# Patient Record
Sex: FEMALE | Race: BLACK OR AFRICAN AMERICAN | Employment: OTHER | URBAN - METROPOLITAN AREA
[De-identification: names, ages, dates, MRNs, and addresses within clinical notes are randomized per-mention and may not be internally consistent; named-entity substitution may affect disease eponyms.]

---

## 2017-07-20 ENCOUNTER — HOSPITAL ENCOUNTER (OUTPATIENT)
Dept: MAMMOGRAPHY | Age: 67
Discharge: HOME | End: 2017-07-20
Attending: INTERNAL MEDICINE
Payer: COMMERCIAL

## 2017-07-20 DIAGNOSIS — Z12.31 VISIT FOR SCREENING MAMMOGRAM: ICD-10-CM

## 2017-07-20 PROCEDURE — 77063 BREAST TOMOSYNTHESIS BI: CPT

## 2018-07-31 ENCOUNTER — HOSPITAL ENCOUNTER (OUTPATIENT)
Dept: MAMMOGRAPHY | Age: 68
Discharge: HOME | End: 2018-07-31
Attending: OBSTETRICS & GYNECOLOGY
Payer: COMMERCIAL

## 2018-07-31 DIAGNOSIS — Z12.31 VISIT FOR SCREENING MAMMOGRAM: ICD-10-CM

## 2018-07-31 PROCEDURE — 77063 BREAST TOMOSYNTHESIS BI: CPT

## 2019-08-09 ENCOUNTER — HOSPITAL ENCOUNTER (OUTPATIENT)
Dept: MAMMOGRAPHY | Age: 69
Discharge: HOME | End: 2019-08-09
Attending: INTERNAL MEDICINE
Payer: COMMERCIAL

## 2019-08-09 DIAGNOSIS — Z12.31 VISIT FOR SCREENING MAMMOGRAM: ICD-10-CM

## 2019-08-09 PROCEDURE — 77063 BREAST TOMOSYNTHESIS BI: CPT

## 2020-09-22 ENCOUNTER — HOSPITAL ENCOUNTER (OUTPATIENT)
Dept: MAMMOGRAPHY | Age: 70
Discharge: HOME | End: 2020-09-22
Attending: INTERNAL MEDICINE
Payer: COMMERCIAL

## 2020-09-22 DIAGNOSIS — Z12.31 VISIT FOR SCREENING MAMMOGRAM: ICD-10-CM

## 2020-09-22 PROCEDURE — 77063 BREAST TOMOSYNTHESIS BI: CPT

## 2021-09-23 ENCOUNTER — TRANSCRIBE ORDER (OUTPATIENT)
Dept: SCHEDULING | Age: 71
End: 2021-09-23

## 2021-09-23 DIAGNOSIS — Z12.31 VISIT FOR SCREENING MAMMOGRAM: Primary | ICD-10-CM

## 2021-09-24 ENCOUNTER — HOSPITAL ENCOUNTER (OUTPATIENT)
Dept: MAMMOGRAPHY | Age: 71
Discharge: HOME | End: 2021-09-24
Attending: INTERNAL MEDICINE
Payer: COMMERCIAL

## 2021-09-24 DIAGNOSIS — Z12.31 VISIT FOR SCREENING MAMMOGRAM: ICD-10-CM

## 2021-09-24 PROCEDURE — 77063 BREAST TOMOSYNTHESIS BI: CPT

## 2022-01-13 ENCOUNTER — TRANSCRIBE ORDER (OUTPATIENT)
Dept: SCHEDULING | Age: 72
End: 2022-01-13

## 2022-01-13 DIAGNOSIS — M48.061 LUMBAR SPINAL STENOSIS: Primary | ICD-10-CM

## 2022-01-16 ENCOUNTER — HOSPITAL ENCOUNTER (OUTPATIENT)
Age: 72
Discharge: HOME | End: 2022-01-16
Attending: PHYSICIAN ASSISTANT
Payer: MEDICARE

## 2022-01-16 DIAGNOSIS — M48.061 LUMBAR SPINAL STENOSIS: ICD-10-CM

## 2022-01-16 PROCEDURE — 72148 MRI LUMBAR SPINE W/O DYE: CPT

## 2022-04-05 ENCOUNTER — TRANSCRIBE ORDER (OUTPATIENT)
Dept: SCHEDULING | Age: 72
End: 2022-04-05

## 2022-04-05 DIAGNOSIS — G81.92: ICD-10-CM

## 2022-04-05 DIAGNOSIS — R53.1 WEAKNESS: Primary | ICD-10-CM

## 2022-04-12 ENCOUNTER — HOSPITAL ENCOUNTER (OUTPATIENT)
Dept: CT IMAGING | Age: 72
Discharge: HOME | End: 2022-04-12
Attending: NURSE PRACTITIONER
Payer: MEDICARE

## 2022-04-12 DIAGNOSIS — R53.1 WEAKNESS: ICD-10-CM

## 2022-04-12 DIAGNOSIS — G81.92: ICD-10-CM

## 2022-04-12 PROCEDURE — 70450 CT HEAD/BRAIN W/O DYE: CPT

## 2022-06-22 RX ORDER — LATANOPROST 50 UG/ML
1 SOLUTION/ DROPS OPHTHALMIC EVERY EVENING
COMMUNITY
Start: 2021-03-11 | End: 2022-09-13

## 2022-06-22 RX ORDER — BISMUTH SUBSALICYLATE 262 MG
1 TABLET,CHEWABLE ORAL DAILY
COMMUNITY

## 2022-06-22 RX ORDER — METFORMIN HYDROCHLORIDE 500 MG/1
500 TABLET ORAL DAILY
COMMUNITY

## 2022-06-22 RX ORDER — TRIAMTERENE AND HYDROCHLOROTHIAZIDE 37.5; 25 MG/1; MG/1
1 CAPSULE ORAL DAILY
COMMUNITY
End: 2022-10-04

## 2022-06-22 RX ORDER — NAPROXEN SODIUM 220 MG
1 TABLET ORAL 2 TIMES DAILY
COMMUNITY

## 2022-06-22 RX ORDER — CYCLOBENZAPRINE HCL 5 MG
5 TABLET ORAL
COMMUNITY

## 2022-06-22 RX ORDER — PREGABALIN 75 MG/1
75 CAPSULE ORAL 2 TIMES DAILY
COMMUNITY
End: 2022-09-13

## 2022-06-22 RX ORDER — SIMVASTATIN 40 MG/1
40 TABLET, FILM COATED ORAL
COMMUNITY
End: 2022-10-04

## 2022-06-23 ENCOUNTER — ANESTHESIA EVENT (OUTPATIENT)
Dept: ENDOSCOPY | Age: 72
End: 2022-06-23
Payer: MEDICARE

## 2022-06-24 ENCOUNTER — ANESTHESIA (OUTPATIENT)
Dept: ENDOSCOPY | Age: 72
End: 2022-06-24
Payer: MEDICARE

## 2022-06-24 ENCOUNTER — HOSPITAL ENCOUNTER (OUTPATIENT)
Age: 72
Setting detail: OUTPATIENT SURGERY
Discharge: HOME | End: 2022-06-24
Attending: INTERNAL MEDICINE | Admitting: INTERNAL MEDICINE
Payer: MEDICARE

## 2022-06-24 VITALS
TEMPERATURE: 97.1 F | WEIGHT: 178.4 LBS | OXYGEN SATURATION: 99 % | HEIGHT: 66 IN | HEART RATE: 79 BPM | RESPIRATION RATE: 12 BRPM | SYSTOLIC BLOOD PRESSURE: 117 MMHG | DIASTOLIC BLOOD PRESSURE: 60 MMHG | BODY MASS INDEX: 28.67 KG/M2

## 2022-06-24 LAB — GLUCOSE BLD STRIP.AUTO-MCNC: 109 MG/DL (ref 70–110)

## 2022-06-24 PROCEDURE — 99100 ANES PT EXTEME AGE<1 YR&>70: CPT | Performed by: ANESTHESIOLOGY

## 2022-06-24 PROCEDURE — 2709999900 HC NON-CHARGEABLE SUPPLY: Performed by: INTERNAL MEDICINE

## 2022-06-24 PROCEDURE — 76060000031 HC ANESTHESIA FIRST 0.5 HR: Performed by: INTERNAL MEDICINE

## 2022-06-24 PROCEDURE — 74011250637 HC RX REV CODE- 250/637: Performed by: NURSE ANESTHETIST, CERTIFIED REGISTERED

## 2022-06-24 PROCEDURE — 00812 ANES LWR INTST SCR COLSC: CPT | Performed by: NURSE ANESTHETIST, CERTIFIED REGISTERED

## 2022-06-24 PROCEDURE — 82962 GLUCOSE BLOOD TEST: CPT

## 2022-06-24 PROCEDURE — 76040000019: Performed by: INTERNAL MEDICINE

## 2022-06-24 PROCEDURE — 74011250636 HC RX REV CODE- 250/636: Performed by: NURSE ANESTHETIST, CERTIFIED REGISTERED

## 2022-06-24 PROCEDURE — 00812 ANES LWR INTST SCR COLSC: CPT | Performed by: ANESTHESIOLOGY

## 2022-06-24 PROCEDURE — 99100 ANES PT EXTEME AGE<1 YR&>70: CPT | Performed by: NURSE ANESTHETIST, CERTIFIED REGISTERED

## 2022-06-24 PROCEDURE — 77030008565 HC TBNG SUC IRR ERBE -B: Performed by: INTERNAL MEDICINE

## 2022-06-24 RX ORDER — INSULIN LISPRO 100 [IU]/ML
INJECTION, SOLUTION INTRAVENOUS; SUBCUTANEOUS ONCE
Status: CANCELLED | OUTPATIENT
Start: 2022-06-24 | End: 2022-06-24

## 2022-06-24 RX ORDER — INSULIN LISPRO 100 [IU]/ML
INJECTION, SOLUTION INTRAVENOUS; SUBCUTANEOUS ONCE
Status: DISCONTINUED | OUTPATIENT
Start: 2022-06-24 | End: 2022-06-24 | Stop reason: HOSPADM

## 2022-06-24 RX ORDER — FAMOTIDINE 20 MG/1
20 TABLET, FILM COATED ORAL ONCE
Status: COMPLETED | OUTPATIENT
Start: 2022-06-24 | End: 2022-06-24

## 2022-06-24 RX ORDER — SODIUM CHLORIDE 0.9 % (FLUSH) 0.9 %
5-40 SYRINGE (ML) INJECTION EVERY 8 HOURS
Status: CANCELLED | OUTPATIENT
Start: 2022-06-24

## 2022-06-24 RX ORDER — PROPOFOL 10 MG/ML
INJECTION, EMULSION INTRAVENOUS AS NEEDED
Status: DISCONTINUED | OUTPATIENT
Start: 2022-06-24 | End: 2022-06-24 | Stop reason: HOSPADM

## 2022-06-24 RX ORDER — MAGNESIUM SULFATE 100 %
4 CRYSTALS MISCELLANEOUS AS NEEDED
Status: CANCELLED | OUTPATIENT
Start: 2022-06-24

## 2022-06-24 RX ORDER — SODIUM CHLORIDE, SODIUM LACTATE, POTASSIUM CHLORIDE, CALCIUM CHLORIDE 600; 310; 30; 20 MG/100ML; MG/100ML; MG/100ML; MG/100ML
25 INJECTION, SOLUTION INTRAVENOUS CONTINUOUS
Status: DISCONTINUED | OUTPATIENT
Start: 2022-06-24 | End: 2022-06-24 | Stop reason: HOSPADM

## 2022-06-24 RX ORDER — SODIUM CHLORIDE 0.9 % (FLUSH) 0.9 %
5-40 SYRINGE (ML) INJECTION AS NEEDED
Status: CANCELLED | OUTPATIENT
Start: 2022-06-24

## 2022-06-24 RX ORDER — ONDANSETRON 2 MG/ML
4 INJECTION INTRAMUSCULAR; INTRAVENOUS ONCE
Status: CANCELLED | OUTPATIENT
Start: 2022-06-24 | End: 2022-06-24

## 2022-06-24 RX ADMIN — FAMOTIDINE 20 MG: 20 TABLET ORAL at 10:21

## 2022-06-24 RX ADMIN — SODIUM CHLORIDE, POTASSIUM CHLORIDE, SODIUM LACTATE AND CALCIUM CHLORIDE 25 ML/HR: 600; 310; 30; 20 INJECTION, SOLUTION INTRAVENOUS at 10:27

## 2022-06-24 RX ADMIN — PROPOFOL 50 MG: 10 INJECTION, EMULSION INTRAVENOUS at 11:06

## 2022-06-24 RX ADMIN — PROPOFOL 80 MG: 10 INJECTION, EMULSION INTRAVENOUS at 11:03

## 2022-06-24 NOTE — ANESTHESIA POSTPROCEDURE EVALUATION
Procedure(s):  COLONOSCOPY. MAC    Anesthesia Post Evaluation      Multimodal analgesia: multimodal analgesia used between 6 hours prior to anesthesia start to PACU discharge  Patient location during evaluation: bedside  Patient participation: complete - patient participated  Level of consciousness: awake  Pain management: adequate  Airway patency: patent  Anesthetic complications: no  Cardiovascular status: stable  Respiratory status: acceptable  Hydration status: acceptable  Post anesthesia nausea and vomiting:  controlled  Final Post Anesthesia Temperature Assessment:  Normothermia (36.0-37.5 degrees C)      INITIAL Post-op Vital signs:   Vitals Value Taken Time   /60 06/24/22 1144   Temp     Pulse 79 06/24/22 1149   Resp 12 06/24/22 1149   SpO2 99 % 06/24/22 1149   Vitals shown include unvalidated device data.

## 2022-06-24 NOTE — ANESTHESIA PREPROCEDURE EVALUATION
Relevant Problems   No relevant active problems       Anesthetic History     PONV          Review of Systems / Medical History  Patient summary reviewed, nursing notes reviewed and pertinent labs reviewed    Pulmonary  Within defined limits                 Neuro/Psych   Within defined limits           Cardiovascular    Hypertension              Exercise tolerance: >4 METS     GI/Hepatic/Renal  Within defined limits              Endo/Other    Diabetes: type 2         Other Findings              Physical Exam    Airway  Mallampati: II  TM Distance: 4 - 6 cm  Neck ROM: normal range of motion   Mouth opening: Normal     Cardiovascular  Regular rate and rhythm,  S1 and S2 normal,  no murmur, click, rub, or gallop  Rhythm: regular  Rate: normal         Dental  No notable dental hx       Pulmonary  Breath sounds clear to auscultation               Abdominal  GI exam deferred       Other Findings            Anesthetic Plan    ASA: 3  Anesthesia type: MAC          Induction: Intravenous  Anesthetic plan and risks discussed with: Patient

## 2022-06-24 NOTE — H&P
[facility]   [email or web address]      Impression:   1. FHx of colon CA      Plan:   colo  1. Chief Complaint:   FHx of colon CA    HPI:  Kobi Shoemaker is a 66 y.o. female who is being seen on consult for the above. No Sxs, last colo > 5 yr ago. Mago Neighbours PMH:   Past Medical History:   Diagnosis Date    Diabetes ([facility] [address]     NIDDM    Hypertension     Nausea & vomiting     Surgery only not colonoscopy       PSH:   Past Surgical History:   Procedure Laterality Date    HX COLONOSCOPY      HX TUBAL LIGATION         Social HX:   Social History     Socioeconomic History    Marital status: SINGLE     Spouse name: Not on file    Number of children: Not on file    Years of education: Not on file    Highest education level: Not on file   Occupational History    Not on file   Tobacco Use    Smoking status: Never Smoker    Smokeless tobacco: Never Used   Vaping Use    Vaping Use: Never used   Substance and Sexual Activity    Alcohol use: Never    Drug use: Never    Sexual activity: Not on file   Other Topics Concern    Not on file   Social History Narrative    Not on file     Social Determinants of Health     Financial Resource Strain:     Difficulty of Paying Living Expenses: Not on file   Food Insecurity:     Worried About [address] in the Last Year: Not on file    [address] in the Last Year: Not on file   Transportation Needs:     Lack of Transportation (Medical): Not on file    Lack of Transportation (Non-Medical):  Not on file   Physical Activity:     Days of Exercise per Week: Not on file    Minutes of Exercise per Session: Not on file   Stress:     Feeling of Stress : Not on file   Social Connections:     Frequency of Communication with Friends and Family: Not on file    Frequency of Social Gatherings with Friends and Family: Not on file    Attends Protestant Services: Not on file    Active Member of Clubs or Organizations: Not on file    Attends Club or Organization Meetings: Not on file    Marital Status: Not on file   Intimate Partner Violence:     Fear of Current or Ex-Partner: Not on file    Emotionally Abused: Not on file    Physically Abused: Not on file    Sexually Abused: Not on file   Housing Stability:     Unable to Pay for Housing in the Last Year: Not on file    Number of Aly in the Last Year: Not on file    Unstable Housing in the Last Year: Not on file       FHX:   Family History   Problem Relation Age of Onset    Breast Cancer Mother 80       Allergy:   Allergies   Allergen Reactions    Codeine Nausea and Vomiting    Penicillin G Swelling       Home Medications:     Medications Prior to Admission   Medication Sig    metFORMIN (GLUCOPHAGE) 500 mg tablet Take 500 mg by mouth daily.  simvastatin (ZOCOR) 40 mg tablet Take 40 mg by mouth nightly.  triamterene-hydroCHLOROthiazide (DYAZIDE) 37.5-25 mg per capsule Take 1 Capsule by mouth daily.  latanoprost (XALATAN) 0.005 % ophthalmic solution Administer 1 Drop to both eyes every evening.  pregabalin (Lyrica) 75 mg capsule Take 75 mg by mouth two (2) times a day.  naproxen sodium (NAPROSYN) 220 mg tablet Take 1 Tablet by mouth two (2) times a day.  cyclobenzaprine (FLEXERIL) 5 mg tablet Take 5 mg by mouth three (3) times daily as needed.  multivitamin (ONE A DAY) tablet Take 1 Tablet by mouth daily. Review of Systems:     Constitutional: No fevers, chills, weight loss, fatigue. Skin: No rashes, pruritis, jaundice, ulcerations, erythema. HENT: No headaches, nosebleeds, sinus pressure, rhinorrhea, sore throat. Eyes: No visual changes, blurred vision, eye pain, photophobia, jaundice. Cardiovascular: No chest pain, heart palpitations. Respiratory: No cough, SOB, wheezing, chest discomfort, orthopnea. Gastrointestinal: neg   Genitourinary: No dysuria, bleeding, discharge, pyuria.    Musculoskeletal: No weakness, arthralgias, wasting. Endo: No sweats. Heme: No bruising, easy bleeding. Allergies: As noted. Neurological: Cranial nerves intact. Alert and oriented. Gait not assessed. Psychiatric:  No anxiety, depression, hallucinations. Visit Vitals  Ht 5' 6\" (1.676 m)   Wt 82.1 kg (181 lb)   BMI 29.21 kg/m²       Physical Assessment:     constitutional: appearance: well developed, well nourished, normal habitus, no deformities, in no acute distress. skin: inspection: no rashes, ulcers, icterus or other lesions; no clubbing or telangiectasias. palpation: no induration or subcutaneos nodules. eyes: inspection: normal conjunctivae and lids; no jaundice pupils: symmetrical, normoreactive to light, normal accommodation and size. ENMT: mouth: normal oral mucosa,lips and gums; good dentition. oropharynx: normal tongue, hard and soft palate; posterior pharynx without erithema, exudate or lesions. neck: thyroid: normal size, consistency and position; no masses or tenderness. respiratory: effort: normal chest excursion; no intercostal retraction or accessory muscle use. cardiovascular: abdominal aorta: normal size and position; no bruits. palpation: PMI of normal size and position; normal rhythm; no thrill or murmurs. abdominal: abdomen: normal consistency; no tenderness or masses. hernias: no hernias appreciated. liver: normal size and consistency. spleen: not palpable. rectal: hemoccult/guaiac: not performed. musculoskeletal: digits and nails: no clubbing, cyanosis, petechiae or other inflammatory conditions. gait: normal gait and station head and neck: normal range of motion; no pain, crepitation or contracture. spine/ribs/pelvis: normal range of motion; no pain, deformity or contracture. lymphatic: axilae: not palpable. groin: not palpable. neck: within normal limits. other: not palpable. neurologic: cranial nerves: II-XII normal.   psychiatric: judgement/insight: within normal limits.  memory: within normal limits for recent and remote events. mood and affect: no evidence of depression, anxiety or agitation. orientation: oriented to time, space and person. Basic Metabolic Profile   No results for input(s): NA, K, CL, CO2, BUN, GLU, CA, MG, PHOS in the last 72 hours. No lab exists for component: CREAT      CBC w/Diff    No results for input(s): WBC, RBC, HGB, HCT, MCV, MCH, MCHC, RDW, PLT, HGBEXT, HCTEXT, PLTEXT in the last 72 hours. No lab exists for component: MPV No results for input(s): GRANS, LYMPH, EOS, PRO, MYELO, METAS, BLAST in the last 72 hours. No lab exists for component: MONO, BASO     Hepatic Function   No results for input(s): ALB, TP, TBILI, AP, AML, LPSE in the last 72 hours. No lab exists for component: DBILI, GPT, SGOT       Francis Caraballo MD, M.D. [facility], [address] [place] [address]  [email or web address]. [facility]

## 2022-06-24 NOTE — DISCHARGE INSTRUCTIONS
Colonoscopy: What to Expect at [address] [place] [address]  After a colonoscopy, you'll stay at the clinic until you wake up. Then you can go home. But you'll need to arrange for a ride. Your doctor will tell you when you can eat and do your other usual activities. Your doctor will talk to you about when you'll need your next colonoscopy. Your doctor can help you decide how often you need to be checked. This will depend on the results of your test and your risk for colorectal cancer. After the test, you may be bloated or have gas pains. You may need to pass gas. If a biopsy was done or a polyp was removed, you may have streaks of blood in your stool (feces) for a few days. Problems such as heavy rectal bleeding may not occur until several weeks after the test. This isn't common. But it can happen after polyps are removed. This care sheet gives you a general idea about how long it will take for you to recover. But each person recovers at a different pace. Follow the steps below to get better as quickly as possible. How can you care for yourself at home? Activity    · Rest when you feel tired. · You can do your normal activities when it feels okay to do so. Diet    · Follow your doctor's directions for eating. · Unless your doctor has told you not to, drink plenty of fluids. This helps to replace the fluids that were lost during the colon prep. · Do not drink alcohol. Medicines    · Your doctor will tell you if and when you can restart your medicines. You will also be given instructions about taking any new medicines. · If you take aspirin or some other blood thinner, ask your doctor if and when to start taking it again. Make sure that you understand exactly what your doctor wants you to do. · If polyps were removed or a biopsy was done during the test, your doctor may tell you not to take aspirin or other anti-inflammatory medicines for a few days.  These include ibuprofen (Advil, Motrin) and naproxen (Aleve). Other instructions    · For your safety, do not drive or operate machinery until the medicine wears off and you can think clearly. Your doctor may tell you not to drive or operate machinery until the day after your test.     · Do not sign legal documents or make major decisions until the medicine wears off and you can think clearly. The anesthesia can make it hard for you to fully understand what you are agreeing to. Follow-up care is a key part of your treatment and safety. Be sure to make and go to all appointments, and call your doctor if you are having problems. It's also a good idea to know your test results and keep a list of the medicines you take. When should you call for help? Call 911 anytime you think you may need emergency care. For example, call if:    · You passed out (lost consciousness). · You pass maroon or bloody stools. · You have trouble breathing. Call your doctor now or seek immediate medical care if:    · You have pain that does not get better after you take pain medicine. · You are sick to your stomach or cannot drink fluids. · You have new or worse belly pain. · You have blood in your stools. · You have a fever. · You cannot pass stools or gas. Watch closely for changes in your health, and be sure to contact your doctor if you have any problems. Where can you learn more? Go to http://www.gray.com/  Enter E264 in the search box to learn more about \"Colonoscopy: What to Expect at Home. \"  Current as of: September 8, 2021               Content Version: 13.2  © 2006-2022 Healthwise, Incorporated. Care instructions adapted under license by [employer] (which disclaims liability or warranty for this information).  If you have questions about a medical condition or this instruction, always ask your healthcare professional. [place] [address] any warranty or liability for your use of this information. DISCHARGE SUMMARY from Nurse     POST-PROCEDURE INSTRUCTIONS:    Call your Physician if you:  ? Observe any excess bleeding. ? Develop a temperature over 100.5o F.  ? Experience abdominal, shoulder or chest pain. ? Notice any signs of decreased circulation or nerve impairment to an extremity such as a change in color, persistent numbness, tingling, coldness or increase in pain. ? Vomit blood or you have nausea and vomiting lasting longer than 4 hours. ? Are unable to take medications. ? Are unable to urinate within 8 hours after discharge following general anesthesia or intravenous sedation. For the next 24 hours after receiving general anesthesia or intravenous sedation, or while taking prescription Narcotics, limit your activities:  ? Do NOT drive a motor vehicle, operate hazard machinery or power tools, or perform tasks that require coordination. The medication you received during your procedure may have some effect on your mental awareness. ? Do NOT make important personal or business decisions. The medication you received during your procedure may have some effect on your mental awareness. ? Do NOT drink alcoholic beverages. These drinks do not mix well with the medications that have been given to you. ? Upon discharge from the hospital, you must be accompanied by a responsible adult. ? Resume your diet as directed by your physician. ? Resume medications as your physician has prescribed. ? Please give a list of your current medications to your Primary Care Provider. ? Please update this list whenever your medications are discontinued, doses are changed, or new medications (including over-the-counter products) are added. ? Please carry medication information at all times in case of emergency situations. These are general instructions for a healthy lifestyle:    No smoking/ No tobacco products/ Avoid exposure to second hand smoke.    Surgeon General's Warning: Quitting smoking now greatly reduces serious risk to your health. Obesity, smoking, and a sedentary lifestyle greatly increase your risk for illness.  A healthy diet, regular physical exercise & weight monitoring are important for maintaining a healthy lifestyle   You may be retaining fluid if you have a history of heart failure or if you experience any of the following symptoms:  Weight gain of 3 pounds or more overnight or 5 pounds in a week, increased swelling in our hands or feet or shortness of breath while lying flat in bed. Please call your doctor as soon as you notice any of these symptoms; do not wait until your next office visit. Recognize signs and symptoms of STROKE:  F  -  Face looks uneven  A  -  Arms unable to move or move unevenly  S  -  Speech slurred or non-existent  T  -  Time to call 911 - as soon as signs and symptoms begin - DO NOT go back to bed or wait to see If you get better - TIME IS BRAIN. Colorectal Screening   Colorectal cancer almost always develops from precancerous polyps (abnormal growths) in the colon or rectum. Screening tests can find precancerous polyps, so that they can be removed before they turn into cancer. Screening tests can also find colorectal cancer early, when treatment works best.  Hilda Manual Speak with your physician about when you should begin screening and how often you should be tested. [employer] Activation    Thank you for requesting access to [employer]. Please follow the instructions below to securely access and download your online medical record. [employer] allows you to send messages to your doctor, view your test results, renew your prescriptions, schedule appointments, and more. How Do I Sign Up? 1. In your internet browser, go to [email or web address]. 2. Click on the First Time User? Click Here link in the Sign In box. You will see the New Member Sign Up page.   3. Enter your [employer] Access Code exactly as it appears below. You will not need to use this code after youve completed the sign-up process. If you do not sign up before the expiration date, you must request a new code. [employer] Access Code: Activation code not generated  Current [employer] Status: Active (This is the date your [employer] access code will )    4. Enter the last four digits of your Social Security Number (xxxx) and Date of Birth (mm/dd/yyyy) as indicated and click Submit. You will be taken to the next sign-up page. 5. Create a [employer] ID. This will be your [employer] login ID and cannot be changed, so think of one that is secure and easy to remember. 6. Create a [employer] password. You can change your password at any time. 7. Enter your Password Reset Question and Answer. This can be used at a later time if you forget your password. 8. Enter your e-mail address. You will receive e-mail notification when new information is available in [address]. 9. Click Sign Up. You can now view and download portions of your medical record. 10. Click the Download Summary menu link to download a portable copy of your medical information. Additional Information    If you have questions, please call [phone]. Remember, [employer] is NOT to be used for urgent needs. For medical emergencies, dial 911. Educational references and/or instructions provided during this visit included:    See Attached      APPOINTMENTS:    Per MD Instruction    Discharge information has been reviewed with the patient. The patient verbalized understanding.

## 2022-07-19 ENCOUNTER — TRANSCRIBE ORDER (OUTPATIENT)
Dept: SCHEDULING | Age: 72
End: 2022-07-19

## 2022-07-19 DIAGNOSIS — Z13.820 SCREENING FOR OSTEOPOROSIS: Primary | ICD-10-CM

## 2022-07-19 DIAGNOSIS — M48.061 LUMBAR SPINAL STENOSIS: Primary | ICD-10-CM

## 2022-07-19 DIAGNOSIS — Z79.83 ENCOUNTER FOR LONG-TERM USE OF BIOPHOSPHONATES: ICD-10-CM

## 2022-07-21 ENCOUNTER — HOSPITAL ENCOUNTER (OUTPATIENT)
Dept: PHYSICAL THERAPY | Age: 72
Discharge: HOME | End: 2022-07-21
Payer: MEDICARE

## 2022-07-21 PROCEDURE — 97162 PT EVAL MOD COMPLEX 30 MIN: CPT

## 2022-07-21 PROCEDURE — 97110 THERAPEUTIC EXERCISES: CPT

## 2022-07-21 NOTE — PROGRESS NOTES
PT DAILY TREATMENT NOTE     Patient Name: Arnie Bell  Date:2022  : 1950  [x]  Patient  Verified  Payor: Viky Gar / Plan: [place] & B / Product Type: Medicare /    In time:12:00  Out time:12:38  Total Treatment Time (min): 38  Visit #: 1 of 8    Medicare/BCBS Only   Total Timed Codes (min):  18 1:1 Treatment Time:  38       Treatment Area: Other low back pain [M54.59]    SUBJECTIVE  Pain Level (0-10 scale): 6  Any medication changes, allergies to medications, adverse drug reactions, diagnosis change, or new procedure performed?: [x] No    [] Yes (see summary sheet for update)  Subjective functional status/changes:   [] No changes reported  The pt reports difficulty with standing and falls    OBJECTIVE    20 min [x]Eval                  []Re-Eval       10 min Therapeutic Exercise:  [] See flow sheet :   Rationale: increase ROM and increase strength to improve the patients ability to perform ADLs    8 min Therapeutic Activity:  []  See flow sheet :   Rationale:  pt education and instruction   to improve the patients ability to self manage symptoms            With   [] TE   [] TA   [] neuro   [] other: Patient Education: [x] Review HEP    [] Progressed/Changed HEP based on:   [] positioning   [] body mechanics   [] transfers   [] heat/ice application    [] other:      Other Objective/Functional Measures:      Pain Level (0-10 scale) post treatment: 6    ASSESSMENT/Changes in Function: see POC    Patient will continue to benefit from skilled PT services to modify and progress therapeutic interventions, address functional mobility deficits, address ROM deficits, address strength deficits, analyze and address soft tissue restrictions, analyze and cue movement patterns, analyze and modify body mechanics/ergonomics, and address imbalance/dizziness to attain remaining goals.      [x]  See Plan of Care  []  See progress note/recertification  []  See Discharge Summary         Progress towards goals / Updated goals:  Short Term Goals: To be accomplished in 2 weeks:  Pt will demonstrate I and compliance with HEP to maximize therapeutic effect. IE: HEP issued and instructed  Pt will demonstrate Rhomberg stance for 30\" without LOB to improve proprioception with ADLs. IE: challenged without UE support  Long Term Goals: To be accomplished in 4 weeks:  Pt will demonstrate B hip flexion strength 4+/5 to improve ease of transfers. IE: 4/5  Pt will demonstrate B hip abduction strength 4/5 to improve stability with standing. IE: 3/5 right  4-/5 left  Pt will report no falls for 2 weeks to improve safety in home and community. IE: fall 1 week ago  Pt will improve FOTO score to 51 to demonstrate improved quality of life and function.   IE: 28    PLAN  []  Upgrade activities as tolerated     [x]  Continue plan of care  []  Update interventions per flow sheet       []  Discharge due to:_  []  Other:_      [place] Client DPT CMTPT 7/21/2022  2:35 PM    Future Appointments   Date Time Provider Kelley Flores   7/26/2022  2:15 PM [facility], PTA [facility]   7/29/2022  8:30 AM [facility] BONE DEXA  1 [facility]   7/29/2022 10:30 AM [facility], PTA [facility]   8/1/2022 12:30 PM [employer], DPT [facility]   8/3/2022 10:15 AM [facility], DPT [facility]   8/8/2022  9:45 AM [facility], PTA [facility]   8/10/2022  9:45 AM Page [facility], PTA [facility]   8/15/2022 10:45 AM Hong Swanson [facility]

## 2022-07-21 NOTE — PROGRESS NOTES
[facility]  [address]  [place], [address]  [phone] fax    Plan of Care/ Statement of Necessity for Physical Therapy Services    Patient name: Katharina Gant Start of Care: 2022   Referral source: Mario Cornejo : 1950    Medical Diagnosis: Other low back pain [M54.59]  Payor: Christin Greco / Plan: [place] MEDICARE PART A & B / Product Type: Medicare /  Onset Date: (with exacerbations in  and )    Treatment Diagnosis: B LE weakness and instability with gait   Prior Hospitalization: see medical history Provider#: [id number]   Medications: Verified on Patient summary List    Comorbidities: lumbar stenosis, HTN, diabetes   Prior Level of Function: Pt with improved ambulation ease and no falls with ambulation      The Plan of Care and following information is based on the information from the initial evaluation. Assessment/ key information: The pt is a 67 y/o F presenting with c/o B LE weakness and instability with gait for the past year. She reports initial onset around  and was treated at this clinic with good results. Since that time she reports exacerbation in  and then again in  leading to her current state. She has had progression of numbness to now include bilateral LE from knee down. Pt reports new onset of falling due to LE weakness and also UE weakness onset that limits her ability to react. Fairly good UE strength noted though biceps/triceps slightly decreased with pt also reporting intermittent hand numbness throughout the day. Pt is known to have lumbar stenosis contributing to condition, she reports no cervical or brain work up performed though. Pt does demonstrate limited hip strength bilaterally right > left with decreased light touch right > left shank also. Pt reports inability to stand for > 5 minutes without burning back pain and LE weakness. Resulting in multiple falls over the last month. She would like to improve LE strength to improve ambulation and ADLs without falling, while awaiting surgical consult. Pt would benefit from PT to improve ROM, strength and safety with ADLs. Evaluation Complexity History MEDIUM  Complexity : 1-2 comorbidities / personal factors will impact the outcome/ POC ; Examination HIGH Complexity : 4+ Standardized tests and measures addressing body structure, function, activity limitation and / or participation in recreation  ;Presentation MEDIUM Complexity : Evolving with changing characteristics  ; Clinical Decision Making MEDIUM Complexity : FOTO score of 26-74  Overall Complexity Rating: MEDIUM  Problem List: pain affecting function, decrease ROM, decrease strength, impaired gait/ balance, decrease ADL/ functional abilitiies, decrease activity tolerance, and decrease flexibility/ joint mobility   Treatment Plan may include any combination of the following: Therapeutic exercise, Therapeutic activities, Neuromuscular re-education, Physical agent/modality, Gait/balance training, Manual therapy, Patient education, Self Care training, Functional mobility training, and Home safety training  Patient / Family readiness to learn indicated by: asking questions, trying to perform skills, and interest  Persons(s) to be included in education: patient (P)  Barriers to Learning/Limitations: None  Patient Goal (s): Work on weakness to stop falling  Patient Self Reported Health Status: good  Rehabilitation Potential: fair-good    Short Term Goals: To be accomplished in 2 weeks:  Pt will demonstrate I and compliance with HEP to maximize therapeutic effect. Pt will demonstrate Rhomberg stance for 30\" without LOB to improve proprioception with ADLs. Long Term Goals: To be accomplished in 4 weeks:  Pt will demonstrate B hip flexion strength 4+/5 to improve ease of transfers. Pt will demonstrate B hip abduction strength 4/5 to improve stability with standing.   Pt will report no falls for 2 weeks to improve safety in home and community. Pt will improve FOTO score to 51 to demonstrate improved quality of life and function. Frequency / Duration: Patient to be seen 2 times per week for 4 weeks. Patient/ Caregiver education and instruction: Diagnosis, prognosis, self care, activity modification, and exercises   [x]  Plan of care has been reviewed with PTA    Certification Period: 7/21/2022 to 8/19/2022  Ayala Coffey DPT CMTPT 7/21/2022 12:47 PM    ________________________________________________________________________    I certify that the above Therapy Services are being furnished while the patient is under my care. I agree with the treatment plan and certify that this therapy is necessary.     [de-identified] Signature:____________________  Date:____________Time: _________     Amna Parrish PA-C  Please sign and return to [facility] [address] [place] [address] & [address]  [address]  [place], [address]  [phone] fax

## 2022-07-26 ENCOUNTER — HOSPITAL ENCOUNTER (OUTPATIENT)
Dept: PHYSICAL THERAPY | Age: 72
Discharge: HOME | End: 2022-07-26
Payer: MEDICARE

## 2022-07-26 PROCEDURE — 97112 NEUROMUSCULAR REEDUCATION: CPT

## 2022-07-26 PROCEDURE — 97110 THERAPEUTIC EXERCISES: CPT

## 2022-07-26 NOTE — PROGRESS NOTES
PT DAILY TREATMENT NOTE     Patient Name: Flor Champion  Date:2022  : 1950  [x]  Patient  Verified  Payor: Manuel Chaney / Plan: [place] MEDICARE PART A & B / Product Type: Medicare /    In time:2:17  Out time:2:47  Total Treatment Time (min): 30  Visit #: 2 of 8    Medicare/BCBS Only   Total Timed Codes (min):  30 1:1 Treatment Time:  30       Treatment Area: Other low back pain [M54.59]    SUBJECTIVE  Pain Level (0-10 scale): 6/10  Any medication changes, allergies to medications, adverse drug reactions, diagnosis change, or new procedure performed?: [x] No    [] Yes (see summary sheet for update)  Subjective functional status/changes:   [] No changes reported  Pt reports no new complaints of pain. Pt reports no change since IE. Pt states she is compliant with HEP. OBJECTIVE    20 min Therapeutic Exercise:  [x] See flow sheet :   Rationale: increase ROM, increase strength, and improve coordination to improve the patients ability to perform ADLs with increased ease. 10 min Neuromuscular Re-education:  [x]  See flow sheet :   Rationale: increase strength, improve coordination, and increase proprioception  to improve the patients ability to perform ADLs with increased ease. With   [] TE   [] TA   [] neuro   [] other: Patient Education: [x] Review HEP    [] Progressed/Changed HEP based on:   [] positioning   [] body mechanics   [] transfers   [] heat/ice application    [] other:      Other Objective/Functional Measures: Initiated exercises as per flow sheet. Pain Level (0-10 scale) post treatment: 6/10    ASSESSMENT/Changes in Function: Pt had increased symptoms, LE weakness, with standing FA with upright posture for approximately 30 seconds.      Patient will continue to benefit from skilled PT services to modify and progress therapeutic interventions, address functional mobility deficits, address ROM deficits, address strength deficits, analyze and address soft tissue restrictions, analyze and cue movement patterns, analyze and modify body mechanics/ergonomics, and assess and modify postural abnormalities to attain remaining goals. []  See Plan of Care  []  See progress note/recertification  []  See Discharge Summary         Progress towards goals / Updated goals:  Short Term Goals: To be accomplished in 2 weeks:  Pt will demonstrate I and compliance with HEP to maximize therapeutic effect. IE: HEP issued and instructed  Current: Pt reports compliance with HEP. 07/26/2022  Pt will demonstrate Rhomberg stance for 30\" without LOB to improve proprioception with ADLs. IE: challenged without UE support  Current: continued UE support required with FA. 02/26/22  Long Term Goals: To be accomplished in 4 weeks:  Pt will demonstrate B hip flexion strength 4+/5 to improve ease of transfers. IE: 4/5  Pt will demonstrate B hip abduction strength 4/5 to improve stability with standing. IE: 3/5 right  4-/5 left  Pt will report no falls for 2 weeks to improve safety in home and community. IE: fall 1 week ago  Pt will improve FOTO score to 51 to demonstrate improved quality of life and function.   IE: 28    PLAN  []  Upgrade activities as tolerated     [x]  Continue plan of care  []  Update interventions per flow sheet       []  Discharge due to:_  []  Other:_      Umberto Mark PTA 7/26/2022  2:37 PM    Future Appointments   Date Time Provider Kelley Flores   7/29/2022  8:30 AM [facility] BONE DEXA RM 1 [facility]   7/29/2022 10:30 AM Eric Guthrie PTA [facility]   8/1/2022 12:30 PM Rani Pack DPT [facility]   8/3/2022 10:15 AM Rani Pack DPT [facility]   8/8/2022  9:45 AM Eric Guthrie PTA [facility]   8/10/2022  9:45 AM Eric Guthrie PTA [facility]   8/15/2022 10:45 AM Kelleen Soulier [facility]

## 2022-07-29 ENCOUNTER — HOSPITAL ENCOUNTER (OUTPATIENT)
Dept: BONE DENSITY | Age: 72
Discharge: HOME | End: 2022-07-29
Attending: PHYSICIAN ASSISTANT
Payer: MEDICARE

## 2022-07-29 ENCOUNTER — HOSPITAL ENCOUNTER (OUTPATIENT)
Dept: PHYSICAL THERAPY | Age: 72
Discharge: HOME | End: 2022-07-29
Payer: MEDICARE

## 2022-07-29 DIAGNOSIS — Z79.83 ENCOUNTER FOR LONG-TERM USE OF BIOPHOSPHONATES: ICD-10-CM

## 2022-07-29 PROCEDURE — 97112 NEUROMUSCULAR REEDUCATION: CPT

## 2022-07-29 PROCEDURE — 77080 DXA BONE DENSITY AXIAL: CPT

## 2022-07-29 PROCEDURE — 97110 THERAPEUTIC EXERCISES: CPT

## 2022-07-29 NOTE — PROGRESS NOTES
PT DAILY TREATMENT NOTE     Patient Name: Katharina Gant  Date:2022  : 1950  [x]  Patient  Verified  Payor: Christin Greco / Plan: [place] MEDICARE PART A & B / Product Type: Medicare /    In time:10:30  Out time:11:10  Total Treatment Time (min): 40  Visit #: 3 of 8    Medicare/BCBS Only   Total Timed Codes (min):  40 1:1 Treatment Time:  40       Treatment Area: Other low back pain [M54.59]    SUBJECTIVE  Pain Level (0-10 scale): 5/10  Any medication changes, allergies to medications, adverse drug reactions, diagnosis change, or new procedure performed?: [x] No    [] Yes (see summary sheet for update)  Subjective functional status/changes:   [] No changes reported  Pt reports her pain is better today. Pt reports continued compliance with HEP. OBJECTIVE    30 min Therapeutic Exercise:  [x] See flow sheet :   Rationale: increase ROM and increase strength to improve the patients ability to perform ADLs with increased ease. 10 min Neuromuscular Re-education:  [x]  See flow sheet :   Rationale: increase strength, improve coordination, and increase proprioception  to improve the patients ability to perform ADLs with increased ease. With   [] TE   [] TA   [] neuro   [] other: Patient Education: [x] Review HEP    [] Progressed/Changed HEP based on:   [] positioning   [] body mechanics   [] transfers   [] heat/ice application    [] other:      Other Objective/Functional Measures: Added standing HS curls. Pain Level (0-10 scale) post treatment: 5/10    ASSESSMENT/Changes in Function: Pt continues to demonstrate significant difficulty standing upright without UE support with increased reports of LBP and LE weakness. Pt has no significant change in symptoms post treatment.      Patient will continue to benefit from skilled PT services to modify and progress therapeutic interventions, address functional mobility deficits, address ROM deficits, address strength deficits, analyze and address soft tissue restrictions, analyze and cue movement patterns, analyze and modify body mechanics/ergonomics, and assess and modify postural abnormalities to attain remaining goals. []  See Plan of Care  []  See progress note/recertification  []  See Discharge Summary         Progress towards goals / Updated goals:  Short Term Goals: To be accomplished in 2 weeks:  Pt will demonstrate I and compliance with HEP to maximize therapeutic effect. IE: HEP issued and instructed  Current: Pt reports compliance with HEP. 07/26/2022  Pt will demonstrate Rhomberg stance for 30\" without LOB to improve proprioception with ADLs. IE: challenged without UE support  Current: continued UE support required with FA. 02/26/22  Long Term Goals: To be accomplished in 4 weeks:  Pt will demonstrate B hip flexion strength 4+/5 to improve ease of transfers. IE: 4/5  Pt will demonstrate B hip abduction strength 4/5 to improve stability with standing. IE: 3/5 right  4-/5 left  Pt will report no falls for 2 weeks to improve safety in home and community. IE: fall 1 week ago  Current: Pt reports no falls since IE. 07/29/2022  Pt will improve FOTO score to 51 to demonstrate improved quality of life and function.   IE: 28    PLAN  []  Upgrade activities as tolerated     [x]  Continue plan of care  []  Update interventions per flow sheet       []  Discharge due to:_  []  Other:_      Porsche Hernandez PTA 7/29/2022  10:37 AM    Future Appointments   Date Time Provider Kelley Flores   8/1/2022 12:30 PM Brooke Salas DPT [facility]   8/3/2022 10:15 AM Brooke Salas DPT [facility]   8/8/2022  9:45 AM Shama Abebe PTA [facility]   8/10/2022  9:45 AM Shama Abebe PTA [facility]   8/15/2022 10:45 AM Michael Schulz [facility]

## 2022-08-01 ENCOUNTER — TELEPHONE (OUTPATIENT)
Dept: PHYSICAL THERAPY | Age: 72
End: 2022-08-01

## 2022-08-01 ENCOUNTER — HOSPITAL ENCOUNTER (OUTPATIENT)
Dept: PHYSICAL THERAPY | Age: 72
Discharge: HOME | End: 2022-08-01
Payer: MEDICARE

## 2022-08-01 PROCEDURE — 97112 NEUROMUSCULAR REEDUCATION: CPT | Performed by: PHYSICAL THERAPIST

## 2022-08-01 PROCEDURE — 97110 THERAPEUTIC EXERCISES: CPT | Performed by: PHYSICAL THERAPIST

## 2022-08-01 NOTE — PROGRESS NOTES
PT DAILY TREATMENT NOTE     Patient Name: Estefanía Sofia  Date:2022  : 1950  [x]  Patient  Verified  Payor: VA MEDICARE / Plan: [place] MEDICARE PART A & B / Product Type: Medicare /    In time:1230  Out time:113  Total Treatment Time (min): 43  Visit #: 4 of 8    Medicare/BCBS Only   Total Timed Codes (min):  43 1:1 Treatment Time:  43       Treatment Area: Other low back pain [M54.59]    SUBJECTIVE  Pain Level (0-10 scale): 6/10  Any medication changes, allergies to medications, adverse drug reactions, diagnosis change, or new procedure performed?: [x] No    [] Yes (see summary sheet for update)  Subjective functional status/changes:   [] No changes reported  Pt reports she didn't take her flexeril before she came today like she normally does     OBJECTIVE    30 min Therapeutic Exercise:  [x] See flow sheet : progressed per flow sheet   Rationale: increase ROM and increase strength to improve the patients ability to improve pain and activity tolerance      13 min Neuromuscular Re-education:  [x]  See flow sheet : core activation ex's per flow sheet   Rationale: increase strength, improve coordination, and increase proprioception  to improve the patients ability to improve pain and activity tolerance     With   [] TE   [] TA   [] neuro   [] other: Patient Education: [x] Review HEP    [] Progressed/Changed HEP based on:   [] positioning   [] body mechanics   [] transfers   [] heat/ice application    [] other:      Other Objective/Functional Measures:     Access Code: [email or web address]  URL: [email or web address]  Date: 2022  Prepared by: Odessa Bring    Exercises  Supine Piriformis Stretch with Foot on Ground - 2-3 x daily - 7 x weekly - 1 sets - 3 reps - 30 seconds hold       Pain Level (0-10 scale) post treatment: 4-5/10    ASSESSMENT/Changes in Function: Progressed ex's per flow sheet and patient completed w/o sitting rest breaks.  Required standing rest breaks after 15-20 seconds of rhomberg standing each repetition. Added piriformis str to HEP for c/o right sciatic pain. Patient will continue to benefit from skilled PT services to modify and progress therapeutic interventions, address functional mobility deficits, address ROM deficits, address strength deficits, analyze and address soft tissue restrictions, analyze and cue movement patterns, analyze and modify body mechanics/ergonomics, assess and modify postural abnormalities, address imbalance/dizziness, and instruct in home and community integration to attain remaining goals. []  See Plan of Care  []  See progress note/recertification  []  See Discharge Summary         Progress towards goals / Updated goals:  Short Term Goals: To be accomplished in 2 weeks:  Pt will demonstrate I and compliance with HEP to maximize therapeutic effect. IE: HEP issued and instructed  Current: Pt reports compliance with HEP. 07/26/2022  Pt will demonstrate Rhomberg stance for 30\" without LOB to improve proprioception with ADLs. IE: challenged without UE support  Current: continued UE support required with rhomberg stance > 15 seconds 8/1/22  Long Term Goals: To be accomplished in 4 weeks:  Pt will demonstrate B hip flexion strength 4+/5 to improve ease of transfers. IE: 4/5  Pt will demonstrate B hip abduction strength 4/5 to improve stability with standing. IE: 3/5 right  4-/5 left  Pt will report no falls for 2 weeks to improve safety in home and community. IE: fall 1 week ago  Current: Pt reports no falls since IE. 07/29/2022  Pt will improve FOTO score to 51 to demonstrate improved quality of life and function.   IE: 28    PLAN  [x]  Upgrade activities as tolerated     []  Continue plan of care  []  Update interventions per flow sheet       []  Discharge due to:_  []  Other:_      Felisha Rivera DPT 8/1/2022  12:25 PM    Future Appointments   Date Time Provider Kelley Flores   8/1/2022 12:30 PM Ambar Jimenez DPT [facility]   8/3/2022 10:15 AM Alem Gaviira DPT [facility]   8/8/2022  9:45 AM Ktahy Rodriguez, PTA [facility]   8/10/2022  9:45 AM Kathy Rodriguez, PTA [facility]   8/15/2022 10:45 AM Dahiana Traylor [facility]

## 2022-08-03 ENCOUNTER — HOSPITAL ENCOUNTER (OUTPATIENT)
Dept: PHYSICAL THERAPY | Age: 72
Discharge: HOME | End: 2022-08-03
Payer: MEDICARE

## 2022-08-03 PROCEDURE — 97112 NEUROMUSCULAR REEDUCATION: CPT

## 2022-08-03 PROCEDURE — 97110 THERAPEUTIC EXERCISES: CPT

## 2022-08-03 NOTE — PROGRESS NOTES
PT DAILY TREATMENT NOTE     Patient Name: Sanjana Zuniga  Date:8/3/2022  : 1950  [x]  Patient  Verified  Payor: VA MEDICARE / Plan: VA MEDICARE PART A & B / Product Type: Medicare /    In time:10:09 AM  Out time:10:47  Total Treatment Time (min): 38  Visit #: 5 of 8    Medicare/BCBS Only   Total Timed Codes (min):  38 1:1 Treatment Time:  38       Treatment Area: Other low back pain [M54.59]    SUBJECTIVE  Pain Level (0-10 scale): 5-6  Any medication changes, allergies to medications, adverse drug reactions, diagnosis change, or new procedure performed?: [x] No    [] Yes (see summary sheet for update)  Subjective functional status/changes:   [] No changes reported  Says she felt \"worn out\" after last session but no increase in pain. OBJECTIVE    25 min Therapeutic Exercise:  [x] See flow sheet :   Rationale: increase ROM and increase strength to improve the patients ability to tolerate MRADL performance. 13 min Neuromuscular Re-education:  [x]  See flow sheet :   Rationale: increase strength, improve coordination, improve balance, and increase proprioception  to improve the patients ability to navigate dynamic environments with improve safety and stability. With   [x] TE   [] TA   [x] neuro   [] other: Patient Education: [] Review HEP    [] Progressed/Changed HEP based on:   [x] positioning   [x] body mechanics   [] transfers   [] heat/ice application    [] other:      Other Objective/Functional Measures:   Intervention per flowsheet     Pain Level (0-10 scale) post treatment: 5-6    ASSESSMENT/Changes in Function: Attempted standing balance on Airex pad but patient with significant knee buckle; will reattempt in future sessions as strength improves. Poor lumbopelvic mechanics and TA recruitment noted. Patient completed all standing activities without seated rest breaks. Plan to incorporate half-prone strengthening exercise.      Patient will continue to benefit from skilled PT services to modify and progress therapeutic interventions, address functional mobility deficits, address ROM deficits, address strength deficits, analyze and address soft tissue restrictions, and analyze and cue movement patterns to attain remaining goals. Progress towards goals / Updated goals:  Short Term Goals: To be accomplished in 2 weeks:  Pt will demonstrate I and compliance with HEP to maximize therapeutic effect. IE: HEP issued and instructed  Current: Pt reports compliance with HEP. 07/26/2022  Pt will demonstrate Rhomberg stance for 30\" without LOB to improve proprioception with ADLs. IE: challenged without UE support  Current: continued UE support required with rhomberg stance > 15 seconds 8/1/22    Long Term Goals: To be accomplished in 4 weeks:  Pt will demonstrate B hip flexion strength 4+/5 to improve ease of transfers. IE: 4/5  Pt will demonstrate B hip abduction strength 4/5 to improve stability with standing. IE: 3/5 right  4-/5 left  Pt will report no falls for 2 weeks to improve safety in home and community. IE: fall 1 week ago  Current: Pt reports no falls since IE. 07/29/2022  Pt will improve FOTO score to 51 to demonstrate improved quality of life and function.   IE: 28    PLAN  []  Upgrade activities as tolerated     []  Continue plan of care  []  Update interventions per flow sheet       []  Discharge due to:_  []  Other:_      Diana Barrett, PT 8/3/2022  10:12 AM    Future Appointments   Date Time Provider Kelley Flores   8/3/2022 10:15 [facility]   8/8/2022  9:45 AM Isreal Galarza PTA [facility]   8/10/2022  9:45 AM [facility]   8/15/2022 10:45 AM Sonia Dodd [facility]

## 2022-08-08 ENCOUNTER — HOSPITAL ENCOUNTER (OUTPATIENT)
Dept: PHYSICAL THERAPY | Age: 72
Discharge: HOME | End: 2022-08-08
Payer: MEDICARE

## 2022-08-08 PROCEDURE — 97110 THERAPEUTIC EXERCISES: CPT

## 2022-08-08 PROCEDURE — 97112 NEUROMUSCULAR REEDUCATION: CPT

## 2022-08-08 NOTE — PROGRESS NOTES
PT DAILY TREATMENT NOTE     Patient Name: Kirsten Offer  Date:2022  : 1950  [x]  Patient  Verified  Payor: Ayaan Hester / Plan: [place] MEDICARE PART A & B / Product Type: Medicare /    In time:9:44  Out time:10:22  Total Treatment Time (min): 38  Visit #: 6 of 8    Medicare/BCBS Only   Total Timed Codes (min):  38 1:1 Treatment Time:  38       Treatment Area: Other low back pain [M54.59]    SUBJECTIVE  Pain Level (0-10 scale): 5/10  Any medication changes, allergies to medications, adverse drug reactions, diagnosis change, or new procedure performed?: [x] No    [] Yes (see summary sheet for update)  Subjective functional status/changes:   [] No changes reported  Pt reports her balance is still off. OBJECTIVE    28 min Therapeutic Exercise:  [x] See flow sheet :   Rationale: increase ROM and increase strength to improve the patients ability to perform ADLs with increased ease. 10 min Neuromuscular Re-education:  [x]  See flow sheet :   Rationale: increase strength, improve coordination, and increase proprioception  to improve the patients ability to perform ADLs with increased ease. With   [] TE   [] TA   [] neuro   [] other: Patient Education: [x] Review HEP    [] Progressed/Changed HEP based on:   [] positioning   [] body mechanics   [] transfers   [] heat/ice application    [] other:      Other Objective/Functional Measures: Frequent LOB with Rhomberg. Pain Level (0-10 scale) post treatment: 5-6/10    ASSESSMENT/Changes in Function: Pt demonstrates fair control with balance activities but continues to have frequent LOB with narrow base of support.      Patient will continue to benefit from skilled PT services to modify and progress therapeutic interventions, address functional mobility deficits, address ROM deficits, address strength deficits, analyze and address soft tissue restrictions, analyze and cue movement patterns, analyze and modify body mechanics/ergonomics, and assess and modify postural abnormalities to attain remaining goals. []  See Plan of Care  []  See progress note/recertification  []  See Discharge Summary         Progress towards goals / Updated goals:  Short Term Goals: To be accomplished in 2 weeks:  Pt will demonstrate I and compliance with HEP to maximize therapeutic effect. IE: HEP issued and instructed  Current: Pt reports compliance with HEP. 07/26/2022  Pt will demonstrate Rhomberg stance for 30\" without LOB to improve proprioception with ADLs. IE: challenged without UE support  Current: continued UE support required with rhomberg stance > 15 seconds 8/1/22     Long Term Goals: To be accomplished in 4 weeks:  Pt will demonstrate B hip flexion strength 4+/5 to improve ease of transfers. IE: 4/5  Pt will demonstrate B hip abduction strength 4/5 to improve stability with standing. IE: 3/5 right  4-/5 left  Pt will report no falls for 2 weeks to improve safety in home and community. IE: fall 1 week ago  Current: Pt reports no falls since IE. 07/29/2022  Pt will improve FOTO score to 51 to demonstrate improved quality of life and function.   IE: 28    PLAN  []  Upgrade activities as tolerated     [x]  Continue plan of care  []  Update interventions per flow sheet       []  Discharge due to:_  []  Other:_      Anita Sandy PTA 8/8/2022  10:21 AM    Future Appointments   Date Time Provider Kelley Flores   8/10/2022  9:45 AM [facility]   8/15/2022 10:45 AM Harsha Siddiqui [facility]

## 2022-08-10 ENCOUNTER — HOSPITAL ENCOUNTER (OUTPATIENT)
Dept: PHYSICAL THERAPY | Age: 72
Discharge: HOME | End: 2022-08-10
Payer: MEDICARE

## 2022-08-10 PROCEDURE — 97112 NEUROMUSCULAR REEDUCATION: CPT

## 2022-08-10 PROCEDURE — 97110 THERAPEUTIC EXERCISES: CPT

## 2022-08-10 NOTE — PROGRESS NOTES
PT DAILY TREATMENT NOTE     Patient Name: Martha Landis  Date:8/10/2022  : 1950  [x]  Patient  Verified  Payor: Melanie Fuentes / Plan: [place] MEDICARE PART A & B / Product Type: Medicare /    In time:9:45  Out time:10:23  Total Treatment Time (min): 38  Visit #:7 of 8    Medicare/BCBS Only   Total Timed Codes (min):  38 1:1 Treatment Time:  38       Treatment Area: Other low back pain [M54.59]    SUBJECTIVE  Pain Level (0-10 scale): 5/10  Any medication changes, allergies to medications, adverse drug reactions, diagnosis change, or new procedure performed?: [x] No    [] Yes (see summary sheet for update)  Subjective functional status/changes:   [] No changes reported  Pt reports no new complaints of pain. P    OBJECTIVE    28 min Therapeutic Exercise:  [x] See flow sheet :   Rationale: increase ROM and increase strength to improve the patients ability to perform ADLs with increased ease. 10 min Neuromuscular Re-education:  []  See flow sheet :   Rationale: increase strength, improve coordination, and increase proprioception  to improve the patients ability to perform ADLs with increased ease. With   [] TE   [] TA   [] neuro   [] other: Patient Education: [x] Review HEP    [] Progressed/Changed HEP based on:   [] positioning   [] body mechanics   [] transfers   [] heat/ice application    [] other:      Other Objective/Functional Measures: Increased reps with exercises as per flow sheet. Pain Level (0-10 scale) post treatment: 5/10    ASSESSMENT/Changes in Function: Pt continues to demonstrate difficulty performing balance activities and sit to stands due to poor proprioception.      Patient will continue to benefit from skilled PT services to modify and progress therapeutic interventions, address functional mobility deficits, address ROM deficits, address strength deficits, analyze and address soft tissue restrictions, analyze and cue movement patterns, analyze and modify body mechanics/ergonomics, and assess and modify postural abnormalities to attain remaining goals. []  See Plan of Care  []  See progress note/recertification  []  See Discharge Summary         Progress towards goals / Updated goals:  Short Term Goals: To be accomplished in 2 weeks:  Pt will demonstrate I and compliance with HEP to maximize therapeutic effect. IE: HEP issued and instructed  Current: Pt reports compliance with HEP. 07/26/2022  Pt will demonstrate Rhomberg stance for 30\" without LOB to improve proprioception with ADLs. IE: challenged without UE support  Current: continued UE support required with rhomberg stance > 15 seconds 8/1/22     Long Term Goals: To be accomplished in 4 weeks:  Pt will demonstrate B hip flexion strength 4+/5 to improve ease of transfers. IE: 4/5  Current: remains 4/5. 08/10/2022  Pt will demonstrate B hip abduction strength 4/5 to improve stability with standing. IE: 3/5 right  4-/5 left  Pt will report no falls for 2 weeks to improve safety in home and community. IE: fall 1 week ago  Current: Pt reports no falls since IE. 07/29/2022  Pt will improve FOTO score to 51 to demonstrate improved quality of life and function.   IE: 28    PLAN  []  Upgrade activities as tolerated     [x]  Continue plan of care  []  Update interventions per flow sheet       []  Discharge due to:_  []  Other:_      Sagar Romo, MARIO 8/10/2022  10:11 AM    Future Appointments   Date Time Provider Kelley Flores   8/15/2022 10:45 AM Heather Alford [facility]

## 2022-08-15 ENCOUNTER — HOSPITAL ENCOUNTER (OUTPATIENT)
Dept: PHYSICAL THERAPY | Age: 72
Discharge: HOME | End: 2022-08-15
Payer: MEDICARE

## 2022-08-15 PROCEDURE — 97110 THERAPEUTIC EXERCISES: CPT

## 2022-08-15 PROCEDURE — 97112 NEUROMUSCULAR REEDUCATION: CPT

## 2022-08-15 PROCEDURE — 97530 THERAPEUTIC ACTIVITIES: CPT

## 2022-08-15 NOTE — PROGRESS NOTES
[facility]  [address]  [place], [address]  [phone] fax    Continued Plan of Care/ Re-certification for Physical Therapy Services    Patient name: Kirsten Calixto Start of Care: 2022   Referral source: Vee Barrett : 1950   Medical/Treatment Diagnosis: Other low back pain [M54.59]  Payor: Ayaan Hester / Plan: Dori Dunaway / Product Type: Medicare /  Onset Date: (with exacerbation in  and )     Prior Hospitalization: see medical history Provider#: [id number]   Medications: Verified on Patient Summary List     Comorbidities: lumbar stenosis, HTN, diabetes   Prior Level of Function: Pt with improved ambulation ease and no falls with ambulation  Visits from Start of Care: 8    Missed Visits: 0    The Plan of Care and following information is based on the patient's current status:  Short Term Goals: To be accomplished in 2 weeks:  Pt will demonstrate I and compliance with HEP to maximize therapeutic effect. IE: HEP issued and instructed  Current: Pt reports compliance with HEP  Pt will demonstrate Rhomberg stance for 30\" without LOB to improve proprioception with ADLs. IE: challenged without UE support  Current: continued UE support required with rhomberg stance > 15 seconds      Long Term Goals: To be accomplished in 4 weeks:  Pt will demonstrate B hip flexion strength 4+/5 to improve ease of transfers. IE: 4/5  Current: remains 4/5  Pt will demonstrate B hip abduction strength 4/5 to improve stability with standing. IE: 3/5 right  4-/5 left  Current: slow progress - 3+/5 right, 4-/5 left    Pt will report no falls for 2 weeks to improve safety in home and community. IE: fall 1 week ago  Current: Pt reports no falls since IE  Pt will improve FOTO score to 51 to demonstrate improved quality of life and function.   IE: 35  Current: progressing - 43    Key functional changes: progressed FOTO score by 7 points, improved right hip abduction 3+/5      Problems/ barriers to goal attainment: N/A      Problem List: pain affecting function, decrease ROM, decrease strength, impaired gait/ balance, decrease ADL/ functional abilitiies, decrease activity tolerance, decrease flexibility/ joint mobility, and decrease transfer abilities    Treatment Plan: Therapeutic exercise, Therapeutic activities, Neuromuscular re-education, Physical agent/modality, Gait/balance training, Manual therapy, Patient education, Self Care training, Functional mobility training, Home safety training, and Stair training     Patient Goal (s) has been updated and includes: \"work on weakness to stop falling\"      Goals for this certification period to be accomplished in 4 weeks:  Pt will demonstrate B hip flexion strength 4+/5 to improve ease of transfers. Re-cert: remains 4/5  Pt will demonstrate B hip abduction strength 4/5 to improve stability with standing. Re-cert: slow progress - 3+/5 right, 4-/5 left    Pt will report no falls for 2 weeks to improve safety in home and community. Re-cert: Pt reports no falls since IE  Pt will improve FOTO score to 51 to demonstrate improved quality of life and function. Re-cert: progressing - 42    Frequency / Duration: Patient to be seen 2 times per week for 4 weeks:    Assessment / Recommendations:Patient presents for re-certification today. She reports no falls since initial evaluation and reports her biggest challenge is standing without UE support. Patient continues to require sitting modifications with performance of daily activities though she feels her balance has improved overall. She is scheduled to have lumbar surgery in September, though would benefit from continued skilled PT 2x/week for 4 weeks to continue progressing strength and balance to maximize post-operative outcomes.      Certification Period: 8/16/2022 - 9/14/2022    Rand Fox, PT, DPT 8/15/2022 11:06 AM    ________________________________________________________________________  I certify that the above Therapy Services are being furnished while the patient is under my care. I agree with the treatment plan and certify that this therapy is necessary. [] I have read the above and request that my patient continue as recommended.   [] I have read the above report and request that my patient continue therapy with the following changes/special instructions: _____________________________________________  [] I have read the above report and request that my patient be discharged from therapy    Physician's Signature:____________Date:_________TIME:________     Carly Elliott PA-C  ** Signature, Date and Time must be completed for valid certification **    Please sign and return to [facility] [address] [place] [address] & [facility]  [address]  [place], [address]  [phone] fax

## 2022-08-15 NOTE — PROGRESS NOTES
PT DAILY TREATMENT NOTE     Patient Name: Natali Siegel  Date:8/15/2022  : 1950  [x]  Patient  Verified  Payor: VA MEDICARE / Plan: [place] MEDICARE PART A & B / Product Type: Medicare /    In time:1045  Out time:1124  Total Treatment Time (min): 39  Visit #: 8 of 8    Medicare/BCBS Only   Total Timed Codes (min):  39 1:1 Treatment Time:  39       Treatment Area: Other low back pain [M54.59]    SUBJECTIVE  Pain Level (0-10 scale): 5-6  Any medication changes, allergies to medications, adverse drug reactions, diagnosis change, or new procedure performed?: [x] No    [] Yes (see summary sheet for update)  Subjective functional status/changes:   [] No changes reported  She is awaiting call back to see about having surgery at [facility] instead of [facility]. OBJECTIVE    19 min Therapeutic Exercise:  [x] See flow sheet :   Rationale: increase ROM, increase strength, and improve coordination to improve the patients ability to perform ADLs and self care tasks with greater ease     8 min Therapeutic Activity:  [x]  See flow sheet : FOTO score    Rationale: improve coordination  to improve the patients ability to perform functional tasks with greater ease      12 min Neuromuscular Re-education:  [x]  See flow sheet : balance activities, sit<>stand    Rationale: increase strength, improve coordination, improve balance, and increase proprioception  to improve the patients ability to perform functional tasks with greater stabilization and control           With   [] TE   [] TA   [] neuro   [] other: Patient Education: [x] Review HEP    [] Progressed/Changed HEP based on:   [] positioning   [] body mechanics   [] transfers   [] heat/ice application    [] other:      Other Objective/Functional Measures: FOTO = 42, hip abduction 3+/5 right, 4-/5 left      Pain Level (0-10 scale) post treatment: 5    ASSESSMENT/Changes in Function: Patient presents for re-certification today.  She reports no falls since initial evaluation and reports her biggest challenge is standing without UE support. Patient continues to require sitting modifications with performance of daily activities though she feels her balance has improved overall. She is scheduled to have lumbar surgery in September, though would benefit from continued skilled PT 2x/week for 4 weeks to continue progressing strength and balance to maximize post-operative outcomes. Patient will continue to benefit from skilled PT services to modify and progress therapeutic interventions, address functional mobility deficits, address ROM deficits, address strength deficits, analyze and address soft tissue restrictions, analyze and cue movement patterns, analyze and modify body mechanics/ergonomics, assess and modify postural abnormalities, address imbalance/dizziness, and instruct in home and community integration to attain remaining goals. []  See Plan of Care  [x]  See progress note/recertification  []  See Discharge Summary         Progress towards goals / Updated goals:  Short Term Goals: To be accomplished in 2 weeks:  Pt will demonstrate I and compliance with HEP to maximize therapeutic effect. IE: HEP issued and instructed  Current: Pt reports compliance with HEP. 07/26/2022  Pt will demonstrate Rhomberg stance for 30\" without LOB to improve proprioception with ADLs. IE: challenged without UE support  Current: continued UE support required with rhomberg stance > 15 seconds 8/1/22     Long Term Goals: To be accomplished in 4 weeks:  Pt will demonstrate B hip flexion strength 4+/5 to improve ease of transfers. IE: 4/5  Current: remains 4/5. 08/10/2022  Pt will demonstrate B hip abduction strength 4/5 to improve stability with standing. IE: 3/5 right  4-/5 left  Current: slow progress 8/15/2022 - 3+/5 right, 4-/5 left    Pt will report no falls for 2 weeks to improve safety in home and community.   IE: fall 1 week ago  Current: Pt reports no falls since IE  Pt will improve FOTO score to 51 to demonstrate improved quality of life and function. IE: 35  Current: progressing - 42 (8/15/2022)     PLAN  []  Upgrade activities as tolerated     [x]  Continue plan of care  []  Update interventions per flow sheet       []  Discharge due to:_  []  Other:_      Rene Valdes PT, DPT 8/15/2022  10:58 AM    No future appointments.

## 2022-08-18 ENCOUNTER — HOSPITAL ENCOUNTER (OUTPATIENT)
Dept: PHYSICAL THERAPY | Age: 72
Discharge: HOME | End: 2022-08-18
Payer: MEDICARE

## 2022-08-18 PROCEDURE — 97112 NEUROMUSCULAR REEDUCATION: CPT

## 2022-08-18 PROCEDURE — 97110 THERAPEUTIC EXERCISES: CPT

## 2022-08-18 NOTE — PROGRESS NOTES
PT DAILY TREATMENT NOTE     Patient Name: Siddharth Cruz  Date:2022  : 1950  [x]  Patient  Verified  Payor: VA MEDICARE / Plan: VA MEDICARE PART A & B / Product Type: Medicare /    In time:9:45  Out time:10:23  Total Treatment Time (min): 38  Visit #: 1 of 8    Medicare/BCBS Only   Total Timed Codes (min):  38 1:1 Treatment Time:  38       Treatment Area: Other low back pain [M54.59]    SUBJECTIVE  Pain Level (0-10 scale): 5/10  Any medication changes, allergies to medications, adverse drug reactions, diagnosis change, or new procedure performed?: [x] No    [] Yes (see summary sheet for update)  Subjective functional status/changes:   [] No changes reported  Pt reports no change in pain today. Pt reports compliance with HEP. OBJECTIVE    28 min Therapeutic Exercise:  [x] See flow sheet :   Rationale: increase strength, improve coordination, and increase proprioception to improve the patients ability to perform ADLs with increased ease. 10 min Neuromuscular Re-education:  [x]  See flow sheet :   Rationale: increase strength, improve coordination, and increase proprioception  to improve the patients ability to perform ADLs with increased ease. With   [] TE   [] TA   [] neuro   [] other: Patient Education: [x] Review HEP    [] Progressed/Changed HEP based on:   [] positioning   [] body mechanics   [] transfers   [] heat/ice application    [] other:      Other Objective/Functional Measures: Frequent LOB with balance activities and sit to stands. Pain Level (0-10 scale) post treatment: /10    ASSESSMENT/Changes in Function: Pt puts forth good effort with therapeutic exercises but continues to have difficulty maintaining balance without UE support.   Discussed DC planning with patient to DC in 6 visits to updated HEP program.     Patient will continue to benefit from skilled PT services to modify and progress therapeutic interventions, address functional mobility deficits, address ROM deficits, address strength deficits, analyze and address soft tissue restrictions, analyze and cue movement patterns, analyze and modify body mechanics/ergonomics, and assess and modify postural abnormalities to attain remaining goals. []  See Plan of Care  []  See progress note/recertification  []  See Discharge Summary         Progress towards goals / Updated goals:     Goals for this certification period to be accomplished in 4 weeks:  Pt will demonstrate B hip flexion strength 4+/5 to improve ease of transfers. Re-cert: remains 4/5  Pt will demonstrate B hip abduction strength 4/5 to improve stability with standing. Re-cert: slow progress - 3+/5 right, 4-/5 left    Pt will report no falls for 2 weeks to improve safety in home and community. Re-cert: Pt reports no falls since IE  Pt will improve FOTO score to 51 to demonstrate improved quality of life and function.   Re-cert: progressing - 42    PLAN  []  Upgrade activities as tolerated     [x]  Continue plan of care  []  Update interventions per flow sheet       []  Discharge due to:_  []  Other:_      Edith Khan PTA 8/18/2022  9:55 AM    Future Appointments   Date Time Provider Kelley Flores   8/23/2022 12:45 PM Yaneli Sheets [facility]   8/25/2022 12:15 PM Obdulia [facility]   8/29/2022 12:30 PM Adebayo Whitehead PTA [facility]   8/31/2022 11:45 AM Adebayo Whitehead PTA [facility]   9/6/2022 11:15 AM Adebayo Whitehead PTA [facility]

## 2022-08-23 ENCOUNTER — HOSPITAL ENCOUNTER (OUTPATIENT)
Dept: PHYSICAL THERAPY | Age: 72
Discharge: HOME | End: 2022-08-23
Payer: MEDICARE

## 2022-08-23 PROCEDURE — 97112 NEUROMUSCULAR REEDUCATION: CPT

## 2022-08-23 PROCEDURE — 97110 THERAPEUTIC EXERCISES: CPT

## 2022-08-23 NOTE — PROGRESS NOTES
PT DAILY TREATMENT NOTE     Patient Name: Bernice Fernandez  Date:2022  : 1950  [x]  Patient  Verified  Payor: VA MEDICARE / Plan: VA MEDICARE PART A & B / Product Type: Medicare /    In time:1238  Out time:119  Total Treatment Time (min): 41  Visit #: 2 of 8    Medicare/BCBS Only   Total Timed Codes (min):  41 1:1 Treatment Time:  41       Treatment Area: Other low back pain [M54.59]    SUBJECTIVE  Pain Level (0-10 scale): 5-6  Any medication changes, allergies to medications, adverse drug reactions, diagnosis change, or new procedure performed?: [x] No    [] Yes (see summary sheet for update)  Subjective functional status/changes:   [] No changes reported  \"I fell on Saturday 2 times. The first time both of my leg gave way under me when I was trying to fold a blanket. The second time my right leg gave way. \"     OBJECTIVE    29 min Therapeutic Exercise:  [x] See flow sheet :   Rationale: increase ROM, increase strength, and improve coordination to improve the patients ability to perform ADLs and dawna    12 min Neuromuscular Re-education:  [x]  See flow sheet : sit<>stand, PPT with marching, scap stabilization   Rationale: increase strength, improve coordination, improve balance, and increase proprioception  to improve the patients ability to perform functional tasks with greater stabilization and control           With   [] TE   [] TA   [] neuro   [] other: Patient Education: [x] Review HEP    [] Progressed/Changed HEP based on:   [] positioning   [] body mechanics   [] transfers   [] heat/ice application    [] other:      Other Objective/Functional Measures: right hip abduction 3+/5, left hip abduction 4-/5, progressed most TB activities to blue TB      Pain Level (0-10 scale) post treatment: 5    ASSESSMENT/Changes in Function: Patient fatigued by rep 9 with right hip abduction requiring Nancy to complete last 3 with emphasis on patient controlling lowering independently.  Patient with significant pain shooting down the left LE in standing limiting balance activities today. Recommend return to balance activities next visit as tolerated. Patient will continue to benefit from skilled PT services to modify and progress therapeutic interventions, address functional mobility deficits, address ROM deficits, address strength deficits, analyze and address soft tissue restrictions, analyze and cue movement patterns, analyze and modify body mechanics/ergonomics, assess and modify postural abnormalities, address imbalance/dizziness, and instruct in home and community integration to attain remaining goals. []  See Plan of Care  []  See progress note/recertification  []  See Discharge Summary         Progress towards goals / Updated goals:  Goals for this certification period to be accomplished in 4 weeks:  Pt will demonstrate B hip flexion strength 4+/5 to improve ease of transfers. Re-cert: remains 4/5  Pt will demonstrate B hip abduction strength 4/5 to improve stability with standing. Re-cert: slow progress - 3+/5 right, 4-/5 left    Current: remains on 8/23/2022 - right 3+/5, left 4-/5   Pt will report no falls for 2 weeks to improve safety in home and community. Re-cert: Pt reports no falls since IE  Current: patient reports 2 falls on Saturday (8/20/2022) where her legs crumbled under her   Pt will improve FOTO score to 51 to demonstrate improved quality of life and function.   Re-cert: progressing - 42    PLAN  []  Upgrade activities as tolerated     [x]  Continue plan of care  []  Update interventions per flow sheet       []  Discharge due to:_  []  Other:_      Refugio Smith, PT, DPT  8/23/2022  12:44 PM    Future Appointments   Date Time Provider Kelley Flores   8/23/2022 12:45 PM Norman Barragan [facility]   8/25/2022 12:15 PM [place] Rodronnie [facility]   8/29/2022 12:30 [facility]   8/31/2022 11:45 AM [facility]   9/6/2022 11:15 AM Olivia Nation, PTA [facility]

## 2022-08-25 ENCOUNTER — HOSPITAL ENCOUNTER (OUTPATIENT)
Dept: PHYSICAL THERAPY | Age: 72
Discharge: HOME | End: 2022-08-25
Payer: MEDICARE

## 2022-08-25 PROCEDURE — 97110 THERAPEUTIC EXERCISES: CPT

## 2022-08-25 PROCEDURE — 97112 NEUROMUSCULAR REEDUCATION: CPT

## 2022-08-25 NOTE — PROGRESS NOTES
PT DAILY TREATMENT NOTE     Patient Name: Darwin Noble  Date:2022  : 1950  [x]  Patient  Verified  Payor: Axel Medici / Plan: VA MEDICARE PART A & B / Product Type: Medicare /    In time: 3829  Out time:1255  Total Treatment Time (min): 38  Visit #: 3 of 8    Medicare/BCBS Only   Total Timed Codes (min):  38 1:1 Treatment Time:  38       Treatment Area: Other low back pain [M54.59]    SUBJECTIVE  Pain Level (0-10 scale): 5  Any medication changes, allergies to medications, adverse drug reactions, diagnosis change, or new procedure performed?: [x] No    [] Yes (see summary sheet for update)  Subjective functional status/changes:   [] No changes reported  No falls reported since last visit. Patient reports she has been very careful.      OBJECTIVE    26 min Therapeutic Exercise:  [x] See flow sheet :   Rationale: increase ROM, increase strength, and improve coordination to improve the patients ability to perform ADLs and self care tasks with greater ease      12 min Neuromuscular Re-education:  [x]  See flow sheet : balance exercises, sit<>stand, bridges    Rationale: increase strength, improve coordination, improve balance, and increase proprioception  to improve the patients ability to perform functional tasks with greater stabilization and control         With   [] TE   [] TA   [] neuro   [] other: Patient Education: [x] Review HEP    [] Progressed/Changed HEP based on:   [] positioning   [] body mechanics   [] transfers   [] heat/ice application    [] other:      Other Objective/Functional Measures: able to reintroduce standing activities today despite pain down leg    Added 1# weight for seated marches and LAQ    Progressed seated hip abduction to supine clam with blue TB    Pain Level (0-10 scale) post treatment: 5    ASSESSMENT/Changes in Function: Patient continues to have significant pain with WB activities but able to tolerate standing exercises today which she was unable to complete last visit. Slight increase in fatigue with additional weight for seated strengthening exercises but no change in pain reported. No assist required for hip abduction today. Reviewed update HEP and provided theraband to continue strengthening exercises independently. Emphasized UE support at home with standing exercises. Patient will continue to benefit from skilled PT services to modify and progress therapeutic interventions, address functional mobility deficits, address ROM deficits, address strength deficits, analyze and address soft tissue restrictions, analyze and cue movement patterns, analyze and modify body mechanics/ergonomics, assess and modify postural abnormalities, address imbalance/dizziness, and instruct in home and community integration to attain remaining goals. []  See Plan of Care  []  See progress note/recertification  []  See Discharge Summary         Progress towards goals / Updated goals:  Goals for this certification period to be accomplished in 4 weeks:  Pt will demonstrate B hip flexion strength 4+/5 to improve ease of transfers. Re-cert: remains 4/5  Pt will demonstrate B hip abduction strength 4/5 to improve stability with standing. Re-cert: slow progress - 3+/5 right, 4-/5 left    Current: remains on 8/23/2022 - right 3+/5, left 4-/5   Pt will report no falls for 2 weeks to improve safety in home and community. Re-cert: Pt reports no falls since IE  Current: patient reports 2 falls on Saturday (8/20/2022) where her legs crumbled under her   Pt will improve FOTO score to 51 to demonstrate improved quality of life and function.   Re-cert: progressing - 42    PLAN  []  Upgrade activities as tolerated     [x]  Continue plan of care  []  Update interventions per flow sheet       []  Discharge due to:_  []  Other:_      Silvana Riley, PT, DPT 8/25/2022  12:16 PM    Future Appointments   Date Time Provider Kelley Flores   8/29/2022 12:30 PM Dulce Umanzor PTA [facility] 8/31/2022 11:45 AM Lukas Van, MARIO [facility]   9/6/2022 11:15 AM Lukas Van PTA [facility]

## 2022-08-29 ENCOUNTER — TELEPHONE (OUTPATIENT)
Dept: PHYSICAL THERAPY | Age: 72
End: 2022-08-29

## 2022-08-29 ENCOUNTER — HOSPITAL ENCOUNTER (OUTPATIENT)
Dept: PHYSICAL THERAPY | Age: 72
Discharge: HOME | End: 2022-08-29
Payer: MEDICARE

## 2022-08-29 PROCEDURE — 97112 NEUROMUSCULAR REEDUCATION: CPT

## 2022-08-29 PROCEDURE — 97110 THERAPEUTIC EXERCISES: CPT

## 2022-08-29 NOTE — PROGRESS NOTES
PT DAILY TREATMENT NOTE     Patient Name: Helio Sanchez  Date:2022  : 1950  [x]  Patient  Verified  Payor: [place] MEDICARE / Plan: [place] MEDICARE PART A & B / Product Type: Medicare /    In time:12:01  Out time:12:49  Total Treatment Time (min): 48  Visit #: 4 of 8    Medicare/BCBS Only   Total Timed Codes (min):  48 1:1 Treatment Time:  43       Treatment Area: Other low back pain [M54.59]    SUBJECTIVE  Pain Level (0-10 scale): 6  Any medication changes, allergies to medications, adverse drug reactions, diagnosis change, or new procedure performed?: [x] No    [] Yes (see summary sheet for update)  Subjective functional status/changes:   [] No changes reported  Pt reports she knows she needs this to improve her strength. OBJECTIVE    33 min Therapeutic Exercise:  [x] See flow sheet :   Rationale: increase ROM and increase strength to improve the patients ability to perform functional task with ease. 15 min Neuromuscular Re-education:  [x]  See flow sheet :   Rationale: increase strength, improve coordination, improve balance, and increase proprioception  to improve the patients ability to perform ADL's with ease. With   [] TE   [] TA   [] neuro   [] other: Patient Education: [x] Review HEP    [] Progressed/Changed HEP based on:   [] positioning   [] body mechanics   [] transfers   [] heat/ice application    [] other:      Other Objective/Functional Measures:      Pain Level (0-10 scale) post treatment: 5    ASSESSMENT/Changes in Function:   Progressed resistance to several exercises to continue to improve overall strength and stability. Moderate cueing required to engage the core with exercise performance and to maintain good posture and control. Gait belt utilized with sit to stands with pt requiring cueing to fully extend through the B knees and stand tall through the L/S. The pt reports no significant changes in pain post treatment and left in no apparent distress.     Patient will continue to benefit from skilled PT services to modify and progress therapeutic interventions, address functional mobility deficits, address ROM deficits, address strength deficits, analyze and address soft tissue restrictions, analyze and cue movement patterns, analyze and modify body mechanics/ergonomics, and assess and modify postural abnormalities to attain remaining goals. [x]  See Plan of Care  []  See progress note/recertification  []  See Discharge Summary         Progress towards goals / Updated goals:  Goals for this certification period to be accomplished in 4 weeks:  Pt will demonstrate B hip flexion strength 4+/5 to improve ease of transfers. Re-cert: remains 4/5  Pt will demonstrate B hip abduction strength 4/5 to improve stability with standing. Re-cert: slow progress - 3+/5 right, 4-/5 left    Current: remains on 8/23/2022 - right 3+/5, left 4-/5   Pt will report no falls for 2 weeks to improve safety in home and community. Re-cert: Pt reports no falls since IE  Current: patient reports 2 falls on Saturday (8/20/2022) where her legs crumbled under her   Pt will improve FOTO score to 51 to demonstrate improved quality of life and function.   Re-cert: progressing - 42    PLAN  []  Upgrade activities as tolerated     [x]  Continue plan of care  []  Update interventions per flow sheet       []  Discharge due to:_  []  Other:_      John Treviño PTA 8/29/2022  12:14 PM    Future Appointments   Date Time Provider Kelley Flores   8/31/2022 11:45 AM Dulce Umanzor PTA [facility]   9/6/2022 11:15 AM [facility]

## 2022-08-31 ENCOUNTER — APPOINTMENT (OUTPATIENT)
Dept: PHYSICAL THERAPY | Age: 72
End: 2022-08-31
Payer: MEDICARE

## 2022-09-02 ENCOUNTER — HOSPITAL ENCOUNTER (OUTPATIENT)
Dept: PHYSICAL THERAPY | Age: 72
Discharge: HOME | End: 2022-09-02
Payer: MEDICARE

## 2022-09-02 PROCEDURE — 97110 THERAPEUTIC EXERCISES: CPT

## 2022-09-02 PROCEDURE — 97112 NEUROMUSCULAR REEDUCATION: CPT

## 2022-09-02 NOTE — PROGRESS NOTES
PT DAILY TREATMENT NOTE     Patient Name: Renetta Lizarraga  Date:2022  : 1950  [x]  Patient  Verified  Payor: VA MEDICARE / Plan: [place] MEDICARE PART A & B / Product Type: Medicare /    In time:7:47  Out time:8:27  Total Treatment Time (min): 40  Visit #: 5 of 8    Medicare/BCBS Only   Total Timed Codes (min):  40 1:1 Treatment Time:  40       Treatment Area: Other low back pain [M54.59]    SUBJECTIVE  Pain Level (0-10 scale): 5.5  Any medication changes, allergies to medications, adverse drug reactions, diagnosis change, or new procedure performed?: [x] No    [] Yes (see summary sheet for update)  Subjective functional status/changes:   [] No changes reported  Pt reports no falls in the past 2 weeks    OBJECTIVE    25 min Therapeutic Exercise:  [x] See flow sheet :   Rationale: increase ROM and increase strength to improve the patients ability to perform ADLs    15 min Neuromuscular Re-education:  [x]  See flow sheet :   Rationale: increase strength, improve coordination, improve balance, and increase proprioception  to improve the patients ability to perform functional tasks           With   [] TE   [] TA   [] neuro   [] other: Patient Education: [x] Review HEP    [] Progressed/Changed HEP based on:   [] positioning   [] body mechanics   [] transfers   [] heat/ice application    [] other:      Other Objective/Functional Measures:   Incr'd reps per flow sheet     Pain Level (0-10 scale) post treatment: 5.5    ASSESSMENT/Changes in Function: Pt demo'd improved activity tolerance today and performed more reps than prescribed with most therex. Pt cont'd to be challenged with standing balance activities and standing therex with c/o incr'd pain at back and tailbone that runs down her legs.      Patient will continue to benefit from skilled PT services to modify and progress therapeutic interventions, address functional mobility deficits, address ROM deficits, address strength deficits, analyze and address soft tissue restrictions, analyze and cue movement patterns, analyze and modify body mechanics/ergonomics, assess and modify postural abnormalities, and address imbalance/dizziness to attain remaining goals. []  See Plan of Care  []  See progress note/recertification  []  See Discharge Summary         Progress towards goals / Updated goals:  Goals for this certification period to be accomplished in 4 weeks:  Pt will demonstrate B hip flexion strength 4+/5 to improve ease of transfers. Re-cert: remains 4/5  Pt will demonstrate B hip abduction strength 4/5 to improve stability with standing. Re-cert: slow progress - 3+/5 right, 4-/5 left    Current: remains on 8/23/2022 - right 3+/5, left 4-/5   Pt will report no falls for 2 weeks to improve safety in home and community. Re-cert: Pt reports no falls since IE  Current: patient reports 2 falls on Saturday (8/20/2022) where her legs crumbled under her ; no falls in the past two weeks (9/2/2022)  Pt will improve FOTO score to 51 to demonstrate improved quality of life and function.   Re-cert: progressing - 42    PLAN  []  Upgrade activities as tolerated     [x]  Continue plan of care  []  Update interventions per flow sheet       []  Discharge due to:_  []  Other:_      Madison Figueroa PTA 9/2/2022  7:44 AM    Future Appointments   Date Time Provider Kelley Flores   9/2/2022  7:45 AM Christopher Quiles PTA [facility]   9/6/2022 11:15 AM Dean Kerr PTA [facility]

## 2022-09-06 ENCOUNTER — HOSPITAL ENCOUNTER (OUTPATIENT)
Dept: PHYSICAL THERAPY | Age: 72
Discharge: HOME | End: 2022-09-06
Payer: MEDICARE

## 2022-09-06 ENCOUNTER — TRANSCRIBE ORDER (OUTPATIENT)
Dept: SCHEDULING | Age: 72
End: 2022-09-06

## 2022-09-06 DIAGNOSIS — Z12.31 VISIT FOR SCREENING MAMMOGRAM: Primary | ICD-10-CM

## 2022-09-06 PROCEDURE — 97110 THERAPEUTIC EXERCISES: CPT

## 2022-09-06 PROCEDURE — 97112 NEUROMUSCULAR REEDUCATION: CPT

## 2022-09-06 NOTE — PROGRESS NOTES
PT DAILY TREATMENT NOTE     Patient Name: Anushka Salazar  Date:2022  : 1950  [x]  Patient  Verified  Payor: VA MEDICARE / Plan: VA MEDICARE PART A & B / Product Type: Medicare /    In time:11:15  Out time:11:54  Total Treatment Time (min): 39  Visit #: 6 of 8    Medicare/BCBS Only   Total Timed Codes (min):  39 1:1 Treatment Time:  39       Treatment Area: Other low back pain [M54.59]    SUBJECTIVE  Pain Level (0-10 scale): 5.5-6/10  Any medication changes, allergies to medications, adverse drug reactions, diagnosis change, or new procedure performed?: [x] No    [] Yes (see summary sheet for update)  Subjective functional status/changes:   [] No changes reported  Pt reports no new complaints of pain. Pt reports compliance with HEP. OBJECTIVE    39 min Therapeutic Exercise:  [x] See flow sheet :   Rationale: increase ROM and increase strength to improve the patients ability to perform ADLs with increased ease. 10 min Neuromuscular Re-education:  [x]  See flow sheet :   Rationale: increase strength, improve coordination, and increase proprioception  to improve the patients ability to perform ADLs with increased ease. With   [] TE   [] TA   [] neuro   [] other: Patient Education: [x] Review HEP    [] Progressed/Changed HEP based on:   [] positioning   [] body mechanics   [] transfers   [] heat/ice application    [] other:      Other Objective/Functional Measures: Held sidelying exercises today, resume next visit. Pain Level (0-10 scale) post treatment: 0/10    ASSESSMENT/Changes in Function: Discussed DC with patient to be in 2 visits in prep for surgery.      Patient will continue to benefit from skilled PT services to modify and progress therapeutic interventions, address functional mobility deficits, address ROM deficits, address strength deficits, analyze and address soft tissue restrictions, analyze and cue movement patterns, and analyze and modify body mechanics/ergonomics to attain remaining goals. []  See Plan of Care  []  See progress note/recertification  []  See Discharge Summary         Progress towards goals / Updated goals:  Goals for this certification period to be accomplished in 4 weeks:  Pt will demonstrate B hip flexion strength 4+/5 to improve ease of transfers. Re-cert: remains 4/5  Pt will demonstrate B hip abduction strength 4/5 to improve stability with standing. Re-cert: slow progress - 3+/5 right, 4-/5 left    Current: remains on 8/23/2022 - right 3+/5, left 4-/5   Pt will report no falls for 2 weeks to improve safety in home and community. Re-cert: Pt reports no falls since IE  Current: No falls since last report (08/20/2022). 09/06/2022  Pt will improve FOTO score to 51 to demonstrate improved quality of life and function.   Re-cert: progressing - 42    PLAN  []  Upgrade activities as tolerated     [x]  Continue plan of care  []  Update interventions per flow sheet       []  Discharge due to:_  []  Other:_      Kylah Ramires PTA 9/6/2022  11:29 AM    Future Appointments   Date Time Provider Kelley Flores   9/12/2022  1:15 PM [facility]   9/14/2022 12:30 PM [facility]

## 2022-09-09 ENCOUNTER — HOSPITAL ENCOUNTER (OUTPATIENT)
Dept: PREADMISSION TESTING | Age: 72
Discharge: HOME | End: 2022-09-09
Payer: MEDICARE

## 2022-09-09 ENCOUNTER — TRANSCRIBE ORDER (OUTPATIENT)
Dept: REGISTRATION | Age: 72
End: 2022-09-09

## 2022-09-09 DIAGNOSIS — M43.16 SPONDYLOLISTHESIS OF LUMBAR REGION: ICD-10-CM

## 2022-09-09 DIAGNOSIS — M48.062 PSEUDOCLAUDICATION SYNDROME: Primary | ICD-10-CM

## 2022-09-09 DIAGNOSIS — M48.062 PSEUDOCLAUDICATION SYNDROME: ICD-10-CM

## 2022-09-09 LAB
ANION GAP SERPL CALC-SCNC: 5 MMOL/L (ref 3–18)
ATRIAL RATE: 84 BPM
BUN SERPL-MCNC: 18 MG/DL (ref 7–18)
BUN/CREAT SERPL: 18 (ref 12–20)
CALCIUM SERPL-MCNC: 9.6 MG/DL (ref 8.5–10.1)
CALCULATED P AXIS, ECG09: 78 DEGREES
CALCULATED R AXIS, ECG10: -29 DEGREES
CALCULATED T AXIS, ECG11: 55 DEGREES
CHLORIDE SERPL-SCNC: 101 MMOL/L (ref 100–111)
CO2 SERPL-SCNC: 34 MMOL/L (ref 21–32)
CREAT SERPL-MCNC: 0.98 MG/DL (ref 0.6–1.3)
DIAGNOSIS, 93000: NORMAL
ERYTHROCYTE [DISTWIDTH] IN BLOOD BY AUTOMATED COUNT: 14.2 % (ref 11.6–14.5)
EST. AVERAGE GLUCOSE BLD GHB EST-MCNC: 128 MG/DL
GLUCOSE SERPL-MCNC: 109 MG/DL (ref 74–99)
HBA1C MFR BLD: 6.1 % (ref 4.2–5.6)
HCT VFR BLD AUTO: 36.8 % (ref 35–45)
HGB BLD-MCNC: 11.1 G/DL (ref 12–16)
MCH RBC QN AUTO: 27.3 PG (ref 24–34)
MCHC RBC AUTO-ENTMCNC: 30.2 G/DL (ref 31–37)
MCV RBC AUTO: 90.4 FL (ref 78–100)
NRBC # BLD: 0 K/UL (ref 0–0.01)
NRBC BLD-RTO: 0 PER 100 WBC
P-R INTERVAL, ECG05: 162 MS
PLATELET # BLD AUTO: 242 K/UL (ref 135–420)
PMV BLD AUTO: 10.8 FL (ref 9.2–11.8)
POTASSIUM SERPL-SCNC: 3.6 MMOL/L (ref 3.5–5.5)
Q-T INTERVAL, ECG07: 370 MS
QRS DURATION, ECG06: 76 MS
QTC CALCULATION (BEZET), ECG08: 437 MS
RBC # BLD AUTO: 4.07 M/UL (ref 4.2–5.3)
SODIUM SERPL-SCNC: 140 MMOL/L (ref 136–145)
VENTRICULAR RATE, ECG03: 84 BPM
WBC # BLD AUTO: 7.2 K/UL (ref 4.6–13.2)

## 2022-09-09 PROCEDURE — 93005 ELECTROCARDIOGRAM TRACING: CPT

## 2022-09-09 PROCEDURE — 83036 HEMOGLOBIN GLYCOSYLATED A1C: CPT

## 2022-09-09 PROCEDURE — 80048 BASIC METABOLIC PNL TOTAL CA: CPT

## 2022-09-09 PROCEDURE — 36415 COLL VENOUS BLD VENIPUNCTURE: CPT

## 2022-09-09 PROCEDURE — 85027 COMPLETE CBC AUTOMATED: CPT

## 2022-09-09 NOTE — NURSE NAVIGATOR
Helio Sanchez watched the pre op seminar online and received a preoperative education booklet in anticipation of surgery

## 2022-09-10 LAB
BACTERIA SPEC CULT: NORMAL
BACTERIA SPEC CULT: NORMAL
SERVICE CMNT-IMP: NORMAL

## 2022-09-12 ENCOUNTER — HOSPITAL ENCOUNTER (OUTPATIENT)
Dept: PHYSICAL THERAPY | Age: 72
Discharge: HOME | End: 2022-09-12
Payer: MEDICARE

## 2022-09-12 PROCEDURE — 97110 THERAPEUTIC EXERCISES: CPT

## 2022-09-12 PROCEDURE — 97112 NEUROMUSCULAR REEDUCATION: CPT

## 2022-09-12 NOTE — PROGRESS NOTES
PT DAILY TREATMENT NOTE     Patient Name: Natali Siegel  Date:2022  : 1950  [x]  Patient  Verified  Payor: Cate Racer / Plan: [place] MEDICARE PART A & B / Product Type: Medicare /    In time:11:47  Out time:1:25  Total Treatment Time (min): 38  Visit #: 7 of 8    Medicare/BCBS Only   Total Timed Codes (min):  38 1:1 Treatment Time:  38       Treatment Area: Other low back pain [M54.59]    SUBJECTIVE  Pain Level (0-10 scale): 10  Any medication changes, allergies to medications, adverse drug reactions, diagnosis change, or new procedure performed?: [x] No    [] Yes (see summary sheet for update)  Subjective functional status/changes:   [] No changes reported  Pt reports her surgical date got moved up to 2022. Pt reports no falls since last visit and continues to perform HEP as often as possible. OBJECTIVE    28 min Therapeutic Exercise:  [x] See flow sheet :   Rationale: increase ROM and increase strength to improve the patients ability to perform ADLs with increased ease. 10 min Neuromuscular Re-education:  [x]  See flow sheet :   Rationale: increase strength, improve coordination, and increase proprioception  to improve the patients ability to perform ADLs with increased ease. With   [] TE   [] TA   [] neuro   [] other: Patient Education: [x] Review HEP    [] Progressed/Changed HEP based on:   [] positioning   [] body mechanics   [] transfers   [] heat/ice application    [] other:      Other Objective/Functional Measures:      Pain Level (0-10 scale) post treatment: 5/10    ASSESSMENT/Changes in Function: Pt continued to have radiating pain when standing greater than 5 minutes.  Pt will be DC'd next visit with surgery expected 2022    Patient will continue to benefit from skilled PT services to modify and progress therapeutic interventions, address functional mobility deficits, address ROM deficits, address strength deficits, analyze and address soft tissue restrictions, and analyze and cue movement patterns to attain remaining goals. []  See Plan of Care  []  See progress note/recertification  []  See Discharge Summary         Progress towards goals / Updated goals:  Goals for this certification period to be accomplished in 4 weeks:  Pt will demonstrate B hip flexion strength 4+/5 to improve ease of transfers. Re-cert: remains 4/5  Current: remains. 4/5. 09/12/2022  Pt will demonstrate B hip abduction strength 4/5 to improve stability with standing. Re-cert: slow progress - 3+/5 right, 4-/5 left    Current: remains on 8/23/2022 - right 3+/5, left 4-/5   Pt will report no falls for 2 weeks to improve safety in home and community. Re-cert: Pt reports no falls since IE  Current: No falls since last report (08/20/2022). 09/06/2022  Pt will improve FOTO score to 51 to demonstrate improved quality of life and function.   Re-cert: progressing - 42    PLAN  []  Upgrade activities as tolerated     [x]  Continue plan of care  []  Update interventions per flow sheet       []  Discharge due to:_  []  Other:_      Estefania Lee PTA 9/12/2022  1:01 PM    Future Appointments   Date Time Provider Kelley Flores   9/12/2022  1:15 PM Zia Lima PTA [facility]   9/13/2022  9:00 AM [facility] ROOM P1 [facility]   9/14/2022 12:30 PM Zia Lima PTA [facility]

## 2022-09-13 ENCOUNTER — HOSPITAL ENCOUNTER (OUTPATIENT)
Dept: PREADMISSION TESTING | Age: 72
Discharge: HOME | End: 2022-09-13

## 2022-09-13 VITALS — BODY MASS INDEX: 28.93 KG/M2 | HEIGHT: 66 IN | WEIGHT: 180 LBS

## 2022-09-13 RX ORDER — GABAPENTIN 300 MG/1
300 CAPSULE ORAL 3 TIMES DAILY
COMMUNITY

## 2022-09-13 RX ORDER — SODIUM CHLORIDE, SODIUM LACTATE, POTASSIUM CHLORIDE, CALCIUM CHLORIDE 600; 310; 30; 20 MG/100ML; MG/100ML; MG/100ML; MG/100ML
125 INJECTION, SOLUTION INTRAVENOUS CONTINUOUS
Status: CANCELLED | OUTPATIENT
Start: 2022-09-21

## 2022-09-13 RX ORDER — DEXTROMETHORPHAN HYDROBROMIDE, GUAIFENESIN 5; 100 MG/5ML; MG/5ML
1300 LIQUID ORAL 2 TIMES DAILY
COMMUNITY
End: 2022-10-04

## 2022-09-13 NOTE — PERIOP NOTES
PAT - SURGICAL PRE-ADMISSION INSTRUCTIONS    NAME:  Ibis Mata                                                          TODAY'S DATE:  9/13/2022    SURGERY DATE:  9/21/2022                                  SURGERY ARRIVAL TIME:   TBD    Do NOT eat or drink anything, including candy or gum, after MIDNIGHT on 9-21-22, unless you have specific instructions from your Surgeon or Anesthesia Provider to do so. No smoking 24 hours before surgery. No alcohol 24 hours prior to the day of surgery. No recreational drugs for one week prior to the day of surgery. Leave all valuables, including money/purse, at home. Remove all jewelry, nail polish, makeup (including mascara); no lotions, powders, deodorant, or perfume/cologne/after shave. Glasses/Contact lenses and Dentures may be worn to the hospital.  They will be removed prior to surgery. Call your doctor if symptoms of a cold or illness develop within 24 ours prior to surgery. AN ADULT MUST DRIVE YOU HOME AFTER OUTPATIENT SURGERY. If you are having an OUTPATIENT procedure, please make arrangements for a responsible adult to be with you for 24 hours after your surgery. If you are admitted to the hospital, you will be assigned to a bed after surgery is complete. Normally a family member will not be able to see you until you are in your assigned bed. 12. Visitation Restrictions Explained. Patient has an advance directive not on file. Pt does not have a DNR order. Special Instructions:  Covid Test Pt is vaccinated. Vaccination record in media.  Quarantine requirements discussed  Take these medications the morning of surgery with a sip of water:  tylenol as needed,   Bring any pertinent legal medical records.,  Bring durable medical equipment (DME) needed:  walker, and back brace if ordered by Dr. King Beckham metformin/glucophage in morning on day of surgery  No NSAIDs 7 days prior to surgery    Patient Prep:    use CHG solution, Instructed to start washes 3 days prior to surgery     These surgical instructions were reviewed with patient during the PAT phone call. Pt verbalized understanding of instructions provided.

## 2022-09-14 ENCOUNTER — HOSPITAL ENCOUNTER (OUTPATIENT)
Dept: PHYSICAL THERAPY | Age: 72
Discharge: HOME | End: 2022-09-14
Payer: MEDICARE

## 2022-09-14 PROCEDURE — 97112 NEUROMUSCULAR REEDUCATION: CPT

## 2022-09-14 PROCEDURE — 97110 THERAPEUTIC EXERCISES: CPT

## 2022-09-14 NOTE — PROGRESS NOTES
PT DISCHARGE DAILY NOTE AND [id number]    Referral source: Dena Morales : 1950   Medical/Treatment Diagnosis: Other low back pain [M54.59]  Payor: VA MEDICARE / Plan: VA MEDICARE PART A & B / Product Type: Medicare /  Onset Date: (with exacerbation in  and )      Prior Hospitalization: see medical history Provider#: [id number]   Medications: Verified on Patient Summary List      Comorbidities: lumbar stenosis, HTN, diabetes   Prior Level of Function: Pt with improved ambulation ease and no falls with ambulation    Visits from Start of Care: 16    Missed Visits: 0    Reporting Period : 08/15/2022 to 2022    Date:2022  : 1950  [x]  Patient  Verified  Payor: Axel Medici / Plan: VA MEDICARE PART A & B / Product Type: Medicare /    In time:12:02  Out time:12:45  Total Treatment Time (min): 43  Visit #: 8 of 8    Medicare/BCBS Only   Total Timed Codes (min):  43 1:1 Treatment Time:  43       SUBJECTIVE  Pain Level (0-10 scale): 6/10  Any medication changes, allergies to medications, adverse drug reactions, diagnosis change, or new procedure performed?: [x] No    [] Yes (see summary sheet for update)  Subjective functional status/changes:   [] No changes reported  Pt reports no new complaints of pain. OBJECTIVE    33 min Therapeutic Exercise:  [x] See flow sheet :   Rationale: increase ROM and increase strength to improve the patients ability to perform ADLs with increased ease. 10 min Neuromuscular Re-education:  [x]  See flow sheet :   Rationale: increase strength, improve coordination, and increase proprioception  to improve the patients ability to perform ADLs with increased ease. With   [] TE   [] TA   [] neuro   [] other: Patient Education: [x] Review HEP    [] Progressed/Changed HEP based on:   [] positioning   [] body mechanics   [] transfers   [] heat/ice application    [] other:      Other Objective/Functional Measures:  FOTO:37     Pain Level (0-10 scale) post treatment: 5/10    Summary of Care:  Goals for this certification period to be accomplished in 4 weeks:  Pt will demonstrate B hip flexion strength 4+/5 to improve ease of transfers. Re-cert: remains 4/5  Current: remains. 4/5. Pt will demonstrate B hip abduction strength 4/5 to improve stability with standing. Re-cert: slow progress - 3+/5 right, 4-/5 left    Current: remains: right 3+/5, left 4-/5   Pt will report no falls for 2 weeks to improve safety in home and community. Re-cert: Pt reports no falls since IE  Current: No falls since last report   Pt will improve FOTO score to 51 to demonstrate improved quality of life and function. Re-cert: progressing - 42  Current: regressed to 37    ASSESSMENT/Changes in Function: Pt has made some progress toward goals and has not had a fall for over 3 weeks. Pt is scheduled to have spinal surgery next week and will be DC'd to [facility] at this time.      Thank you for this referral!      PLAN  [x]Discontinue therapy: []Patient has reached or is progressing toward set goals      []Patient is non-compliant or has abdicated      [x]Due to lack of appreciable progress towards set goals    Luis Elliott, PTA 9/14/2022  12:10 PM  Co-Sign: Coleen Hanna DPT CMTPT

## 2022-09-20 NOTE — H&P
Pre-Admission History and Physical    Patient: Jessica Pena   MRN: [id number]   SSN: [id number]   YOB: 1950   Age: 67 y.o. Sex: female     Patient scheduled for: anterior lumbar interbody fusion lumbar four/five, lumbar five/ sacral one, prone trans psoas fusion lumbar two/three, three/four, segmental instrumentation lumbar two - sacral one. Date of surgery: 9/21/2022. Surgeon: Jodee Goff MD    HPI:  Jessica ePna is a 67 y.o. female with significant back, buttock and leg pain. She reports a pain level of 6/10. MRI demonstrates lumbar spondylosis with stenosis at S1 on the left, L3/4 and L4/5 globally with spondylolisthesis and L2/3 moderately severe. This patient has failed the presurgical conservative treatments  including physical therapy, spinal block injections and medications. Pain has impacted the patient's functional ability. She is being admitted for surgical intervention. Past Medical History:   Diagnosis Date    Arthritis     back and hands    Chronic pain     back    COVID-19     Summer of  2021    Diabetes ([facility] [address]     NIDDM    Hypertension     Nausea & vomiting     Surgery only not colonoscopy     Social History     Socioeconomic History    Marital status: SINGLE   Tobacco Use    Smoking status: Never    Smokeless tobacco: Never   Vaping Use    Vaping Use: Never used   Substance and Sexual Activity    Alcohol use: Never    Drug use: Never     Past Surgical History:   Procedure Laterality Date    COLONOSCOPY N/A 6/24/2022    COLONOSCOPY performed by Ariella Swain MD at [facility]    HX COLONOSCOPY      HX TUBAL LIGATION       Family History   Problem Relation Age of Onset    Breast Cancer Mother 80     Allergies   Allergen Reactions    Codeine Nausea and Vomiting    Penicillin G Swelling     Current Outpatient Medications   Medication Sig Dispense Refill    gabapentin (NEURONTIN) 300 mg capsule Take 300 mg by mouth three (3) times daily.       acetaminophen (Tylenol Arthritis Pain) 650 mg TbER Take 1,300 mg by mouth two (2) times a day. naproxen sodium (NAPROSYN) 220 mg tablet Take 1 Tablet by mouth two (2) times a day. metFORMIN (GLUCOPHAGE) 500 mg tablet Take 500 mg by mouth daily. simvastatin (ZOCOR) 40 mg tablet Take 40 mg by mouth nightly. triamterene-hydroCHLOROthiazide (DYAZIDE) 37.5-25 mg per capsule Take 1 Capsule by mouth daily. cyclobenzaprine (FLEXERIL) 5 mg tablet Take 5 mg by mouth three (3) times daily as needed. multivitamin (ONE A DAY) tablet Take 1 Tablet by mouth daily. ROS:  Denies chills, fever,night sweats,  bowel or bladder dysfunction, unexplained weight loss/weight gain, chest pain, sob or anxiety. Physical Examination    Gen: Well developed, well nourished 67 y.o. female with good strength of her ehl, ta, hams and quads, deltoids, biceps, triceps and intrinsics. No nerve tension signs    Assessment and Plan    Due to the pt's persistent symptoms unrelieved by conservative measure Brad Betts is being admitted to undergo surgical intervention. The post-operative plan of care consists of physical therapy, home health and a 2 week f/u office visit. The risks, benefits, complications and alternatives to surgery have been discussed in detail with the patient. The patient understands and agrees to proceed.

## 2022-09-21 ENCOUNTER — ANESTHESIA EVENT (OUTPATIENT)
Dept: SURGERY | Age: 72
DRG: 460 | End: 2022-09-21
Payer: MEDICARE

## 2022-09-21 ENCOUNTER — ANESTHESIA (OUTPATIENT)
Dept: SURGERY | Age: 72
DRG: 460 | End: 2022-09-21
Payer: MEDICARE

## 2022-09-21 ENCOUNTER — APPOINTMENT (OUTPATIENT)
Dept: GENERAL RADIOLOGY | Age: 72
DRG: 460 | End: 2022-09-21
Attending: ORTHOPAEDIC SURGERY
Payer: MEDICARE

## 2022-09-21 ENCOUNTER — HOSPITAL ENCOUNTER (INPATIENT)
Age: 72
LOS: 2 days | Discharge: NURSING, REHAB OR LONG-TERM CARE | DRG: 460 | End: 2022-09-23
Attending: ORTHOPAEDIC SURGERY | Admitting: ORTHOPAEDIC SURGERY
Payer: MEDICARE

## 2022-09-21 DIAGNOSIS — Z98.1 S/P LUMBAR AND LUMBOSACRAL FUSION BY ANTERIOR TECHNIQUE: Primary | ICD-10-CM

## 2022-09-21 PROBLEM — M48.061 SPINAL STENOSIS OF LUMBAR REGION AT MULTIPLE LEVELS: Status: ACTIVE | Noted: 2022-09-21

## 2022-09-21 LAB
ABO + RH BLD: NORMAL
BLOOD GROUP ANTIBODIES SERPL: NORMAL
GLUCOSE BLD STRIP.AUTO-MCNC: 110 MG/DL (ref 70–110)
GLUCOSE BLD STRIP.AUTO-MCNC: 140 MG/DL (ref 70–110)
GLUCOSE BLD STRIP.AUTO-MCNC: 155 MG/DL (ref 70–110)
GLUCOSE BLD STRIP.AUTO-MCNC: 89 MG/DL (ref 70–110)
SPECIMEN EXP DATE BLD: NORMAL

## 2022-09-21 PROCEDURE — 36415 COLL VENOUS BLD VENIPUNCTURE: CPT

## 2022-09-21 PROCEDURE — 4A11X4G MONITORING OF PERIPHERAL NERVOUS ELECTRICAL ACTIVITY, INTRAOPERATIVE, EXTERNAL APPROACH: ICD-10-PCS | Performed by: ORTHOPAEDIC SURGERY

## 2022-09-21 PROCEDURE — 74011000250 HC RX REV CODE- 250: Performed by: ORTHOPAEDIC SURGERY

## 2022-09-21 PROCEDURE — 74011250636 HC RX REV CODE- 250/636: Performed by: ORTHOPAEDIC SURGERY

## 2022-09-21 PROCEDURE — 77030034479 HC ADH SKN CLSR PRINEO J&J -B: Performed by: ORTHOPAEDIC SURGERY

## 2022-09-21 PROCEDURE — 01NR0ZZ RELEASE SACRAL NERVE, OPEN APPROACH: ICD-10-PCS | Performed by: ORTHOPAEDIC SURGERY

## 2022-09-21 PROCEDURE — 77030008477 HC STYL SATN SLP COVD -A: Performed by: ANESTHESIOLOGY

## 2022-09-21 PROCEDURE — 74011000250 HC RX REV CODE- 250: Performed by: NURSE ANESTHETIST, CERTIFIED REGISTERED

## 2022-09-21 PROCEDURE — 77030031140 HC SUT VCRL3 J&J -A: Performed by: ORTHOPAEDIC SURGERY

## 2022-09-21 PROCEDURE — 77030035236 HC SUT PDS STRATFX BARB J&J -B: Performed by: ORTHOPAEDIC SURGERY

## 2022-09-21 PROCEDURE — 82962 GLUCOSE BLOOD TEST: CPT

## 2022-09-21 PROCEDURE — 0ST40ZZ RESECTION OF LUMBOSACRAL DISC, OPEN APPROACH: ICD-10-PCS | Performed by: ORTHOPAEDIC SURGERY

## 2022-09-21 PROCEDURE — 2709999900 HC NON-CHARGEABLE SUPPLY: Performed by: ORTHOPAEDIC SURGERY

## 2022-09-21 PROCEDURE — 76010000141 HC OR TIME 6.5 TO 7 HR: Performed by: ORTHOPAEDIC SURGERY

## 2022-09-21 PROCEDURE — 0ST20ZZ RESECTION OF LUMBAR VERTEBRAL DISC, OPEN APPROACH: ICD-10-PCS | Performed by: ORTHOPAEDIC SURGERY

## 2022-09-21 PROCEDURE — 77030020269 HC MISC IMPL: Performed by: ORTHOPAEDIC SURGERY

## 2022-09-21 PROCEDURE — 77010033678 HC OXYGEN DAILY

## 2022-09-21 PROCEDURE — 0SG30A0 FUSION OF LUMBOSACRAL JOINT WITH INTERBODY FUSION DEVICE, ANTERIOR APPROACH, ANTERIOR COLUMN, OPEN APPROACH: ICD-10-PCS | Performed by: ORTHOPAEDIC SURGERY

## 2022-09-21 PROCEDURE — 77030002933 HC SUT MCRYL J&J -A: Performed by: ORTHOPAEDIC SURGERY

## 2022-09-21 PROCEDURE — 74011250637 HC RX REV CODE- 250/637: Performed by: ORTHOPAEDIC SURGERY

## 2022-09-21 PROCEDURE — 77030006643: Performed by: ANESTHESIOLOGY

## 2022-09-21 PROCEDURE — 86900 BLOOD TYPING SEROLOGIC ABO: CPT

## 2022-09-21 PROCEDURE — 76060000044 HC ANESTHESIA 6.5 TO 7 HR: Performed by: ORTHOPAEDIC SURGERY

## 2022-09-21 PROCEDURE — 74011250636 HC RX REV CODE- 250/636: Performed by: ANESTHESIOLOGY

## 2022-09-21 PROCEDURE — C1713 ANCHOR/SCREW BN/BN,TIS/BN: HCPCS | Performed by: ORTHOPAEDIC SURGERY

## 2022-09-21 PROCEDURE — 77030040361 HC SLV COMPR DVT MDII -B: Performed by: ORTHOPAEDIC SURGERY

## 2022-09-21 PROCEDURE — 77030020782 HC GWN BAIR PAWS FLX 3M -B: Performed by: ORTHOPAEDIC SURGERY

## 2022-09-21 PROCEDURE — 77030020268 HC MISC GENERAL SUPPLY: Performed by: ORTHOPAEDIC SURGERY

## 2022-09-21 PROCEDURE — 0SG10A0 FUSION OF 2 OR MORE LUMBAR VERTEBRAL JOINTS WITH INTERBODY FUSION DEVICE, ANTERIOR APPROACH, ANTERIOR COLUMN, OPEN APPROACH: ICD-10-PCS | Performed by: ORTHOPAEDIC SURGERY

## 2022-09-21 PROCEDURE — 77030005513 HC CATH URETH FOL11 MDII -B: Performed by: ORTHOPAEDIC SURGERY

## 2022-09-21 PROCEDURE — 07DR3ZZ EXTRACTION OF ILIAC BONE MARROW, PERCUTANEOUS APPROACH: ICD-10-PCS | Performed by: ORTHOPAEDIC SURGERY

## 2022-09-21 PROCEDURE — 77030027138 HC INCENT SPIROMETER -A: Performed by: ORTHOPAEDIC SURGERY

## 2022-09-21 PROCEDURE — 76210000016 HC OR PH I REC 1 TO 1.5 HR: Performed by: ORTHOPAEDIC SURGERY

## 2022-09-21 PROCEDURE — 77030014007 HC SPNG HEMSTAT J&J -B: Performed by: ORTHOPAEDIC SURGERY

## 2022-09-21 PROCEDURE — 77030004402 HC BUR NEUR STRY -C: Performed by: ORTHOPAEDIC SURGERY

## 2022-09-21 PROCEDURE — 77030008683 HC TU ET CUF COVD -A: Performed by: ANESTHESIOLOGY

## 2022-09-21 PROCEDURE — 77030034475 HC MISC IMPL SPN: Performed by: ORTHOPAEDIC SURGERY

## 2022-09-21 PROCEDURE — 65270000029 HC RM PRIVATE

## 2022-09-21 PROCEDURE — 74011250636 HC RX REV CODE- 250/636: Performed by: NURSE ANESTHETIST, CERTIFIED REGISTERED

## 2022-09-21 PROCEDURE — 77030010512 HC APPL CLP LIG J&J -C: Performed by: ORTHOPAEDIC SURGERY

## 2022-09-21 PROCEDURE — 01NB0ZZ RELEASE LUMBAR NERVE, OPEN APPROACH: ICD-10-PCS | Performed by: ORTHOPAEDIC SURGERY

## 2022-09-21 DEVICE — I-FACTOR PUTTY, 2.5CC SYRINGE
Type: IMPLANTABLE DEVICE | Site: SPINE LUMBAR | Status: FUNCTIONAL
Brand: I-FACTOR PEPTIDE ENHANCED BONE GRAFT

## 2022-09-21 DEVICE — I-FACTOR™ PUTTY, 5.0 CC SYRINGE
Type: IMPLANTABLE DEVICE | Site: SPINE LUMBAR | Status: FUNCTIONAL
Brand: I-FACTOR™ PEPTIDE ENHANCED BONE GRAFT

## 2022-09-21 DEVICE — IDENTITI LIF POROUS TI SPACER, 8 X 18 X 50 MM, 10°
Type: IMPLANTABLE DEVICE | Site: SPINE LUMBAR | Status: FUNCTIONAL
Brand: IDENTITI

## 2022-09-21 RX ORDER — SODIUM CHLORIDE, SODIUM LACTATE, POTASSIUM CHLORIDE, CALCIUM CHLORIDE 600; 310; 30; 20 MG/100ML; MG/100ML; MG/100ML; MG/100ML
125 INJECTION, SOLUTION INTRAVENOUS CONTINUOUS
Status: DISCONTINUED | OUTPATIENT
Start: 2022-09-21 | End: 2022-09-22

## 2022-09-21 RX ORDER — PHENYLEPHRINE HYDROCHLORIDE 10 MG/ML
INJECTION INTRAVENOUS AS NEEDED
Status: DISCONTINUED | OUTPATIENT
Start: 2022-09-21 | End: 2022-09-21 | Stop reason: HOSPADM

## 2022-09-21 RX ORDER — ONDANSETRON 2 MG/ML
4 INJECTION INTRAMUSCULAR; INTRAVENOUS
Status: DISCONTINUED | OUTPATIENT
Start: 2022-09-21 | End: 2022-09-23 | Stop reason: HOSPADM

## 2022-09-21 RX ORDER — INSULIN LISPRO 100 [IU]/ML
INJECTION, SOLUTION INTRAVENOUS; SUBCUTANEOUS
Status: DISCONTINUED | OUTPATIENT
Start: 2022-09-21 | End: 2022-09-23 | Stop reason: HOSPADM

## 2022-09-21 RX ORDER — DOCUSATE SODIUM 100 MG/1
100 CAPSULE, LIQUID FILLED ORAL 2 TIMES DAILY
Status: DISCONTINUED | OUTPATIENT
Start: 2022-09-21 | End: 2022-09-23 | Stop reason: HOSPADM

## 2022-09-21 RX ORDER — ROCURONIUM BROMIDE 10 MG/ML
INJECTION, SOLUTION INTRAVENOUS AS NEEDED
Status: DISCONTINUED | OUTPATIENT
Start: 2022-09-21 | End: 2022-09-21 | Stop reason: HOSPADM

## 2022-09-21 RX ORDER — TRANEXAMIC ACID 10 MG/ML
1 INJECTION, SOLUTION INTRAVENOUS ONCE
Status: COMPLETED | OUTPATIENT
Start: 2022-09-21 | End: 2022-09-21

## 2022-09-21 RX ORDER — VANCOMYCIN HYDROCHLORIDE
1250 ONCE
Status: DISCONTINUED | OUTPATIENT
Start: 2022-09-21 | End: 2022-09-21 | Stop reason: HOSPADM

## 2022-09-21 RX ORDER — ONDANSETRON 2 MG/ML
INJECTION INTRAMUSCULAR; INTRAVENOUS AS NEEDED
Status: DISCONTINUED | OUTPATIENT
Start: 2022-09-21 | End: 2022-09-21

## 2022-09-21 RX ORDER — ONDANSETRON 2 MG/ML
INJECTION INTRAMUSCULAR; INTRAVENOUS AS NEEDED
Status: DISCONTINUED | OUTPATIENT
Start: 2022-09-21 | End: 2022-09-21 | Stop reason: HOSPADM

## 2022-09-21 RX ORDER — DIAZEPAM 5 MG/1
5 TABLET ORAL
Status: DISCONTINUED | OUTPATIENT
Start: 2022-09-21 | End: 2022-09-23 | Stop reason: HOSPADM

## 2022-09-21 RX ORDER — HYDROMORPHONE HYDROCHLORIDE 1 MG/ML
1 INJECTION, SOLUTION INTRAMUSCULAR; INTRAVENOUS; SUBCUTANEOUS
Status: DISCONTINUED | OUTPATIENT
Start: 2022-09-21 | End: 2022-09-22

## 2022-09-21 RX ORDER — MAGNESIUM SULFATE 100 %
4 CRYSTALS MISCELLANEOUS AS NEEDED
Status: DISCONTINUED | OUTPATIENT
Start: 2022-09-21 | End: 2022-09-23 | Stop reason: HOSPADM

## 2022-09-21 RX ORDER — VANCOMYCIN HYDROCHLORIDE 1 G/20ML
INJECTION, POWDER, LYOPHILIZED, FOR SOLUTION INTRAVENOUS AS NEEDED
Status: DISCONTINUED | OUTPATIENT
Start: 2022-09-21 | End: 2022-09-21 | Stop reason: HOSPADM

## 2022-09-21 RX ORDER — SODIUM CHLORIDE 0.9 % (FLUSH) 0.9 %
5-40 SYRINGE (ML) INJECTION AS NEEDED
Status: DISCONTINUED | OUTPATIENT
Start: 2022-09-21 | End: 2022-09-21 | Stop reason: HOSPADM

## 2022-09-21 RX ORDER — BUPIVACAINE HYDROCHLORIDE 5 MG/ML
INJECTION, SOLUTION EPIDURAL; INTRACAUDAL AS NEEDED
Status: DISCONTINUED | OUTPATIENT
Start: 2022-09-21 | End: 2022-09-21 | Stop reason: HOSPADM

## 2022-09-21 RX ORDER — SODIUM CHLORIDE 0.9 % (FLUSH) 0.9 %
5-40 SYRINGE (ML) INJECTION EVERY 8 HOURS
Status: DISCONTINUED | OUTPATIENT
Start: 2022-09-21 | End: 2022-09-23 | Stop reason: HOSPADM

## 2022-09-21 RX ORDER — ALBUTEROL SULFATE 0.83 MG/ML
SOLUTION RESPIRATORY (INHALATION)
Status: DISCONTINUED | OUTPATIENT
Start: 2022-09-21 | End: 2022-09-21 | Stop reason: HOSPADM

## 2022-09-21 RX ORDER — DIPHENHYDRAMINE HYDROCHLORIDE 50 MG/ML
12.5 INJECTION, SOLUTION INTRAMUSCULAR; INTRAVENOUS
Status: DISCONTINUED | OUTPATIENT
Start: 2022-09-21 | End: 2022-09-21 | Stop reason: HOSPADM

## 2022-09-21 RX ORDER — DEXAMETHASONE SODIUM PHOSPHATE 4 MG/ML
INJECTION, SOLUTION INTRA-ARTICULAR; INTRALESIONAL; INTRAMUSCULAR; INTRAVENOUS; SOFT TISSUE AS NEEDED
Status: DISCONTINUED | OUTPATIENT
Start: 2022-09-21 | End: 2022-09-21 | Stop reason: HOSPADM

## 2022-09-21 RX ORDER — LIDOCAINE HYDROCHLORIDE 20 MG/ML
INJECTION, SOLUTION EPIDURAL; INFILTRATION; INTRACAUDAL; PERINEURAL AS NEEDED
Status: DISCONTINUED | OUTPATIENT
Start: 2022-09-21 | End: 2022-09-21 | Stop reason: HOSPADM

## 2022-09-21 RX ORDER — HYDROMORPHONE HYDROCHLORIDE 1 MG/ML
0.5 INJECTION, SOLUTION INTRAMUSCULAR; INTRAVENOUS; SUBCUTANEOUS
Status: DISCONTINUED | OUTPATIENT
Start: 2022-09-21 | End: 2022-09-21 | Stop reason: HOSPADM

## 2022-09-21 RX ORDER — FAMOTIDINE 20 MG/1
40 TABLET, FILM COATED ORAL EVERY 12 HOURS
Status: DISCONTINUED | OUTPATIENT
Start: 2022-09-21 | End: 2022-09-23 | Stop reason: HOSPADM

## 2022-09-21 RX ORDER — SODIUM CHLORIDE 0.9 % (FLUSH) 0.9 %
5-40 SYRINGE (ML) INJECTION EVERY 8 HOURS
Status: DISCONTINUED | OUTPATIENT
Start: 2022-09-21 | End: 2022-09-21 | Stop reason: HOSPADM

## 2022-09-21 RX ORDER — SODIUM CHLORIDE, SODIUM LACTATE, POTASSIUM CHLORIDE, CALCIUM CHLORIDE 600; 310; 30; 20 MG/100ML; MG/100ML; MG/100ML; MG/100ML
100 INJECTION, SOLUTION INTRAVENOUS CONTINUOUS
Status: DISCONTINUED | OUTPATIENT
Start: 2022-09-21 | End: 2022-09-21 | Stop reason: HOSPADM

## 2022-09-21 RX ORDER — MIDAZOLAM HYDROCHLORIDE 1 MG/ML
INJECTION, SOLUTION INTRAMUSCULAR; INTRAVENOUS AS NEEDED
Status: DISCONTINUED | OUTPATIENT
Start: 2022-09-21 | End: 2022-09-21 | Stop reason: HOSPADM

## 2022-09-21 RX ORDER — LORAZEPAM 1 MG/1
1 TABLET ORAL
Status: DISCONTINUED | OUTPATIENT
Start: 2022-09-21 | End: 2022-09-23 | Stop reason: HOSPADM

## 2022-09-21 RX ORDER — FENTANYL CITRATE 50 UG/ML
25 INJECTION, SOLUTION INTRAMUSCULAR; INTRAVENOUS AS NEEDED
Status: DISCONTINUED | OUTPATIENT
Start: 2022-09-21 | End: 2022-09-21 | Stop reason: HOSPADM

## 2022-09-21 RX ORDER — DIPHENHYDRAMINE HYDROCHLORIDE 50 MG/ML
12.5 INJECTION, SOLUTION INTRAMUSCULAR; INTRAVENOUS
Status: DISCONTINUED | OUTPATIENT
Start: 2022-09-21 | End: 2022-09-23 | Stop reason: HOSPADM

## 2022-09-21 RX ORDER — PROCHLORPERAZINE EDISYLATE 5 MG/ML
5 INJECTION INTRAMUSCULAR; INTRAVENOUS ONCE
Status: DISCONTINUED | OUTPATIENT
Start: 2022-09-21 | End: 2022-09-21 | Stop reason: HOSPADM

## 2022-09-21 RX ORDER — SODIUM CHLORIDE 0.9 % (FLUSH) 0.9 %
5-40 SYRINGE (ML) INJECTION AS NEEDED
Status: DISCONTINUED | OUTPATIENT
Start: 2022-09-21 | End: 2022-09-23 | Stop reason: HOSPADM

## 2022-09-21 RX ORDER — VANCOMYCIN HYDROCHLORIDE
1250 EVERY 12 HOURS
Status: COMPLETED | OUTPATIENT
Start: 2022-09-21 | End: 2022-09-22

## 2022-09-21 RX ORDER — ACETAMINOPHEN 500 MG
1000 TABLET ORAL EVERY 6 HOURS
Status: DISCONTINUED | OUTPATIENT
Start: 2022-09-21 | End: 2022-09-23 | Stop reason: HOSPADM

## 2022-09-21 RX ORDER — NALOXONE HYDROCHLORIDE 0.4 MG/ML
0.4 INJECTION, SOLUTION INTRAMUSCULAR; INTRAVENOUS; SUBCUTANEOUS AS NEEDED
Status: DISCONTINUED | OUTPATIENT
Start: 2022-09-21 | End: 2022-09-23 | Stop reason: HOSPADM

## 2022-09-21 RX ORDER — OXYCODONE HYDROCHLORIDE 5 MG/1
5-10 TABLET ORAL
Status: DISCONTINUED | OUTPATIENT
Start: 2022-09-21 | End: 2022-09-23 | Stop reason: HOSPADM

## 2022-09-21 RX ORDER — NALOXONE HYDROCHLORIDE 0.4 MG/ML
0.1 INJECTION, SOLUTION INTRAMUSCULAR; INTRAVENOUS; SUBCUTANEOUS AS NEEDED
Status: DISCONTINUED | OUTPATIENT
Start: 2022-09-21 | End: 2022-09-21 | Stop reason: HOSPADM

## 2022-09-21 RX ORDER — TRIAMTERENE/HYDROCHLOROTHIAZID 37.5-25 MG
1 TABLET ORAL DAILY
Status: DISCONTINUED | OUTPATIENT
Start: 2022-09-22 | End: 2022-09-23 | Stop reason: HOSPADM

## 2022-09-21 RX ORDER — PROPOFOL 10 MG/ML
INJECTION, EMULSION INTRAVENOUS
Status: DISCONTINUED | OUTPATIENT
Start: 2022-09-21 | End: 2022-09-21 | Stop reason: HOSPADM

## 2022-09-21 RX ORDER — FENTANYL CITRATE 50 UG/ML
INJECTION, SOLUTION INTRAMUSCULAR; INTRAVENOUS AS NEEDED
Status: DISCONTINUED | OUTPATIENT
Start: 2022-09-21 | End: 2022-09-21 | Stop reason: HOSPADM

## 2022-09-21 RX ORDER — HYDROMORPHONE HYDROCHLORIDE 1 MG/ML
INJECTION, SOLUTION INTRAMUSCULAR; INTRAVENOUS; SUBCUTANEOUS AS NEEDED
Status: DISCONTINUED | OUTPATIENT
Start: 2022-09-21 | End: 2022-09-21 | Stop reason: HOSPADM

## 2022-09-21 RX ORDER — GABAPENTIN 300 MG/1
300 CAPSULE ORAL 3 TIMES DAILY
Status: DISCONTINUED | OUTPATIENT
Start: 2022-09-21 | End: 2022-09-23 | Stop reason: HOSPADM

## 2022-09-21 RX ORDER — NEOSTIGMINE METHYLSULFATE 1 MG/ML
INJECTION, SOLUTION INTRAVENOUS AS NEEDED
Status: DISCONTINUED | OUTPATIENT
Start: 2022-09-21 | End: 2022-09-21 | Stop reason: HOSPADM

## 2022-09-21 RX ORDER — PROPOFOL 10 MG/ML
INJECTION, EMULSION INTRAVENOUS AS NEEDED
Status: DISCONTINUED | OUTPATIENT
Start: 2022-09-21 | End: 2022-09-21 | Stop reason: HOSPADM

## 2022-09-21 RX ORDER — KETAMINE HYDROCHLORIDE 10 MG/ML
INJECTION, SOLUTION INTRAMUSCULAR; INTRAVENOUS AS NEEDED
Status: DISCONTINUED | OUTPATIENT
Start: 2022-09-21 | End: 2022-09-21 | Stop reason: HOSPADM

## 2022-09-21 RX ORDER — VANCOMYCIN HYDROCHLORIDE
1250 ONCE
Status: COMPLETED | OUTPATIENT
Start: 2022-09-21 | End: 2022-09-21

## 2022-09-21 RX ORDER — GLYCOPYRROLATE 0.2 MG/ML
INJECTION INTRAMUSCULAR; INTRAVENOUS AS NEEDED
Status: DISCONTINUED | OUTPATIENT
Start: 2022-09-21 | End: 2022-09-21 | Stop reason: HOSPADM

## 2022-09-21 RX ADMIN — TRANEXAMIC ACID 1 G: 10 INJECTION, SOLUTION INTRAVENOUS at 07:50

## 2022-09-21 RX ADMIN — GABAPENTIN 300 MG: 300 CAPSULE ORAL at 22:21

## 2022-09-21 RX ADMIN — DOCUSATE SODIUM 100 MG: 100 CAPSULE ORAL at 22:21

## 2022-09-21 RX ADMIN — PHENYLEPHRINE HYDROCHLORIDE 200 MCG: 10 INJECTION INTRAVENOUS at 10:07

## 2022-09-21 RX ADMIN — SODIUM CHLORIDE, SODIUM LACTATE, POTASSIUM CHLORIDE, AND CALCIUM CHLORIDE: 600; 310; 30; 20 INJECTION, SOLUTION INTRAVENOUS at 13:00

## 2022-09-21 RX ADMIN — HYDROMORPHONE HYDROCHLORIDE 0.5 MG: 1 INJECTION, SOLUTION INTRAMUSCULAR; INTRAVENOUS; SUBCUTANEOUS at 10:40

## 2022-09-21 RX ADMIN — PHENYLEPHRINE HYDROCHLORIDE 100 MCG: 10 INJECTION INTRAVENOUS at 10:40

## 2022-09-21 RX ADMIN — VANCOMYCIN HYDROCHLORIDE 1250 MG: 10 INJECTION, POWDER, LYOPHILIZED, FOR SOLUTION INTRAVENOUS at 19:12

## 2022-09-21 RX ADMIN — HYDROMORPHONE HYDROCHLORIDE 0.5 MG: 1 INJECTION, SOLUTION INTRAMUSCULAR; INTRAVENOUS; SUBCUTANEOUS at 08:15

## 2022-09-21 RX ADMIN — KETAMINE HYDROCHLORIDE 10 MG: 10 INJECTION, SOLUTION INTRAMUSCULAR; INTRAVENOUS at 09:59

## 2022-09-21 RX ADMIN — DEXAMETHASONE SODIUM PHOSPHATE 4 MG: 4 INJECTION, SOLUTION INTRAMUSCULAR; INTRAVENOUS at 10:18

## 2022-09-21 RX ADMIN — MIDAZOLAM 1 MG: 1 INJECTION INTRAMUSCULAR; INTRAVENOUS at 07:31

## 2022-09-21 RX ADMIN — SODIUM CHLORIDE, SODIUM LACTATE, POTASSIUM CHLORIDE, AND CALCIUM CHLORIDE 125 ML/HR: 600; 310; 30; 20 INJECTION, SOLUTION INTRAVENOUS at 16:27

## 2022-09-21 RX ADMIN — VANCOMYCIN HYDROCHLORIDE 1250 MG: 10 INJECTION, POWDER, LYOPHILIZED, FOR SOLUTION INTRAVENOUS at 07:02

## 2022-09-21 RX ADMIN — SODIUM CHLORIDE, SODIUM LACTATE, POTASSIUM CHLORIDE, AND CALCIUM CHLORIDE 125 ML/HR: 600; 310; 30; 20 INJECTION, SOLUTION INTRAVENOUS at 06:35

## 2022-09-21 RX ADMIN — PHENYLEPHRINE HYDROCHLORIDE 100 MCG: 10 INJECTION INTRAVENOUS at 10:27

## 2022-09-21 RX ADMIN — SODIUM CHLORIDE, SODIUM LACTATE, POTASSIUM CHLORIDE, AND CALCIUM CHLORIDE: 600; 310; 30; 20 INJECTION, SOLUTION INTRAVENOUS at 09:00

## 2022-09-21 RX ADMIN — KETAMINE HYDROCHLORIDE 10 MG: 10 INJECTION, SOLUTION INTRAMUSCULAR; INTRAVENOUS at 08:15

## 2022-09-21 RX ADMIN — FENTANYL CITRATE 50 MCG: 50 INJECTION, SOLUTION INTRAMUSCULAR; INTRAVENOUS at 08:02

## 2022-09-21 RX ADMIN — GLYCOPYRROLATE 0.1 MG: 0.2 INJECTION INTRAMUSCULAR; INTRAVENOUS at 07:31

## 2022-09-21 RX ADMIN — PHENYLEPHRINE HYDROCHLORIDE 100 MCG: 10 INJECTION INTRAVENOUS at 10:04

## 2022-09-21 RX ADMIN — NEOSTIGMINE METHYLSULFATE 1 MG: 1 INJECTION, SOLUTION INTRAVENOUS at 13:35

## 2022-09-21 RX ADMIN — TRANEXAMIC ACID 1 G: 10 INJECTION, SOLUTION INTRAVENOUS at 13:13

## 2022-09-21 RX ADMIN — KETAMINE HYDROCHLORIDE 10 MG: 10 INJECTION, SOLUTION INTRAMUSCULAR; INTRAVENOUS at 08:09

## 2022-09-21 RX ADMIN — ONDANSETRON HYDROCHLORIDE 4 MG: 2 INJECTION INTRAMUSCULAR; INTRAVENOUS at 11:58

## 2022-09-21 RX ADMIN — FENTANYL CITRATE 25 MCG: 50 INJECTION, SOLUTION INTRAMUSCULAR; INTRAVENOUS at 15:16

## 2022-09-21 RX ADMIN — ROCURONIUM BROMIDE 50 MG: 10 INJECTION, SOLUTION INTRAVENOUS at 07:42

## 2022-09-21 RX ADMIN — ACETAMINOPHEN 1000 MG: 500 TABLET ORAL at 17:37

## 2022-09-21 RX ADMIN — PHENYLEPHRINE HYDROCHLORIDE 100 MCG: 10 INJECTION INTRAVENOUS at 08:25

## 2022-09-21 RX ADMIN — GLYCOPYRROLATE 0.2 MG: 0.2 INJECTION INTRAMUSCULAR; INTRAVENOUS at 13:35

## 2022-09-21 RX ADMIN — PHENYLEPHRINE HYDROCHLORIDE 50 MCG: 10 INJECTION INTRAVENOUS at 09:11

## 2022-09-21 RX ADMIN — PROPOFOL 150 MG: 10 INJECTION, EMULSION INTRAVENOUS at 07:42

## 2022-09-21 RX ADMIN — LIDOCAINE HYDROCHLORIDE 80 MG: 20 INJECTION, SOLUTION INTRAVENOUS at 07:42

## 2022-09-21 RX ADMIN — KETAMINE HYDROCHLORIDE 20 MG: 10 INJECTION, SOLUTION INTRAMUSCULAR; INTRAVENOUS at 08:00

## 2022-09-21 RX ADMIN — PROPOFOL 50 MCG/KG/MIN: 10 INJECTION, EMULSION INTRAVENOUS at 10:00

## 2022-09-21 RX ADMIN — OXYCODONE 5 MG: 5 TABLET ORAL at 16:19

## 2022-09-21 RX ADMIN — FENTANYL CITRATE 50 MCG: 50 INJECTION, SOLUTION INTRAMUSCULAR; INTRAVENOUS at 07:42

## 2022-09-21 RX ADMIN — OXYCODONE 5 MG: 5 TABLET ORAL at 22:54

## 2022-09-21 RX ADMIN — PHENYLEPHRINE HYDROCHLORIDE 100 MCG: 10 INJECTION INTRAVENOUS at 10:20

## 2022-09-21 RX ADMIN — PHENYLEPHRINE HYDROCHLORIDE 50 MCG/MIN: 10 INJECTION INTRAVENOUS at 10:57

## 2022-09-21 RX ADMIN — FENTANYL CITRATE 25 MCG: 50 INJECTION, SOLUTION INTRAMUSCULAR; INTRAVENOUS at 15:00

## 2022-09-21 RX ADMIN — GABAPENTIN 300 MG: 300 CAPSULE ORAL at 17:37

## 2022-09-21 RX ADMIN — PHENYLEPHRINE HYDROCHLORIDE 100 MCG: 10 INJECTION INTRAVENOUS at 07:53

## 2022-09-21 RX ADMIN — MIDAZOLAM 1 MG: 1 INJECTION INTRAMUSCULAR; INTRAVENOUS at 09:59

## 2022-09-21 RX ADMIN — PROPOFOL 50 MG: 10 INJECTION, EMULSION INTRAVENOUS at 09:59

## 2022-09-21 NOTE — PROGRESS NOTES
19:50 Assessment completed. Lungs are clear bilat. IS =1200. Back dsg remains C/D/I. CMS & PP are intact. Resting quietly in bed. 23:00 Shift assessment completed. See nsg flow sheet for details. 03:00 Reassessed with 0 changes noted. Resting quietly in bed with eyes closed between cares    07:30 Bedside and Verbal shift change report given to [address] (oncoming nurse) by Beauty Osler RN (offgoing nurse). Report included the following information SBAR.

## 2022-09-21 NOTE — BRIEF OP NOTE
Brief Postoperative Note    Patient: Nadia Manuel  YOB: 1950  MRN: [id number]    Date of Procedure: 9/21/2022     Pre-Op Diagnosis: SPONDYLALISTHESIS, SPINAL STENOSIS    Post-Op Diagnosis: Same as preoperative diagnosis. Procedure(s):  ANTERIOR LUMBAR INTERBODY FUSION LUMBAR FOUR/FIVE, LUMBAR FIVE/SACRAL ONE  PRONE TRANSPOAS FUSION LUMBER TWO/THREE, THREE/FOUR, SEGMANTAL INSTRUMENT LUMBAR TWO- SACRAL ONE WITH C-ARM  Segmental instrumentation L2/3/5/S1  Surgeon(s):  MD María Elena Sarmiento MD Eletha Blacksmith, MD    Surgical Assistant: Surg Asst-1: Michelle ALBARRAN    Anesthesia: General     Estimated Blood Loss (mL): less than 288     Complications: None    Specimens: * No specimens in log *     Implants:   Implant Name Type Inv.  Item Serial No.  Lot No. LRB No. Used Action   Demineralized Cancellous chips 1-8mm, 10cc   [id number] LIFENET 0 N/A 1 Implanted   PUTTY BONE GRAFT FACTOR 2.5ML PEPTIDE ENHANCED W/SYRINGE - [id number]  PUTTY BONE GRAFT FACTOR 2.5ML PEPTIDE ENHANCED W/SYRINGE  [employer]WD [id number] N/A 1 Implanted   PUTTY BONE GRAFT FACTOR 2.5ML PEPTIDE ENHANCED W/SYRINGE - [id number]  PUTTY BONE GRAFT FACTOR 2.5ML PEPTIDE ENHANCED W/SYRINGE  [employer]WD [id number] N/A 3 Implanted   IdentiTi ALIF SA Spacer [id number] 15 degrees    ALPHATEC SPINE [id number] N/A 1 Implanted   IdentiTi ALIF SA Spcaer [id number], 15 degrees    ALPHATEC SPINE [id number] N/A 1 Implanted   Demineralized cancellous chips 1-8mm, 10cc   [id number] LIFENET 0 N/A 1 Implanted   PUTTY BONE GRAFT FACTOR 5ML PEPTIDE ENHANCED W/SYRINGE - [id number]  PUTTY BONE GRAFT FACTOR 5ML PEPTIDE ENHANCED W/SYRINGE  [employer]WD [id number] N/A 1 Implanted   PUTTY BONE GRAFT FACTOR 5ML PEPTIDE ENHANCED W/SYRINGE - [id number]  PUTTY BONE GRAFT FACTOR 5ML PEPTIDE ENHANCED W/SYRINGE  [employer]WD [id number] N/A 1 Implanted   SPACER SPNL 10 DEG [id number] MM LIF POROUS TI IDENTITI - [id number]  SPACER SPNL 10 DEG [id number] MM LIF POROUS TI IDENTITI  ALPHATEC SPINE_WD [id number] N/A 1 Implanted   IdentiTi LIF Porous Spacer, [id number], 10 degrees    ALPHATEC SPINE [id number] N/A 1 Implanted       Drains: * No LDAs found *    Findings: restored lordosis, rigid fixation    Electronically Signed by Magdaleno May MD on 9/21/2022 at 1:16 PM

## 2022-09-21 NOTE — PROGRESS NOTES
[address] [facility] at this time. Patient drowsy and oriented x4. Denies SOB, chest pain. Shows no signs of distress. Patient lungs clear diminished bilaterally. Cap refill < 3 sec to all extremities. Patient has 20 G IV to R hand CDI. Flacc 5/10. Dressing to L back and abdomen is CDI. Dressing to lower back DI with scant sanguineous drainage. SCDs and hudson stockings applied to BLE. Incentive spirometer at bedside. Patient reaches 1500 on IS. Bowel sounds present. Skin intact. Patient call light and possessions within reach. Bed locked and in lowest position. 1940  Bedside and verbal shift change report given to Delphine Birmingham by Isabela Avilez RN. Report included SBAR, kardex, MAR, and recent results. Pain controlled by position and prn dominga. Patient has not ambulated post op.

## 2022-09-21 NOTE — ANESTHESIA PREPROCEDURE EVALUATION
Relevant Problems   No relevant active problems       Anesthetic History     PONV          Review of Systems / Medical History  Patient summary reviewed, nursing notes reviewed and pertinent labs reviewed    Pulmonary  Within defined limits            Pertinent negatives: No sleep apnea and smoker     Neuro/Psych              Cardiovascular    Hypertension: well controlled              Exercise tolerance: <4 METS     GI/Hepatic/Renal             Pertinent negatives: No hiatal hernia and GERD   Endo/Other    Diabetes: well controlled, type 2    Arthritis     Other Findings              Physical Exam    Airway  Mallampati: III  TM Distance: > 6 cm  Neck ROM: normal range of motion   Mouth opening: Diminished (comment)     Cardiovascular    Rhythm: regular  Rate: normal         Dental         Pulmonary  Breath sounds clear to auscultation               Abdominal  GI exam deferred       Other Findings            Anesthetic Plan    ASA: 2  Anesthesia type: general          Induction: Intravenous  Anesthetic plan and risks discussed with: Patient

## 2022-09-21 NOTE — PERIOP NOTES
TRANSFER - IN REPORT:    Verbal report received from Sabas Cabrera CRNA and Tasha Malhotra RN(name) on [place] [facility]  being received from OR (unit) for routine progression of care      Report consisted of patients Situation, Background, Assessment and   Recommendations(SBAR). Information from the following report(s) OR Summary, Procedure Summary, Intake/Output, and MAR was reviewed with the receiving nurse. Opportunity for questions and clarification was provided. Assessment completed upon patients arrival to unit and care assumed.

## 2022-09-21 NOTE — PERIOP NOTES
TRANSFER - OUT REPORT:    Verbal report given to [address], RN (name) on Jessica Pena  being transferred to [address] [place], [place] [address]unit) for routine progression of care       Report consisted of patients Situation, Background, Assessment and   Recommendations(SBAR). Information from the following report(s) Procedure Summary, Intake/Output, MAR, and Cardiac Rhythm NSR  was reviewed with the receiving nurse. Lines:   Peripheral IV 09/21/22 Posterior;Right Hand (Active)   Site Assessment Clean, dry, & intact 09/21/22 1451   Phlebitis Assessment 0 09/21/22 1451   Infiltration Assessment 0 09/21/22 1451   Dressing Status Clean, dry, & intact 09/21/22 1451   Dressing Type Transparent;Tape 09/21/22 1451   Hub Color/Line Status Pink; Infusing 09/21/22 1451        Opportunity for questions and clarification was provided.       Patient transported with:   Registered Nurse

## 2022-09-21 NOTE — ROUTINE PROCESS
1528  TRANSFER - IN REPORT:    Verbal report received from [address] [place] [address] on Arnie Bell  being received from PACU for routine post - op      Report consisted of patients Situation, Background, Assessment and   Recommendations(SBAR). Information from the following report(s) SBAR, Kardex, OR Summary, Intake/Output, MAR, and Recent Results was reviewed with the receiving nurse. Opportunity for questions and clarification was provided. Assessment will be completed upon patients arrival to unit and care assumed.

## 2022-09-21 NOTE — OP NOTES
Operative Note    Patient: Lexii Mesa  YOB: 1950  MRN: [id number]    Date of Procedure: 9/21/2022     Pre-Op Diagnosis: SPONDYLALISTHESIS, SPINAL STENOSIS    Post-Op Diagnosis: Same as preoperative diagnosis. Procedure(s): Anterior exposure of the L4-L5 disc space  Anterior exposure of the L5-S1 disc space    Surgeon(s):  MD Dori Solomon MD    Surgical Assistant: Surg Asst-1: Petey Killian    Anesthesia: General     Estimated Blood Loss (mL):  804     Complications: None    Specimens: * No specimens in log *     Implants:   Implant Name Type Inv. Item Serial No.  Lot No. LRB No. Used Action   Demineralized Cancellous chips 1-8mm, 10cc   [id number] LIFENET 0 N/A 1 Implanted   PUTTY BONE GRAFT FACTOR 2.5ML PEPTIDE ENHANCED W/SYRINGE - [id number]  PUTTY BONE GRAFT FACTOR 2.5ML PEPTIDE ENHANCED W/SYRINGE  [employer]WD [id number] N/A 1 Implanted   PUTTY BONE GRAFT FACTOR 2.5ML PEPTIDE ENHANCED W/SYRINGE - [id number]  PUTTY BONE GRAFT FACTOR 2.5ML PEPTIDE ENHANCED W/SYRINGE  [employer]_WD [id number] N/A 3 Implanted   IdentiTi ALIF SA Spacer [id number] 15 degrees    ALPHATEC SPINE [id number] N/A 1 Implanted   IdentiTi ALIF SA Spcaer [id number], 15 degrees    ALPHATEC SPINE [id number] N/A 1 Implanted       Drains: * No LDAs found *    Findings: Exposure of the L4-L5 disc space, exposure of the L5-S1 disc space, no complications encountered    Detailed Description of Procedure:   Patient had preop antibiotics and preop discussion of surgery and agreed to proceed. Had general anesthesia Hughes catheter shaved prepped draped per CDC guidelines for aseptic technique. Made a Pfannenstiel style incision below the umbilicus into the left carried down through skin and soft tissue exposing the anterior fascia.   I incised the anterior fascia in a left paramedian fashion exposing the rectus muscle followed this to the midline and entered the preperitoneal space and followed this around laterally into the retroperitoneal space identifying the psoas muscle then the vascular pedicle self-retaining clamp was placed in the L5-S1 disc base was identified and gently cleared off overlying soft tissue. There was a descending sacral which I doubly clipped and divided. This allowed exposure to the L5-S1 disc bases compartment fluoroscopy. Please refer to Dr. Linda Garcia dictation for the discectomy and implant therapy. The pedicles were inspected the vessels were patent and no bleeding. Next worked in a cephalad direction and identified the L4-L5 disc space. Carefully dissected the iliac vein to the right giving exposure to the anterior surface of the disc base. Please refer to Dr. Linda Garcia dictation for discectomy and implant therapy. Once this was completed reexamined there was no bleeding the vessels were intact placed Vanco powder at the site and allowed the retroperitoneum to sit back in its normal position there is no violation of the peritoneum. Closed the overlying anterior fascia with figure-of-eight #1 Nurolon sutures. Irrigated again and closed the Alverto's fascia with interrupted 2-0 Monocryl. 3-0 Monocryl interrupted for the subcu 4 Monocryl and Dermabond glue for the skin. No complications were encountered special thanks to Dr. Amy Pascual for limited perspective care this very nice patient.     Electronically Signed by Andrew Ly MD on 9/21/2022 at 9:45 AM

## 2022-09-21 NOTE — INTERVAL H&P NOTE
Update History & Physical    The Patient's History and Physical of September 20, 2022 was reviewed with the patient and I examined the patient. There was no change. The surgical site was confirmed by the patient and me. Plan:  The risk, benefits, expected outcome, and alternative to the recommended procedure have been discussed with the patient. Patient understands and wants to proceed with the procedure.     Electronically signed by Robb Hameed MD on 9/21/2022 at 7:18 AM

## 2022-09-22 ENCOUNTER — APPOINTMENT (OUTPATIENT)
Dept: CT IMAGING | Age: 72
DRG: 460 | End: 2022-09-22
Attending: ORTHOPAEDIC SURGERY
Payer: MEDICARE

## 2022-09-22 LAB
ANION GAP SERPL CALC-SCNC: 4 MMOL/L (ref 3–18)
BUN SERPL-MCNC: 18 MG/DL (ref 7–18)
BUN/CREAT SERPL: 17 (ref 12–20)
CALCIUM SERPL-MCNC: 8.8 MG/DL (ref 8.5–10.1)
CHLORIDE SERPL-SCNC: 106 MMOL/L (ref 100–111)
CO2 SERPL-SCNC: 29 MMOL/L (ref 21–32)
CREAT SERPL-MCNC: 1.04 MG/DL (ref 0.6–1.3)
ERYTHROCYTE [DISTWIDTH] IN BLOOD BY AUTOMATED COUNT: 14.6 % (ref 11.6–14.5)
GLUCOSE BLD STRIP.AUTO-MCNC: 112 MG/DL (ref 70–110)
GLUCOSE BLD STRIP.AUTO-MCNC: 120 MG/DL (ref 70–110)
GLUCOSE BLD STRIP.AUTO-MCNC: 127 MG/DL (ref 70–110)
GLUCOSE BLD STRIP.AUTO-MCNC: 138 MG/DL (ref 70–110)
GLUCOSE BLD STRIP.AUTO-MCNC: 150 MG/DL (ref 70–110)
GLUCOSE BLD STRIP.AUTO-MCNC: 157 MG/DL (ref 70–110)
GLUCOSE SERPL-MCNC: 139 MG/DL (ref 74–99)
HCT VFR BLD AUTO: 27.5 % (ref 35–45)
HGB BLD-MCNC: 8.6 G/DL (ref 12–16)
MCH RBC QN AUTO: 27.8 PG (ref 24–34)
MCHC RBC AUTO-ENTMCNC: 31.3 G/DL (ref 31–37)
MCV RBC AUTO: 89 FL (ref 78–100)
NRBC # BLD: 0 K/UL (ref 0–0.01)
NRBC BLD-RTO: 0 PER 100 WBC
PLATELET # BLD AUTO: 194 K/UL (ref 135–420)
PMV BLD AUTO: 10.9 FL (ref 9.2–11.8)
POTASSIUM SERPL-SCNC: 4 MMOL/L (ref 3.5–5.5)
RBC # BLD AUTO: 3.09 M/UL (ref 4.2–5.3)
SODIUM SERPL-SCNC: 139 MMOL/L (ref 136–145)
WBC # BLD AUTO: 11.5 K/UL (ref 4.6–13.2)

## 2022-09-22 PROCEDURE — 74011636637 HC RX REV CODE- 636/637: Performed by: ORTHOPAEDIC SURGERY

## 2022-09-22 PROCEDURE — 97162 PT EVAL MOD COMPLEX 30 MIN: CPT

## 2022-09-22 PROCEDURE — 36415 COLL VENOUS BLD VENIPUNCTURE: CPT

## 2022-09-22 PROCEDURE — 82962 GLUCOSE BLOOD TEST: CPT

## 2022-09-22 PROCEDURE — 74011000250 HC RX REV CODE- 250: Performed by: ORTHOPAEDIC SURGERY

## 2022-09-22 PROCEDURE — 97530 THERAPEUTIC ACTIVITIES: CPT

## 2022-09-22 PROCEDURE — 72131 CT LUMBAR SPINE W/O DYE: CPT

## 2022-09-22 PROCEDURE — 85027 COMPLETE CBC AUTOMATED: CPT

## 2022-09-22 PROCEDURE — 65270000029 HC RM PRIVATE

## 2022-09-22 PROCEDURE — 74011250637 HC RX REV CODE- 250/637: Performed by: ORTHOPAEDIC SURGERY

## 2022-09-22 PROCEDURE — 97166 OT EVAL MOD COMPLEX 45 MIN: CPT

## 2022-09-22 PROCEDURE — 74011250636 HC RX REV CODE- 250/636: Performed by: ORTHOPAEDIC SURGERY

## 2022-09-22 PROCEDURE — 80048 BASIC METABOLIC PNL TOTAL CA: CPT

## 2022-09-22 RX ORDER — OXYCODONE HYDROCHLORIDE 5 MG/1
5 TABLET ORAL
Qty: 28 TABLET | Refills: 0 | Status: SHIPPED | OUTPATIENT
Start: 2022-09-22 | End: 2022-09-23 | Stop reason: SDUPTHER

## 2022-09-22 RX ADMIN — Medication 3 UNITS: at 17:35

## 2022-09-22 RX ADMIN — OXYCODONE 5 MG: 5 TABLET ORAL at 12:48

## 2022-09-22 RX ADMIN — ACETAMINOPHEN 1000 MG: 500 TABLET ORAL at 06:26

## 2022-09-22 RX ADMIN — OXYCODONE 10 MG: 5 TABLET ORAL at 08:52

## 2022-09-22 RX ADMIN — DOCUSATE SODIUM 100 MG: 100 CAPSULE ORAL at 10:13

## 2022-09-22 RX ADMIN — FAMOTIDINE 40 MG: 20 TABLET, FILM COATED ORAL at 10:13

## 2022-09-22 RX ADMIN — ACETAMINOPHEN 1000 MG: 500 TABLET ORAL at 00:25

## 2022-09-22 RX ADMIN — SODIUM CHLORIDE, PRESERVATIVE FREE 10 ML: 5 INJECTION INTRAVENOUS at 21:58

## 2022-09-22 RX ADMIN — Medication 3 UNITS: at 00:26

## 2022-09-22 RX ADMIN — VANCOMYCIN HYDROCHLORIDE 1250 MG: 10 INJECTION, POWDER, LYOPHILIZED, FOR SOLUTION INTRAVENOUS at 07:42

## 2022-09-22 RX ADMIN — GABAPENTIN 300 MG: 300 CAPSULE ORAL at 18:25

## 2022-09-22 RX ADMIN — ACETAMINOPHEN 1000 MG: 500 TABLET ORAL at 18:25

## 2022-09-22 RX ADMIN — ACETAMINOPHEN 1000 MG: 500 TABLET ORAL at 12:48

## 2022-09-22 RX ADMIN — GABAPENTIN 300 MG: 300 CAPSULE ORAL at 10:13

## 2022-09-22 RX ADMIN — OXYCODONE 5 MG: 5 TABLET ORAL at 04:20

## 2022-09-22 RX ADMIN — FAMOTIDINE 40 MG: 20 TABLET, FILM COATED ORAL at 00:25

## 2022-09-22 RX ADMIN — FAMOTIDINE 40 MG: 20 TABLET, FILM COATED ORAL at 21:55

## 2022-09-22 RX ADMIN — DOCUSATE SODIUM 100 MG: 100 CAPSULE ORAL at 21:55

## 2022-09-22 RX ADMIN — SODIUM CHLORIDE, PRESERVATIVE FREE 10 ML: 5 INJECTION INTRAVENOUS at 18:28

## 2022-09-22 RX ADMIN — OXYCODONE 10 MG: 5 TABLET ORAL at 17:29

## 2022-09-22 RX ADMIN — GABAPENTIN 300 MG: 300 CAPSULE ORAL at 23:17

## 2022-09-22 RX ADMIN — TRIAMTERENE AND HYDROCHLOROTHIAZIDE 1 TABLET: 37.5; 25 TABLET ORAL at 10:13

## 2022-09-22 NOTE — PROGRESS NOTES
Vss afeb  Neuro intact  Sciatica resolved  Incisional pain  Pt ot  Dc home when cleared PT  Fu 2 weeks

## 2022-09-22 NOTE — PROGRESS NOTES
D/C plan: Skilled nursing facility for rehab. CM met w/ the pt at bedside. Confirmed information on the pt's face sheet. Pt lives at home w/ her dtr. Pt is independent at baseline w/ a RW. CM discussed SNF at d/c. Pt is in agreement. Pt would like to consider facilities in [place], [facility]. She will pick from the accepting facilities w/ a private room. Transition of Care (EARL) Plan:          Pt admitted for an elective surgical procedure. Anterior lumbar interbody fusion L5-S1 with titanium mesh cage and demineralized bone matrix cage, Alphatec type; anterior lumbar interbody fusion L4-L5 with titanium mesh cage and demineralized bone matrix cage, Alphatec type; post prone transpsoas interbody fusion/anterior interbody fusion at L2-L3 with Alphatec cage and demineralized bone matrix; prone transpsoas interbody fusion/anterior interbody fusion L3-L4 with titanium mesh cage and demineralized bone matrix, Alphatec type; posterior segmental spinal instrumentation, Alphatec type, L2-L3, L5-S1; intraoperative neuromonitoring, Alphatec type. Pt is independent. Please encourage ambulation. No transition of care needs identified at this time. Anticipate pt will be medically stable for discharge within the next 24-48 hours with physician follow up. CM available to assist as needed. EARL Transportation:   How is patient being transported at discharge? BLS vs dtr; pending medical progression. When? Once cleared by physician     Is transport scheduled? N/A      Follow-up appointment and transportation:   PCP/Specialist?  See AVS for Appointment         Who is transporting to the follow-up appointment? dtr      Is transport for follow up appointment scheduled? N/A    Communication plan (with patient/family): Who is being called? Patient     Responsible party? Patient      What number(s) is to be used? See Facesheet      What service provider is calling for [facility] services?     N/a When are they calling? N/a    Readmission Risk? (Green/Low; Yellow/Moderate; Red/High):  [employer] here to complete [address] including selection of the Healthcare Decision Maker Relationship (ie \"Primary\")    Care Management Interventions  PCP Verified by CM: Yes (Dr. Brown Lopez)  Palliative Care Criteria Met (RRAT>21 & CHF Dx)?: No  Mode of Transport at Discharge: BLS  Transition of Care Consult (CM Consult): SNF  Discharge Durable Medical Equipment: No  Physical Therapy Consult: Yes  Occupational Therapy Consult: Yes  Speech Therapy Consult: No  Support Systems: Child(chago)  Confirm Follow Up Transport: Family  The Plan for Transition of Care is Related to the Following Treatment Goals : SNF then d/c home. The Patient and/or Patient Representative was Provided with a Choice of Provider and Agrees with the Discharge Plan?: Yes (The pt is alert and oriented. )  Freedom of Choice List was Provided with Basic Dialogue that Supports the Patient's Individualized Plan of Care/Goals, Treatment Preferences and Shares the Quality Data Associated with the Providers?: Yes (The pt would like referrals sent to [facility] and the [facility].  She will choose from the accepting facilities. )  Discharge Location  Patient Expects to be Discharged to[de-identified] Skilled nursing facility

## 2022-09-22 NOTE — OP NOTES
[address] [place] [address]   OPERATIVE REPORT    Name:  Ester Oppenheim  MR#:   [id number]  :  1950  ACCOUNT #:  [de-identified]  DATE OF SERVICE:  2022    PREOPERATIVE DIAGNOSES:  Degenerative spinal stenosis; degenerative spondylolisthesis, multiple levels; neurogenic claudication; lumbosacral kyphosis. POSTOPERATIVE DIAGNOSES:  Degenerative spinal stenosis; degenerative spondylolisthesis, multiple levels; neurogenic claudication; lumbosacral kyphosis. PROCEDURES PERFORMED:  Anterior lumbar interbody fusion L5-S1 with titanium mesh cage and demineralized bone matrix cage, Alphatec type; anterior lumbar interbody fusion L4-L5 with titanium mesh cage and demineralized bone matrix cage, Alphatec type; post prone transpsoas interbody fusion/anterior interbody fusion at L2-L3 with Alphatec cage and demineralized bone matrix; prone transpsoas interbody fusion/anterior interbody fusion L3-L4 with titanium mesh cage and demineralized bone matrix, Alphatec type; posterior segmental spinal instrumentation, Alphatec type, L2-L3, L5-S1; intraoperative neuromonitoring, Alphatec type. SURGEON:  Magdaleno May MD    CO-SURGEON:  Anterior lumbar fusion, Mahsa Craig DO    ASSISTANT:  Demetrius Moore    ANESTHESIA:  General endotracheal.    COMPLICATIONS:  None. SPECIMENS REMOVED:  None. IMPLANTS:  Alphatec interbody cages x4, pedicle screw system, demineralized bone matrix. ESTIMATED BLOOD LOSS:  100 mL. FINDINGS:  We were able to restore lumbar lordosis, provide anterior column support, indirect decompression and rigid fixation. PROCEDURE:  Following induction of endotracheal anesthesia and placement of neuromonitoring, the patient was placed in supine position on a radiolucent table. The patient was prepped and draped in the usual fashion.   Dr. Dominique Woods and [employer] performed exposure of the lumbosacral spine at L5-S1 and then L4-L5 through left-sided Pfannenstiel incision in a retroperitoneal approach. The great vessels were protected and the middle sacral was ligated. Self-retaining retractor blades placed at L5-S1 to protect the vasculature and I performed an annulotomy, radical diskectomy, endplate preparation, thorough decompression. Various sizers were utilized. Ultimately, most appropriate size interbody device was selected with a large footprint, filled with demineralized bone matrix and tamped firmly into place with excellent bony apposition. Anterior fixation screws were placed at L5-S1 rigidly securing the device. This dramatically restored disk space height and segmental lordosis. Attention then turned to L4-L5. The retractors were recited lateral to the great vessels. They were retracted as best possible though we were somewhat limited in our traction of the vessels to the right. An annulotomy was again done and radical diskectomy back to the posterior margin. Thorough decompression and resection of the annular and disk material were done. Various karrie and sizers were utilized. Following this direct decompression, an interbody cage was selected and tamped firmly into place, filled with demineralized bone matrix with fixation screws going into L4 and L5. Estimated blood loss for this was zero. There were no complications. Dr. Lynn Montana then performed closure of the abdominal incision in the standard fashion with a subcuticular suture and Dermabond. All counts were correct. Fluoroscopic imaging confirmed positioning of the devices and improved alignment of the patient's spine. The patient was then turned onto prone position on a prone transpsoas frame. The patient was prepped and draped in usual fashion. Left-sided access was obtained. This was an incision that allowed me to address both the L2-L3 and L3-L4 segments. Digital palpation and dissection were done to the retroperitoneal space. Palpation of the psoas was done.   Initial guide tube was placed with fluoroscopic guidance. Neuromonitoring was used with triggered EMGs. Clear runs were found. The Alphatec split tube dilation was utilized, centered in the midpoint of the disk space. The intradiscal aisha was placed after visualizing and probing the area to make sure there were no neural elements. Clean runs were found. An annulotomy was done. Benitez elevators were taken into superior and inferior endplates and radical diskectomy done. Graded karrie were then utilized and disk space prepared. Distractors utilized and the best tightest lordotic implant was placed maximizing segmental lordosis and dramatically returning disk space height to normal.  This filled demineralized bone matrix, tamped firmly into place. No blood loss was noted. Attention then turned to L3 where the procedure was repeated. At each level, a thorough decompression was accomplished, endplate preparation was done and the interbody device was selected and tamped firmly into place with excellent bony apposition and restoration of segmental lordosis and interbody structural integrity. Attention then was turned to placement of rigid segmental fixation utilizing the percutaneous screw technique. Incisions were made at the transverse process pedicle junction bilaterally. This was just through the skin. Jamshidi needles were placed transpedicularly utilizing intraoperative neuromonitoring as well as fluoroscopic imaging. Guidewires placed, fascial splitter used and then the appropriate screws placed. The L2, L3, L5 and S1 rigid fixation was afforded. Again utilizing a percutaneous technique, a lordosed don was introduced, threaded through the towers of each of the screws and final tightening and torquing done. Spine was straight, rigidly fixed, lordotic. All wounds were irrigated.   There was minimal bleeding other than skin bleeding, I would say 100 mL or less total.  Closure was done in layers and the skin closed with subcuticular suture and Dermabond. Sterile occlusive dressings were placed upon the wounds. All counts were correct.       MD ALLISON [email or web address]  D:  09/21/2022 14:14  T:  09/22/2022 0:12  JOB #:  [id number]

## 2022-09-22 NOTE — DISCHARGE SUMMARY
Discharge/Transfer  Summary     Patient: Brad Betts MRN: [id number]  SSN: [id number]    YOB: 1950  Age: 67 y.o. Sex: female       Admit Date: 9/21/2022    Discharge Date: 9/23/2022      Admission Diagnoses: Spinal stenosis of lumbar region at multiple levels [M48.061]    Discharge Diagnoses:   Problem List as of 9/23/2022 Never Reviewed            Codes Class Noted - Resolved    Spinal stenosis of lumbar region at multiple levels ICD-10-CM: M48.061  ICD-9-CM: 724.02  9/21/2022 - Present            Discharge Condition: Good      Surgery: ANTERIOR LUMBAR INTERBODY FUSION LUMBAR FOUR/FIVE, LUMBAR FIVE/SACRAL ONE:   PRONE TRANSPOAS FUSION LUMBER TWO/THREE, THREE/FOUR, SEGMANTAL INSTRUMENT LUMBAR TWO- SACRAL ONE WITH C-ARM:      Procedure(s) (LRB):  ANTERIOR LUMBAR INTERBODY FUSION LUMBAR FOUR/FIVE, LUMBAR FIVE/SACRAL ONE (N/A)  PRONE TRANSPOAS FUSION LUMBER TWO/THREE, THREE/FOUR, SEGMANTAL INSTRUMENT LUMBAR TWO- SACRAL ONE WITH C-ARM (N/A)       Hospital Course:Slow progress. CT scan confirmed surgical procedure. Disposition: home    Discharge Medications:   Current Discharge Medication List        START taking these medications    Details   oxyCODONE IR (Roxicodone) 5 mg immediate release tablet Take 1 Tablet by mouth every six (6) hours as needed for Pain for up to 7 days. Max Daily Amount: 20 mg.  Qty: 28 Tablet, Refills: 0    Associated Diagnoses: S/P lumbar and lumbosacral fusion by anterior technique           CONTINUE these medications which have NOT CHANGED    Details   gabapentin (NEURONTIN) 300 mg capsule Take 300 mg by mouth three (3) times daily. acetaminophen (TYLENOL) 650 mg TbER Take 1,300 mg by mouth two (2) times a day. metFORMIN (GLUCOPHAGE) 500 mg tablet Take 500 mg by mouth daily. simvastatin (ZOCOR) 40 mg tablet Take 40 mg by mouth nightly. triamterene-hydroCHLOROthiazide (DYAZIDE) 37.5-25 mg per capsule Take 1 Capsule by mouth daily. multivitamin (ONE A DAY) tablet Take 1 Tablet by mouth daily. naproxen sodium (NAPROSYN) 220 mg tablet Take 1 Tablet by mouth two (2) times a day. cyclobenzaprine (FLEXERIL) 5 mg tablet Take 5 mg by mouth three (3) times daily as needed. Follow-up Appointments   Procedures    FOLLOW UP VISIT Appointment in: Two Weeks     Standing Status:   Standing     Number of Occurrences:   1     Order Specific Question:   Appointment in     Answer:    Two Weeks       Signed By: Vee Corona MD     September 23, 2022

## 2022-09-22 NOTE — PROGRESS NOTES
Problem: Mobility Impaired (Adult and Pediatric)  Goal: *Acute Goals and Plan of Care (Insert Text)  Description: In 1-7 days pt will be able to perform:  STG  1. Bed mobility:  Demonstrate proper log-roll technique for safe initiation of rolling for OOB activities. 2.  Supine to sit to supine CGA with HR for meals. 3.  Sit to stand to sit CGA with RW in prep for ambulation. LT.  Gait:  Ambulate >50ft min A with RW, for home/community mobility. 2.  Stair Negotiation:  Ascend/descend >3 steps min A with HR for home entry. 3.  Activity tolerance: Tolerate up in chair 30 minutes-1 hour for ADL's.  4.  Patient/Family Education:  Patient/family to be independent with HEP for follow-up care and safe discharge. Note: []  Patient has met MD eugene green for d/c home   []  Recommend HH with 24 hour adult care  [x]  Benefit from additional acute PT session to address:  transfer training, gait training and stair training    PHYSICAL THERAPY EVALUATION    Patient: Natali Siegel (25 y.o. female)  Date: 2022  Primary Diagnosis: Spinal stenosis of lumbar region at multiple levels [M48.061]  Procedure(s) (LRB):  ANTERIOR LUMBAR INTERBODY FUSION LUMBAR FOUR/FIVE, LUMBAR FIVE/SACRAL ONE (N/A)  PRONE TRANSPOAS FUSION LUMBER TWO/THREE, THREE/FOUR, SEGMANTAL INSTRUMENT LUMBAR TWO- SACRAL ONE WITH C-ARM (N/A) 1 Day Post-Op   Precautions:   Fall, Back, Spinal    PLOF: Independent    ASSESSMENT :  Based on the objective data described below, the patient presents with decreased mobility in regards to bed mobility, transfers, gt quality and tolerance, balance, stair negotiation and safety due to back surgery. Decreased generalized strength, pain in back, dec sensation of B LE also impacting pt functional mobility. Pt rating pain on numerical pain scale pre/post and during session 3/10.   Pt ed regarding mobility safety, ice application/use, back precautions, log roll technique, environmental safety and need to use call bell for activity. Pt able to perform supine<>sidelying<>sit w/ mod Ax1-2. Sitting position EOB w/ dec trunk control. Sit<>stand w/ mod/max Ax2. Safety vc required throughout session to reinforce safety. Pt demonstrated difficulty initiating movement of B LE. Required manual bracing of R knee to initiate standing w/ RW and unable to obtain full upright posture despite max Ax2 and 3 trials. Pt able to scoot along laterally EOB w/ min A and frequent vc.  Pt c/o dizziness and light headedness after session. /50. Answered questions by pt in regards to PT and mobility. Pt left supine in bed w/ all needs within reach and B SCD reapplied. Nurse Marni aware of session and outcomes. Recommend acute rehab upon hospital d/c. Patient will benefit from skilled intervention to address the above impairments. Patient's rehabilitation potential is considered to be Good  Factors which may influence rehabilitation potential include:   []         None noted  []         Mental ability/status  []         Medical condition  []         Home/family situation and support systems  []         Safety awareness  [x]         Pain tolerance/management  []         Other:      PLAN :  Recommendations and Planned Interventions:  [x]           Bed Mobility Training             []    Neuromuscular Re-Education  [x]           Transfer Training                   []    Orthotic/Prosthetic Training  [x]           Gait Training                          [x]    Modalities  []           Therapeutic Exercises           []    Edema Management/Control  [x]           Therapeutic Activities            [x]    Family Training/Education  [x]           Patient Education  []           Other (comment):    Frequency/Duration: Patient will be followed by physical therapy 1-2 times per day/4-7 days per week to address goals.   Discharge Recommendations:   Further Equipment Recommendations for Discharge: N/A    AMPA: 10/24    This AMPA score should be considered in conjunction with interdisciplinary team recommendations to determine the most appropriate discharge setting. Patient's social support, diagnosis, medical stability, and prior level of function should also be taken into consideration. SUBJECTIVE:   Patient stated I am having a hard time moving my legs.     OBJECTIVE DATA SUMMARY:     Past Medical History:   Diagnosis Date    Arthritis     back and hands    Chronic pain     back    COVID-19     Summer of  2021    Diabetes [facility])     NIDDM    Hypertension     Nausea & vomiting     Surgery only not colonoscopy     Past Surgical History:   Procedure Laterality Date    COLONOSCOPY N/A 6/24/2022    COLONOSCOPY performed by German Neville MD at [facility]    HX COLONOSCOPY      HX TUBAL LIGATION       Barriers to Learning/Limitations: yes;  physical  Compensate with: Visual Cues, Verbal Cues, and Tactile Cues  Home Situation:  Home Situation  Home Environment: Private residence  # Steps to Enter: 4  Rails to Enter: Yes  Hand Rails : Bilateral (wide)  One/Two Story Residence: Two story  # of Interior Steps: 13  Interior Rails: Right  Lift Chair Available: No  Living Alone: No  Support Systems: Child(chago)  Patient Expects to be Discharged to[de-identified] Skilled nursing facility  Current DME Used/Available at Home: Erleen Hugo, straight, Walker, rolling  Tub or Shower Type: Shower  Critical Behavior:  Neurologic State: Alert  Orientation Level: Oriented X4  Cognition: Appropriate for age attention/concentration; Follows commands  Safety/Judgement: Awareness of environment;Decreased awareness of need for assistance;Decreased insight into deficits  Psychosocial  Patient Behaviors: Calm; Cooperative  Skin Condition/Temp: Warm  Skin Integrity: Incision (comment) (back)  Skin Integumentary  Skin Color: Appropriate for ethnicity  Skin Condition/Temp: Warm  Skin Integrity: Incision (comment) (back)  Strength:    Strength: Generally decreased, functional  Tone & Sensation:   Tone: Normal  Sensation: Impaired (R knee to foot; L LE)  Range Of Motion:  AROM: Generally decreased, functional  PROM: Generally decreased, functional  Posture:  Functional Mobility:  Bed Mobility:  Rolling: Minimum assistance; Additional time (vc)  Supine to Sit: Moderate assistance; Additional time (vc)  Sit to Supine: Minimum assistance; Moderate assistance;Assist x2 (vc)  Scooting: Contact guard assistance;Minimum assistance; Additional time (vc)  Transfers:  Sit to Stand: Moderate assistance;Maximum assistance;Assist x2 (vc)  Stand to Sit: Moderate assistance;Assist x2 (vc)  Balance:   Sitting: Impaired  Sitting - Static: Fair (occasional)  Sitting - Dynamic: Fair (occasional)  Standing: Impaired; With support  Standing - Static: Poor;Fair;Constant support  Standing - Dynamic : Not tested  Wheelchair Mobility:  Ambulation/Gait Training:  Therapeutic Exercises:   Pain:  Pain level pre-treatment: 3/10   Pain level post-treatment: 3/10   Pain Intervention(s) : Medication (see MAR); Rest, Ice, Repositioning  Response to intervention: Nurse notified, See doc flow    Activity Tolerance:   Fair   Please refer to the flowsheet for vital signs taken during this treatment. After treatment:   []         Patient left in no apparent distress sitting up in chair  [x]         Patient left in no apparent distress in bed  [x]         Call bell left within reach  [x]         Nursing notified  []         Caregiver present  []         Bed alarm activated  [x]         SCDs applied    COMMUNICATION/EDUCATION:   [x]         Role of Physical Therapy in the acute care setting. [x]         Fall prevention education was provided and the patient/caregiver indicated understanding. [x]         Patient/family have participated as able in goal setting and plan of care. [x]         Patient/family agree to work toward stated goals and plan of care.   []         Patient understands intent and goals of therapy, but is neutral about his/her participation. []         Patient is unable to participate in goal setting/plan of care: ongoing with therapy staff.  []         Other: Thank you for this referral.  Liz Tirado, PT   Time Calculation: 23 mins      Eval Complexity: History: HIGH Complexity :3+ comorbidities / personal factors will impact the outcome/ POC Exam:MEDIUM Complexity : 3 Standardized tests and measures addressing body structure, function, activity limitation and / or participation in recreation  Presentation: HIGH Complexity : Unstable and unpredictable characteristics  Clinical Decision Making:High Complexity    Overall Complexity:MEDIUM    [address] [facility] [address] [address] AM-PAC® Basic Mobility Inpatient Short Form (6-Clicks) Version 2    How much HELP from another person does the patient currently need    (If the patient hasn't done an activity recently, how much help from another person do you think he/she would need if he/she tried?)   Total (Total A or Dep)   A Lot  (Mod to Max A)   A Little (Sup or Min A)   None (Mod I to I)   Turning from your back to your side while in a flat bed without using bedrails? [] 1 [x] 2 [] 3 [] 4   2. Moving from lying on your back to sitting on the side of a flat bed without using bedrails? [] 1 [x] 2 [] 3 [] 4   3. Moving to and from a bed to a chair (including a wheelchair)? [] 1 [x] 2 [] 3 [] 4   4. Standing up from a chair using your arms (e.g., wheelchair, or bedside chair)? [] 1 [x] 2 [] 3 [] 4   5. Walking in hospital room? [x] 1 [] 2 [] 3 [] 4   6. Climbing 3-5 steps with a railing?+   [x] 1 [] 2 [] 3 [] 4   +If stair climbing cannot be assessed, skip item #6. Sum responses from items 1-5. Based on an AM-PAC score of 10/24 and their current functional mobility deficits, it is recommended that the patient have 5-7 sessions per week of Physical Therapy at d/c to increase the patient's independence.

## 2022-09-22 NOTE — PROGRESS NOTES
19:25 Assessment completed. Lungs are clear bilat. IS = 1200. Honey comb dsg on back remains D/I with CMS & PP intact. Continues to have numbness & tingling in RLE & numbness in LLE. Denies pain or discomfort in calves. Resting quietly in bed on her side. 22:55 Shift assessment completed. See nsg flow sheet for details    03:10 Reassessed with 0 changes noted. Honey comb dsg's remains D/I. Repositioned q 2-3 hrs as requested. Resting quietly in bed with eyes closed between cares. 07:05 Bedside and Verbal shift change report given to CHRISTIANO Escobar RN (oncoming nurse) by Gabo Vicente RN (offgoing nurse). Report included the following information SBAR.

## 2022-09-22 NOTE — WOUND CARE
Wound Care Note:    Patient seen for hospital wide pressure injury prevalence. Full assessment completed, sacrum, heels and bilateral ischium intact, no redness noted. Patient repositioned at this time. Skin Care & Pressure Relief Recommendations:  Minimize layers of linen  Pads under patient to optimize support surface and microclimate  Turn/reposition approximately every 2 hours. Pillow Wedges  Manage incontinence  Promote continence; Skin Protective lotion to buttocks and sacrum daily and as needed with incontinence care    Offload heels with pillows or offloading boots.     Discussed above plan with patient     Transition of Care: Consult wound care if needed

## 2022-09-22 NOTE — PROGRESS NOTES
Problem: Falls - Risk of  Goal: *Absence of Falls  Description: Document Shaheen Chung Fall Risk and appropriate interventions in the flowsheet. Outcome: Progressing Towards Goal  Note: Fall Risk Interventions:  Mobility Interventions: Communicate number of staff needed for ambulation/transfer, Patient to call before getting OOB, Utilize walker, cane, or other assistive device         Medication Interventions: Assess postural VS orthostatic hypotension, Patient to call before getting OOB, Teach patient to arise slowly    Elimination Interventions: Call light in reach, Patient to call for help with toileting needs    History of Falls Interventions:  Investigate reason for fall, Consult care management for discharge planning, Vital signs minimum Q4HRs X 24 hrs (comment for end date)

## 2022-09-22 NOTE — ANESTHESIA POSTPROCEDURE EVALUATION
Procedure(s):  ANTERIOR LUMBAR INTERBODY FUSION LUMBAR FOUR/FIVE, LUMBAR FIVE/SACRAL ONE  PRONE TRANSPOAS FUSION LUMBER TWO/THREE, THREE/FOUR, SEGMANTAL INSTRUMENT LUMBAR TWO- SACRAL ONE WITH C-ARM.     general    Anesthesia Post Evaluation      Multimodal analgesia: multimodal analgesia used between 6 hours prior to anesthesia start to PACU discharge  Patient location during evaluation: PACU  Patient participation: complete - patient participated  Level of consciousness: awake and alert  Pain score: 5  Airway patency: patent  Anesthetic complications: no  Cardiovascular status: acceptable  Respiratory status: acceptable  Hydration status: acceptable  Post anesthesia nausea and vomiting:  none  Final Post Anesthesia Temperature Assessment:  Normothermia (36.0-37.5 degrees C)      INITIAL Post-op Vital signs:   Vitals Value Taken Time   /55 09/21/22 1541   Temp 36.4 °C (97.5 °F) 09/21/22 1541   Pulse 88 09/21/22 1541   Resp 16 09/21/22 1541   SpO2 100 % 09/21/22 1541

## 2022-09-22 NOTE — PROGRESS NOTES
Problem: Self Care Deficits Care Plan (Adult)  Goal: *Acute Goals and Plan of Care (Insert Text)  Description: Initial Occupational Therapy Goals (9/22/2022) Within 7 day(s):  1. Patient will perform toilet transfer with min A in preparation for bowel and bladder management. 2. Patient will perform bowel and bladder management with min A for increased independence with ADLs. 3. Patient will perform UB dressing with SBA for increased independence with ADLs. 4. Patient will perform LB dressing with min A & A/E PRN for increased independence with ADLs. 5. Patient will adhere to back/spinal precautions 100% of the time with 1-2 verbal cues for increased independence with ADLs. 6. Patient will utilize energy conservation techniques with 1 verbal cue(s) for increased independence with ADLs. Outcome: Progressing Towards Goal  OCCUPATIONAL THERAPY EVALUATION    Patient: Katharina Gant (73 y.o. female)  Date: 9/22/2022  Primary Diagnosis: Spinal stenosis of lumbar region at multiple levels [M48.061]  Procedure(s) (LRB):  ANTERIOR LUMBAR INTERBODY FUSION LUMBAR FOUR/FIVE, LUMBAR FIVE/SACRAL ONE (N/A)  PRONE TRANSPOAS FUSION LUMBER TWO/THREE, THREE/FOUR, SEGMANTAL INSTRUMENT LUMBAR TWO- SACRAL ONE WITH C-ARM (N/A) 1 Day Post-Op   Precautions: Fall, Back, Spinal  PLOF: pt mod I for ADLs/functional mobility, lives with daughter and grandchildren    ASSESSMENT AND RECOMMENDATIONS:  Based on the objective data described below, the patient presents with BLE decreased ROM and strength affecting LE ADLs. Pt found supine in bed, reporting pain 3/10, agreeable to therapy. Educated pt on back precautions including log rolling technique for bed mobility. Pt required min A for rolling, and min/mod Ax2 to transition from sidelying>sitting with additional time to complete. Pt with impaired sitting balance and requires occasional support. Pt performed STS x 2, required mod/max Ax2 and unable to maintain static standing balance.  Pt unable to straighten B knees and tends to buckle. Pt then reporting increased light headedness, returned to supine with mod Ax2, /50. Recommend acute inpatient rehab at hospital d/c to maximize safety/independence with ADLs. Education: Reviewed Back Precautions, body mechanics, home safety, importance of moving every hour to assist w/ stiffness & spasms, adaptive strategies and adaptive dressing techniques including clothing modifications with patient verbalizing understanding at this time. Patient will benefit from skilled intervention to address the above impairments. Patient's rehabilitation potential is considered to be Good  Factors which may influence rehabilitation potential include:   [x]             None noted  []             Mental ability/status  []             Medical condition  []             Home/family situation and support systems  []             Safety awareness  []             Pain tolerance/management  []             Other:        PLAN :  Recommendations and Planned Interventions:   [x]               Self Care Training                  [x]      Therapeutic Activities  [x]               Functional Mobility Training   []      Cognitive Retraining  [x]               Therapeutic Exercises           [x]      Endurance Activities  [x]               Balance Training                    [x]      Neuromuscular Re-Education  []               Visual/Perceptual Training     [x]      Home Safety Training  [x]               Patient Education                   [x]      Family Training/Education  []               Other (comment):    Frequency/Duration: Patient will be followed by occupational therapy 1-2 times per day/4-7 days per week to address goals.   Discharge Recommendations: acute inpatient rehab  Further Equipment Recommendations for Discharge: N/A    AMPAC: Based on an AM-PAC score of 13/24 and their current ADL deficits; it is recommended that the patient have 5-7 sessions per week of Occupational Therapy at d/c to increase the patient's independence. Currently, this patient demonstrates the potential endurance, and/or tolerance for 3 hours of therapy each day at d/c. This AMPAC score should be considered in conjunction with interdisciplinary team recommendations to determine the most appropriate discharge setting. Patient's social support, diagnosis, medical stability, and prior level of function should also be taken into consideration. SUBJECTIVE:   Patient stated I can't get my legs to straighten.     OBJECTIVE DATA SUMMARY:     Past Medical History:   Diagnosis Date    Arthritis     back and hands    Chronic pain     back    COVID-19     Summer of  2021    Diabetes [facility])     NIDDM    Hypertension     Nausea & vomiting     Surgery only not colonoscopy     Past Surgical History:   Procedure Laterality Date    COLONOSCOPY N/A 6/24/2022    COLONOSCOPY performed by Zia Allison MD at [facility]    HX COLONOSCOPY      HX TUBAL LIGATION       Barriers to Learning/Limitations: yes;  physical  Compensate with: visual, verbal, tactile, kinesthetic cues/model    Home Situation/Prior Level of Function:   Home Situation  Home Environment: Private residence  # Steps to Enter: 4  Rails to Enter: Yes  Hand Rails : Bilateral (wide)  One/Two Story Residence: Two story  # of Interior Steps: 15  Interior Rails: Right  Lift Chair Available: No  Living Alone: No  Support Systems: Child(chago)  Patient Expects to be Discharged to[de-identified] Rehab Unit Subacute  Current DME Used/Available at Home: Cane, straight, Walker, rolling  Tub or Shower Type: Shower  []  Right hand dominant   []  Left hand dominant    Cognitive/Behavioral Status:  Neurologic State: Alert  Orientation Level: Oriented X4  Cognition: Appropriate for age attention/concentration; Follows commands  Safety/Judgement: Awareness of environment;Decreased awareness of need for assistance;Decreased insight into deficits    Coordination: BUE  Coordination: Generally decreased, functional  Fine Motor Skills-Upper: Left Intact; Right Intact    Gross Motor Skills-Upper: Left Intact; Right Intact    Balance:  Sitting: Impaired  Sitting - Static: Fair (occasional)  Sitting - Dynamic: Fair (occasional)  Standing: Impaired; With support  Standing - Static: Poor;Fair;Constant support  Standing - Dynamic : Not tested    Strength: BUE  Strength: Generally decreased, functional    Tone & Sensation:BUE  Tone: Normal  Sensation: Impaired (R knee to foot; L LE)    Range of Motion: BUE  AROM: Generally decreased, functional  PROM: Generally decreased, functional    Functional Mobility and Transfers for ADLs:  Bed Mobility:  Rolling: Minimum assistance; Additional time (vc)  Supine to Sit: Moderate assistance; Additional time (vc)  Sit to Supine: Minimum assistance; Moderate assistance;Assist x2 (vc)  Scooting: Contact guard assistance;Minimum assistance; Additional time (vc)  Transfers:  Sit to Stand: Moderate assistance;Maximum assistance;Assist x2 (vc)    ADL Assessment:  Feeding: Setup;Modified independent  Oral Facial Hygiene/Grooming: Minimum assistance  Bathing: Maximum assistance  Upper Body Dressing: Moderate assistance  Lower Body Dressing: Total assistance  Toileting: Maximum assistance     ADL Intervention:  Feeding  Feeding Assistance: Set-up; Supervision  Drink to Mouth: Set-up; Supervision    Cognitive Retraining  Safety/Judgement: Awareness of environment;Decreased awareness of need for assistance;Decreased insight into deficits    Pain:  Pain level pre-treatment: 3/10  Pain level post-treatment: 3/10  Pain Intervention(s): Rest, Ice, Repositioning   Response to intervention: Nurse notified, see doc flow sheet    Activity Tolerance:   Fair. Patient able to stand ~15 seconds. Patient limited by pain, strength, ROM, endurance, balance. Patient unsteady. Please refer to the flowsheet for vital signs taken during this treatment.   After treatment:   [] Patient left in no apparent distress sitting up in chair  []  Patient sitting on EOB  [x]  Patient left in no apparent distress in bed  [x]  Call bell left within reach  [x]  Nursing notified  []  Caregiver present  []  Ice applied  [x]  SCD's on while back in bed  [] Bed alarm activated    COMMUNICATION/EDUCATION:   Communication/Collaboration:  [x]       Role of Occupational Therapy in the acute care setting. [x]      Home safety education was provided and the patient/caregiver indicated understanding. [x]      Patient/family have participated as able in goal setting and plan of care. [x]      Patient/family agree to work toward stated goals and plan of care. []      Patient understands intent and goals of therapy, but is neutral about his/her participation. []      Patient is unable to participate in plan of care at this time. Thank you for this referral.  Omero Morin, OTR/L  Time Calculation: 23 mins    Eval Complexity: History: MEDIUM Complexity : Expanded review of history including physical, cognitive and psychosocial  history ; Examination: MEDIUM Complexity : 3-5 performance deficits relating to physical, cognitive , or psychosocial skils that result in activity limitations and / or participation restrictions; Decision Making:MEDIUM Complexity : Patient may present with comorbidities that affect occupational performnce.  Miniml to moderate modification of tasks or assistance (eg, physical or verbal ) with assesment(s) is necessary to enable patient to complete evaluation     [facility] AM-PAC® Daily Activity Inpatient Short Form (6-Clicks)*    How much HELP from another person does the patient currently need    (If the patient hasn't done an activity recently, how much help from another person do you think he/she would need if he/she tried?)   Total (Total A or Dep)   A Lot  (Mod to Max A)   A Little (Sup or Min A)   None (Mod I to I)   Putting on and taking off regular lower body clothing? [x] 1 [] 2 [] 3 [] 4   2. Bathing (including washing, rinsing,      drying)? [] 1 [x] 2 [] 3 [] 4   3. Toileting, which includes using toilet, bedpan or urinal?   [] 1 [x] 2 [] 3 [] 4   4. Putting on and taking off regular upper body clothing? [] 1 [x] 2 [] 3 [] 4   5. Taking care of personal grooming such as brushing teeth? [] 1 [] 2 [x] 3 [] 4   6. Eating meals? [] 1 [] 2 [x] 3 [] 4     Based on an AM-PAC score of 13/24 and their current ADL deficits; it is recommended that the patient have 5-7 sessions per week of Occupational Therapy at d/c to increase the patient's independence. Currently, this patient demonstrates the potential endurance, and/or tolerance for 3 hours of therapy each day at d/c.

## 2022-09-22 NOTE — PROGRESS NOTES
5992  Bedside shift change report given to Kay Harmon RN (oncoming nurse) by Daisy Richter RN (offgoing nurse). Report included the following information SBAR, Kardex, Intake/Output, and MAR.     0740  Assumed care at this time. Patient alert and oriented x4. Denies SOB, chest pain. Shows no signs of distress. Patient lungs clear bilaterally. Cap refill < 3 sec to all extremities. Patient has 20 G IV to R hand CDI. Stated pain 5/10. Dressing to back is CDI. SCDs and hudson stockings applied to BLE. Incentive spirometer at bedside. Patient reaches 1500 on IS. Bowel sounds present. Skin intact. Attempted to sit patient on EOB--max assist x2 and unable to hold self in sitting position on EOB. Patient call light and possessions within reach. Bed locked and in lowest position. [address] [facility] [address] to Dr. Azucena Kennedy. Notified patient still c/o tingling to LLE and heaviness/pain/numbness to RLE. Bucking with standing and unable to lift legs off bed. Verbal order with readback: Ct without contrast lumbar spine. Hughes may remain and needs to be removed when patient ambulates. [address] [facility] [address] to Dr. Azucena Kennedy. Notified CT will be completed later this evening. Instructed to d/c fluids because patient oral intake and output is sufficient. Notified  and temp 99.5; patient has been educated several times on use of IS. Will attempt to sit patient on EOB again when meal arrives. 1600  Verbal order with readback Dr. Azucena Kennedy: d/c IV dilaudid. 1630  Patient able to sit on EOB to eat. Patient mobility improving but still unable to lift legs. Will reassess later this evening to see if possible to get patient into chair. 1920  Bedside and verbal shift change report given to Erick Carvajal by Sania San RN. Report included SBAR, kardex, MAR, and recent results. Pain controlled with prn meds and repositioning. HR improves with IS use and pain control. Will continue to monitor temperature and HR.

## 2022-09-22 NOTE — DISCHARGE INSTRUCTIONS
Dr. Marisela Moore    DO NOT take any NSAIDS if you had Fusion Surgery. ACTIVITIES:  *The first week after surgery   Change positions every hour while you are awake. Walking is the best way to rebuild strength. Activities around the house, such as washing dishes and preparing light meals are fine. Avoid strenuous activities, such as vacuuming, and do not lift anything heavier than 1 gallon of milk (or about 5-8 pounds). Do not bend over to  items from the ground level until 3 months post-op. *Week 2 and beyond  You may gradually increase your activities, but avoid heavy lifting, pushing/pulling. Walk at a pace that avoids fatigue or severe pain. Do not try to walk several blocks the first day! As you increase the distance, you may feel tired. If so, stop and rest.   Follow-up with Dr. Marissa Christie will be 2 weeks after surgery. BATHING and INCISION CARE:  The incision may be tender or feel numb: this is normal.   Keep the incision clean and dry. You may shower 3 days after surgery. Cover the dressing with saran wrap before getting in the shower. The incision is closed with sutures under the skin and glue on top. Do not apply any lotions, ointments or oils on the incision. Do not remove the dressing. Your dressing will be changed at your first post op appointment. If it comes loose or is damaged, dirty or wet before this appointment, call your home health nurse (if you are being seen by a nurse at home) or the office to have the dressing changed. If you notice any excessive swelling, redness, or persistent drainage around the incision, notify the office immediately. CONSTIPATION:  Take a stool softener twice a day while you are taking a narcotic.    If you have not had a bowel movement within 3 days of surgery, you will need to use a laxative or suppository that can be obtained over the counter at your local pharmacy     ICE  Use ice on your back to decrease pain and swelling. Do NOT use heat. MEDICATIONS:  If you had fusion surgery DO NOT TAKE non-steroidal anti-inflammatory (NSAID) medications, such as Motrin, Aleve, Advil Naprosyn, Ibuprofen or aspirin. Take Tylenol/Acetaminophen every 4-6 hours for pain. Do not take more than 3000 mg each day. (Do not take Tylenol/Acetaminophen if you have liver problems). Take your prescribed narcotic pain medication as needed for pain that is not tolerable. Eat food before you take any pain medication to avoid nausea. If you need a medication refill, please call the office during working hours at least 2 days before your prescription runs out. Do not wait until your bottle is empty to call for a refill. NUTRITION:  Eat healthy to help your wound to heal.    Eat a healthy balanced diet to help your wound to heal. Protein supplements should be considered if you are eating less than 50% of your meal.   Drink plenty of water to stay hydrated. DRIVING & RETURN TO WORK:   You will be told at your follow up appointment if it is safe for you to drive or return to work and will be provided with a [id number]-note if needed (please ask). NEVER drive while taking narcotic medication. WHEN TO CALL THE OFFICE:  If you have severe pain unrelieved by the medications, new numbness or tingling in your legs; If you have a fever of 101.0°F or greater   If you notice increased swelling, redness, or increased drainage from the incision    If you are not able to urinate  If you are not able to control your bowels       [address] [place] [address] [address] number is [phone]. They are open from 8:00am to 5:00pm Mon - Fri. After 5:00pm, or on weekends/holidays, please call the answering service at [phone] for a call back.

## 2022-09-23 ENCOUNTER — HOSPITAL ENCOUNTER (INPATIENT)
Age: 72
LOS: 11 days | Discharge: HOME HEALTH | DRG: 561 | End: 2022-10-04
Attending: HOSPITALIST | Admitting: HOSPITALIST
Payer: MEDICARE

## 2022-09-23 VITALS
HEIGHT: 66 IN | WEIGHT: 180.6 LBS | OXYGEN SATURATION: 98 % | HEART RATE: 99 BPM | DIASTOLIC BLOOD PRESSURE: 62 MMHG | SYSTOLIC BLOOD PRESSURE: 142 MMHG | BODY MASS INDEX: 29.02 KG/M2 | RESPIRATION RATE: 16 BRPM | TEMPERATURE: 97.8 F

## 2022-09-23 DIAGNOSIS — M48.061 SPINAL STENOSIS OF LUMBAR REGION AT MULTIPLE LEVELS: Primary | ICD-10-CM

## 2022-09-23 PROBLEM — Z74.09 IMPAIRED MOBILITY AND ADLS: Status: ACTIVE | Noted: 2022-09-23

## 2022-09-23 PROBLEM — Z78.9 IMPAIRED MOBILITY AND ADLS: Status: ACTIVE | Noted: 2022-09-23

## 2022-09-23 LAB
ERYTHROCYTE [DISTWIDTH] IN BLOOD BY AUTOMATED COUNT: 14.6 % (ref 11.6–14.5)
GLUCOSE BLD STRIP.AUTO-MCNC: 105 MG/DL (ref 70–110)
GLUCOSE BLD STRIP.AUTO-MCNC: 126 MG/DL (ref 70–110)
GLUCOSE BLD STRIP.AUTO-MCNC: 141 MG/DL (ref 70–110)
HCT VFR BLD AUTO: 27.7 % (ref 35–45)
HGB BLD-MCNC: 8.6 G/DL (ref 12–16)
MCH RBC QN AUTO: 27.7 PG (ref 24–34)
MCHC RBC AUTO-ENTMCNC: 31 G/DL (ref 31–37)
MCV RBC AUTO: 89.1 FL (ref 78–100)
NRBC # BLD: 0 K/UL (ref 0–0.01)
NRBC BLD-RTO: 0 PER 100 WBC
PLATELET # BLD AUTO: 177 K/UL (ref 135–420)
PMV BLD AUTO: 10.7 FL (ref 9.2–11.8)
RBC # BLD AUTO: 3.11 M/UL (ref 4.2–5.3)
WBC # BLD AUTO: 15.9 K/UL (ref 4.6–13.2)

## 2022-09-23 PROCEDURE — 65310000000 HC RM PRIVATE REHAB

## 2022-09-23 PROCEDURE — 82962 GLUCOSE BLOOD TEST: CPT

## 2022-09-23 PROCEDURE — 36415 COLL VENOUS BLD VENIPUNCTURE: CPT

## 2022-09-23 PROCEDURE — 74011000250 HC RX REV CODE- 250: Performed by: ORTHOPAEDIC SURGERY

## 2022-09-23 PROCEDURE — 85027 COMPLETE CBC AUTOMATED: CPT

## 2022-09-23 PROCEDURE — 2709999900 HC NON-CHARGEABLE SUPPLY

## 2022-09-23 PROCEDURE — 97116 GAIT TRAINING THERAPY: CPT

## 2022-09-23 PROCEDURE — 74011250637 HC RX REV CODE- 250/637: Performed by: HOSPITALIST

## 2022-09-23 PROCEDURE — 74011250637 HC RX REV CODE- 250/637: Performed by: ORTHOPAEDIC SURGERY

## 2022-09-23 RX ORDER — CYCLOBENZAPRINE HCL 5 MG
5 TABLET ORAL
Status: DISCONTINUED | OUTPATIENT
Start: 2022-09-23 | End: 2022-10-04 | Stop reason: HOSPADM

## 2022-09-23 RX ORDER — FAMOTIDINE 40 MG/1
40 TABLET, FILM COATED ORAL DAILY
COMMUNITY
End: 2022-10-04

## 2022-09-23 RX ORDER — DEXTROSE MONOHYDRATE 100 MG/ML
0-250 INJECTION, SOLUTION INTRAVENOUS AS NEEDED
Status: DISCONTINUED | OUTPATIENT
Start: 2022-09-23 | End: 2022-10-04 | Stop reason: HOSPADM

## 2022-09-23 RX ORDER — HYDRALAZINE HYDROCHLORIDE 25 MG/1
25 TABLET, FILM COATED ORAL
Status: DISCONTINUED | OUTPATIENT
Start: 2022-09-23 | End: 2022-10-04 | Stop reason: HOSPADM

## 2022-09-23 RX ORDER — THERA TABS 400 MCG
1 TAB ORAL DAILY
Status: DISCONTINUED | OUTPATIENT
Start: 2022-09-24 | End: 2022-10-04 | Stop reason: HOSPADM

## 2022-09-23 RX ORDER — ATORVASTATIN CALCIUM 20 MG/1
20 TABLET, FILM COATED ORAL
Status: DISCONTINUED | OUTPATIENT
Start: 2022-09-23 | End: 2022-10-04 | Stop reason: HOSPADM

## 2022-09-23 RX ORDER — ADHESIVE BANDAGE
30 BANDAGE TOPICAL DAILY PRN
Status: DISCONTINUED | OUTPATIENT
Start: 2022-09-23 | End: 2022-10-04 | Stop reason: HOSPADM

## 2022-09-23 RX ORDER — FAMOTIDINE 20 MG/1
40 TABLET, FILM COATED ORAL DAILY
Status: DISCONTINUED | OUTPATIENT
Start: 2022-09-24 | End: 2022-09-25

## 2022-09-23 RX ORDER — INSULIN LISPRO 100 [IU]/ML
INJECTION, SOLUTION INTRAVENOUS; SUBCUTANEOUS
Status: DISCONTINUED | OUTPATIENT
Start: 2022-09-23 | End: 2022-10-04 | Stop reason: HOSPADM

## 2022-09-23 RX ORDER — BISACODYL 5 MG
10 TABLET, DELAYED RELEASE (ENTERIC COATED) ORAL
Status: DISCONTINUED | OUTPATIENT
Start: 2022-09-23 | End: 2022-10-04 | Stop reason: HOSPADM

## 2022-09-23 RX ORDER — OXYCODONE HYDROCHLORIDE 5 MG/1
5 TABLET ORAL
Qty: 28 TABLET | Refills: 0 | Status: SHIPPED | OUTPATIENT
Start: 2022-09-23 | End: 2022-10-04

## 2022-09-23 RX ORDER — DOCUSATE SODIUM 100 MG/1
100 CAPSULE, LIQUID FILLED ORAL 2 TIMES DAILY
COMMUNITY

## 2022-09-23 RX ORDER — GABAPENTIN 300 MG/1
300 CAPSULE ORAL 3 TIMES DAILY
Status: DISCONTINUED | OUTPATIENT
Start: 2022-09-23 | End: 2022-10-04 | Stop reason: HOSPADM

## 2022-09-23 RX ORDER — OXYCODONE HYDROCHLORIDE 5 MG/1
5 TABLET ORAL
Status: DISCONTINUED | OUTPATIENT
Start: 2022-09-23 | End: 2022-10-04 | Stop reason: HOSPADM

## 2022-09-23 RX ORDER — DOCUSATE SODIUM 100 MG/1
100 CAPSULE, LIQUID FILLED ORAL 2 TIMES DAILY
Status: DISCONTINUED | OUTPATIENT
Start: 2022-09-23 | End: 2022-10-04 | Stop reason: HOSPADM

## 2022-09-23 RX ORDER — MAGNESIUM SULFATE 100 %
4 CRYSTALS MISCELLANEOUS AS NEEDED
Status: DISCONTINUED | OUTPATIENT
Start: 2022-09-23 | End: 2022-10-04 | Stop reason: HOSPADM

## 2022-09-23 RX ORDER — ACETAMINOPHEN 325 MG/1
650 TABLET ORAL EVERY 12 HOURS
Status: COMPLETED | OUTPATIENT
Start: 2022-09-23 | End: 2022-09-26

## 2022-09-23 RX ORDER — FACIAL-BODY WIPES
10 EACH TOPICAL
Status: DISCONTINUED | OUTPATIENT
Start: 2022-09-23 | End: 2022-10-04 | Stop reason: HOSPADM

## 2022-09-23 RX ADMIN — ACETAMINOPHEN 650 MG: 325 TABLET, FILM COATED ORAL at 20:28

## 2022-09-23 RX ADMIN — DOCUSATE SODIUM 100 MG: 100 CAPSULE ORAL at 10:27

## 2022-09-23 RX ADMIN — TRIAMTERENE AND HYDROCHLOROTHIAZIDE 1 TABLET: 37.5; 25 TABLET ORAL at 10:27

## 2022-09-23 RX ADMIN — DOCUSATE SODIUM 100 MG: 100 CAPSULE, LIQUID FILLED ORAL at 18:04

## 2022-09-23 RX ADMIN — FAMOTIDINE 40 MG: 20 TABLET, FILM COATED ORAL at 10:27

## 2022-09-23 RX ADMIN — ACETAMINOPHEN 1000 MG: 500 TABLET ORAL at 12:17

## 2022-09-23 RX ADMIN — GABAPENTIN 300 MG: 300 CAPSULE ORAL at 12:17

## 2022-09-23 RX ADMIN — OXYCODONE 10 MG: 5 TABLET ORAL at 11:17

## 2022-09-23 RX ADMIN — ATORVASTATIN CALCIUM 20 MG: 40 TABLET, FILM COATED ORAL at 21:57

## 2022-09-23 RX ADMIN — GABAPENTIN 300 MG: 300 CAPSULE ORAL at 22:00

## 2022-09-23 RX ADMIN — ACETAMINOPHEN 1000 MG: 500 TABLET ORAL at 05:48

## 2022-09-23 RX ADMIN — OXYCODONE HYDROCHLORIDE 5 MG: 5 TABLET ORAL at 20:28

## 2022-09-23 RX ADMIN — OXYCODONE 10 MG: 5 TABLET ORAL at 05:48

## 2022-09-23 RX ADMIN — SODIUM CHLORIDE, PRESERVATIVE FREE 10 ML: 5 INJECTION INTRAVENOUS at 05:50

## 2022-09-23 NOTE — PROGRESS NOTES
Vss afeb  Neuro intact   psoas weakness on left secondary to expected surgical trauma. Pain controlled  CT of ls spine with excellent alignment and appropriate instrumentation placement    Plan  PT OT  IF clears able to go home  IF continued slow progress discharge to ARU/SNF for ongoing therapy.   Scripts on chart

## 2022-09-23 NOTE — REHAB NOTE
Wound Prevention Checklist    Patient: Zarina Pascual (43 y.o. female)  Date: 9/23/2022  Diagnosis: Impaired mobility and ADLs [Z74.09, Z78.9] <principal problem not specified>    Precautions:         []  Heel prevention boots placed on patient    [x]  Patient turned q2h during shift    []  Lift team ordered    [x]  Patient on Lizzy bed/Specialty bed    [x]  Each Wound is documented during shift (Stage, Color, drainage, odor, measurements, and dressings)    [x]  Dual skin check done with Stevie Aranda RN

## 2022-09-23 NOTE — PROGRESS NOTES
4304  Bedside shift change report given to Kristina Coronado RN (oncoming nurse) by Tito Casey RN (offgoing nurse). Report included the following information SBAR, Kardex, Intake/Output, and MAR.     [id number]  Assumed care at this time. Patient alert and oriented x4. Denies SOB, chest pain. Shows no signs of distress. Patient lungs clear bilaterally. Cap refill < 3 sec to all extremities. Patient has 20 G IV to R hand CDI. Stated pain 5/10. Dressing to lower back, L back, and abdomen is CDI with scant sanguinous drainage. SCD and hudson stockings applied to BLE. Incentive spirometer at bedside. Patient reaches 1500 on IS. Bowel sounds present. Skin intact. Patient call light and possessions within reach. Bed locked and in lowest position. 0529  Patient ambulated to chair. Removed aly. [address]. Rafi Payton. Notified patient is c/o mild dizziness since getting up this morning. Tolerating fluids. /66, . Verbal order with readback: cbc.    5998  Patient voiding and tolerating ambulation with assistance x2. 1245  Attempted to give report to rehab facility. Stated they would return call and patient may continue with  time at 1 with [employer] 1401  TRANSFER - OUT REPORT:    Verbal report given to Nasima COONEY(name) on Gabriella Hwoell  being transferred to [address] [place] [address] for routine progression of care       Report consisted of patients Situation, Background, Assessment and   Recommendations(SBAR). Information from the following report(s) SBAR, Kardex, Intake/Output, and MAR was reviewed with the receiving nurse. Opportunity for questions and clarification was provided.       Patient transported with:  Jailyn Mckeon

## 2022-09-23 NOTE — REHAB NOTE
SHIFT CHANGE NOTE FOR MARYDI    Bedside and Verbal shift change report given to Cathy COONEY (oncoming nurse) by Tenisha Jean Baptiste RN (offgoing nurse). Report included the following information SBAR, Kardex, MAR and Recent Results. Situation:   Code Status: Full Code   Hospital Day: 0   Problem List:   Hospital Problems  Never Reviewed            Codes Class Noted POA    Impaired mobility and ADLs ICD-10-CM: Z74.09, Z78.9  ICD-9-CM: V49.89  9/23/2022 Unknown           Background:   Past Medical History:   Past Medical History:   Diagnosis Date    Arthritis     back and hands    Chronic pain     back    COVID-19     Summer of  2021    Diabetes ([facility] [address]     NIDDM    Hypertension     Nausea & vomiting     Surgery only not colonoscopy        Assessment:  Changes in Assessment throughout shift:      Patient has a central line: no Reasons if yes: Insertion date: Last dressing date:  Patient has Aly Cath: no Reasons if yes:     Insertion date:  Shift aly care completed: YES    Last Vitals:     Vitals:    09/23/22 1507   BP: (!) 143/62   Pulse: 68   Resp: 19   Temp: 98 °F (36.7 °C)   SpO2: 96%       PAIN    Pain Assessment    Pain Intensity 1: 0 (09/23/22 1802) Pain Intensity 1: 2 (12/29/14 1105)      Pain Location 1: Abdomen      Pain Intervention(s) 1: Medication (see MAR)  Patient Stated Pain Goal: 0 Patient Stated Pain Goal: 0  Intervention effective: yes  Other actions taken for pain:      Skin Assessment  Skin color Skin Color: Appropriate for ethnicity  Condition/Temperature Skin Condition/Temp: Dry  Integrity Skin Integrity: Incision (comment)  Turgor    Weekly Pressure Ulcer Documentation  Pressure  Injury Documentation: No Pressure Injury Noted-Pressure Ulcer Prevention Initiated  Wound Prevention & Protection Methods  Orientation of wound Orientation of Wound Prevention: Posterior  Location of Prevention Location of Wound Prevention: Sacrum/Coccyx  Dressing Present Dressing Present : No  Dressing Status    Wound Offloading Wound Offloading (Prevention Methods): Bed, pressure reduction mattress    INTAKE/OUPUT  Date 09/22/22 1900 - 09/23/22 0659(Not Admitted) 09/23/22 0700 - 09/24/22 0659   Shift 1900-0659 24 Hour Total 5618-7423 9045-7652 24 Hour Total   INTAKE   P.O.   300  300     P. O.   300  300   Shift Total   300  300   OUTPUT   Urine          Urine Occurrence(s)   4 x  4 x   Stool          Stool Occurrence(s)   0 x  0 x   Shift Total        NET   300  300   Weight (kg)            Recommendations:  Patient needs and requests: tbd    Pending tests/procedures: tbd     Functional Level/Equipment: Partial (one person) /      Fall Precautions:   Fall risk precautions were reinforced with the patient; she was instructed to call for help prior to getting up. The following fall risk precautions were continued: bed/ chair alarms, door signage, yellow bracelet and socks as well as update of the Sofia Fothergill tool in the patient's room. Reta Score: 4    HEALS Safety Check    A safety check occurred in the patient's room between off going nurse and oncoming nurse listed above. The safety check included the below items  Area Items   H  High Alert Medications Verify all high alert medication drips (heparin, PCA, etc.)   E  Equipment Suction is set up for ALL patients (with chloe)  Red plugs utilized for all equipment (IV pumps, etc.)  WOWs wiped down at end of shift. Room stocked with oxygen, suction, and other unit-specific supplies   A  Alarms Bed alarm is set for fall risk patients  Ensure chair alarm is in place and activated if patient is up in a chair   L  Lines Check IV for any infiltration  Hughes bag is empty if patient has a Hughes   Tubing and IV bags are labeled   S  Safety   Room is clean, patient is clean, and equipment is clean. Hallways are clear from equipment besides carts.    Fall bracelet on for fall risk patients  Ensure room is clear and free of clutter  Suction is set up for ALL patients (with chloe)  Hallways are clear from equipment besides carts.    Isolation precautions followed, supplies available outside room, sign posted     Howard Taylor RN No

## 2022-09-23 NOTE — DISCHARGE SUMMARY
Discharge/Transfer  Summary     Patient: Flor Champion MRN: [id number]  SSN: [id number]    YOB: 1950  Age: 67 y.o. Sex: female       Admit Date: 9/21/2022    Discharge Date: 9/23/2022      Admission Diagnoses: Spinal stenosis of lumbar region at multiple levels [M48.061]    Discharge Diagnoses:   Problem List as of 9/23/2022 Never Reviewed            Codes Class Noted - Resolved    Spinal stenosis of lumbar region at multiple levels ICD-10-CM: M48.061  ICD-9-CM: 724.02  9/21/2022 - Present            Discharge Condition: Good      Surgery: ANTERIOR LUMBAR INTERBODY FUSION LUMBAR FOUR/FIVE, LUMBAR FIVE/SACRAL ONE:   PRONE TRANSPOAS FUSION LUMBER TWO/THREE, THREE/FOUR, SEGMANTAL INSTRUMENT LUMBAR TWO- SACRAL ONE WITH C-ARM:      Procedure(s) (LRB):  ANTERIOR LUMBAR INTERBODY FUSION LUMBAR FOUR/FIVE, LUMBAR FIVE/SACRAL ONE (N/A)  PRONE TRANSPOAS FUSION LUMBER TWO/THREE, THREE/FOUR, SEGMANTAL INSTRUMENT LUMBAR TWO- SACRAL ONE WITH C-ARM (N/A)       Hospital Course: Patient tolerated surgery well. CT confirmed instrumentation and alignment. Has expected psoas weakness on left. With slow PT OT progress will go to ARU. Disposition: Acute Rehabilitation    Discharge Medications:   Current Discharge Medication List        START taking these medications    Details   !! oxyCODONE IR (Roxicodone) 5 mg immediate release tablet Take 1 Tablet by mouth every six (6) hours as needed for Pain for up to 7 days. Max Daily Amount: 20 mg.  Qty: 28 Tablet, Refills: 0    Associated Diagnoses: S/P lumbar and lumbosacral fusion by anterior technique      !! oxyCODONE IR (Roxicodone) 5 mg immediate release tablet Take 1 Tablet by mouth every six (6) hours as needed for Pain for up to 7 days. Max Daily Amount: 20 mg.  Qty: 28 Tablet, Refills: 0    Associated Diagnoses: S/P lumbar and lumbosacral fusion by anterior technique       !! - Potential duplicate medications found. Please discuss with provider. CONTINUE these medications which have NOT CHANGED    Details   gabapentin (NEURONTIN) 300 mg capsule Take 300 mg by mouth three (3) times daily. acetaminophen (TYLENOL) 650 mg TbER Take 1,300 mg by mouth two (2) times a day. metFORMIN (GLUCOPHAGE) 500 mg tablet Take 500 mg by mouth daily. simvastatin (ZOCOR) 40 mg tablet Take 40 mg by mouth nightly. triamterene-hydroCHLOROthiazide (DYAZIDE) 37.5-25 mg per capsule Take 1 Capsule by mouth daily. multivitamin (ONE A DAY) tablet Take 1 Tablet by mouth daily. naproxen sodium (NAPROSYN) 220 mg tablet Take 1 Tablet by mouth two (2) times a day. cyclobenzaprine (FLEXERIL) 5 mg tablet Take 5 mg by mouth three (3) times daily as needed. Follow-up Appointments   Procedures    FOLLOW UP VISIT Appointment in: Two Weeks     Standing Status:   Standing     Number of Occurrences:   1     Order Specific Question:   Appointment in     Answer:    Two Weeks       Signed By: West Ma MD     September 23, 2022

## 2022-09-23 NOTE — PROGRESS NOTES
Problem: Falls - Risk of  Goal: *Absence of Falls  Description: Document Ev Soto Fall Risk and appropriate interventions in the flowsheet.   Outcome: Progressing Towards Goal  Note: Fall Risk Interventions:  Mobility Interventions: Bed/chair exit alarm, Patient to call before getting OOB         Medication Interventions: Bed/chair exit alarm, Evaluate medications/consider consulting pharmacy, Patient to call before getting OOB    Elimination Interventions: Call light in reach, Bed/chair exit alarm, Patient to call for help with toileting needs    History of Falls Interventions: Bed/chair exit alarm, Evaluate medications/consider consulting pharmacy         Problem: Patient Education: Go to Patient Education Activity  Goal: Patient/Family Education  Outcome: Progressing Towards Goal

## 2022-09-23 NOTE — PROGRESS NOTES
Problem: Mobility Impaired (Adult and Pediatric)  Goal: *Acute Goals and Plan of Care (Insert Text)  Description: In 1-7 days pt will be able to perform:  STG  1. Bed mobility:  Demonstrate proper log-roll technique for safe initiation of rolling for OOB activities. 2.  Supine to sit to supine CGA with HR for meals. 3.  Sit to stand to sit CGA with RW in prep for ambulation. LT.  Gait:  Ambulate >50ft min A with RW, for home/community mobility. 2.  Stair Negotiation:  Ascend/descend >3 steps min A with HR for home entry. 3.  Activity tolerance: Tolerate up in chair 30 minutes-1 hour for ADL's.  4.  Patient/Family Education:  Patient/family to be independent with HEP for follow-up care and safe discharge. Outcome: Progressing Towards Goal   []  Patient has met MD mobilization critieria for d/c home   []  Recommend HH with 24 hour adult care   [x]  Benefit from additional acute PT session to address:  Rehab placement    PHYSICAL THERAPY TREATMENT    Patient: Jessica Pena (15 y.o. female)  Date: 2022  Diagnosis: Spinal stenosis of lumbar region at multiple levels [M48.061] <principal problem not specified>  Procedure(s) (LRB):  ANTERIOR LUMBAR INTERBODY FUSION LUMBAR FOUR/FIVE, LUMBAR FIVE/SACRAL ONE (N/A)  PRONE TRANSPOAS FUSION LUMBER TWO/THREE, THREE/FOUR, SEGMANTAL INSTRUMENT LUMBAR TWO- SACRAL ONE WITH C-ARM (N/A) 2 Days Post-Op  Precautions: Fall, Back, Spinal  PLOF: lives with daughter, ambulatory with a RW    ASSESSMENT:  Pt sitting in chair upon arrival.  VC for UE placement and Nancy needed for sit to stand. Ambulated 20ft with RW, decreased pace, no LOB, c/o nausea and dizziness. ModA with LEs into bed. Pt reports feeling better upon laying down.   Progression toward goals:   []      Improving appropriately and progressing toward goals  []      Improving slowly and progressing toward goals  []      Not making progress toward goals and plan of care will be adjusted     PLAN:  Patient continues to benefit from skilled intervention to address the above impairments. Continue treatment per established plan of care. Discharge Recommendations: Rehab  Further Equipment Recommendations for Discharge:  rolling walker    AMPAC: Based on an AM-PAC score of **/24 (14/20 if omitting stairs) and their current functional mobility deficits, it is recommended that the patient have 3-5 sessions per week of Physical Therapy at d/c to increase the patient's independence. This AMPAC score should be considered in conjunction with interdisciplinary team recommendations to determine the most appropriate discharge setting. Patient's social support, diagnosis, medical stability, and prior level of function should also be taken into consideration. SUBJECTIVE:   Patient stated My R leg is still giving me trouble.     OBJECTIVE DATA SUMMARY:   Critical Behavior:  Neurologic State: Alert, Eyes open spontaneously  Orientation Level: Oriented X4  Cognition: Follows commands  Safety/Judgement: Awareness of environment, Decreased awareness of need for assistance, Decreased insight into deficits  Functional Mobility Training:  Bed Mobility:  Sit to Supine: Moderate assistance  Transfers:  Sit to Stand: Minimum assistance  Stand to Sit: Contact guard assistance  Balance:  Sitting: Intact; With support  Sitting - Static: Fair (occasional)  Sitting - Dynamic: Fair (occasional)  Standing: Intact; With support  Standing - Static: Fair  Standing - Dynamic : Fair   Ambulation/Gait Training:  Distance (ft): 20 Feet (ft)  Assistive Device: Gait belt;Walker, rolling  Ambulation - Level of Assistance: Contact guard assistance  Gait Abnormalities: Decreased step clearance  Speed/Laura: Slow        Pain:  Pain level pre-treatment: 6/10  Pain level post-treatment: 6/10   Pain Intervention(s): Medication (see MAR);  Rest, Ice, Repositioning   Response to intervention: Nurse notified, See doc flow    Activity Tolerance: fair  Please refer to the flowsheet for vital signs taken during this treatment. After treatment:   [] Patient left in no apparent distress sitting up in chair  [x] Patient left in no apparent distress in bed  [x] Call bell left within reach  [] Nursing notified  [] Caregiver present  [] Bed alarm activated  [x] SCDs applied      COMMUNICATION/EDUCATION:   [x]         Role of Physical Therapy in the acute care setting. [x]         Fall prevention education was provided and the patient/caregiver indicated understanding. [x]         Patient/family have participated as able in working toward goals and plan of care. [x]         Patient/family agree to work toward stated goals and plan of care. []         Patient understands intent and goals of therapy, but is neutral about his/her participation. []         Patient is unable to participate in stated goals/plan of care: ongoing with therapy staff.  []         Other:        Philip Danielson PTA   Time Calculation: 11 mins    [facility] AM-PAC® Basic Mobility Inpatient Short Form (6-Clicks) Version 2    How much HELP from another person does the patient currently need    (If the patient hasn't done an activity recently, how much help from another person do you think he/she would need if he/she tried?)   Total (Total A or Dep)   A Lot  (Mod to Max A)   A Little (Sup or Min A)   None (Mod I to I)   Turning from your back to your side while in a flat bed without using bedrails? [] 1 [] 2 [x] 3 [] 4   2. Moving from lying on your back to sitting on the side of a flat bed without using bedrails? [] 1 [x] 2 [] 3 [] 4   3. Moving to and from a bed to a chair (including a wheelchair)? [] 1 [] 2 [x] 3 [] 4   4. Standing up from a chair using your arms (e.g., wheelchair, or bedside chair)? [] 1 [] 2 [x] 3 [] 4   5. Walking in hospital room? [] 1 [] 2 [x] 3 [] 4   6.  Climbing 3-5 steps with a railing?+   [] 1 [] 2 [] 3 [] 4   +If stair climbing cannot be assessed, skip item #6. Sum responses from items 1-5. Based on an AM-PAC score of **/24 (or **/20 if omitting stairs) and their current functional mobility deficits, it is recommended that the patient have 5-7 sessions per week of Physical Therapy at d/c to increase the patient's independence. Currently, this patient demonstrates the potential endurance, and/or tolerance for 3 hours of therapy each day at d/c. Based on an AM-PAC score of **/24 (14/20 if omitting stairs) and their current functional mobility deficits, it is recommended that the patient have 3-5 sessions per week of Physical Therapy at d/c to increase the patient's independence. Based on an AM-PAC score of **/24 (or **/20 if omitting stairs) and their current functional mobility deficits, it is recommended that the patient have 2-3 sessions per week of Physical Therapy at d/c to increase the patient's independence. At this time and based on an AM-PAC score of **/24 (or **/20 if omitting stairs), no further PT is recommended upon discharge due to (i.e. patient at baseline functional statusetc). Recommend patient returns to prior setting with prior services.

## 2022-09-23 NOTE — ACP (ADVANCE CARE PLANNING)
Advance Care Planning   Advance Care Planning Inpatient Note  [facility] Department    Today's Date: 9/23/2022  Unit: [facility] [address] [facility]    Received request from heaven. Upon review of chart and communication with care team, patient's decision making abilities are not in question. Patient was/were present in the room during visit. Goals of ACP Conversation:  Discuss Advance Care planning documents    Health Care Decision Makers:    Patient has document at home,  provided instructions for uploading form in My Chart.   Summary:  No Decision Maker named by patient at this time  Kalen 53 (Patient Wishes) on file:  Healthcare Power of /Advance Directive appointment of Health care agent  Living Will/ Advance Directive  Anatomical Gift/Organ donation     Interventions:  Requested patient/family submit existing document(s) for our records: Healthcare Power of /Advance Directive appointment of Health care agent  Living Will/ Advance Directive  Anatomical Gift/Organ donation    Care Preferences Communicated:  No    Outcomes/Plan:  ACP discussion completed    Chaplain Nhung on 9/23/2022 at 11:11 AM

## 2022-09-23 NOTE — PROGRESS NOTES
Problem: Falls - Risk of  Goal: *Absence of Falls  Description: Document [place] Fall Risk and appropriate interventions in the flowsheet.   Outcome: Progressing Towards Goal  Note: Fall Risk Interventions:  Mobility Interventions: Communicate number of staff needed for ambulation/transfer, Patient to call before getting OOB, Utilize walker, cane, or other assistive device         Medication Interventions: Assess postural VS orthostatic hypotension, Patient to call before getting OOB, Teach patient to arise slowly    Elimination Interventions: Call light in reach, Patient to call for help with toileting needs    History of Falls Interventions: Vital signs minimum Q4HRs X 24 hrs (comment for end date), Investigate reason for fall (\"leg gave out\")

## 2022-09-23 NOTE — PROGRESS NOTES
conducted a Follow up consultation and Spiritual Assessment for Yeny Terrell, who is a 67 y.o.,female. The  provided the following Interventions:  Continued the relationship of care and support. Listened empathically. Offered assurance of prayer on patients behalf.  provided Scripture reading of Psalm 91 for patient. Chart reviewed. The following outcomes were achieved:  Patient expressed gratitude for pastoral care visit. Assessment:  There are no further spiritual or Mosque issues which require Spiritual Care Services interventions at this time. Plan:  Chaplains will continue to follow and will provide pastoral care on an as needed/requested basis.  recommends bedside caregivers page  on duty if patient shows signs of acute spiritual or emotional distress. Rev.  Deep Odell,Certified Respecting Choices Advance Care   Coordinator [place]  [phone]

## 2022-09-23 NOTE — PROGRESS NOTES
Transport today at 1300 via [address] [place] [address],[address] to [id number] [place] [address]. The pt and her dtr are in agreement w/ the transfer. Transition of care to Acute Rehab: [address] [place] [address] for Rehab    Communication to Patient/Family:  Met with patient and family, and they are agreeable to the transition plan. The Plan for Transition of Care is related to the following treatment goals: Acute rehab     The Patient  was provided with a choice of provider and agrees   with the discharge plan. Yes [x] No []    Freedom of choice list was provided with basic dialogue that supports the patient's individualized plan of care/goals and shares the quality data associated with the providers. Yes [x] No []    Rehab Transition:  Patient has been accepted to [address] [place] [address] for Rehab, [address] [place] [address]., [place], [address], and meets criteria for admission. Patient will transported by [facility]  and expected to leave at 1300. Rosie Joseph Communication to Rehab:  Bedside RN, Marni, has been notified to update the transition plan to the facility and call report Phone [phone]. Discharge information has been updated on the AVS and sent via [facility] and/or CC link. Discharge instructions to be fax'd to facility at Fax [phone]     Please include all hard scripts for controlled substances, med rec and dc summary, and AVS in packet. Please medicate for pain prior to dc if possible and needed to help offset delay when patient first arrives to facility. Reviewed and confirmed with facility, [facility] for Rehab can manage the patient care needs for the following:     Jovita Lima with (X) only those applicable:  Medication:  []Medications are available at the facility  []IV Antibiotics    []Controlled Substance - hard copies available sent.   []Weekly Labs    Equipment:  []CPAP/BiPAP  []Wound Vacuum  []Hughes or Urinary Device  []PICC/Central Line  []Nebulizer  []Ventilator    Treatment:  []Isolation (for MRSA, VRE, etc.)  []Surgical Drain Management  []Tracheostomy Care  []Dressing Changes  []Dialysis with transportation  []PEG Care  []Oxygen  []Daily Weights for Heart Failure    Dietary:  []Any diet limitations  []Tube Feedings   []Total Parenteral Management (TPN)    Financial Resources:  [x]UAI Not required for Acute Rehab Transfer  []Medicaid Application Completed  []UAI Completed  []Level II Completed  [] Private pay individual who will not become financially eligible for Medicaid within 6 months from admission to a [address] [place] [address] facility. [] Individual refused to have screening conducted. Advanced Care Plan:  []Surrogate Decision Maker of Care  []POA  []Communicated Code Status and copy sent. Other:         Care Management Interventions  PCP Verified by CM:  Yes (Dr. Elaina Palm)  Palliative Care Criteria Met (RRAT>21 & CHF Dx)?: No  Mode of Transport at Discharge: BLS  Transition of Care Consult (CM Consult): Acute Rehab  Discharge Durable Medical Equipment: No  Physical Therapy Consult: Yes  Occupational Therapy Consult: Yes  Speech Therapy Consult: No  Support Systems: Child(chago)  Confirm Follow Up Transport: Family  The Plan for Transition of Care is Related to the Following Treatment Goals : Acute Rehab  The Patient and/or Patient Representative was Provided with a Choice of Provider and Agrees with the Discharge Plan?: Yes (The pt is alert and oriented. )  Freedom of Choice List was Provided with Basic Dialogue that Supports the Patient's Individualized Plan of Care/Goals, Treatment Preferences and Shares the Quality Data Associated with the Providers?: Yes ([facility])  Discharge Location  Patient Expects to be Discharged to[de-identified] Rehab hospital/unit acute

## 2022-09-24 ENCOUNTER — APPOINTMENT (OUTPATIENT)
Dept: GENERAL RADIOLOGY | Age: 72
DRG: 561 | End: 2022-09-24
Attending: INTERNAL MEDICINE
Payer: MEDICARE

## 2022-09-24 LAB
ALBUMIN SERPL-MCNC: 2.5 G/DL (ref 3.4–5)
ALBUMIN/GLOB SERPL: 0.6 {RATIO} (ref 0.8–1.7)
ALP SERPL-CCNC: 73 U/L (ref 45–117)
ALT SERPL-CCNC: 64 U/L (ref 13–56)
ANION GAP SERPL CALC-SCNC: 4 MMOL/L (ref 3–18)
ANION GAP SERPL CALC-SCNC: 5 MMOL/L (ref 3–18)
APPEARANCE UR: CLEAR
AST SERPL-CCNC: 98 U/L (ref 10–38)
BACTERIA URNS QL MICRO: ABNORMAL /HPF
BASOPHILS # BLD: 0 K/UL (ref 0–0.1)
BASOPHILS # BLD: 0 K/UL (ref 0–0.1)
BASOPHILS NFR BLD: 0 % (ref 0–2)
BASOPHILS NFR BLD: 0 % (ref 0–2)
BILIRUB SERPL-MCNC: 0.4 MG/DL (ref 0.2–1)
BILIRUB UR QL: NEGATIVE
BUN SERPL-MCNC: 14 MG/DL (ref 7–18)
BUN SERPL-MCNC: 15 MG/DL (ref 7–18)
BUN/CREAT SERPL: 15 (ref 12–20)
BUN/CREAT SERPL: 16 (ref 12–20)
CALCIUM SERPL-MCNC: 8.8 MG/DL (ref 8.5–10.1)
CALCIUM SERPL-MCNC: 9.1 MG/DL (ref 8.5–10.1)
CHLORIDE SERPL-SCNC: 101 MMOL/L (ref 100–111)
CHLORIDE SERPL-SCNC: 103 MMOL/L (ref 100–111)
CO2 SERPL-SCNC: 32 MMOL/L (ref 21–32)
CO2 SERPL-SCNC: 33 MMOL/L (ref 21–32)
COLOR UR: YELLOW
CREAT SERPL-MCNC: 0.9 MG/DL (ref 0.6–1.3)
CREAT SERPL-MCNC: 1 MG/DL (ref 0.6–1.3)
DIFFERENTIAL METHOD BLD: ABNORMAL
DIFFERENTIAL METHOD BLD: ABNORMAL
EOSINOPHIL # BLD: 0.1 K/UL (ref 0–0.4)
EOSINOPHIL # BLD: 0.1 K/UL (ref 0–0.4)
EOSINOPHIL NFR BLD: 0 % (ref 0–5)
EOSINOPHIL NFR BLD: 1 % (ref 0–5)
EPITH CASTS URNS QL MICRO: ABNORMAL /LPF (ref 0–5)
ERYTHROCYTE [DISTWIDTH] IN BLOOD BY AUTOMATED COUNT: 14.8 % (ref 11.6–14.5)
ERYTHROCYTE [DISTWIDTH] IN BLOOD BY AUTOMATED COUNT: 14.9 % (ref 11.6–14.5)
GLOBULIN SER CALC-MCNC: 4 G/DL (ref 2–4)
GLUCOSE BLD STRIP.AUTO-MCNC: 110 MG/DL (ref 70–110)
GLUCOSE BLD STRIP.AUTO-MCNC: 131 MG/DL (ref 70–110)
GLUCOSE BLD STRIP.AUTO-MCNC: 149 MG/DL (ref 70–110)
GLUCOSE SERPL-MCNC: 121 MG/DL (ref 74–99)
GLUCOSE SERPL-MCNC: 196 MG/DL (ref 74–99)
GLUCOSE UR STRIP.AUTO-MCNC: NEGATIVE MG/DL
HCT VFR BLD AUTO: 25.2 % (ref 35–45)
HCT VFR BLD AUTO: 26.5 % (ref 35–45)
HGB BLD-MCNC: 7.8 G/DL (ref 12–16)
HGB BLD-MCNC: 8 G/DL (ref 12–16)
HGB UR QL STRIP: ABNORMAL
IMM GRANULOCYTES # BLD AUTO: 0 K/UL (ref 0–0.04)
IMM GRANULOCYTES # BLD AUTO: 0.1 K/UL (ref 0–0.04)
IMM GRANULOCYTES NFR BLD AUTO: 0 % (ref 0–0.5)
IMM GRANULOCYTES NFR BLD AUTO: 0 % (ref 0–0.5)
KETONES UR QL STRIP.AUTO: NEGATIVE MG/DL
LACTATE SERPL-SCNC: 1.2 MMOL/L (ref 0.4–2)
LACTATE SERPL-SCNC: 2.8 MMOL/L (ref 0.4–2)
LEUKOCYTE ESTERASE UR QL STRIP.AUTO: NEGATIVE
LYMPHOCYTES # BLD: 1.2 K/UL (ref 0.9–3.6)
LYMPHOCYTES # BLD: 1.3 K/UL (ref 0.9–3.6)
LYMPHOCYTES NFR BLD: 11 % (ref 21–52)
LYMPHOCYTES NFR BLD: 9 % (ref 21–52)
MCH RBC QN AUTO: 26.7 PG (ref 24–34)
MCH RBC QN AUTO: 27.3 PG (ref 24–34)
MCHC RBC AUTO-ENTMCNC: 30.2 G/DL (ref 31–37)
MCHC RBC AUTO-ENTMCNC: 31 G/DL (ref 31–37)
MCV RBC AUTO: 88.1 FL (ref 78–100)
MCV RBC AUTO: 88.3 FL (ref 78–100)
MONOCYTES # BLD: 1.2 K/UL (ref 0.05–1.2)
MONOCYTES # BLD: 1.2 K/UL (ref 0.05–1.2)
MONOCYTES NFR BLD: 9 % (ref 3–10)
MONOCYTES NFR BLD: 9 % (ref 3–10)
NEUTS SEG # BLD: 10.7 K/UL (ref 1.8–8)
NEUTS SEG # BLD: 9.9 K/UL (ref 1.8–8)
NEUTS SEG NFR BLD: 79 % (ref 40–73)
NEUTS SEG NFR BLD: 81 % (ref 40–73)
NITRITE UR QL STRIP.AUTO: NEGATIVE
NRBC # BLD: 0 K/UL (ref 0–0.01)
NRBC # BLD: 0 K/UL (ref 0–0.01)
NRBC BLD-RTO: 0 PER 100 WBC
NRBC BLD-RTO: 0 PER 100 WBC
PH UR STRIP: 5.5 [PH] (ref 5–8)
PLATELET # BLD AUTO: 174 K/UL (ref 135–420)
PLATELET # BLD AUTO: 201 K/UL (ref 135–420)
PMV BLD AUTO: 10.6 FL (ref 9.2–11.8)
PMV BLD AUTO: 11 FL (ref 9.2–11.8)
POTASSIUM SERPL-SCNC: 3.5 MMOL/L (ref 3.5–5.5)
POTASSIUM SERPL-SCNC: 3.9 MMOL/L (ref 3.5–5.5)
PROT SERPL-MCNC: 6.5 G/DL (ref 6.4–8.2)
PROT UR STRIP-MCNC: NEGATIVE MG/DL
RBC # BLD AUTO: 2.86 M/UL (ref 4.2–5.3)
RBC # BLD AUTO: 3 M/UL (ref 4.2–5.3)
RBC #/AREA URNS HPF: ABNORMAL /HPF (ref 0–5)
SODIUM SERPL-SCNC: 138 MMOL/L (ref 136–145)
SODIUM SERPL-SCNC: 140 MMOL/L (ref 136–145)
SP GR UR REFRACTOMETRY: 1.01 (ref 1–1.03)
UROBILINOGEN UR QL STRIP.AUTO: 1 EU/DL (ref 0.2–1)
WBC # BLD AUTO: 12.5 K/UL (ref 4.6–13.2)
WBC # BLD AUTO: 13.2 K/UL (ref 4.6–13.2)
WBC URNS QL MICRO: ABNORMAL /HPF (ref 0–4)

## 2022-09-24 PROCEDURE — 81001 URINALYSIS AUTO W/SCOPE: CPT

## 2022-09-24 PROCEDURE — 99223 1ST HOSP IP/OBS HIGH 75: CPT | Performed by: INTERNAL MEDICINE

## 2022-09-24 PROCEDURE — 87040 BLOOD CULTURE FOR BACTERIA: CPT

## 2022-09-24 PROCEDURE — 97162 PT EVAL MOD COMPLEX 30 MIN: CPT

## 2022-09-24 PROCEDURE — 82962 GLUCOSE BLOOD TEST: CPT

## 2022-09-24 PROCEDURE — 97535 SELF CARE MNGMENT TRAINING: CPT

## 2022-09-24 PROCEDURE — 74011250637 HC RX REV CODE- 250/637: Performed by: INTERNAL MEDICINE

## 2022-09-24 PROCEDURE — 85025 COMPLETE CBC W/AUTO DIFF WBC: CPT

## 2022-09-24 PROCEDURE — 97166 OT EVAL MOD COMPLEX 45 MIN: CPT

## 2022-09-24 PROCEDURE — 2709999900 HC NON-CHARGEABLE SUPPLY

## 2022-09-24 PROCEDURE — 74011250637 HC RX REV CODE- 250/637: Performed by: HOSPITALIST

## 2022-09-24 PROCEDURE — 36415 COLL VENOUS BLD VENIPUNCTURE: CPT

## 2022-09-24 PROCEDURE — 71045 X-RAY EXAM CHEST 1 VIEW: CPT

## 2022-09-24 PROCEDURE — 83605 ASSAY OF LACTIC ACID: CPT

## 2022-09-24 PROCEDURE — 65310000000 HC RM PRIVATE REHAB

## 2022-09-24 PROCEDURE — 97116 GAIT TRAINING THERAPY: CPT

## 2022-09-24 PROCEDURE — 74011250636 HC RX REV CODE- 250/636: Performed by: INTERNAL MEDICINE

## 2022-09-24 PROCEDURE — 97530 THERAPEUTIC ACTIVITIES: CPT

## 2022-09-24 PROCEDURE — 80053 COMPREHEN METABOLIC PANEL: CPT

## 2022-09-24 RX ORDER — SODIUM CHLORIDE 9 MG/ML
500 INJECTION, SOLUTION INTRAVENOUS ONCE
Status: COMPLETED | OUTPATIENT
Start: 2022-09-24 | End: 2022-09-25

## 2022-09-24 RX ORDER — ONDANSETRON 4 MG/1
8 TABLET, ORALLY DISINTEGRATING ORAL
Status: DISCONTINUED | OUTPATIENT
Start: 2022-09-24 | End: 2022-09-24

## 2022-09-24 RX ORDER — POLYETHYLENE GLYCOL 3350 17 G/17G
17 POWDER, FOR SOLUTION ORAL ONCE
Status: COMPLETED | OUTPATIENT
Start: 2022-09-24 | End: 2022-09-24

## 2022-09-24 RX ORDER — ONDANSETRON 4 MG/1
4 TABLET, ORALLY DISINTEGRATING ORAL
Status: DISCONTINUED | OUTPATIENT
Start: 2022-09-24 | End: 2022-10-04 | Stop reason: HOSPADM

## 2022-09-24 RX ORDER — METOPROLOL TARTRATE 25 MG/1
12.5 TABLET, FILM COATED ORAL EVERY 12 HOURS
Status: DISCONTINUED | OUTPATIENT
Start: 2022-09-24 | End: 2022-10-04 | Stop reason: HOSPADM

## 2022-09-24 RX ORDER — ACETAMINOPHEN 500 MG
500 TABLET ORAL
Status: DISCONTINUED | OUTPATIENT
Start: 2022-09-24 | End: 2022-10-04 | Stop reason: HOSPADM

## 2022-09-24 RX ORDER — HYDROCHLOROTHIAZIDE 25 MG/1
12.5 TABLET ORAL DAILY
Status: DISCONTINUED | OUTPATIENT
Start: 2022-09-24 | End: 2022-10-04 | Stop reason: HOSPADM

## 2022-09-24 RX ADMIN — ACETAMINOPHEN 650 MG: 325 TABLET, FILM COATED ORAL at 08:42

## 2022-09-24 RX ADMIN — GABAPENTIN 300 MG: 300 CAPSULE ORAL at 21:28

## 2022-09-24 RX ADMIN — ACETAMINOPHEN 500 MG: 500 TABLET ORAL at 18:23

## 2022-09-24 RX ADMIN — DOCUSATE SODIUM 100 MG: 100 CAPSULE, LIQUID FILLED ORAL at 08:42

## 2022-09-24 RX ADMIN — FAMOTIDINE 40 MG: 20 TABLET ORAL at 08:42

## 2022-09-24 RX ADMIN — GABAPENTIN 300 MG: 300 CAPSULE ORAL at 08:42

## 2022-09-24 RX ADMIN — THERA TABS 1 TABLET: TAB at 08:42

## 2022-09-24 RX ADMIN — ACETAMINOPHEN 650 MG: 325 TABLET, FILM COATED ORAL at 23:09

## 2022-09-24 RX ADMIN — ATORVASTATIN CALCIUM 20 MG: 40 TABLET, FILM COATED ORAL at 21:28

## 2022-09-24 RX ADMIN — LACTULOSE 30 ML: 20 SOLUTION ORAL at 12:40

## 2022-09-24 RX ADMIN — DOCUSATE SODIUM 100 MG: 100 CAPSULE, LIQUID FILLED ORAL at 17:05

## 2022-09-24 RX ADMIN — GABAPENTIN 300 MG: 300 CAPSULE ORAL at 16:59

## 2022-09-24 RX ADMIN — POLYETHYLENE GLYCOL 3350 17 G: 17 POWDER, FOR SOLUTION ORAL at 12:40

## 2022-09-24 RX ADMIN — SODIUM CHLORIDE 500 ML: 9 INJECTION, SOLUTION INTRAVENOUS at 20:28

## 2022-09-24 RX ADMIN — HYDROCHLOROTHIAZIDE 12.5 MG: 25 TABLET ORAL at 12:40

## 2022-09-24 RX ADMIN — METOPROLOL TARTRATE 12.5 MG: 25 TABLET, FILM COATED ORAL at 21:28

## 2022-09-24 NOTE — PROGRESS NOTES
Problem: Mobility Impaired (Adult and Pediatric)  Goal: *Therapy Goal (Edit Goal, Insert Text)  Description: Physical Therapy Short Term Goals  Initiated 9/24/2022 and to be accomplished within 7 day(s) on 9/30/2022  1. Patient will move from supine to sit and sit to supine , scoot up and down, and roll side to side in bed with minimal assistance/contact guard assist.    2.  Patient will transfer from bed to chair and chair to bed with minimal assistance/contact guard assist using the least restrictive device. 3.  Patient will perform sit to stand with moderate assistance . 4. Patient will ambulate with minimal assistance/contact guard assist for 150 feet with the least restrictive device. 5.  Patient will ascend/descend 4 stairs with 1 handrail(s) with moderate assistance . Physical Therapy Long Term Goals  Initiated 9/24/2022 and to be accomplished within 21 day(s) on 10/14/2022  1. Patient will move from supine to sit and sit to supine , scoot up and down, and roll side to side in bed with supervision/set-up. 2.  Patient will transfer from bed to chair and chair to bed with supervision/set-up using the least restrictive device. 3.  Patient will perform sit to stand with supervision/set-up. 4.  Patient will ambulate with supervision/set-up for 150 feet with the least restrictive device. 5.  Patient will ascend/descend 4 stairs with 1 handrail(s) with CG/SBA. Outcome: Progressing Towards Goal   PHYSICAL THERAPY EVALUATION    Patient: Tabitha Carvajal (52 y.o. female)  Date: 9/24/2022  Diagnosis: Impaired mobility and ADLs [Z74.09, Z78.9]   Precautions: Fall, Back, Spinal  Chart, physical therapy assessment, plan of care and goals were reviewed. Time in:0940  Time [id number]  Time In: 3232  Time Out: 1400    Patient seen for: Balance activities;Gait training;Patient education;Transfer training; Wheelchair mobility    Pain:  Patient reporting moderate pain during session.     Patient identified with name and : yes    SUBJECTIVE:     Patient stated I'm doing all right.  Patient reporting that she was having worsening weakness, pain and starting to fall before she underwent her surgery. Patient reporting vertigo during session with left rolling and slightly with lying down on left side and coming back up into sitting. Lasting only seconds. Patient's Goal for Physical Therapy: Patient wanting to be better able to walk. OBJECTIVE DATA SUMMARY:     Past Medical History:   Diagnosis Date    Arthritis     back and hands    Chronic pain     back    COVID-19     Summer of  2021    Diabetes [facility])     NIDDM    Hypertension     Nausea & vomiting     Surgery only not colonoscopy     Past Surgical History:   Procedure Laterality Date    COLONOSCOPY N/A 2022    COLONOSCOPY performed by Vinay Castro MD at [facility]    HX COLONOSCOPY      HX TUBAL LIGATION         Problem List:    Decreased strength B LE  [x]     Decreased strength trunk/core  [x]     Decreased AROM   [x]     Decreased PROM  [x]    Decreased endurance  [x]     Decreased balance sitting  [x]     Decreased balance standing  [x]     Pain   [x]     Slow ambulation velocity  [x]    Decreased coordination  []    Decreased safety awareness  []      Functional Limitations:   Decreased independence with bed mobility  [x]     Decreased independence with functional transfers  [x]     Decreased independence with ambulation  [x]     Decreased independence with stair negotiation  [x]       Previous Functional Level: Patient reporting using rollator mod I level though was starting to have more falls at home. Lives with her daughter.      Home Environment: Home Environment: Private residence  # Steps to Enter: 4  Rails to Enter: Yes  Hand Rails : Bilateral (too far apart hold simultaneously)  One/Two Story Residence: Two story  # of Interior Steps: 15  Interior Rails: Right  Lift Chair Available: Yes  Living Alone: No  Support Systems: Child(chago)  Patient Expects to be Discharged to[de-identified] Home with family assistance  Current DME Used/Available at Home: Valentino Sings, rollator, Walker, rolling, Katiana Eufemia, straight, Katiana Eufemia, quad  Tub or Shower Type: Shower    Barriers to Learning/Limitations: yes;  physical  Compensate with: visual, verbal, tactile, kinesthetic cues/model         Outcome Measures: See functional scores.       MMT Initial Assessment (not formally assessed due to pain)   Right Lower Extremity Left Lower Extremity   Hip Flexion 3 3   Knee Extension 4- 4-   Knee Flexion 4- 4-   Ankle Dorsiflexion 3+ 3+   0/5 No palpable muscle contraction  1/5 Palpable muscle contraction, no joint movement  2-/5 Less than full range of motion in gravity eliminated position  2/5 Able to complete full range of motion in gravity eliminated position  2+/5 Able to initiate movement against gravity  3-/5 More than half but not full range of motion against gravity  3/5 Able to complete full range of motion against gravity  3+/5 Completes full range of motion against gravity with minimal resistance  4-/5 Completes full range of motion against gravity with minimal-moderate resistance  4/5 Completes full range of motion against gravity with moderate resistance  4+/5 Completes full range of motion against gravity with moderate-maximum resistance  5/5 Completes full range of motion against gravity with maximum resistance        GROSS ASSESSMENT Initial Assessment 9/24/2022   AROM Generally decreased, functional   Strength Generally decreased, functional   Coordination Generally decreased, functional   Tone Normal   Sensation Impaired (tingling down anterior legs bilaterally, numbness reported posteriorly bilat LE with left worse than right)   PROM Generally decreased, functional       POSTURE Initial Assessment 9/24/2022   Posture (WDL) Exceptions to [facility]   Posture Assessment Forward head;Rounded shoulders       BALANCE Initial Assessment 9/24/2022    Sitting - Static: Good (unsupported)  Sitting - Dynamic: Fair (occasional)  Standing - Static: Fair  Standing - Dynamic : Impaired    Ability to  small object from floor: NT due to safety concern       BED/CHAIR/WHEELCHAIR TRANSFERS Initial Assessment 9/24/2022   Rolling Right  SBA for logrolling   Rolling Left  SBA for logrolling   Supine to Sit 3 (Moderate assistance)   Sit to Stand Maximum assistance (needing lifting and lowering assistance, particularly with fatigue)   Sit to Supine 2 (Maximal assistance) (using logrolling technique)   Transfer Assist Score 3 (Moderate assistance )   Transfer Type Other   Comments  Stand step transfers using RW. Needing cues for safe hand placement and for safe scooting back into chair to avoid twisting. Repeated sit to  PM 2 bouts of 3 reps for ease of task. Car Transfer Not tested (NT due to assistance required for getting into standing)   Car Type N/A       WHEELCHAIR MOBILITY/MANAGEMENT Initial Assessment 9/24/2022   Able to Propel 75 feet   Assist Level  (partial assistance for turning, backing up etc)   Curbs/ramps assistance required 0 (Not tested)   Wheelchair set up assistance required 3 (Moderate assistance)   Wheelchair management Manages left brake, Manages right brake (using brake extender)   Comments Using bilat UE and LE to propel       GAIT Initial Assessment 9/24/2022   Gait Description (WDL) Exceptions to WDL   Gait Abnormalities Decreased step clearance (forward flexed trunk, slow gait speed)       WALKING INDEPENDENCE Initial Assessment 9/24/2022   Assistive device Gait belt, Walker, rolling   Ambulation assistance - level surface 4 (Minimal assistance)   Distance 75 Feet (ft)   Comments  Min A for safety, slight left lateral trunk lean during gait.    Ambulation assistance - unlevel surface  (NT due to safety concern)       STEPS/STAIRS Initial Assessment 9/24/2022   Steps/Stairs ambulated 0   Rail Use  (NT)   Assistance Level 0 (Not tested)   Comments  NT due to safety concern   Curbs/Ramps  (NT due to safety concern)       ASSESSMENT :  Based on the objective data described above, the patient presents with pain, decreased strength, endurance, balance leading to difficulty perform safe functional mobility independently. Patient will benefit from skilled intervention to address the above impairments. Patient's rehabilitation potential is considered to be Good  Factors which may influence rehabilitation potential include:   []         None noted  []         Mental ability/status  [x]         Medical condition  [x]         Home/family situation and support systems  []         Safety awareness  [x]         Pain tolerance/management  []         Other:      PLAN :  See STG and LTG above. Order received from MD for physical therapy services and chart reviewed. Pt to be seen 5 times per week for 3 hours of total therapy per day for 3 weeks. Thank you for the referral.    Pt would benefit from skilled physical therapy in order to improve independent functional mobility within the home. Interventions may include range of motion (AROM, PROM B LE/trunk), motor function (B LE/trunk strengthening/coordination), activity tolerance (vitals, oxygen saturation levels), bed mobility training, balance activities, gait training (progressive ambulation program), wheelchair management and functional transfer training. Patient would also benefit from concurrent and group therapy sessions to promote increased participation in skilled therapy interventions and to provide opportunities for increased social interaction.      Discharge Recommendations: Home Health and caregiver assistance  Further Equipment Recommendations for Discharge: rolling walker       Activity Tolerance:   Fair due to fatigue and pain    After treatment:   [] Patient left in no apparent distress in bed  [x] Patient left in no apparent distress sitting up in chair  [] Patient left in no apparent distress in w/c mobilizing under own power  [] Patient left in no apparent distress dining area  [] Patient left in no apparent distress mobilizing under own power  [x] Call bell left within reach  [x] Nursing notified  [] Caregiver present  [] Bed alarm activated   [x] Chair alarm activated. COMMUNICATION/EDUCATION:   [x]         Fall prevention education was provided and the patient/caregiver indicated understanding. [x]         Patient/family have participated as able in goal setting and plan of care. [x]         Patient/family agree to work toward stated goals and plan of care. []         Patient understands intent and goals of therapy, but is neutral about his/her participation. []         Patient is unable to participate in goal setting and plan of care.     Thank you for this referral.  Deep Ellis, PT  9/24/2022

## 2022-09-24 NOTE — PROGRESS NOTES
Problem: Self Care Deficits Care Plan (Adult)  Goal: *Therapy Goal (Edit Goal, Insert Text)  Description: Occupational Therapy Goals   Long Term Goals  Initiated 2022 and to be accomplished within 3 week(s)  1. Pt will perform self-feeding with [place]. 2. Pt will perform grooming with MOD I standing at sink. 3. Pt will perform UB bathing with MOD I.  4. Pt will perform LB bathing with MOD I.  5. Pt will perform tub/shower transfer with MOD I.   6. Pt will perform UB dressing with MOD I.  7. Pt will perform LB dressing with MOD I.  8. Pt will perform toileting task with MOD I.  9. Pt will perform toilet transfer with MOD I. Short Term Goals   Initiated 2022 and to be accomplished within 7 day(s)  1. Pt will perform grooming task while standing at sink x2 minutes  2. Pt will perform UB bathing with setup assist.  3. Pt will perform LB bathing with MIN A, use of AE prn.  4. Pt will perform tub/shower transfer with MIN A to TTB. 5. Pt will perform LB dressing with MOD A.  6. Pt will perform toileting task with MIN A.  7. Pt will perform toilet transfer with MIN A. Outcome: Progressing Towards Goal   OCCUPATIONAL THERAPY EVALUATION    Patient: Leticia Watson 67 y.o. Date: 2022  Primary Diagnosis: Impaired mobility and ADLs [Z74.09, Z78.9]    Patient identified with name and : Yes    Past Medical History:   Past Medical History:   Diagnosis Date    Arthritis     back and hands    Chronic pain     back    COVID-19     Summer of  2021    Diabetes ([facility] [address]     NIDDM    Hypertension     Nausea & vomiting     Surgery only not colonoscopy     Precautions: Spine    Time In: 0800  Time Out[de-identified] 0858    Pain:  Pt reports 0/10 pain or discomfort prior to treatment. Pt reports 0/10 pain or discomfort post treatment. Pt describes itching at incision sites     SUBJECTIVE:   Patient stated I put it off for too long.     Pt previously modified independent with ADLs, retired in 2021 from position with [employer]. Lives with daughter.     OBJECTIVE DATA SUMMARY:       [x]  Right hand dominant   []  Left hand dominant    Therapy Diagnosis:   Difficulty with ADLs  [x]     Difficulty with functional transfers  [x]     Difficulty with ambulation  [x]     Difficulty with IADLs  [x]       Problem List:    Decreased strength B UE  []     Decreased strength trunk/core  [x]     Decreased AROM   []     Decreased endurance  [x]     Decreased balance sitting  []     Decreased balance standing  [x]     Pain   [x]     Decreased PROM  []       Functional Limitations:   Decreased independence with ADL  [x]     Decreased independence with functional transfers  [x]     Decreased independence with ambulation  [x]     Decreased independence with IADL  [x]       Previous Functional Level: Pre-Morbid Level of Function: 6    Home Environment: Home Situation  Home Environment: Private residence  # Steps to Enter: 4  Rails to Enter: Yes  Hand Rails : Bilateral (too far apart hold simultaneously)  One/Two Story Residence: Two story  # of Interior Steps: 15  Interior Rails: Right  Lift Chair Available: Yes  Living Alone: No  Support Systems: Child(chago)  Patient Expects to be Discharged to[de-identified] Home with family assistance  Current DME Used/Available at Home: Lorren Barer, rollator, Lorren Barer, rolling, [place], straight, [place], quad  Tub or Shower Type: Shower    Barriers to Learning/Limitations: None  Compensate with: visual, verbal, tactile, kinesthetic cues/model    Outcome Measures:      MMT Initial Assessment   Right Upper Extremity  Left Upper Extremity    UE AROM ROM WFL; MMT grossly 4+/5 ROM WFL; MMT grossly 4+/5   Shoulder flexion     Shoulder extension     Shoulder ABDuction     Shoulder ADDUction     Elbow Flexion     Elbow Extension     Wrist Extension/Flexion          0/5 No palpable muscle contraction  1/5 Palpable muscle contraction, no joint movement  2-/5 Less than full range of motion in gravity eliminated position  2/5 Able to complete full range of motion in gravity eliminated position  2+/5 Able to initiate movement against gravity  3-/5 More than half but not full range of motion against gravity  3/5 Able to complete full range of motion against gravity  3+/5 Completes full range of motion against gravity with minimal resistance  4-/5 Completes full range of motion against gravity with minimal resistance  4/5 Completes full range of motion against gravity with moderate resistance  5/5 Completes full range of motion against gravity with maximum resistance    Sensation: intact to light touch  Coordination: sequential finger touching intact    FIM SCORES Initial Assessment   Bladder - level of assist     Bladder - accident frequency score     Bowel - level of assist     Bowel - accident frequency score     Please see [facility] Interdisciplinary Eval: Coordination/Balance Section for details regarding FIM score description.     COGNITION/PERCEPTION Initial Assessment   Reading Status Literate   Writing Status Able to write   Arousal/Alertness  Alert   Orientation Level Oriented X4                 COMPREHENSION MODE Initial Assessment   Primary Mode of Comprehension Auditory   Hearing Aide None   Corrective Lenses Reading glasses   Score       EXPRESSION Initial Assessment   Primary Mode of Expression  6   Score     Comments       SOCIAL INTERACTION/PRAGMATICS Initial Assessment   Score 6    Comments       PROBLEM SOLVING Initial Assessment   Score  66   Comments       MEMORY Initial Assessment   Score 6    Comments       EATING Initial Assessment   Functional Level 5   Comments       GROOMING Initial Assessment   Functional Level 5    Oral Hygiene FIM:5; able to manage wrappers/containers, uses dish to catch spit d/t spinal precautions   Tasks completed by patient Brushed teeth, Washed face   Comments seated in w/c at sink     BATHING Initial Assessment   Functional Level 3   Body parts patient bathed Arm, left, Arm, right, Chest, Abdomen, Thigh, left, Thigh, right   Comments basin bath from w/c; assist for reaching BLE, perineal area, buttocks     TUB/SHOWER TRANSFER INDEPENDENCE Initial Assessment   Score 3   Comments stand step transfer with RW     UPPER BODY DRESSING/UNDRESSING Initial Assessment   Functional Level 4   Items applied/Steps completed Pullover (4 steps)   Comments  Assist to pull over posterior trunk       LOWER BODY DRESSING/UNDRESSING Initial Assessment   Functional Level 2    Sock and/or Shoe Management FIM:1   Items applied/Steps completed Sock, left (1 step), Sock, right (1 step), Underpants (3 steps), Elastic waist pants (3 steps)   Comments       TOILETING Initial Assessment   Functional Level     Comments       TOILET TRANSFER INDEPENDENCE Initial Assessment   Transfer score 3   Comments stand step with RW     INSTRUMENTAL ADL Initial Assessment (PLOF)   Meal preparation Modified independent   Homemaking Modified independent   Medicine Management Modified independent   Financial Management Modified independent        ASSESSMENT :  Based on the objective data described above, the patient presents with decreased functional mobility, impaired ADLs and IADLs secondary to lumbar fusion and subsequent spinal precautions. Pt would benefit from skilled occupational therapy in order to improve independent functional mobility/ADLs,/IADLs within the home. Interventions may include range of motion (AROM, PROM B UE), motor function (B UE/ strengthening/coordination), activity tolerance (vitals, oxygen saturation levels), balance training, ADL/IADL training and functional transfer training. Please see [facility]; Interdisciplinary Eval, Care Plan, and Patient Education for further information regarding occupational therapy examination and plan of care.       PLAN :  Recommendations and Planned Interventions:  [x]               Self Care Training                   [x]        Therapeutic Activities  [x]               Functional Mobility Training []        Cognitive Retraining  [x]               Therapeutic Exercises            [x]        Endurance Activities  [x]               Balance Training                     []        Neuromuscular Re-Education  []               Visual/Perceptual Training      [x]   Home Safety Training  [x]               Patient Education                    [x]        Family Training/Education  []               Other (comment):    Frequency/Duration: Patient will be followed by occupational therapy 1-2 times per day/4-7 days per week to address goals. Discharge Recommendations: To Be Determined  Further Equipment Recommendations for Discharge: transfer bench and N/A     Please refer to the flow sheet for vital signs taken during this treatment. After treatment:   [x] Patient left in no apparent distress sitting up in chair  [] Patient left in no apparent distress in bed  [x] Call bell left within reach  [] Nursing notified  [] Caregiver present  [x] Chair alarm activated    COMMUNICATION/EDUCATION:   [x] Home safety education was provided and the patient/caregiver indicated understanding. [x] Patient/family have participated as able in goal setting and plan of care. [x] Patient/family agree to work toward stated goals and plan of care. [] Patient understands intent and goals of therapy, but is neutral about his/her participation. [] Patient is unable to participate in goal setting and plan of care. Order received from MD for occupational therapy services and chart reviewed. Pt to be seen 5 times per week for 3 hours of total therapy per day for 3 weeks.   Thank you for the referral.    Thank you for this referral.  Kiara Denny OT

## 2022-09-24 NOTE — PROGRESS NOTES
I got a call from nursing to report that patient having low-grade fever, tachycardia. BP (!) 156/77 (BP 1 Location: Right upper arm, BP Patient Position: Sitting)   Pulse (!) 106   Temp (!) 100.5 °F (38.1 °C)   Resp 19   SpO2 98%     We will do infectious disease work-up including chest x-ray, urine analysis, blood culture, urine culture. Advised nurses to give patient a dose of Tylenol and monitor temperature. If she continues to have recurrent temperature, we will consider putting her on empiric antibiotics. We will also change hydrochlorothiazide to low-dose of metoprolol with holding parameters.

## 2022-09-24 NOTE — H&P
[facility] PHYSICAL REHABILITATION  [address] [place] [address], [address]     INPATIENT REHABILITATION  HISTORY AND PHYSICAL  ([address] [place] [address] Admission Physician Evaluation)    Name: Tamiko Clayton CSN: [id number]   Age: 67 y.o. MRN: [id number]   Sex: female Admit Date: 9/23/2022     PCP: Dr. Merline Boards, NP      Primary Rehabilitation Impairment Category (DANY): Other orthopedic    Impairment Group Label: Other orthopedic    Etiologic Diagnosis: Spinal stenosis, lumbar region without neurological claudication      Subjective:     Patient seen and examined. History of the Present Illness: The patient is a 69-year-old female with who was admitted to [facility] on September 20, 2022 due to a planned surgery for lumbar stenosis. Patient reportedly had significant back pain radiating to leg pain. MRI demonstrated lumbar spondylosis with stenosis at S1 on the left and L3-L4 as well as L4-L5. Patient failed conservative management as an outpatient. Patient was admitted for planned surgery and underwent anterior lumbar interbody fusion lumbar four/five, lumbar five/ sacral one, prone trans psoas fusion lumbar two/three, three/four, segmental instrumentation lumbar two - sacral one. Postoperative course reportedly was unremarkable. She was resumed on home medication for the comorbidities. PT and OT evaluate this patient for  impaired mobility and ADLs. Patient was felt to be a good candidate for acute inpatient rehabilitation. Upon evaluation by Physical Therapy and Occupational Therapy, the patient was recommended for acute inpatient rehabilitation. The patient was discharged and was subsequently admitted to the [facility] for Physical Rehabilitation for intensive rehabilitation to help recover strength, function and mobility. Patient is currently sitting in a wheelchair. Has had some back pain.   Off-and-on tingling and numbness present in both lower extremities. No headaches or dizziness currently. However said dizziness while turning her head earlier today with the physical therapy without any nausea. No visual disturbances currently. No dysphagia. Denies any chest pain or shortness of breath. No PND or orthopnea. No cough. No fever or chills. No nausea or vomiting. She still has constipation. No abdominal pain. Past Medical History:  Past Medical History:   Diagnosis Date    Arthritis     back and hands    Chronic pain     back    COVID-19     Summer of  2021    Diabetes [facility])     NIDDM    Hypertension     Nausea & vomiting     Surgery only not colonoscopy       Past Surgical History:  Past Surgical History:   Procedure Laterality Date    COLONOSCOPY N/A 6/24/2022    COLONOSCOPY performed by Ray Yougn MD at [facility] END[facility]COPY    HX COLONOSCOPY      HX TUBAL LIGATION         Allergies: Allergies   Allergen Reactions    Codeine Nausea and Vomiting    Penicillin G Swelling         Social History: Lives with her daughter. Denies smoking cigarettes or drinking any alcohol. Family History:  Family History   Problem Relation Age of Onset    Breast Cancer Mother 80       Transfer Medications (from the Discharge Summary prepared by Dr. Tra Piper):    Prior to Admission Medications   Prescriptions Last Dose Informant Patient Reported? Taking?   acetaminophen (TYLENOL) 650 mg TbER 9/23/2022  Yes Yes   Sig: Take 1,300 mg by mouth two (2) times a day. cyclobenzaprine (FLEXERIL) 5 mg tablet Unknown  Yes No   Sig: Take 5 mg by mouth three (3) times daily as needed. docusate sodium (Colace) 100 mg capsule 9/23/2022  Yes Yes   Sig: Take 100 mg by mouth two (2) times a day. famotidine (PEPCID) 40 mg tablet 9/23/2022  Yes Yes   Sig: Take 40 mg by mouth daily. gabapentin (NEURONTIN) 300 mg capsule 9/23/2022  Yes Yes   Sig: Take 300 mg by mouth three (3) times daily.    metFORMIN (GLUCOPHAGE) 500 mg tablet Unknown  Yes No   Sig: Take 500 mg by mouth daily.   multivitamin (ONE A DAY) tablet   Yes No   Sig: Take 1 Tablet by mouth daily. naproxen sodium (NAPROSYN) 220 mg tablet   Yes No   Sig: Take 1 Tablet by mouth two (2) times a day. oxyCODONE IR (Roxicodone) 5 mg immediate release tablet 9/23/2022  No Yes   Sig: Take 1 Tablet by mouth every six (6) hours as needed for Pain for up to 7 days. Max Daily Amount: 20 mg.   oxyCODONE IR (Roxicodone) 5 mg immediate release tablet Unknown  No No   Sig: Take 1 Tablet by mouth every six (6) hours as needed for Pain for up to 7 days. Max Daily Amount: 20 mg.   simvastatin (ZOCOR) 40 mg tablet Unknown  Yes No   Sig: Take 40 mg by mouth nightly. triamterene-hydroCHLOROthiazide (DYAZIDE) 37.5-25 mg per capsule 9/23/2022  Yes Yes   Sig: Take 1 Capsule by mouth daily. Facility-Administered Medications: None       Review Of Systems:   CONSTITUTIONAL: No weight loss. EYES: No blurred vision and no eye discharge. ENT: No nasal discharge. No ear pain. CARDIOVASCULAR: No chest pain and no diaphoresis. RESPIRATORY: No cough, no hemoptysis. GI: No vomiting, no diarrhea   : No urinary frequency and no dysuria. MUSCULOSKELETAL: No muscle pains. Back pain present. SKIN: No rashes. NEURO: No dizziness,. Some numbness present in both lower extremities. Back pain present. ENDOCRINE: No polyphagia and no polydipsia. HEMATOLOGY: No bleeding tendencies. Objective:     Vital Signs:  Patient Vitals for the past 24 hrs:   BP Temp Pulse Resp SpO2   09/24/22 0846 133/68 99.6 °F (37.6 °C) 96 18 97 %   09/23/22 2028 132/71 (!) 96.7 °F (35.9 °C) 76 18 97 %   09/23/22 1507 (!) 143/62 98 °F (36.7 °C) 68 19 96 %        There is no height or weight on file to calculate BMI. Physical Examination:      General appearance - alert, well appearing, follows verbal commands appropriately, and in no distress  Eyes - sclera anicteric, no pallor  Nose - no obvious nasal discharge.    Neck - supple, no JVD, trachea is midline  Chest -clear air entry noted in bases, no wheezes  Heart - S1 and S2 normal  Abdomen - soft, nontender, nondistended, Bowel sounds present  Neurological - alert, oriented, normal speech, no focal findings noted except motor strength 4-5 out of 5 in both lower extremities, sensation touch normal in all 4 extremities.   Musculoskeletal - no joint tenderness or erythema of knees bilaterally  Extremities - no pedal edema noted  Psych -no agitation or hallucination      Current Medications:  Current Facility-Administered Medications   Medication Dose Route Frequency    hydroCHLOROthiazide (HYDRODIURIL) tablet 12.5 mg  12.5 mg Oral DAILY    docusate sodium (COLACE) capsule 100 mg  100 mg Oral BID    magnesium hydroxide (MILK OF MAGNESIA) 400 mg/5 mL oral suspension 30 mL  30 mL Oral DAILY PRN    bisacodyL (DULCOLAX) tablet 10 mg  10 mg Oral Q48H PRN    bisacodyL (DULCOLAX) suppository 10 mg  10 mg Rectal Q48H PRN    hydrALAZINE (APRESOLINE) tablet 25 mg  25 mg Oral Q6H PRN    acetaminophen (TYLENOL) tablet 650 mg  650 mg Oral Q12H    cyclobenzaprine (FLEXERIL) tablet 5 mg  5 mg Oral TID PRN    famotidine (PEPCID) tablet 40 mg  40 mg Oral DAILY    gabapentin (NEURONTIN) capsule 300 mg  300 mg Oral TID    therapeutic multivitamin (THERAGRAN) tablet 1 Tablet  1 Tablet Oral DAILY    oxyCODONE IR (ROXICODONE) tablet 5 mg  5 mg Oral Q6H PRN    atorvastatin (LIPITOR) tablet 20 mg  20 mg Oral QHS    insulin lispro (HUMALOG) injection   SubCUTAneous AC&HS    glucose chewable tablet 16 g  4 Tablet Oral PRN    glucagon (GLUCAGEN) injection 1 mg  1 mg IntraMUSCular PRN    dextrose 10% infusion 0-250 mL  0-250 mL IntraVENous PRN       Functional Assessment:     Occupational Therapy   Prior Level of Function  Pre-Admission Screen  Post-Admission Evaluation   Eating   Independent Eating   Set up Eating  Functional Level: 5   Grooming   Independent Grooming   Minimal Assist Grooming  Functional Level: 5   Upper Body Dressing Independent Upper Body Dressing   Moderate Assist Upper Body Dressing  Functional Level: 4   Lower Body Dressing   Independent Lower Body Dressing   Total assistant Lower Body Dressing  Functional Level: 2   Bladder Management   Independent Bladder Management   Modified Independent Toileting      Bowel Management   Independent Bowel Management   Independent      Physical Therapy   Prior Level of Function  Pre-Admission Screen  Post-Admission Evaluation   Ambulation   Modified Independent Ambulation   Maximal Assist     Bed Mobility   Independent Bed Mobility   Moderate Assist     Supine to Sit   Modified Independent Supine to Sit   Moderate Assist     Sit to Stand   Modified Independent Sit to Stand   Minimal Assist     Bed/Chair Transfers   Modified Independent Bed/Chair Transfers   Minimal Assist     Toilet Transfers   Modified Independent Toilet Transfers   Not evaluated Toilet Transfers  Toilet Transfer Score: 3           Labs on Admission:  Recent Results (from the past 24 hour(s))   CBC W/O DIFF    Collection Time: 09/23/22 11:00 AM   Result Value Ref Range    WBC 15.9 (H) 4.6 - 13.2 K/uL    RBC 3.11 (L) 4.20 - 5.30 M/uL    HGB 8.6 (L) 12.0 - 16.0 g/dL    HCT 27.7 (L) 35.0 - 45.0 %    MCV 89.1 78.0 - 100.0 FL    MCH 27.7 24.0 - 34.0 PG    MCHC 31.0 31.0 - 37.0 g/dL    RDW 14.6 (H) 11.6 - 14.5 %    PLATELET 016 088 - 596 K/uL    MPV 10.7 9.2 - 11.8 FL    NRBC 0.0 0  WBC    ABSOLUTE NRBC 0.00 0.00 - 0.01 K/uL   GLUCOSE, POC    Collection Time: 09/23/22 11:23 AM   Result Value Ref Range    Glucose (POC) 126 (H) 70 - 110 mg/dL   GLUCOSE, POC    Collection Time: 09/23/22  8:11 PM   Result Value Ref Range    Glucose (POC) 141 (H) 70 - 815 mg/dL   METABOLIC PANEL, BASIC    Collection Time: 09/24/22  7:24 AM   Result Value Ref Range    Sodium 140 136 - 145 mmol/L    Potassium 3.9 3.5 - 5.5 mmol/L    Chloride 103 100 - 111 mmol/L    CO2 33 (H) 21 - 32 mmol/L    Anion gap 4 3.0 - 18 mmol/L    Glucose 121 (H) 74 - 99 mg/dL    BUN 14 7.0 - 18 MG/DL    Creatinine 0.90 0.6 - 1.3 MG/DL    BUN/Creatinine ratio 16 12 - 20      GFR est AA >60 >60 ml/min/1.73m2    GFR est non-AA >60 >60 ml/min/1.73m2    Calcium 9.1 8.5 - 10.1 MG/DL   CBC WITH AUTOMATED DIFF    Collection Time: 09/24/22  7:24 AM   Result Value Ref Range    WBC 13.2 4.6 - 13.2 K/uL    RBC 3.00 (L) 4.20 - 5.30 M/uL    HGB 8.0 (L) 12.0 - 16.0 g/dL    HCT 26.5 (L) 35.0 - 45.0 %    MCV 88.3 78.0 - 100.0 FL    MCH 26.7 24.0 - 34.0 PG    MCHC 30.2 (L) 31.0 - 37.0 g/dL    RDW 14.9 (H) 11.6 - 14.5 %    PLATELET 833 947 - 470 K/uL    MPV 11.0 9.2 - 11.8 FL    NRBC 0.0 0  WBC    ABSOLUTE NRBC 0.00 0.00 - 0.01 K/uL    NEUTROPHILS 81 (H) 40 - 73 %    LYMPHOCYTES 9 (L) 21 - 52 %    MONOCYTES 9 3 - 10 %    EOSINOPHILS 0 0 - 5 %    BASOPHILS 0 0 - 2 %    IMMATURE GRANULOCYTES 0 0.0 - 0.5 %    ABS. NEUTROPHILS 10.7 (H) 1.8 - 8.0 K/UL    ABS. LYMPHOCYTES 1.2 0.9 - 3.6 K/UL    ABS. MONOCYTES 1.2 0.05 - 1.2 K/UL    ABS. EOSINOPHILS 0.1 0.0 - 0.4 K/UL    ABS. BASOPHILS 0.0 0.0 - 0.1 K/UL    ABS. IMM. GRANS. 0.1 (H) 0.00 - 0.04 K/UL    DF AUTOMATED             Assessment:     1. Impaired mobility and ADLs secondary to #2  2. Lumbar spinal stenosis without neurological claudication status post anterior lumbar interbody fusion, L4-L5 and L5-S1 1 prone transpoas fusion  3. Diabetes mellitus  4. Hypertension  5. Osteoarthritis    Willingness to participate in the program: Good      Rehabilitation Potential: Good      Plan:     1. Medical Issues being followed closely:    [x]  Fall and safety precautions     [x]  Wound Care     [x]  Bowel and Bladder Function     []  Fluid Electrolyte and Nutrition Balance     [x]  Pain Control      2.  Issues that 24 hour rehabilitation nursing is following:    [x]  Fall and safety precautions     [x]  Wound Care     [x]  Bowel and Bladder Function     []  Fluid Electrolyte and Nutrition Balance     [x]  Pain Control      [x]  Assistance with and education on in-room safety with transfers to and from the bed, wheelchair, toilet and shower. 3. Acute rehabilitation plan of care:    [x]  Patient to be evaluated and treated by:           [x]  Physical Therapy           [x]  Occupational Therapy           []  Speech Therapy     []  Hold Rehab until further notice     5. Medications:    [x]  MAR Reviewed     [x]  Continue Present Medications     6. Chemical DVT Prophylaxis:      []  Enoxaparin     []  Unfractionated Heparin     []  Warfarin     []  NOAC     []  Aspirin     [x]  None     7. Mechanical DVT Prophylaxis:      []  FLORECITA Stockings     [x]  Sequential Compression Device     []  None     8. GI Prophylaxis:      []  PPI     [x]  H2 Blocker     []  None / Not indicated     9. Code status:    [x]  Full code     []  Partial code     []  Do not intubate     []  Do not resuscitate     10. Diet:  Specifications ADA and cardiac   Solids (consistency) Regular   Liquids (consistency) Regular   Fluid Restriction None       MEDICAL PLAN:    1. Impaired mobility and ADLs secondary to #2  Continue PT and OT    2. Lumbar spinal stenosis without neurological claudication status post anterior lumbar interbody fusion, L4-L5 and L5-S1 1 prone transpoas fusion  Continue Tylenol and gabapentin for pain management  Continue Roxicodone as needed for moderate to severe pain  Continue bowel protocol  We will give patient MiraLAX x1 as well as lactulose x1 for constipation treatment    3. Diabetes mellitus:  Patient was on metformin  Continuously sliding scale    4. Hypertension:  Continue HCTZ with holding parameters  Resume hydralazine as needed    5. Osteoarthritis    6. Dyslipidemia:  Continue Lipitor      RELEVANT CHANGES SINCE PREADMISSION SCREENING: I have compared the patients medical and functional status at the time of the pre-admission screening and on this post-admission evaluation.  The preadmission screen and findings from therapy evaluations both support my post admission physician evaluation, deeming this patient to be an appropriate candidate for the IRF. The patient requires multidisciplinary treatment, physician oversight and intensive therapy not provided at a lower level of care. By signing this document, I acknowledge that I have personally performed a full physical examination on this patient within 24 hours of admission to this inpatient rehabilitation facility and have determined the patient to be able to tolerate the above course of treatment at an intensive level for a reasonable period of time. Total clinical care time is 75 minutes, including review of chart including all labs, radiology, past medical history, and discussion with patient. Greater than 50% of my time was spent in coordination of care and counseling.       Signed:    Jd Sotelo MD    September 24, 2022

## 2022-09-24 NOTE — REHAB NOTE
SHIFT CHANGE NOTE FOR [facility]    Bedside and Verbal shift change report given to [address] (oncoming nurse) by Nanci Rogel RN (offgoing nurse). Report included the following information SBAR, Kardex, MAR and Recent Results. Situation:   Code Status: Full Code   Hospital Day: 1   Problem List:   Hospital Problems  Never Reviewed            Codes Class Noted POA    Impaired mobility and ADLs ICD-10-CM: Z74.09, Z78.9  ICD-9-CM: V49.89  9/23/2022 Unknown         Background:   Past Medical History:   Past Medical History:   Diagnosis Date    Arthritis     back and hands    Chronic pain     back    COVID-19     Summer of  2021    Diabetes ([facility] [address]     NIDDM    Hypertension     Nausea & vomiting     Surgery only not colonoscopy        Assessment:  Changes in Assessment throughout shift: No change to previous assessment    Patient has a central line: no Reasons if yes: Insertion date: Last dressing date:  Patient has Aly Cath: no Reasons if yes:     Insertion date:  Shift aly care completed: YES    Last Vitals:     Vitals:    09/23/22 1507 09/23/22 2028   BP: (!) 143/62 132/71   Pulse: 68 76   Resp: 19 18   Temp: 98 °F (36.7 °C) (!) 96.7 °F (35.9 °C)   SpO2: 96% 97%       PAIN    Pain Assessment    Pain Intensity 1: 0 (09/24/22 0407) Pain Intensity 1: 2 (12/29/14 1105)    Pain Location 1: Back, Leg Pain Location 1: Abdomen    Pain Intervention(s) 1: Position, Medication (see MAR) Pain Intervention(s) 1: Medication (see MAR)  Patient Stated Pain Goal: 0 Patient Stated Pain Goal: 0  Intervention effective: yes  Other actions taken for pain: Position;Medication (see MAR)    Skin Assessment  Skin color Skin Color: Appropriate for ethnicity  Condition/Temperature Skin Condition/Temp: Dry, Warm  Integrity Skin Integrity: Incision (comment)  Turgor    Weekly Pressure Ulcer Documentation  Pressure  Injury Documentation: No Pressure Injury Noted-Pressure Ulcer Prevention Initiated  Wound Prevention & Protection Methods  Orientation of wound Orientation of Wound Prevention: Posterior  Location of Prevention Location of Wound Prevention: Sacrum/Coccyx  Dressing Present Dressing Present : No  Dressing Status    Wound Offloading Wound Offloading (Prevention Methods): Bed, pressure reduction mattress, Chair cushion, Elevate heels, Pillows, Repositioning, Walker, Wheelchair    INTAKE/OUPUT  Date 09/23/22 0700 - 09/24/22 0659 09/24/22 0700 - 09/25/22 0659   Shift 0700-1859 1900-0659 24 Hour Total 0700-1859 1900-0659 24 Hour Total   INTAKE   P.O. 300  300        P. O. 300  300      Shift Total 300  300      OUTPUT   Urine  600 600        Urine Voided  600 600        Urine Occurrence(s) 4 x 5 x 9 x      Emesis/NG output           Emesis Occurrence(s)  0 x 0 x      Stool           Stool Occurrence(s) 0 x 0 x 0 x      Shift Total  600 600       -600 -300      Weight (kg)               Recommendations:  Patient needs and requests: tbd    Pending tests/procedures: tbd     Functional Level/Equipment: Partial (one person) / Bed (comment); Crissy Nnamdi; Wheelchair    Fall Precautions:   Fall risk precautions were reinforced with the patient; she was instructed to call for help prior to getting up. The following fall risk precautions were continued: bed/ chair alarms, door signage, yellow bracelet and socks as well as update of the Cosmo Jesus tool in the patient's room. Reta Score: 4    HEALS Safety Check    A safety check occurred in the patient's room between off going nurse and oncoming nurse listed above. The safety check included the below items  Area Items   H  High Alert Medications Verify all high alert medication drips (heparin, PCA, etc.)   E  Equipment Suction is set up for ALL patients (with yanker)  Red plugs utilized for all equipment (IV pumps, etc.)  WOWs wiped down at end of shift.   Room stocked with oxygen, suction, and other unit-specific supplies   A  Alarms Bed alarm is set for fall risk patients  Ensure chair alarm is in place and activated if patient is up in a chair   L  Lines Check IV for any infiltration  Hughes bag is empty if patient has a Hughes   Tubing and IV bags are labeled   S  Safety   Room is clean, patient is clean, and equipment is clean. Hallways are clear from equipment besides carts. Fall bracelet on for fall risk patients  Ensure room is clear and free of clutter  Suction is set up for ALL patients (with chloe)  Hallways are clear from equipment besides carts.    Isolation precautions followed, supplies available outside room, sign posted     Clay Menon RN

## 2022-09-24 NOTE — REHAB NOTE
[facility]    Bedside and Verbal shift change report given to Xuan Johnson RN (oncoming nurse) by Tu Crain RN (offgoing nurse). Report included the following information SBAR, Kardex, MAR and Recent Results. Situation:   Code Status: Full Code   Hospital Day: 1   Problem List:   Hospital Problems  Never Reviewed            Codes Class Noted POA    Impaired mobility and ADLs ICD-10-CM: Z74.09, Z78.9  ICD-9-CM: V49.89  9/23/2022 Unknown         Background:   Past Medical History:   Past Medical History:   Diagnosis Date    Arthritis     back and hands    Chronic pain     back    COVID-19     Summer of  2021    Diabetes ([facility] [address]     NIDDM    Hypertension     Nausea & vomiting     Surgery only not colonoscopy        Assessment:  Changes in Assessment throughout shift:      Patient has a central line: no Reasons if yes: Insertion date: Last dressing date:  Patient has Aly Cath: no Reasons if yes:     Insertion date:  Shift aly care completed: YES    Last Vitals:     Vitals:    09/24/22 0846 09/24/22 1240 09/24/22 1710 09/24/22 1921   BP: 133/68 132/71 (!) 156/77    Pulse: 96 (!) 103 (!) 106    Resp: 18  19    Temp: 99.6 °F (37.6 °C)  (!) 100.5 °F (38.1 °C) 99.4 °F (37.4 °C)   SpO2: 97%  98%        PAIN    Pain Assessment    Pain Intensity 1: 0 (09/24/22 1554) Pain Intensity 1: 2 (12/29/14 1105)    Pain Location 1: Back, Leg Pain Location 1: Abdomen    Pain Intervention(s) 1: Position, Medication (see MAR) Pain Intervention(s) 1: Medication (see MAR)  Patient Stated Pain Goal: 0 Patient Stated Pain Goal: 0  Intervention effective: yes  Other actions taken for pain:      Skin Assessment  Skin color Skin Color: Appropriate for ethnicity  Condition/Temperature Skin Condition/Temp: Dry  Integrity Skin Integrity: Incision (comment)  Turgor    Weekly Pressure Ulcer Documentation  Pressure  Injury Documentation: No Pressure Injury Noted-Pressure Ulcer Prevention Initiated  Wound Prevention & Protection Methods  Orientation of wound Orientation of Wound Prevention: Posterior  Location of Prevention Location of Wound Prevention: Sacrum/Coccyx  Dressing Present Dressing Present : No  Dressing Status    Wound Offloading Wound Offloading (Prevention Methods): Bed, pressure reduction mattress    INTAKE/OUPUT  Date 09/23/22 1900 - 09/24/22 0659 09/24/22 0700 - 09/25/22 0659   Shift 8562-6274 24 Hour Total 9137-0709 0404-9022 24 Hour Total   INTAKE   P.O.  300        P. O.  300      Shift Total  300      OUTPUT   Urine 600 600        Urine Voided 600 600        Urine Occurrence(s) 6 x 10 x 3 x  3 x   Emesis/NG output          Emesis Occurrence(s) 0 x 0 x      Stool          Stool Occurrence(s) 0 x 0 x 0 x  0 x   Shift Total 600 600      NET -600 -300      Weight (kg)              Recommendations:  Patient needs and requests: tbd    Pending tests/procedures: tbd     Functional Level/Equipment: Partial (one person) / Bed (comment)    Fall Precautions:   Fall risk precautions were reinforced with the patient; she was instructed to call for help prior to getting up. The following fall risk precautions were continued: bed/ chair alarms, door signage, yellow bracelet and socks as well as update of the Darlen Lincolnton tool in the patient's room. Reta Score: 4    HEALS Safety Check    A safety check occurred in the patient's room between off going nurse and oncoming nurse listed above. The safety check included the below items  Area Items   H  High Alert Medications Verify all high alert medication drips (heparin, PCA, etc.)   E  Equipment Suction is set up for ALL patients (with yanker)  Red plugs utilized for all equipment (IV pumps, etc.)  WOWs wiped down at end of shift.   Room stocked with oxygen, suction, and other unit-specific supplies   A  Alarms Bed alarm is set for fall risk patients  Ensure chair alarm is in place and activated if patient is up in a chair   L  Lines Check IV for any infiltration  Hughes bag is empty if patient has a Hughes   Tubing and IV bags are labeled   S  Safety   Room is clean, patient is clean, and equipment is clean. Hallways are clear from equipment besides carts. Fall bracelet on for fall risk patients  Ensure room is clear and free of clutter  Suction is set up for ALL patients (with chloe)  Hallways are clear from equipment besides carts.    Isolation precautions followed, supplies available outside room, sign posted     Kay Demarco RN

## 2022-09-25 LAB
GLUCOSE BLD STRIP.AUTO-MCNC: 102 MG/DL (ref 70–110)
GLUCOSE BLD STRIP.AUTO-MCNC: 115 MG/DL (ref 70–110)
GLUCOSE BLD STRIP.AUTO-MCNC: 150 MG/DL (ref 70–110)
GLUCOSE BLD STRIP.AUTO-MCNC: 159 MG/DL (ref 70–110)
LACTATE SERPL-SCNC: 1.3 MMOL/L (ref 0.4–2)

## 2022-09-25 PROCEDURE — 74011250637 HC RX REV CODE- 250/637: Performed by: HOSPITALIST

## 2022-09-25 PROCEDURE — 82962 GLUCOSE BLOOD TEST: CPT

## 2022-09-25 PROCEDURE — 83605 ASSAY OF LACTIC ACID: CPT

## 2022-09-25 PROCEDURE — 36415 COLL VENOUS BLD VENIPUNCTURE: CPT

## 2022-09-25 PROCEDURE — 97535 SELF CARE MNGMENT TRAINING: CPT

## 2022-09-25 PROCEDURE — 65310000000 HC RM PRIVATE REHAB

## 2022-09-25 PROCEDURE — 74011250637 HC RX REV CODE- 250/637: Performed by: INTERNAL MEDICINE

## 2022-09-25 PROCEDURE — 97530 THERAPEUTIC ACTIVITIES: CPT

## 2022-09-25 PROCEDURE — 97110 THERAPEUTIC EXERCISES: CPT

## 2022-09-25 PROCEDURE — 74011636637 HC RX REV CODE- 636/637: Performed by: HOSPITALIST

## 2022-09-25 RX ORDER — FAMOTIDINE 20 MG/1
20 TABLET, FILM COATED ORAL 2 TIMES DAILY
Status: DISCONTINUED | OUTPATIENT
Start: 2022-09-25 | End: 2022-10-04 | Stop reason: HOSPADM

## 2022-09-25 RX ADMIN — FAMOTIDINE 20 MG: 20 TABLET ORAL at 17:01

## 2022-09-25 RX ADMIN — DOCUSATE SODIUM 100 MG: 100 CAPSULE, LIQUID FILLED ORAL at 17:01

## 2022-09-25 RX ADMIN — OXYCODONE HYDROCHLORIDE 5 MG: 5 TABLET ORAL at 03:18

## 2022-09-25 RX ADMIN — GABAPENTIN 300 MG: 300 CAPSULE ORAL at 08:53

## 2022-09-25 RX ADMIN — METOPROLOL TARTRATE 12.5 MG: 25 TABLET, FILM COATED ORAL at 21:28

## 2022-09-25 RX ADMIN — ACETAMINOPHEN 650 MG: 325 TABLET, FILM COATED ORAL at 22:01

## 2022-09-25 RX ADMIN — Medication 2 UNITS: at 12:17

## 2022-09-25 RX ADMIN — Medication 2 UNITS: at 21:41

## 2022-09-25 RX ADMIN — ACETAMINOPHEN 650 MG: 325 TABLET, FILM COATED ORAL at 08:54

## 2022-09-25 RX ADMIN — FAMOTIDINE 20 MG: 20 TABLET ORAL at 08:53

## 2022-09-25 RX ADMIN — GABAPENTIN 300 MG: 300 CAPSULE ORAL at 16:48

## 2022-09-25 RX ADMIN — BISACODYL 10 MG: 10 SUPPOSITORY RECTAL at 16:48

## 2022-09-25 RX ADMIN — THERA TABS 1 TABLET: TAB at 08:54

## 2022-09-25 RX ADMIN — OXYCODONE HYDROCHLORIDE 5 MG: 5 TABLET ORAL at 19:47

## 2022-09-25 RX ADMIN — GABAPENTIN 300 MG: 300 CAPSULE ORAL at 21:32

## 2022-09-25 RX ADMIN — ATORVASTATIN CALCIUM 20 MG: 40 TABLET, FILM COATED ORAL at 21:28

## 2022-09-25 RX ADMIN — DOCUSATE SODIUM 100 MG: 100 CAPSULE, LIQUID FILLED ORAL at 08:54

## 2022-09-25 RX ADMIN — OXYCODONE HYDROCHLORIDE 5 MG: 5 TABLET ORAL at 13:45

## 2022-09-25 RX ADMIN — METOPROLOL TARTRATE 12.5 MG: 25 TABLET, FILM COATED ORAL at 08:53

## 2022-09-25 NOTE — PROGRESS NOTES
Reason for Renal Dosing:  Per Renal Dosing Policy    Patient clinical status and labs ordered/reviewed. Pt Weight     Serum Creatinine Lab Results   Component Value Date/Time    Creatinine 1.00 09/24/2022 06:52 PM       Creatinine Clearance Estimated Creatinine Clearance: 54 mL/min (based on SCr of 1 mg/dL). BUN Lab Results   Component Value Date/Time    BUN 15 09/24/2022 06:52 PM       WBC Lab Results   Component Value Date/Time    WBC 12.5 09/24/2022 06:52 PM      Temperature Temp: 98.4 °F (36.9 °C)   HR Pulse (Heart Rate): 98     BP BP: 131/68           Drug type: Non-Antibiotic    Drug/dose: Famotidine 40 mg PO Daily was adjusted to: 20 mg PO BID (for eCrCl = 54 mL/min)    Continue to monitor.     Signed [facility]  Date 9/25/2022  Time 3:54 AM

## 2022-09-25 NOTE — PROGRESS NOTES
Chart was reviewed as a follow-up for fever last night. CBC did not show any leukocytosis. Lactic acid is much better now. No fever since this morning. Chest x-ray is negative. Pending blood culture. Continue using Tylenol as needed. If patient spikes any fever, consider obtaining Doppler of lower extremity. BP (!) 153/75 (BP 1 Location: Right upper arm, BP Patient Position: At rest)   Pulse 89   Temp 99 °F (37.2 °C)   Resp 19   SpO2 97%     Recent Results (from the past 24 hour(s))   GLUCOSE, POC    Collection Time: 09/24/22  4:21 PM   Result Value Ref Range    Glucose (POC) 110 70 - 110 mg/dL   LACTIC ACID    Collection Time: 09/24/22  6:52 PM   Result Value Ref Range    Lactic acid 2.8 (HH) 0.4 - 2.0 MMOL/L   CULTURE, BLOOD    Collection Time: 09/24/22  6:52 PM    Specimen: Blood   Result Value Ref Range    Special Requests: NO SPECIAL REQUESTS      Culture result: NO GROWTH AFTER 11 HOURS     METABOLIC PANEL, COMPREHENSIVE    Collection Time: 09/24/22  6:52 PM   Result Value Ref Range    Sodium 138 136 - 145 mmol/L    Potassium 3.5 3.5 - 5.5 mmol/L    Chloride 101 100 - 111 mmol/L    CO2 32 21 - 32 mmol/L    Anion gap 5 3.0 - 18 mmol/L    Glucose 196 (H) 74 - 99 mg/dL    BUN 15 7.0 - 18 MG/DL    Creatinine 1.00 0.6 - 1.3 MG/DL    BUN/Creatinine ratio 15 12 - 20      GFR est AA >60 >60 ml/min/1.73m2    GFR est non-AA 54 (L) >60 ml/min/1.73m2    Calcium 8.8 8.5 - 10.1 MG/DL    Bilirubin, total 0.4 0.2 - 1.0 MG/DL    ALT (SGPT) 64 (H) 13 - 56 U/L    AST (SGOT) 98 (H) 10 - 38 U/L    Alk.  phosphatase 73 45 - 117 U/L    Protein, total 6.5 6.4 - 8.2 g/dL    Albumin 2.5 (L) 3.4 - 5.0 g/dL    Globulin 4.0 2.0 - 4.0 g/dL    A-G Ratio 0.6 (L) 0.8 - 1.7     CBC WITH AUTOMATED DIFF    Collection Time: 09/24/22  6:52 PM   Result Value Ref Range    WBC 12.5 4.6 - 13.2 K/uL    RBC 2.86 (L) 4.20 - 5.30 M/uL    HGB 7.8 (L) 12.0 - 16.0 g/dL    HCT 25.2 (L) 35.0 - 45.0 %    MCV 88.1 78.0 - 100.0 FL    MCH 27.3 24.0 - 34.0 PG    MCHC 31.0 31.0 - 37.0 g/dL    RDW 14.8 (H) 11.6 - 14.5 %    PLATELET 673 435 - 683 K/uL    MPV 10.6 9.2 - 11.8 FL    NRBC 0.0 0  WBC    ABSOLUTE NRBC 0.00 0.00 - 0.01 K/uL    NEUTROPHILS 79 (H) 40 - 73 %    LYMPHOCYTES 11 (L) 21 - 52 %    MONOCYTES 9 3 - 10 %    EOSINOPHILS 1 0 - 5 %    BASOPHILS 0 0 - 2 %    IMMATURE GRANULOCYTES 0 0.0 - 0.5 %    ABS. NEUTROPHILS 9.9 (H) 1.8 - 8.0 K/UL    ABS. LYMPHOCYTES 1.3 0.9 - 3.6 K/UL    ABS. MONOCYTES 1.2 0.05 - 1.2 K/UL    ABS. EOSINOPHILS 0.1 0.0 - 0.4 K/UL    ABS. BASOPHILS 0.0 0.0 - 0.1 K/UL    ABS. IMM.  GRANS. 0.0 0.00 - 0.04 K/UL    DF AUTOMATED     CULTURE, BLOOD    Collection Time: 09/24/22  6:57 PM    Specimen: Blood   Result Value Ref Range    Special Requests: NO SPECIAL REQUESTS      Culture result: NO GROWTH AFTER 11 HOURS     URINALYSIS W/ RFLX MICROSCOPIC    Collection Time: 09/24/22  7:45 PM   Result Value Ref Range    Color YELLOW      Appearance CLEAR      Specific gravity 1.006 1.005 - 1.030      pH (UA) 5.5 5.0 - 8.0      Protein Negative NEG mg/dL    Glucose Negative NEG mg/dL    Ketone Negative NEG mg/dL    Bilirubin Negative NEG      Blood SMALL (A) NEG      Urobilinogen 1.0 0.2 - 1.0 EU/dL    Nitrites Negative NEG      Leukocyte Esterase Negative NEG     URINE MICROSCOPIC ONLY    Collection Time: 09/24/22  7:45 PM   Result Value Ref Range    WBC 0 to 3 0 - 4 /hpf    RBC 0 to 2 0 - 5 /hpf    Epithelial cells 1+ 0 - 5 /lpf    Bacteria FEW (A) NEG /hpf   GLUCOSE, POC    Collection Time: 09/24/22  9:25 PM   Result Value Ref Range    Glucose (POC) 149 (H) 70 - 110 mg/dL   LACTIC ACID    Collection Time: 09/24/22 10:15 PM   Result Value Ref Range    Lactic acid 1.2 0.4 - 2.0 MMOL/L   LACTIC ACID    Collection Time: 09/25/22  6:47 AM   Result Value Ref Range    Lactic acid 1.3 0.4 - 2.0 MMOL/L   GLUCOSE, POC    Collection Time: 09/25/22  7:35 AM   Result Value Ref Range    Glucose (POC) 115 (H) 70 - 110 mg/dL   GLUCOSE, POC    Collection Time: 09/25/22 11:20 AM   Result Value Ref Range    Glucose (POC) 159 (H) 70 - 110 mg/dL

## 2022-09-25 NOTE — PROGRESS NOTES
Problem: Falls - Risk of  Goal: *Absence of Falls  Description: Document Twyla Counts Fall Risk and appropriate interventions in the flowsheet.   Outcome: Progressing Towards Goal  Note: Fall Risk Interventions:  Mobility Interventions: Bed/chair exit alarm    Mentation Interventions: Bed/chair exit alarm    Medication Interventions: Bed/chair exit alarm, Evaluate medications/consider consulting pharmacy    Elimination Interventions: Call light in reach, Bed/chair exit alarm, Elevated toilet seat    History of Falls Interventions: Bed/chair exit alarm, Evaluate medications/consider consulting pharmacy         Problem: Patient Education: Go to Patient Education Activity  Goal: Patient/Family Education  Outcome: Progressing Towards Goal     Problem: Pain  Goal: *Control of Pain  Outcome: Progressing Towards Goal  Goal: *PALLIATIVE CARE:  Alleviation of Pain  Outcome: Progressing Towards Goal     Problem: Patient Education: Go to Patient Education Activity  Goal: Patient/Family Education  Outcome: Progressing Towards Goal     Problem: Patient Education: Go to Patient Education Activity  Goal: Patient/Family Education  Outcome: Progressing Towards Goal     Problem: Patient Education: Go to Patient Education Activity  Goal: Patient/Family Education  Outcome: Progressing Towards Goal     Problem: Inpatient Rehab (Adult)  Goal: *LTG: Avoids injury/falls 100% of time related to deficits  Outcome: Progressing Towards Goal  Goal: *LTG: Avoids infection 100% of time related to deficits  Outcome: Progressing Towards Goal  Goal: *LTG: Verbalize understanding of diagnosis and risk factors for recurring stroke  Outcome: Progressing Towards Goal  Goal: *LTG: Absence of DVT during hospitalization  Outcome: Progressing Towards Goal  Goal: *LTG: Maintains Skin Integrity With No Evidence of Pressure Injury 100% of Time  Outcome: Progressing Towards Goal  Goal: Interventions  Outcome: Progressing Towards Goal     Problem: Patient Education: Go to Patient Education Activity  Goal: Patient/Family Education  Outcome: Progressing Towards Goal     Problem: Pressure Injury - Risk of  Goal: *Prevention of pressure injury  Description: Document Rashid Scale and appropriate interventions in the flowsheet.   Outcome: Progressing Towards Goal     Problem: Patient Education: Go to Patient Education Activity  Goal: Patient/Family Education  Outcome: Progressing Towards Goal

## 2022-09-25 NOTE — REHAB NOTE
[facility]    Bedside and Verbal shift change report given to Luke Lieberman RN (oncoming nurse) by Edgar Avendaño RN (offgoing nurse). Report included the following information SBAR, Kardex, MAR and Recent Results. Situation:   Code Status: Full Code   Hospital Day: 2   Problem List:   Hospital Problems  Never Reviewed            Codes Class Noted POA    Impaired mobility and ADLs ICD-10-CM: Z74.09, Z78.9  ICD-9-CM: V49.89  9/23/2022 Unknown       Background:   Past Medical History:   Past Medical History:   Diagnosis Date    Arthritis     back and hands    Chronic pain     back    COVID-19     Summer of  2021    Diabetes ([facility] [address]     NIDDM    Hypertension     Nausea & vomiting     Surgery only not colonoscopy        Assessment:  Changes in Assessment throughout shift: No change to previous assessment    Patient has a central line: no Reasons if yes: Insertion date: Last dressing date:  Patient has Aly Cath: no Reasons if yes:     Insertion date:  Shift aly care completed: YES    Last Vitals:     Vitals:    09/24/22 1921 09/24/22 2128 09/25/22 0838 09/25/22 1640   BP:  131/68 (!) 153/75 130/80   Pulse:  98 89 93   Resp:  18 19 18   Temp: 99.4 °F (37.4 °C) 98.4 °F (36.9 °C) 99 °F (37.2 °C) 99.4 °F (37.4 °C)   SpO2:  97% 97% 96%       PAIN    Pain Assessment    Pain Intensity 1: 0 (09/25/22 1634) Pain Intensity 1: 2 (12/29/14 1105)    Pain Location 1: Groin Pain Location 1: Abdomen    Pain Intervention(s) 1: Medication (see MAR) Pain Intervention(s) 1: Medication (see MAR)  Patient Stated Pain Goal: 0 Patient Stated Pain Goal: 0  Intervention effective: yes  Other actions taken for pain:      Skin Assessment  Skin color Skin Color: Appropriate for ethnicity  Condition/Temperature Skin Condition/Temp: Dry, Warm  Integrity Skin Integrity: Wound (add Wound LDA)  Turgor    Weekly Pressure Ulcer Documentation  Pressure  Injury Documentation: No Pressure Injury Noted-Pressure Ulcer Prevention Initiated  Wound Prevention & Protection Methods  Orientation of wound Orientation of Wound Prevention: Posterior  Location of Prevention Location of Wound Prevention: Sacrum/Coccyx  Dressing Present Dressing Present : No  Dressing Status    Wound Offloading Wound Offloading (Prevention Methods): Bed, pressure reduction mattress    INTAKE/OUPUT  Date 09/24/22 1900 - 09/25/22 0659 09/25/22 0700 - 09/26/22 0659   Shift 2821-6720 24 Hour Total 6952-5317 5187-9514 24 Hour Total   INTAKE   P.O.   480  480     P. O.   480  480   Shift Total   480  480   OUTPUT   Urine          Urine Occurrence(s) 6 x 9 x 4 x  4 x   Stool          Stool Occurrence(s) 0 x 0 x 0 x  0 x   Shift Total        NET   480  480   Weight (kg)              Recommendations:  Patient needs and requests: tbd    Pending tests/procedures: tbd     Functional Level/Equipment:   / Bed (comment)    Fall Precautions:   Fall risk precautions were reinforced with the patient; she was instructed to call for help prior to getting up. The following fall risk precautions were continued: bed/ chair alarms, door signage, yellow bracelet and socks as well as update of the [employer] tool in the patient's room. Reta Score: 4    HEALS Safety Check    A safety check occurred in the patient's room between off going nurse and oncoming nurse listed above. The safety check included the below items  Area Items   H  High Alert Medications Verify all high alert medication drips (heparin, PCA, etc.)   E  Equipment Suction is set up for ALL patients (with yanker)  Red plugs utilized for all equipment (IV pumps, etc.)  WOWs wiped down at end of shift.   Room stocked with oxygen, suction, and other unit-specific supplies   A  Alarms Bed alarm is set for fall risk patients  Ensure chair alarm is in place and activated if patient is up in a chair   L  Lines Check IV for any infiltration  Hughes bag is empty if patient has a Hughes   Tubing and IV bags are labeled   S  Safety   Room is clean, patient is clean, and equipment is clean. Hallways are clear from equipment besides carts. Fall bracelet on for fall risk patients  Ensure room is clear and free of clutter  Suction is set up for ALL patients (with chloe)  Hallways are clear from equipment besides carts.    Isolation precautions followed, supplies available outside room, sign posted     Marquez Palacio RN

## 2022-09-25 NOTE — REHAB NOTE
SHIFT CHANGE NOTE FOR [facility]    Bedside and Verbal shift change report given to Geovanny Nguyễn (oncoming nurse) by Sunday Spine (offgoing nurse). Report included the following information SBAR, Kardex, MAR and Recent Results. Situation:   Code Status: Full Code   Reason for Admission: Spinal surgery  Hospital Day: 2   Problem List:   Hospital Problems  Never Reviewed            Codes Class Noted POA    Impaired mobility and ADLs ICD-10-CM: Z74.09, Z78.9  ICD-9-CM: V49.89  9/23/2022 Unknown           Background:   Past Medical History:   Past Medical History:   Diagnosis Date    Arthritis     back and hands    Chronic pain     back    COVID-19     Summer of  2021    Diabetes ([facility] [address]     NIDDM    Hypertension     Nausea & vomiting     Surgery only not colonoscopy      Patient taking anticoagulants none    Patient has a defibrillator: no     Assessment:  Changes in Assessment throughout shift: none    Patient has central line: no Reasons if yes: Insertion date: Last dressing date:  Patient has Hughes Cath: no Reasons if yes:     Insertion date:    Last Vitals:     Vitals:    09/24/22 1240 09/24/22 1710 09/24/22 1921 09/24/22 2128   BP: 132/71 (!) 156/77  131/68   Pulse: (!) 103 (!) 106  98   Resp:  19  18   Temp:  (!) 100.5 °F (38.1 °C) 99.4 °F (37.4 °C) 98.4 °F (36.9 °C)   SpO2:  98%  97%       PAIN    Pain Assessment    Pain Intensity 1: 0 (09/25/22 0000) Pain Intensity 1: 2 (12/29/14 1105)    Pain Location 1: Back, Leg Pain Location 1: Abdomen    Pain Intervention(s) 1: Position, Medication (see MAR) Pain Intervention(s) 1: Medication (see MAR)  Patient Stated Pain Goal: 0 Patient Stated Pain Goal: 0  Intervention effective: na    Other actions taken for pain: na    Skin Assessment  Skin color Skin Color: Appropriate for ethnicity  Condition/Temperature Skin Condition/Temp: Dry, Warm  Integrity Skin Integrity: Incision (comment)  Turgor    Weekly Pressure Ulcer Documentation  Pressure  Injury Documentation: No Pressure Injury Noted-Pressure Ulcer Prevention Initiated  Wound Prevention & Protection Methods  Orientation of wound Orientation of Wound Prevention: Posterior  Location of Prevention Location of Wound Prevention: Buttocks, Sacrum/Coccyx  Dressing Present Dressing Present : No  Dressing Status    Wound Offloading Wound Offloading (Prevention Methods): Bed, pressure redistribution/air    INTAKE/OUPUT  Date 09/24/22 0700 - 09/25/22 0659 09/25/22 0700 - 09/26/22 0659   Shift 0700-1859 1900-0659 24 Hour Total 0700-1859 1900-0659 24 Hour Total   INTAKE   Shift Total         OUTPUT   Urine           Urine Occurrence(s) 3 x 4 x 7 x      Stool           Stool Occurrence(s) 0 x 0 x 0 x      Shift Total         NET         Weight (kg)             Recommendations:  Patient needs and requests: toileting    Diet: 4 carb choice    Pending tests/procedures: lab     Functional Level/Equipment: assist x 1/wheelchair    Estimated Discharge Date: TBD Posted on Whiteboard in Patients Room: yes     HEALS Safety Check    A safety check occurred in the patient's room between off going nurse and oncoming nurse listed above. The safety check included the below items  Area Items   H  High Alert Medications Verify all high alert medication drips (heparin, PCA, etc.)   E  Equipment Suction is set up for ALL patients (with yanker)  Red plugs utilized for all equipment (IV pumps, etc.)  WOWs wiped down at end of shift. Room stocked with oxygen, suction, and other unit-specific supplies   A  Alarms Bed alarm is set for fall risk patients  Ensure chair alarm is in place and activated if patient is up in a chair   L  Lines Check IV for any infiltration  Hughes bag is empty if patient has a Hughes   Tubing and IV bags are labeled   S  Safety   Room is clean, patient is clean, and equipment is clean. Hallways are clear from equipment besides carts.    Fall bracelet on for fall risk patients  Ensure room is clear and free of clutter  Suction is set up for ALL patients (with chloe)  Hallways are clear from equipment besides carts.    Isolation precautions followed, supplies available outside room, sign posted

## 2022-09-25 NOTE — PROGRESS NOTES
Urine collected for urinalysis and sent to the lab. Lactic acid critical lab value received from [place] in the lab. 1948- called and order received for [email or web address] cc/hr x 500cc with [facility] verifying the order with read back. 2245-Repeat lactic acid level is 1. 2. IVF continued at prescribed rate. Lactic acid level ordered for the morning.

## 2022-09-25 NOTE — PROGRESS NOTES
Problem: Self Care Deficits Care Plan (Adult)  Goal: *Therapy Goal (Edit Goal, Insert Text)  Description: Occupational Therapy Goals   Long Term Goals  Initiated 2022 and to be accomplished within 3 week(s)  1. Pt will perform self-feeding with [place]. 2. Pt will perform grooming with MOD I standing at sink. 3. Pt will perform UB bathing with MOD I.  4. Pt will perform LB bathing with MOD I.  5. Pt will perform tub/shower transfer with MOD I.   6. Pt will perform UB dressing with MOD I.  7. Pt will perform LB dressing with MOD I.  8. Pt will perform toileting task with MOD I.  9. Pt will perform toilet transfer with MOD I. Short Term Goals   Initiated 2022 and to be accomplished within 7 day(s)  1. Pt will perform grooming task while standing at sink x2 minutes  2. Pt will perform UB bathing with setup assist.  3. Pt will perform LB bathing with MIN A, use of AE prn.  4. Pt will perform tub/shower transfer with MIN A to TTB. 5. Pt will perform LB dressing with MOD A.  6. Pt will perform toileting task with MIN A.  7. Pt will perform toilet transfer with MIN A. Outcome: Progressing Towards Goal     Occupational Therapy TREATMENT    Patient: Aarti Sapp   67 y.o. Patient identified with name and : yes    Date: 2022    First Tx Session  Time In: 12  Time Out[de-identified] 1450    Diagnosis: Impaired mobility and ADLs [Z74.09, Z78.9]   Precautions: Fall, Back, Spinal  Chart, occupational therapy assessment, plan of care, and goals were reviewed. Pain:  Pt reports 8/10 pain or discomfort prior to treatment. Pt reports 6/10 pain or discomfort post treatment. Intervention Provided: RN notified and aware. Pain medication administered at start of session via RN. Pt closely monitored for pain and frequent rest breaks given. SUBJECTIVE:   Patient stated I get vertigo when I roll towards my left side, but not the right.     OBJECTIVE DATA SUMMARY:     THERAPEUTIC ACTIVITY Daily Assessment    Pt completed FM/dexterity task imitating a design placing small pegs into peg board while standing to promote standing tolerance, standing balance, FMC, and activity tolerance during bimanual task while maintaining spinal precautions. The pt completed 3 trials w/ seated rest break following each trial (trial 1: 3 min 18 sec, trial 2: 3 min 8 sec, trial 3: 3 min). Min-Mod A needed for several sit > stand transfers variable w/ level of fatigue. Min VC to maintain spinal precautions, for safe transfer technique, and for accuracy when imitating design. THERAPEUTIC EXERCISE Daily Assessment    Mod A for supine > sit transfer via log roll technique. Visual and tactile cues needed to maintain spinal precautions. Mod A to manage trunk and BLE. Pt ambulated approx 70 ft x2 trials to/from therapy gym w/ CGA-min A and no rest breaks needed. Min VC to maintain upright posture and for walker management. GROOMING Daily Assessment    Functional Level: 5  Tasks Completed by Patient: Brushed teeth  Comments: Pt completed oral hygiene standing at sinkside for ~3 min w/ CGA using FWW. Min VC to maintain upright standing posture 2/2 spinal precautions. Oral hygiene: 5       ASSESSMENT:  The pt is progressing slowly towards goals and is mostly limited by pain, decreased functional endurance, decreased standing tolerance, and decreased balance. Increased time needed to complete tasks 2/2 pain and fatigue. The pt would benefit from mass practice of functional transfers while maintaining spinal precautions (log roll, sit > stand, toilet, shower), as well as activities to promote activity tolerance and dynamic balance. Pt would also benefit from education and demonstration of AE to increase independence w/ LB dressing tasks.      Progression toward goals:  []          Improving appropriately and progressing toward goals  [x]          Improving slowly and progressing toward goals  []          Not making progress toward goals and plan of care will be adjusted     PLAN:  Patient continues to benefit from skilled intervention to address the above impairments. Continue treatment per established plan of care. Discharge Recommendations:  Home Health  Further Equipment Recommendations for Discharge:  TBD pending progress     Activity Tolerance:  Fair (-). Frequent rest breaks      Estimated LOS: 2-3 weeks    Please refer to the flowsheet for vital signs taken during this treatment. After treatment:   []  Patient left in no apparent distress sitting up in chair   [x]  Patient left in no apparent distress in bed  [x]  Call bell left within reach  [x]  Nursing notified  []  Caregiver present  []  Bed alarm activated    COMMUNICATION/EDUCATION:   [x] Home safety education was provided and the patient/caregiver indicated understanding. [x] Patient/family have participated as able in goal setting and plan of care. [x] Patient/family agree to work toward stated goals and plan of care. [] Patient understands intent and goals of therapy, but is neutral about his/her participation. [] Patient is unable to participate in goal setting and plan of care.       Ha Olson, OT

## 2022-09-26 LAB
GLUCOSE BLD STRIP.AUTO-MCNC: 115 MG/DL (ref 70–110)
GLUCOSE BLD STRIP.AUTO-MCNC: 135 MG/DL (ref 70–110)
GLUCOSE BLD STRIP.AUTO-MCNC: 149 MG/DL (ref 70–110)
GLUCOSE BLD STRIP.AUTO-MCNC: 170 MG/DL (ref 70–110)

## 2022-09-26 PROCEDURE — 2709999900 HC NON-CHARGEABLE SUPPLY

## 2022-09-26 PROCEDURE — 65310000000 HC RM PRIVATE REHAB

## 2022-09-26 PROCEDURE — 99232 SBSQ HOSP IP/OBS MODERATE 35: CPT | Performed by: HOSPITALIST

## 2022-09-26 PROCEDURE — 74011636637 HC RX REV CODE- 636/637: Performed by: HOSPITALIST

## 2022-09-26 PROCEDURE — 97150 GROUP THERAPEUTIC PROCEDURES: CPT

## 2022-09-26 PROCEDURE — 97110 THERAPEUTIC EXERCISES: CPT

## 2022-09-26 PROCEDURE — 82962 GLUCOSE BLOOD TEST: CPT

## 2022-09-26 PROCEDURE — 74011250637 HC RX REV CODE- 250/637: Performed by: INTERNAL MEDICINE

## 2022-09-26 PROCEDURE — 97116 GAIT TRAINING THERAPY: CPT

## 2022-09-26 PROCEDURE — 97530 THERAPEUTIC ACTIVITIES: CPT

## 2022-09-26 PROCEDURE — 74011250637 HC RX REV CODE- 250/637: Performed by: HOSPITALIST

## 2022-09-26 RX ADMIN — OXYCODONE HYDROCHLORIDE 5 MG: 5 TABLET ORAL at 09:33

## 2022-09-26 RX ADMIN — FAMOTIDINE 20 MG: 20 TABLET ORAL at 18:37

## 2022-09-26 RX ADMIN — OXYCODONE HYDROCHLORIDE 5 MG: 5 TABLET ORAL at 02:03

## 2022-09-26 RX ADMIN — THERA TABS 1 TABLET: TAB at 09:33

## 2022-09-26 RX ADMIN — METOPROLOL TARTRATE 12.5 MG: 25 TABLET, FILM COATED ORAL at 09:32

## 2022-09-26 RX ADMIN — GABAPENTIN 300 MG: 300 CAPSULE ORAL at 16:05

## 2022-09-26 RX ADMIN — GABAPENTIN 300 MG: 300 CAPSULE ORAL at 09:33

## 2022-09-26 RX ADMIN — FAMOTIDINE 20 MG: 20 TABLET ORAL at 09:33

## 2022-09-26 RX ADMIN — ATORVASTATIN CALCIUM 20 MG: 40 TABLET, FILM COATED ORAL at 21:38

## 2022-09-26 RX ADMIN — DOCUSATE SODIUM 100 MG: 100 CAPSULE, LIQUID FILLED ORAL at 18:37

## 2022-09-26 RX ADMIN — GABAPENTIN 300 MG: 300 CAPSULE ORAL at 21:38

## 2022-09-26 RX ADMIN — DOCUSATE SODIUM 100 MG: 100 CAPSULE, LIQUID FILLED ORAL at 09:33

## 2022-09-26 RX ADMIN — ACETAMINOPHEN 650 MG: 325 TABLET, FILM COATED ORAL at 09:33

## 2022-09-26 RX ADMIN — Medication 2 UNITS: at 21:55

## 2022-09-26 RX ADMIN — OXYCODONE HYDROCHLORIDE 5 MG: 5 TABLET ORAL at 16:04

## 2022-09-26 NOTE — REHAB NOTE
[facility]    Bedside and Verbal shift change report given to Jade Allison (oncoming nurse) by Giorgio Sauer (offgoing nurse). Report included the following information SBAR, Kardex, MAR and Recent Results. Situation:   Code Status: Full Code   Reason for Admission: Spinal Surgery  Hospital Day: 3   Problem List:   Hospital Problems  Never Reviewed            Codes Class Noted POA    Impaired mobility and ADLs ICD-10-CM: Z74.09, Z78.9  ICD-9-CM: V49.89  9/23/2022 Unknown           Background:   Past Medical History:   Past Medical History:   Diagnosis Date    Arthritis     back and hands    Chronic pain     back    COVID-19     Summer of  2021    Diabetes ([facility] [address]     NIDDM    Hypertension     Nausea & vomiting     Surgery only not colonoscopy      Patient taking anticoagulants no   Patient has a defibrillator: no     Assessment:  Changes in Assessment throughout shift: none    Patient has central line: no Reasons if yes: Insertion date: Last dressing date:  Patient has Hughes Cath: no Reasons if yes:     Insertion date    Last Vitals:     Vitals:    09/24/22 2128 09/25/22 0838 09/25/22 1640 09/25/22 2128   BP: 131/68 (!) 153/75 130/80 120/64   Pulse: 98 89 93 95   Resp: 18 19 18 18   Temp: 98.4 °F (36.9 °C) 99 °F (37.2 °C) 99.4 °F (37.4 °C) 99.6 °F (37.6 °C)   SpO2: 97% 97% 96% 95%       PAIN    Pain Assessment    Pain Intensity 1: 7 (09/26/22 0203) Pain Intensity 1: 2 (12/29/14 1105)    Pain Location 1: Leg Pain Location 1: Abdomen    Pain Intervention(s) 1: Medication (see MAR) Pain Intervention(s) 1: Medication (see MAR)  Patient Stated Pain Goal: 0 Patient Stated Pain Goal: 0  Intervention effective: yes    Other actions taken for pain: none    Skin Assessment  Skin color Skin Color: Appropriate for ethnicity  Condition/Temperature Skin Condition/Temp: Dry, Warm  Integrity Skin Integrity: Incision (comment)  Turgor    Weekly Pressure Ulcer Documentation  Pressure  Injury Documentation: No Pressure Injury Noted-Pressure Ulcer Prevention Initiated  Wound Prevention & Protection Methods  Orientation of wound Orientation of Wound Prevention: Posterior  Location of Prevention Location of Wound Prevention: Buttocks, Sacrum/Coccyx  Dressing Present Dressing Present : No  Dressing Status    Wound Offloading Wound Offloading (Prevention Methods): Bed, pressure redistribution/air    INTAKE/OUPUT  Date 09/25/22 0700 - 09/26/22 0659 09/26/22 0700 - 09/27/22 0659   Shift 0700-1859 1900-0659 24 Hour Total 0700-1859 1900-0659 24 Hour Total   INTAKE   P.O. 480  480        P. O. 480  480      Shift Total 480  480      OUTPUT   Urine           Urine Occurrence(s) 4 x 3 x 7 x      Stool           Stool Occurrence(s) 0 x 0 x 0 x      Shift Total           480      Weight (kg)             Recommendations:  Patient needs and requests: toileting,pain management    Diet: 4 carb choice    Pending tests/procedures: none     Functional Level/Equipment: assist x 1/wheelchair    Estimated Discharge Date: TBD Posted on Whiteboard in Patients Room: yes     HEALS Safety Check    A safety check occurred in the patient's room between off going nurse and oncoming nurse listed above. The safety check included the below items  Area Items   H  High Alert Medications Verify all high alert medication drips (heparin, PCA, etc.)   E  Equipment Suction is set up for ALL patients (with yanker)  Red plugs utilized for all equipment (IV pumps, etc.)  WOWs wiped down at end of shift. Room stocked with oxygen, suction, and other unit-specific supplies   A  Alarms Bed alarm is set for fall risk patients  Ensure chair alarm is in place and activated if patient is up in a chair   L  Lines Check IV for any infiltration  Hughes bag is empty if patient has a Hughes   Tubing and IV bags are labeled   S  Safety   Room is clean, patient is clean, and equipment is clean. Hallways are clear from equipment besides carts.    Fall bracelet on for fall risk patients  Ensure room is clear and free of clutter  Suction is set up for ALL patients (with chloe)  Hallways are clear from equipment besides carts.    Isolation precautions followed, supplies available outside room, sign posted

## 2022-09-26 NOTE — PROGRESS NOTES
conducted an initial consultation and Spiritual Assessment for Whitney Agustin, who is a 67 y.o.,female. Patients Primary Language is: [place]. According to the patients EMR Latter-day Affiliation is: [facility].     The reason the Patient came to the hospital is:   Patient Active Problem List    Diagnosis Date Noted    Impaired mobility and ADLs 09/23/2022    Spinal stenosis of lumbar region at multiple levels 09/21/2022        The  provided the following Interventions:  Initiated a relationship of care and support with patient in room 190 of the rehab unit. Listened empathically as patient shared her story of coming here and how she was feeling   Asked about her having an advance directive and she said yes but it was home with daughter. Provided information about Spiritual Care Services. Offered prayer and assurance of continued prayers on patients behalf. The following outcomes were achieved:  Patient shared limited information about her medical narrative and spiritual journey/beliefs. Patient processed feeling about current hospitalization. Patient expressed gratitude for pastoral care visit. Assessment:  Patient does not have any Rastafarian/cultural needs that will affect patients preferences in health care. There are no further spiritual or Rastafarian issues which require Spiritual Care Services interventions at this time. Plan:  Chaplains will continue to follow and will provide pastoral care on an as needed/requested basis    . Azalia Mcgee   Spiritual Care   [phone]

## 2022-09-26 NOTE — ROUTINE PROCESS
[facility]    Bedside and Verbal shift change report given to Vernon López RN (oncoming nurse) by Elisha Calhoun RN (offgoing nurse). Report included the following information SBAR, Kardex, MAR and Recent Results.     Situation:   Code Status: Full Code   Hospital Day: 3   Problem List:   Hospital Problems  Never Reviewed            Codes Class Noted POA    Impaired mobility and ADLs ICD-10-CM: Z74.09, Z78.9  ICD-9-CM: V49.89  9/23/2022 Unknown           Background:   Past Medical History:   Past Medical History:   Diagnosis Date    Arthritis     back and hands    Chronic pain     back    COVID-19     Summer of  2021    Diabetes ([facility] [address]     NIDDM    Hypertension     Nausea & vomiting     Surgery only not colonoscopy        Assessment:  Changes in Assessment throughout shift: No change to previous assessment    Patient has a central line: no Reasons if yes: na  Insertion date:na Last dressing date:  an  Patient has Aly Cath: no Reasons if yes: na   Insertion date:na  Shift aly care completed: NO    Last Vitals:     Vitals:    09/25/22 1640 09/25/22 2128 09/26/22 0730 09/26/22 1609   BP: 130/80 120/64 136/70 118/69   Pulse: 93 95 91 95   Resp: 18 18 16 18   Temp: 99.4 °F (37.4 °C) 99.6 °F (37.6 °C) 98.8 °F (37.1 °C) 98.1 °F (36.7 °C)   SpO2: 96% 95% 99% 96%       PAIN    Pain Assessment    Pain Intensity 1: 0 (09/26/22 1704) Pain Intensity 1: 2 (12/29/14 1105)    Pain Location 1: Back Pain Location 1: Abdomen    Pain Intervention(s) 1: Medication (see MAR) Pain Intervention(s) 1: Medication (see MAR)  Patient Stated Pain Goal: 0 Patient Stated Pain Goal: 0  Intervention effective: yes  Other actions taken for pain: Medication (see MAR)    Skin Assessment  Skin color Skin Color: Appropriate for ethnicity  Condition/Temperature Skin Condition/Temp: Dry, Warm  Integrity Skin Integrity: Incision (comment)  Turgor    Weekly Pressure Ulcer Documentation  Pressure  Injury Documentation: No Pressure Injury Noted-Pressure Ulcer Prevention Initiated  Wound Prevention & Protection Methods  Orientation of wound Orientation of Wound Prevention: Posterior  Location of Prevention Location of Wound Prevention: Buttocks, Sacrum/Coccyx  Dressing Present Dressing Present : No  Dressing Status    Wound Offloading Wound Offloading (Prevention Methods): Bed, pressure redistribution/air    INTAKE/OUPUT  Date 09/25/22 1900 - 09/26/22 0659 09/26/22 0700 - 09/27/22 0659   Shift 5882-9474 24 Hour Total 8564-0595 3946-8931 24 Hour Total   INTAKE   P.O.  480 1320  1320     P. O.  480 1320  1320   Shift Total  480 1320  1320   OUTPUT   Urine          Urine Occurrence(s) 4 x 8 x 4 x  4 x   Stool          Stool Occurrence(s) 0 x 0 x 0 x  0 x   Shift Total        NET  480 1320  1320   Weight (kg)            Recommendations:  Patient needs and requests: na    Pending tests/procedures: na     Functional Level/Equipment: Partial (one person) /      Fall Precautions:   Fall risk precautions were reinforced with the patient; she was instructed to call for help prior to getting up. The following fall risk precautions were continued: bed/ chair alarms, door signage, yellow bracelet and socks as well as update of the [place] tool in the patient's room. Reta Score: 4    HEALS Safety Check    A safety check occurred in the patient's room between off going nurse and oncoming nurse listed above. The safety check included the below items  Area Items   H  High Alert Medications Verify all high alert medication drips (heparin, PCA, etc.)   E  Equipment Suction is set up for ALL patients (with yanker)  Red plugs utilized for all equipment (IV pumps, etc.)  WOWs wiped down at end of shift.   Room stocked with oxygen, suction, and other unit-specific supplies   A  Alarms Bed alarm is set for fall risk patients  Ensure chair alarm is in place and activated if patient is up in a chair   L  Lines Check IV for any infiltration  Hughes bag is empty if patient has a Hughes   Tubing and IV bags are labeled   S  Safety   Room is clean, patient is clean, and equipment is clean. Hallways are clear from equipment besides carts. Fall bracelet on for fall risk patients  Ensure room is clear and free of clutter  Suction is set up for ALL patients (with chloe)  Hallways are clear from equipment besides carts.    Isolation precautions followed, supplies available outside room, sign posted     Brock Fritz RN

## 2022-09-26 NOTE — ACP (ADVANCE CARE PLANNING)
Advance Care Planning   Advance Care Planning Inpatient Note  [address] [place] [address]   Spiritual Care Department    Today's Date: 9/26/2022  Unit: [facility]  REHAB UNIT    Received request from . Upon review of chart and communication with care team, patient's decision making abilities are not in question. Patient was/were present in the room during visit. Goals of ACP Conversation:  Discuss Advance Care planning documents    Health Care Decision Makers:    No healthcare decision makers have been documented. Click here to complete [address] including selection of the Healthcare Decision Maker Relationship (ie \"Primary\")  Summary:  No Decision Maker named by patient at this time    Advance Care Planning Documents (Patient Wishes) on file:  None     Assessment:    Patient seen as a new admit in the rehab unit. Asked her about having an advance directive and she said yes but it is at home and daughter has it.     Interventions:      Care Preferences Communicated:  No    Outcomes/Plan:      HENRIK Leonard on 9/26/2022 at 11:17 AM

## 2022-09-26 NOTE — PROGRESS NOTES
Problem: Self Care Deficits Care Plan (Adult)  Goal: *Therapy Goal (Edit Goal, Insert Text)  Description: Occupational Therapy Goals   Long Term Goals  Initiated 2022 and to be accomplished within 3 week(s)  1. Pt will perform self-feeding with [place]. 2. Pt will perform grooming with MOD I standing at sink. 3. Pt will perform UB bathing with MOD I.  4. Pt will perform LB bathing with MOD I.  5. Pt will perform tub/shower transfer with MOD I.   6. Pt will perform UB dressing with MOD I.  7. Pt will perform LB dressing with MOD I.  8. Pt will perform toileting task with MOD I.  9. Pt will perform toilet transfer with MOD I. Short Term Goals   Initiated 2022 and to be accomplished within 7 day(s)  1. Pt will perform grooming task while standing at sink x2 minutes  2. Pt will perform UB bathing with setup assist.  3. Pt will perform LB bathing with MIN A, use of AE prn.  4. Pt will perform tub/shower transfer with MIN A to TTB. 5. Pt will perform LB dressing with MOD A.  6. Pt will perform toileting task with MIN A.  7. Pt will perform toilet transfer with MIN A. Outcome: Progressing Towards Goal  Goal: Interventions  Outcome: Progressing Towards Goal   Occupational Therapy TREATMENT    Patient: Kira Alegre   67 y.o. Patient identified with name and : yes    Date: 2022    First Tx Session  Time In: 1  Time Out[de-identified] 1130        Diagnosis: Impaired mobility and ADLs [Z74.09, Z78.9]   Precautions: Fall, Back, Spinal  Chart, occupational therapy assessment, plan of care, and goals were reviewed. Pain:  Pt reports 8/10 pain or discomfort prior to treatment. Pt reports 8/10 pain or discomfort post treatment. Intervention Provided: pt received pain meds prior to therapist arrival       SUBJECTIVE:   Patient stated I worked for the [employer].     OBJECTIVE DATA SUMMARY:     THERAPEUTIC ACTIVITY Daily Assessment    Pt engaged in dynamic standing activity at table top which involved grasping and manipulating puzzle pieces to secure jigsaw puzzle pieces together. Pt reported increased difficulty due to impaired vision and demonstrated x2 reps standing with CGA for 2 minutes. THERAPEUTIC EXERCISE Daily Assessment    Pt completed BUE strengthening with yellow therabar in 3 directions (supination/pronation, IR/ER) 4x15 reps, 2 sets with RB's in between sets. GROOMING Daily Assessment        Pt performed grooming tasks at sink side with supervision. Oral hygiene: 5     MOBILITY/TRANSFERS Daily Assessment     Pt performed bed mobility rolling to right with supervision and performed supine to sit transfer via log roll with Nancy. Pt performed functional transfer EOB to w/c with modA. Pt required VC's for spinal precautions. GROUP Daily Assessment     Pt engaged in group activity with two other patients which involved performing BUE FM activity while grasping small rectangular blocks from tower and restacking to top of tower. Pt demonstrated increased challenge to grasp pieces with accuracy and precision. Pt lost 1/2 games. ASSESSMENT:  Pt is making progress toward goals and performed supine to sit while maintaining spinal precautions with very minimal cueing. Pt demonstrated good safety technique however required multiple attempts due to fatigue with sit to stand and modA. Pt is increasing BUE strength and activity tolerance for carryover with self cares. Pt progress is negatively impacted by pain. Progression toward goals:  [x]          Improving appropriately and progressing toward goals  []          Improving slowly and progressing toward goals  []          Not making progress toward goals and plan of care will be adjusted     PLAN:  Patient continues to benefit from skilled intervention to address the above impairments. Continue treatment per established plan of care.   Discharge Recommendations:  Home Health  Further Equipment Recommendations for Discharge:  TBD pending progress     Activity Tolerance:  Fair/good      Estimated LOS:TBD    Please refer to the flowsheet for vital signs taken during this treatment. After treatment:   [x]  Patient left in no apparent distress sitting up in chair   []  Patient left in no apparent distress in bed  [x]  Call bell left within reach  [x]  Nursing notified  []  Caregiver present  []  Bed alarm activated    COMMUNICATION/EDUCATION:   [] Home safety education was provided and the patient/caregiver indicated understanding. [] Patient/family have participated as able in goal setting and plan of care. [x] Patient/family agree to work toward stated goals and plan of care. [] Patient understands intent and goals of therapy, but is neutral about his/her participation. [] Patient is unable to participate in goal setting and plan of care.       Abisai Alba, OT

## 2022-09-26 NOTE — ROUTINE PROCESS
0800 Pt. Awake sitting up in bed no change in assessment pt. Reported to be feeling fine. 0930 Pt. Sitting up in chair eating breakfast.   1200 with therapy. 1330 able to transfer from bed to chair with assist.   1500 no change in assessment. 1800 Pt. Sitting up in chair eating dinner.

## 2022-09-26 NOTE — PROGRESS NOTES
[facility] Hospitalist Group  Progress Note    Patient: Zarina Pascual Age: 67 y.o. : 1950 MR#: [id number] SSN: [id number]  Date/Time: 2022     Subjective: Patient feels fine, no new complaints. Complains of mild dry cough, no fevers overnight, no shortness of breath, no diarrhea, no dysuria, no urgency or frequency of urination. Assessment/Plan:   1. Impaired mobility and ADLs secondary to #2  2. Lumbar spinal stenosis without neurological claudication status post anterior lumbar interbody fusion, L4-L5 and L5-S1 1 prone transpoas fusion  3. Diabetes mellitus  4. Hypertension  5. Osteoarthritis    MEDICAL PLAN:     1. Impaired mobility and ADLs secondary to #2  Continue PT and OT     2. Lumbar spinal stenosis without neurological claudication status post anterior lumbar interbody fusion, L4-L5 and L5-S1 1 prone transpoas fusion  Continue Tylenol and gabapentin for pain management  Continue Roxicodone as needed for moderate to severe pain  Continue bowel protocol  Continue MiraLAX    3. Diabetes mellitus:  Patient was on metformin  Continuously sliding scale    4. Hypertension:  Continue hold HCTZ and monitor  Continue hydralazine as needed    5. Osteoarthritis    6. Dyslipidemia:  Continue Lipitor    Discussed with patient and also family at the bedside and explained about my above plan care.     Case discussed with:  [x]Patient  [x]Family  [x]Nursing  []Case Management  DVT Prophylaxis:  []Lovenox  []Hep SQ  [x]SCDs  []Coumadin   []Eliquis/Xarelto     Objective:   VS: Visit Vitals  /70 (BP 1 Location: Right upper arm, BP Patient Position: Supine)   Pulse 91   Temp 98.8 °F (37.1 °C)   Resp 16   SpO2 99%      Tmax/24hrs: Temp (24hrs), Av.3 °F (37.4 °C), Min:98.8 °F (37.1 °C), Max:99.6 °F (37.6 °C)  IOBRIEF  Intake/Output Summary (Last 24 hours) at 2022 1345  Last data filed at 2022 1318  Gross per 24 hour   Intake 480 ml   Output --   Net 480 ml General:  Alert, cooperative, no acute distress    HEENT: PERRLA, anicteric sclerae. Pulmonary:  CTA Bilaterally. No Wheezing/Rales. Cardiovascular: Regular rate and Rhythm. GI:  Soft, Non distended, Non tender. + Bowel sounds. Extremities:  No edema. No calf tenderness. Psych: Good insight. Not anxious or agitated. Neurologic: Alert and oriented X 3. Moves all ext.   Additional: Back dressing clean, no erythema redness    Medications:   Current Facility-Administered Medications   Medication Dose Route Frequency    famotidine (PEPCID) tablet 20 mg  20 mg Oral BID    [Held by provider] hydroCHLOROthiazide (HYDRODIURIL) tablet 12.5 mg  12.5 mg Oral DAILY    ondansetron (ZOFRAN ODT) tablet 4 mg  4 mg Oral Q8H PRN    acetaminophen (TYLENOL) tablet 500 mg  500 mg Oral Q6H PRN    metoprolol tartrate (LOPRESSOR) tablet 12.5 mg  12.5 mg Oral Q12H    docusate sodium (COLACE) capsule 100 mg  100 mg Oral BID    magnesium hydroxide (MILK OF MAGNESIA) 400 mg/5 mL oral suspension 30 mL  30 mL Oral DAILY PRN    bisacodyL (DULCOLAX) tablet 10 mg  10 mg Oral Q48H PRN    bisacodyL (DULCOLAX) suppository 10 mg  10 mg Rectal Q48H PRN    hydrALAZINE (APRESOLINE) tablet 25 mg  25 mg Oral Q6H PRN    cyclobenzaprine (FLEXERIL) tablet 5 mg  5 mg Oral TID PRN    gabapentin (NEURONTIN) capsule 300 mg  300 mg Oral TID    therapeutic multivitamin (THERAGRAN) tablet 1 Tablet  1 Tablet Oral DAILY    oxyCODONE IR (ROXICODONE) tablet 5 mg  5 mg Oral Q6H PRN    atorvastatin (LIPITOR) tablet 20 mg  20 mg Oral QHS    insulin lispro (HUMALOG) injection   SubCUTAneous AC&HS    glucose chewable tablet 16 g  4 Tablet Oral PRN    glucagon (GLUCAGEN) injection 1 mg  1 mg IntraMUSCular PRN    dextrose 10% infusion 0-250 mL  0-250 mL IntraVENous PRN       Labs:    Recent Results (from the past 24 hour(s))   GLUCOSE, POC    Collection Time: 09/25/22  4:18 PM   Result Value Ref Range    Glucose (POC) 102 70 - 110 mg/dL   GLUCOSE, POC    Collection Time: 09/25/22  9:26 PM   Result Value Ref Range    Glucose (POC) 150 (H) 70 - 110 mg/dL   GLUCOSE, POC    Collection Time: 09/26/22  8:07 AM   Result Value Ref Range    Glucose (POC) 115 (H) 70 - 110 mg/dL   GLUCOSE, POC    Collection Time: 09/26/22 11:37 AM   Result Value Ref Range    Glucose (POC) 149 (H) 70 - 110 mg/dL       Signed By: Mago Hale MD     September 26, 2022      Disclaimer: Sections of this note are dictated using utilizing voice recognition software. Minor typographical errors may be present. If questions arise, please do not hesitate to contact me or call our department.

## 2022-09-26 NOTE — REHAB NOTE
Wound Prevention Checklist    Patient: Ibis Mata (40 y.o. female)  Date: 9/26/2022  Diagnosis: Impaired mobility and ADLs [Z74.09, Z78.9] <principal problem not specified>    Precautions: Fall, Back, Spinal       []  Heel prevention boots placed on patient    []  Patient turned q2h during shift    []  Lift team ordered    []  Patient on Lizzy bed/Specialty bed    []  Each Wound is documented during shift (Stage, Color, drainage, odor, measurements, and dressings)    []  Dual skin check done with Raissa Fernández RN

## 2022-09-26 NOTE — PROGRESS NOTES
Problem: Mobility Impaired (Adult and Pediatric)  Goal: *Therapy Goal (Edit Goal, Insert Text)  Description: Physical Therapy Short Term Goals  Initiated 9/24/2022 and to be accomplished within 7 day(s) on 9/30/2022  1. Patient will move from supine to sit and sit to supine , scoot up and down, and roll side to side in bed with minimal assistance/contact guard assist.    2.  Patient will transfer from bed to chair and chair to bed with minimal assistance/contact guard assist using the least restrictive device. 3.  Patient will perform sit to stand with moderate assistance . 4. Patient will ambulate with minimal assistance/contact guard assist for 150 feet with the least restrictive device. 5.  Patient will ascend/descend 4 stairs with 1 handrail(s) with moderate assistance . Physical Therapy Long Term Goals  Initiated 9/24/2022 and to be accomplished within 21 day(s) on 10/14/2022  1. Patient will move from supine to sit and sit to supine , scoot up and down, and roll side to side in bed with supervision/set-up. 2.  Patient will transfer from bed to chair and chair to bed with supervision/set-up using the least restrictive device. 3.  Patient will perform sit to stand with supervision/set-up. 4.  Patient will ambulate with supervision/set-up for 150 feet with the least restrictive device. 5.  Patient will ascend/descend 4 stairs with 1 handrail(s) with CG/SBA. Outcome: Progressing Towards Goal   PHYSICAL THERAPY TREATMENT    Patient: Feliciano Edward (36 y.o. female)  Date: 9/26/2022  Diagnosis: Impaired mobility and ADLs [Z74.09, Z78.9]   Precautions: Fall, Back, Spinal  Chart, physical therapy assessment, plan of care and goals were reviewed. Time In:1130  Time Out:1230  Time In: 1435  Time Out: 1505    Patient seen for: Balance activities;Gait training;Patient education;Transfer training; Wheelchair mobility; Therapeutic exercise    Pain:  Patient reporting 6/10 pain bilat anterior thighs, particularly right LE. Treatment modified as needed to manage pain. Patient reporting 6.5/10 pain in the PM.    Patient identified with name and : yes    SUBJECTIVE:      Patient agreeable to treatment. OBJECTIVE DATA SUMMARY:    Objective:     TRANSFERS Daily Assessment     Transfer Type: Other  Other: stand step with RW  Transfer Assistance : 3 (Moderate assistance )  Sit to Stand Assistance: Maximum assistance (lifting/lowering assistance for safety. Right knee buckling noted)    Patient transfers with RW needing moderate assistance with backing up to transfer surface safely with cues for safe RW negotiation, maintaining spinal precautions. Patient performing sit to stand with moderate to maximal assistance with cues for safe hand placement and cues for ensuring that she was staying symmetrical when lifting up into standing and not twisting her back to reach forward for walker or backward for wheelchair. Performing 3 reps to emphasize this safer technique during PM session. GAIT Daily Assessment    Gait Description (WDL) Exceptions to WDL    Gait Abnormalities Decreased step clearance (forward flexed trunk and slow gait speed)    Assistive device Gait belt;Walker, rolling    Ambulation assistance - level surface 4 (Minimal assistance)    Distance 65 Feet (ft)    Ambulation assistance- uneven surface  (NT due to patient fatigue, increased pain)    Comments Patient performing 3 separate gait bouts during sessions today. Performing 65 ft, then 75 ft in AM session.  In PM session, performing 72 ft of gait before needing seated rest. Observed to have dyspnea with exertion but recovering quickly with seated rest.         STEPS/STAIRS Daily Assessment     Steps/Stairs ambulated      Assistance Required 0 (Not tested)    Rail Use      Comments  To be further assessed    Curbs/Ramps  (NT due to patient fatigue and increased pain)        BALANCE Daily Assessment     Sitting - Static: Good (unsupported)  Sitting - Dynamic: Fair (occasional)  Standing - Static: Fair  Standing - Dynamic : Impaired        WHEELCHAIR MOBILITY Daily Assessment     Able to Propel (ft): 75 feet  Functional Level:  (min A for steering, backing up)  Curbs/Ramps Assist Required (FIM Score): 0 (Not tested)  Wheelchair Setup Assist Required : 3 (Moderate assistance)  Wheelchair Management: Manages left brake;Manages right brake        THERAPEUTIC EXERCISES Daily Assessment           Seated  EXERCISE   Sets   Reps   Active Active Assist   Comments   Ankle Pumps   [] []      Long Arc Quads 3 10 [x]  []   no weight, assistance right LE for support, not performing full available ROM against gravity right LE   Seated Marching   []  []      Heel slides     []  []      Hip Abd/ER clamshells w/ T-band 3  15  [x]  []   red Tband   Hip Abd SLRs   []  []      Hip Add Isometrics 3 10 [x]  []  3 sec holds   Heel taps on floor     []  []      Wheelchair pushups     []  []         Standing mini-squats 3 x 10 reps at RW with seated rests in between bouts. No increased pain reported with this activity. ASSESSMENT:  Patient would continue to benefit from skilled PT to improve ability to perform safe functional mobility. Continues to be limited by pain and weakness. Needing frequent rests. Progression toward goals:  []      Improving appropriately and progressing toward goals  [x]      Improving slowly and progressing toward goals  []      Not making progress toward goals and plan of care will be adjusted      PLAN:  Patient continues to benefit from skilled intervention to address the above impairments. Continue treatment per established plan of care.   Discharge Recommendations:  Home Health and caregiver assistance  Further Equipment Recommendations for Discharge:  rolling walker      Estimated Discharge Date: 10/14/2022    Activity Tolerance:   Fair due to pain and weakness  After treatment:   [] Patient left in no apparent distress in bed  [x] Patient left in no apparent distress sitting up in chair  [] Patient left in no apparent distress in w/c mobilizing under own power  [] Patient left in no apparent distress dining area  [] Patient left in no apparent distress mobilizing under own power  [x] Call bell left within reach  [x] Nursing notified  [] Caregiver present  [] Bed alarm activated   [x] Chair alarm activated      Vianey Real, PT  9/26/2022

## 2022-09-27 ENCOUNTER — APPOINTMENT (OUTPATIENT)
Dept: VASCULAR SURGERY | Age: 72
DRG: 561 | End: 2022-09-27
Attending: HOSPITALIST
Payer: MEDICARE

## 2022-09-27 LAB
ANION GAP SERPL CALC-SCNC: 8 MMOL/L (ref 3–18)
BASOPHILS # BLD: 0 K/UL (ref 0–0.1)
BASOPHILS NFR BLD: 0 % (ref 0–2)
BUN SERPL-MCNC: 13 MG/DL (ref 7–18)
BUN/CREAT SERPL: 17 (ref 12–20)
CALCIUM SERPL-MCNC: 8.6 MG/DL (ref 8.5–10.1)
CHLORIDE SERPL-SCNC: 102 MMOL/L (ref 100–111)
CO2 SERPL-SCNC: 29 MMOL/L (ref 21–32)
CREAT SERPL-MCNC: 0.75 MG/DL (ref 0.6–1.3)
DIFFERENTIAL METHOD BLD: ABNORMAL
EOSINOPHIL # BLD: 0.2 K/UL (ref 0–0.4)
EOSINOPHIL NFR BLD: 2 % (ref 0–5)
ERYTHROCYTE [DISTWIDTH] IN BLOOD BY AUTOMATED COUNT: 14.7 % (ref 11.6–14.5)
GLUCOSE BLD STRIP.AUTO-MCNC: 108 MG/DL (ref 70–110)
GLUCOSE BLD STRIP.AUTO-MCNC: 113 MG/DL (ref 70–110)
GLUCOSE BLD STRIP.AUTO-MCNC: 127 MG/DL (ref 70–110)
GLUCOSE BLD STRIP.AUTO-MCNC: 177 MG/DL (ref 70–110)
GLUCOSE SERPL-MCNC: 123 MG/DL (ref 74–99)
HCT VFR BLD AUTO: 24.1 % (ref 35–45)
HGB BLD-MCNC: 7.5 G/DL (ref 12–16)
IMM GRANULOCYTES # BLD AUTO: 0 K/UL (ref 0–0.04)
IMM GRANULOCYTES NFR BLD AUTO: 0 % (ref 0–0.5)
LYMPHOCYTES # BLD: 2.4 K/UL (ref 0.9–3.6)
LYMPHOCYTES NFR BLD: 22 % (ref 21–52)
MCH RBC QN AUTO: 27.9 PG (ref 24–34)
MCHC RBC AUTO-ENTMCNC: 31.1 G/DL (ref 31–37)
MCV RBC AUTO: 89.6 FL (ref 78–100)
MONOCYTES # BLD: 1 K/UL (ref 0.05–1.2)
MONOCYTES NFR BLD: 9 % (ref 3–10)
NEUTS SEG # BLD: 7.3 K/UL (ref 1.8–8)
NEUTS SEG NFR BLD: 67 % (ref 40–73)
NRBC # BLD: 0 K/UL (ref 0–0.01)
NRBC BLD-RTO: 0 PER 100 WBC
PLATELET # BLD AUTO: 260 K/UL (ref 135–420)
PLATELET COMMENTS,PCOM: ABNORMAL
PMV BLD AUTO: 10.4 FL (ref 9.2–11.8)
POTASSIUM SERPL-SCNC: 3.3 MMOL/L (ref 3.5–5.5)
RBC # BLD AUTO: 2.69 M/UL (ref 4.2–5.3)
RBC MORPH BLD: ABNORMAL
SODIUM SERPL-SCNC: 139 MMOL/L (ref 136–145)
WBC # BLD AUTO: 10.9 K/UL (ref 4.6–13.2)

## 2022-09-27 PROCEDURE — 99232 SBSQ HOSP IP/OBS MODERATE 35: CPT | Performed by: HOSPITALIST

## 2022-09-27 PROCEDURE — 97110 THERAPEUTIC EXERCISES: CPT

## 2022-09-27 PROCEDURE — 74011250637 HC RX REV CODE- 250/637: Performed by: HOSPITALIST

## 2022-09-27 PROCEDURE — 97150 GROUP THERAPEUTIC PROCEDURES: CPT

## 2022-09-27 PROCEDURE — 82962 GLUCOSE BLOOD TEST: CPT

## 2022-09-27 PROCEDURE — 93970 EXTREMITY STUDY: CPT

## 2022-09-27 PROCEDURE — 97116 GAIT TRAINING THERAPY: CPT

## 2022-09-27 PROCEDURE — 36415 COLL VENOUS BLD VENIPUNCTURE: CPT

## 2022-09-27 PROCEDURE — 74011636637 HC RX REV CODE- 636/637: Performed by: HOSPITALIST

## 2022-09-27 PROCEDURE — 74011250636 HC RX REV CODE- 250/636: Performed by: HOSPITALIST

## 2022-09-27 PROCEDURE — 97530 THERAPEUTIC ACTIVITIES: CPT

## 2022-09-27 PROCEDURE — 85025 COMPLETE CBC W/AUTO DIFF WBC: CPT

## 2022-09-27 PROCEDURE — 74011250637 HC RX REV CODE- 250/637: Performed by: INTERNAL MEDICINE

## 2022-09-27 PROCEDURE — 65310000000 HC RM PRIVATE REHAB

## 2022-09-27 PROCEDURE — 2709999900 HC NON-CHARGEABLE SUPPLY

## 2022-09-27 PROCEDURE — 80048 BASIC METABOLIC PNL TOTAL CA: CPT

## 2022-09-27 RX ORDER — ENOXAPARIN SODIUM 100 MG/ML
40 INJECTION SUBCUTANEOUS EVERY 24 HOURS
Status: DISCONTINUED | OUTPATIENT
Start: 2022-09-27 | End: 2022-10-04 | Stop reason: HOSPADM

## 2022-09-27 RX ORDER — POTASSIUM CHLORIDE 20 MEQ/1
20 TABLET, EXTENDED RELEASE ORAL
Status: COMPLETED | OUTPATIENT
Start: 2022-09-27 | End: 2022-09-27

## 2022-09-27 RX ADMIN — ATORVASTATIN CALCIUM 20 MG: 40 TABLET, FILM COATED ORAL at 21:38

## 2022-09-27 RX ADMIN — POTASSIUM CHLORIDE 20 MEQ: 1500 TABLET, EXTENDED RELEASE ORAL at 12:46

## 2022-09-27 RX ADMIN — FAMOTIDINE 20 MG: 20 TABLET ORAL at 18:19

## 2022-09-27 RX ADMIN — ENOXAPARIN SODIUM 40 MG: 100 INJECTION SUBCUTANEOUS at 12:47

## 2022-09-27 RX ADMIN — GABAPENTIN 300 MG: 300 CAPSULE ORAL at 21:38

## 2022-09-27 RX ADMIN — FAMOTIDINE 20 MG: 20 TABLET ORAL at 09:24

## 2022-09-27 RX ADMIN — GABAPENTIN 300 MG: 300 CAPSULE ORAL at 16:36

## 2022-09-27 RX ADMIN — METOPROLOL TARTRATE 12.5 MG: 25 TABLET, FILM COATED ORAL at 09:24

## 2022-09-27 RX ADMIN — DOCUSATE SODIUM 100 MG: 100 CAPSULE, LIQUID FILLED ORAL at 09:24

## 2022-09-27 RX ADMIN — METOPROLOL TARTRATE 12.5 MG: 25 TABLET, FILM COATED ORAL at 21:38

## 2022-09-27 RX ADMIN — DOCUSATE SODIUM 100 MG: 100 CAPSULE, LIQUID FILLED ORAL at 18:19

## 2022-09-27 RX ADMIN — THERA TABS 1 TABLET: TAB at 09:24

## 2022-09-27 RX ADMIN — OXYCODONE HYDROCHLORIDE 5 MG: 5 TABLET ORAL at 09:25

## 2022-09-27 RX ADMIN — Medication 2 UNITS: at 12:18

## 2022-09-27 RX ADMIN — GABAPENTIN 300 MG: 300 CAPSULE ORAL at 09:24

## 2022-09-27 NOTE — PROGRESS NOTES
Wound Prevention Checklist    Patient: Manan Peters (2 y.o. female)  Date: 9/27/2022  Diagnosis: Impaired mobility and ADLs [Z74.09, Z78.9] <principal problem not specified>    Precautions: Fall, Back, Spinal       []  Heel prevention boots placed on patient    [x]  Patient turned q2h during shift    []  Lift team ordered    []  Patient on [place] bed/Specialty bed    []  Each Wound is documented during shift (Stage, Color, drainage, odor, measurements, and dressings)    [x]  Dual skin check done with IVET Morgan, RN

## 2022-09-27 NOTE — PROGRESS NOTES
SHIFT CHANGE NOTE FOR [facility]    Bedside and Verbal shift change report given to Garry Hough RN (oncoming nurse) by Harriett Roldan RN (offgoing nurse). Report included the following information SBAR, Kardex, MAR and Recent Results. Situation:   Code Status: Full Code   Hospital Day: 4   Problem List:   Hospital Problems  Never Reviewed            Codes Class Noted POA    Impaired mobility and ADLs ICD-10-CM: Z74.09, Z78.9  ICD-9-CM: V49.89  9/23/2022 Unknown           Background:   Past Medical History:   Past Medical History:   Diagnosis Date    Arthritis     back and hands    Chronic pain     back    COVID-19     Summer of  2021    Diabetes ([facility] [address]     NIDDM    Hypertension     Nausea & vomiting     Surgery only not colonoscopy        Assessment:  Changes in Assessment throughout shift: No change to previous assessment    Patient has a central line: no Reasons if yes: Insertion date: Last dressing date:  Patient has Aly Cath: no Reasons if yes:     Insertion date:  Shift aly care completed:     Last Vitals:     Vitals:    09/26/22 0730 09/26/22 1609 09/26/22 2030 09/26/22 2138   BP: 136/70 118/69 (!) 106/58    Pulse: 91 95 (!) 102 92   Resp: 16 18 18    Temp: 98.8 °F (37.1 °C) 98.1 °F (36.7 °C) 100 °F (37.8 °C)    SpO2: 99% 96% 100%        PAIN    Pain Assessment    Pain Intensity 1: 0 (09/27/22 0443) Pain Intensity 1: 2 (12/29/14 1105)    Pain Location 1: Back Pain Location 1: Abdomen    Pain Intervention(s) 1: Medication (see MAR) Pain Intervention(s) 1: Medication (see MAR)  Patient Stated Pain Goal: 0 Patient Stated Pain Goal: 0  Intervention effective: yes  Other actions taken for pain:      Skin Assessment  Skin color Skin Color: Appropriate for ethnicity  Condition/Temperature Skin Condition/Temp: Dry, Warm  Integrity Skin Integrity: Incision (comment) (honeycomb dressing x3 C/D/I)  Turgor    Weekly Pressure Ulcer Documentation  Pressure  Injury Documentation: No Pressure Injury Noted-Pressure Ulcer Prevention Initiated  Wound Prevention & Protection Methods  Orientation of wound Orientation of Wound Prevention: Posterior  Location of Prevention Location of Wound Prevention: Sacrum/Coccyx  Dressing Present Dressing Present : No  Dressing Status    Wound Offloading Wound Offloading (Prevention Methods): Bed, pressure reduction mattress, Bed, pressure redistribution/air, Elevate heels, Pillows, Repositioning, Turning, Wheelchair    INTAKE/OUPUT  Date 09/26/22 0700 - 09/27/22 0659 09/27/22 0700 - 09/28/22 0659   Shift 0700-1859 6049-4350 24 Hour Total 8333-5017 9987-4146 24 Hour Total   INTAKE   P.O. 5177 823 9550        P.O. 9105 404 2956      Shift Total 5712 969 4667      OUTPUT   Urine  0 0        Urine Voided  0 0        Urine Occurrence(s) 4 x 4 x 8 x      Stool           Stool Occurrence(s) 0 x 0 x 0 x      Shift Total  0 0      NET 3165 055 1956      Weight (kg)             Recommendations:  Patient needs and requests: none at this time    Pending tests/procedures: none at this time     Functional Level/Equipment: Partial (one person) /      Fall Precautions:   Fall risk precautions were reinforced with the patient; she was instructed to call for help prior to getting up. The following fall risk precautions were continued: bed/ chair alarms, door signage, yellow bracelet and socks as well as update of the [employer] tool in the patient's room. Reta Score: 4    HEALS Safety Check    A safety check occurred in the patient's room between off going nurse and oncoming nurse listed above. The safety check included the below items  Area Items   H  High Alert Medications Verify all high alert medication drips (heparin, PCA, etc.)   E  Equipment Suction is set up for ALL patients (with yanker)  Red plugs utilized for all equipment (IV pumps, etc.)  WOWs wiped down at end of shift.   Room stocked with oxygen, suction, and other unit-specific supplies   A  Alarms Bed alarm is set for fall risk patients  Ensure chair alarm is in place and activated if patient is up in a chair   L  Lines Check IV for any infiltration  Hughes bag is empty if patient has a Hughes   Tubing and IV bags are labeled   S  Safety   Room is clean, patient is clean, and equipment is clean. Hallways are clear from equipment besides carts. Fall bracelet on for fall risk patients  Ensure room is clear and free of clutter  Suction is set up for ALL patients (with chloe)  Hallways are clear from equipment besides carts.    Isolation precautions followed, supplies available outside room, sign posted     Otha Schwab, RN

## 2022-09-27 NOTE — PROGRESS NOTES
Problem: Self Care Deficits Care Plan (Adult)  Goal: *Therapy Goal (Edit Goal, Insert Text)  Description: Occupational Therapy Goals   Long Term Goals  Initiated 2022 and to be accomplished within 3 week(s)  1. Pt will perform self-feeding with [place]. 2. Pt will perform grooming with MOD I standing at sink. 3. Pt will perform UB bathing with MOD I.  4. Pt will perform LB bathing with MOD I.  5. Pt will perform tub/shower transfer with MOD I.   6. Pt will perform UB dressing with MOD I.  7. Pt will perform LB dressing with MOD I.  8. Pt will perform toileting task with MOD I.  9. Pt will perform toilet transfer with MOD I. Short Term Goals   Initiated 2022 and to be accomplished within 7 day(s)  1. Pt will perform grooming task while standing at sink x2 minutes  2. Pt will perform UB bathing with setup assist.  3. Pt will perform LB bathing with MIN A, use of AE prn.  4. Pt will perform tub/shower transfer with MIN A to TTB. 5. Pt will perform LB dressing with MOD A.  6. Pt will perform toileting task with MIN A.  7. Pt will perform toilet transfer with MIN A. Outcome: Progressing Towards Goal  Goal: Interventions  Outcome: Progressing Towards Goal   Occupational Therapy TREATMENT    Patient: Cici Massey   67 y.o. Patient identified with name and : yes    Date: 2022    First Tx Session  Time In: 0930  Time Out[de-identified] 1100        Diagnosis: Impaired mobility and ADLs [Z74.09, Z78.9]   Precautions: Fall, Back, Spinal  Chart, occupational therapy assessment, plan of care, and goals were reviewed. Pain:  Pt reports 7/10 pain or discomfort prior to treatment. Pt reports -/10 pain or discomfort post treatment. Intervention Provided: RN aware and administered pt medications prior to therapist arrival      SUBJECTIVE:   Patient stated This is fun. I love these group sessions.     OBJECTIVE DATA SUMMARY:     THERAPEUTIC ACTIVITY Daily Assessment    Pt engaged in dynamic standing activity which involved grasping small tokens and securing into vertical slot to play game of connect 4. Pt required min VC's for depth perception and visual perceptual skills to drop token into slot and demonstrated standing tolerance of 2 min. 20 sec with CGA. Second rep: pt demonstrated standing tolerance of 1 min. 30 sec. With CGA to stand and modA for sit to stand. Pt engaged in balloon toss activity which involved tapping balloon to therapist for duration of 5 minutes for warm-up, as tolerated. Pt required RB's in between sets and performed standing as tolerated due to pain in RLE. GROUP Daily Assessment    Pt engaged in group treatment with 2 other patients which involved performing core strengthening with bilateral hands holding ball and 1 # wrist weights (chest press, shoulder flexion/extension) 5x15 reps with RB's in between sets and therabar strengthening in 3 directions (supination/pronation, IR/ER) 3x15 reps. GROOMING Daily Assessment        Pt performed grooming at sink side with setup       Oral hygiene: 5       MOBILITY/TRANSFERS Daily Assessment        Pt performed sit to stand from w/c to FWW with modA and VC's for sequencing and technique. Pt performed functional mobility from pt room to therapy gym with supervision and min VC's. ASSESSMENT:  Pt is making progress toward goals however negatively impacted by pain. Pt requires verbal reminders for spinal precautions and safety with functional transfers. Pt is demonstrating increased standing tolerance this date for therac activities and increasing BUE strength for carryover for self cares. Progression toward goals:  []          Improving appropriately and progressing toward goals  [x]          Improving slowly and progressing toward goals  []          Not making progress toward goals and plan of care will be adjusted     PLAN:  Patient continues to benefit from skilled intervention to address the above impairments. Continue treatment per established plan of care. Discharge Recommendations:  Home Health  Further Equipment Recommendations for Discharge:  TBD pending progress     Activity Tolerance:  good      Estimated LOS:TBD        Please refer to the flowsheet for vital signs taken during this treatment. After treatment:   [x]  Patient left in no apparent distress sitting up in chair   []  Patient left in no apparent distress in bed  [x]  Call bell left within reach  []  Nursing notified  []  Caregiver present  []  Bed alarm activated    COMMUNICATION/EDUCATION:   [] Home safety education was provided and the patient/caregiver indicated understanding. [] Patient/family have participated as able in goal setting and plan of care. [x] Patient/family agree to work toward stated goals and plan of care. [] Patient understands intent and goals of therapy, but is neutral about his/her participation. [] Patient is unable to participate in goal setting and plan of care.       Mervin Longo, OT

## 2022-09-27 NOTE — REHAB NOTE
[address]  [address] [place] [address], [address]     [address]  OVERALL PLAN OF CARE    Name: Angela Camacho CSN: [id number]   Age: 67 y.o. MRN: [id number]   Sex: female Admit Date: 9/23/2022       Primary Rehabilitation Diagnosis  1. Impaired mobility and ADLs secondary to #2  2. Lumbar spinal stenosis without neurological claudication status post anterior lumbar interbody fusion, L4-L5 and L5-S1 1 prone transpoas fusion    ANTICIPATED INTERVENTIONS THAT SUPPORT THE MEDICAL NECESSITY OF THIS ADMISSION:    I. Physical Therapy              A. Intensity: 3 hour per day              B. Frequency: 5 times per week              C. Duration: 2 weeks              D. Long Term Goals:      Initiated 9/24/2022 and to be accomplished within 21 day(s) on 10/14/2022  1. Patient will move from supine to sit and sit to supine , scoot up and down, and roll side to side in bed with supervision/set-up. 2.  Patient will transfer from bed to chair and chair to bed with supervision/set-up using the least restrictive device. 3.  Patient will perform sit to stand with supervision/set-up. 4.  Patient will ambulate with supervision/set-up for 150 feet with the least restrictive device. 5.  Patient will ascend/descend 4 stairs with 1 handrail(s) with CG/SBA.     E. Interventions:   Pt would benefit from skilled physical therapy in order to improve independent functional mobility within the home. Interventions may include range of motion (AROM, PROM B LE/trunk), motor function (B LE/trunk strengthening/coordination), activity tolerance (vitals, oxygen saturation levels), bed mobility training, balance activities, gait training (progressive ambulation program), wheelchair management and functional transfer training.  Patient would also benefit from concurrent and group therapy sessions to promote increased participation in skilled therapy interventions and to provide opportunities for increased social interaction. II. Occupational Therapy              A. Intensity: 3 hour per day              B. Frequency: 5 times per week              C. Duration: 2 weeks              D. Long Term goals:      Initiated 09/24/2022 and to be accomplished within 3 week(s)  1. Pt will perform self-feeding with [place]. 2. Pt will perform grooming with MOD I standing at sink. 3. Pt will perform UB bathing with MOD I.  4. Pt will perform LB bathing with MOD I.  5. Pt will perform tub/shower transfer with MOD I.   6. Pt will perform UB dressing with MOD I.  7. Pt will perform LB dressing with MOD I.  8. Pt will perform toileting task with MOD I.  9. Pt will perform toilet transfer with MOD I.     E. Interventions:   Pt would benefit from skilled occupational therapy in order to improve independent functional mobility/ADLs,/IADLs within the home. Interventions may include range of motion (AROM, PROM B UE), motor function (B UE/ strengthening/coordination), activity tolerance (vitals, oxygen saturation levels), balance training, ADL/IADL training and functional transfer training. PHYSICIAN'S ASSESSMENT OF FINDINGS:    Are the established goals sufficient for achieving the optimal level of function? [x]  Yes      []  No    What changes would you recommend to the goals as written? None      Are the interventions noted sufficient for achieving the optimal level of function? [x]  Yes      []  No    What changes would you recommend to the interventions noted? If therapy staff is unable to provide 3 hr of total therapy per day in 5 days due to medical issues or decreased patient tolerance, may modify treatment schedule to 15 hr/week.       Estimated length of stay: 2 weeks      Medical rehabilitation prognosis:    []  Excellent     [x]  Good     []  Fair     []  Guarded       Discharge Destination:     [x]  Home     []  [address]     []  [place]     []  [facility] [address]     []  [place]     []  Other:       Signed:    Brian Mcintosh MD    September 27, 2022

## 2022-09-27 NOTE — PROGRESS NOTES
Problem: Mobility Impaired (Adult and Pediatric)  Goal: *Therapy Goal (Edit Goal, Insert Text)  Description: Physical Therapy Short Term Goals  Initiated 9/24/2022 and to be accomplished within 7 day(s) on 9/30/2022  1. Patient will move from supine to sit and sit to supine , scoot up and down, and roll side to side in bed with minimal assistance/contact guard assist.    2.  Patient will transfer from bed to chair and chair to bed with minimal assistance/contact guard assist using the least restrictive device. 3.  Patient will perform sit to stand with moderate assistance . 4. Patient will ambulate with minimal assistance/contact guard assist for 150 feet with the least restrictive device. 5.  Patient will ascend/descend 4 stairs with 1 handrail(s) with moderate assistance . Physical Therapy Long Term Goals  Initiated 9/24/2022 and to be accomplished within 21 day(s) on 10/14/2022  1. Patient will move from supine to sit and sit to supine , scoot up and down, and roll side to side in bed with supervision/set-up. 2.  Patient will transfer from bed to chair and chair to bed with supervision/set-up using the least restrictive device. 3.  Patient will perform sit to stand with supervision/set-up. 4.  Patient will ambulate with supervision/set-up for 150 feet with the least restrictive device. 5.  Patient will ascend/descend 4 stairs with 1 handrail(s) with CG/SBA. Outcome: Progressing Towards Goal   PHYSICAL THERAPY TREATMENT    Patient: Zarina Pascual (34 y.o. female)  Date: 9/27/2022  Diagnosis: Impaired mobility and ADLs [Z74.09, Z78.9]   Precautions: Fall, Back, Spinal  Chart, physical therapy assessment, plan of care and goals were reviewed. Time [id number]  Time [id number]    Patient seen for: Gait training;Patient education; Therapeutic exercise;Transfer training; Wheelchair mobility    Pain:  Patient reporting moderate pain in right LE during session, treatment modified.      Patient identified with name and : yes    SUBJECTIVE:      Patient agreeable to session. Reporting that she has been feeling quite tired and worn out but is not sure if that is from running a fever in the middle of the night. OBJECTIVE DATA SUMMARY:    Objective:       BED/MAT MOBILITY Daily Assessment     Rolling Left : 4 (Minimal assistance) (using bedrail to assist)  Supine to Sit : 3 (Moderate assistance)    Mod A for bed mobility with assistance at legs required, CGA at upper trunk, using bedrail and cues for logrolling technique. TRANSFERS Daily Assessment     Transfer Type: Other  Other: stand step with RW  Transfer Assistance : 3 (Moderate assistance )  Sit to Stand Assistance: Maximum assistance    Continues to require lifting assistance from non-elevated surfaces including wheelchair. Cues for pushing from armrest of wheelchair for increased UE leverage to assist with sit to stand. Emphasis on improved foot positioning and on increased stand to sit control as patient with poor eccentric control. Patient performing sit to stands from 26\" height mat table x 6 reps, 2 bouts with seated rest in between bouts on mat. Then last set of 6 reps from 25\" height mat table. From elevated surface, patient with bilat UE at distal thighs to encourage increased LE functional use and strengthening. Patient needing cues for safe approach to transfer surface with RW, cues for taking small steps to avoid twisting motion. GAIT Daily Assessment    Gait Description (WDL) Exceptions to WDL    Gait Abnormalities Decreased step clearance (forward flexed trunk and slow gait speed; cues for more upright trunk)    Assistive device Gait belt;Walker, rolling    Ambulation assistance - level surface 4 (Minimal assistance)    Distance 50 Feet (ft)    Ambulation assistance- uneven surface  (NT due to safety concern)    Comments Gait for 50 to 75 ft bouts.  Cues for stepping appropriately into middle of RW for safety and to avoid significant forward flexion of trunk. STEPS/STAIRS Daily Assessment     Steps/Stairs ambulated      Assistance Required 0 (Not tested)    Rail Use      Comments  NT due to safety concern today    Curbs/Ramps  (NT due to safety concern)        BALANCE Daily Assessment     Sitting - Static: Good (unsupported)  Sitting - Dynamic: Fair (occasional)  Standing - Static: Fair  Standing - Dynamic : Impaired        WHEELCHAIR MOBILITY Daily Assessment     Able to Propel (ft): 75 feet  Functional Level:  (min A)  Curbs/Ramps Assist Required (FIM Score): 0 (Not tested)  Wheelchair Setup Assist Required : 3 (Moderate assistance)  Wheelchair Management: Manages left brake;Manages right brake    Using bilat UE and LE to propel         THERAPEUTIC EXERCISES Daily Assessment            Seated  EXERCISE   Sets   Reps   Active Active Assist   Comments   Ankle Pumps     []  []      Long Arc Quads 3 10 [x]  []   no weight, assistance right LE for support, not performing full available ROM against gravity right LE   Seated Marching     []  []      Heel slides     []  []      Hip Abd/ER clamshells w/ T-band 3  15  [x]  []   red Tband   Hip Abd SLRs     []  []      Hip Add Isometrics 3 10 [x]  []  3 sec holds   Heel taps on floor     []  []      Wheelchair pushups     []  []             ASSESSMENT:  Patient continues to require significant assistance for sit to stand due to decreased functional LE weakness. Ongoing fatigue and increased pain observed during treatment requiring PT to modify activities and exercises to avoid increased pain. Progression toward goals:  []      Improving appropriately and progressing toward goals  [x]      Improving slowly and progressing toward goals  []      Not making progress toward goals and plan of care will be adjusted      PLAN:  Patient continues to benefit from skilled intervention to address the above impairments. Continue treatment per established plan of care.   Discharge Recommendations:  Home Health and caregiver training  Further Equipment Recommendations for Discharge:  REGINE w/c      Estimated Discharge Date: 10/14/2022      Activity Tolerance:   Fair due to fatigue and pain    After treatment:   [] Patient left in no apparent distress in bed  [x] Patient left in no apparent distress sitting up in chair  [] Patient left in no apparent distress in w/c mobilizing under own power  [] Patient left in no apparent distress dining area  [] Patient left in no apparent distress mobilizing under own power  [x] Call bell left within reach  [x] Nursing notified  [] Caregiver present  [] Bed alarm activated   [x] Chair alarm activated      Ceci Leon, PT  9/27/2022

## 2022-09-27 NOTE — PROGRESS NOTES
Problem: Falls - Risk of  Goal: *Absence of Falls  Description: Document Lettie Duverney Fall Risk and appropriate interventions in the flowsheet.   Outcome: Progressing Towards Goal  Note: Fall Risk Interventions:  Mobility Interventions: Bed/chair exit alarm, Patient to call before getting OOB, Utilize walker, cane, or other assistive device    Mentation Interventions: Bed/chair exit alarm, Door open when patient unattended, Room close to nurse's station    Medication Interventions: Bed/chair exit alarm, Patient to call before getting OOB, Teach patient to arise slowly    Elimination Interventions: Bed/chair exit alarm, Call light in reach, Patient to call for help with toileting needs, Stay With Me (per policy)    History of Falls Interventions: Bed/chair exit alarm, Door open when patient unattended, Room close to nurse's station         Problem: Patient Education: Go to Patient Education Activity  Goal: Patient/Family Education  Outcome: Progressing Towards Goal     Problem: Pain  Goal: *Control of Pain  Outcome: Progressing Towards Goal     Problem: Patient Education: Go to Patient Education Activity  Goal: Patient/Family Education  Outcome: Progressing Towards Goal     Problem: Inpatient Rehab (Adult)  Goal: *LTG: Avoids injury/falls 100% of time related to deficits  Outcome: Progressing Towards Goal  Goal: *LTG: Avoids infection 100% of time related to deficits  Outcome: Progressing Towards Goal  Goal: *LTG: Verbalize understanding of diagnosis and risk factors for recurring stroke  Outcome: Progressing Towards Goal  Goal: *LTG: Absence of DVT during hospitalization  Outcome: Progressing Towards Goal  Goal: *LTG: Maintains Skin Integrity With No Evidence of Pressure Injury 100% of Time  Outcome: Progressing Towards Goal  Goal: Interventions  Outcome: Progressing Towards Goal     Problem: Patient Education: Go to Patient Education Activity  Goal: Patient/Family Education  Outcome: Progressing Towards Goal Problem: Pressure Injury - Risk of  Goal: *Prevention of pressure injury  Description: Document Rashid Scale and appropriate interventions in the flowsheet. Outcome: Progressing Towards Goal  Note: Pressure Injury Interventions:  Sensory Interventions: Assess changes in LOC, Check visual cues for pain, Pad between skin to skin, Turn and reposition approx.  every two hours (pillows and wedges if needed), Keep linens dry and wrinkle-free, Minimize linen layers    Moisture Interventions: Absorbent underpads, Minimize layers, Moisture barrier, Apply protective barrier, creams and emollients    Activity Interventions: Increase time out of bed, Pressure redistribution bed/mattress(bed type)    Mobility Interventions: HOB 30 degrees or less, Pressure redistribution bed/mattress (bed type)    Nutrition Interventions: Document food/fluid/supplement intake    Friction and Shear Interventions: HOB 30 degrees or less, Foam dressings/transparent film/skin sealants, Apply protective barrier, creams and emollients, Minimize layers                Problem: Patient Education: Go to Patient Education Activity  Goal: Patient/Family Education  Outcome: Progressing Towards Goal

## 2022-09-27 NOTE — PROGRESS NOTES
[facility] Hospitalist Group  Progress Note    Patient: Genny Dickson Age: 67 y.o. : 1950 MR#: [id number] SSN: [id number]  Date/Time: 2022     Subjective: Patient feels fine, slight pain lower calf area with physical therapy, no pain at rest, continues to deny any dysuria, no urgency or frequency of urination, no diarrhea, no shortness of breath, no cough. Continues to have low-grade fever, overnight 100 °F T-max     Assessment/Plan:   1. Impaired mobility and ADLs secondary to #2  2. Lumbar spinal stenosis without neurological claudication status post anterior lumbar interbody fusion, L4-L5 and L5-S1 1 prone transpoas fusion  3. Diabetes mellitus  4. Hypertension  5. Osteoarthritis  6. Low-grade fever    MEDICAL PLAN:     1. Impaired mobility and ADLs secondary to #2  Continue PT and OT     2. Low-grade fever: All cultures negative, no signs of infection. Will get lower extremity duplex to rule out DVT      3. Lumbar spinal stenosis without neurological claudication status post anterior lumbar interbody fusion, L4-L5 and L5-S1 1 prone transpoas fusion  Continue Tylenol and gabapentin for pain management  Continue Roxicodone as needed for moderate to severe pain  Continue bowel protocol  Continue MiraLAX  Discussed with spine surgery Dr. Samantha morales to start Lovenox for DVT prophylaxis. 4.  Diabetes mellitus:  Patient was on metformin  Continuously sliding scale    5. Hypertension:  Continue hold HCTZ and monitor  Continue hydralazine as needed    6. Osteoarthritis    7. Dyslipidemia:  Continue Lipitor    Discussed with RN about duplex study  Discussed with patient at the bedside and explained about my above plan care.     Case discussed with:  [x]Patient  [x]Family  [x]Nursing  []Case Management  DVT Prophylaxis:  [x]Lovenox  []Hep SQ  [x]SCDs  []Coumadin   []Eliquis/Xarelto     Objective:   VS: Visit Vitals  /86 (BP 1 Location: Left upper arm, BP Patient Position: At rest)   Pulse 93   Temp 99.1 °F (37.3 °C)   Resp 19   SpO2 99%      Tmax/24hrs: Temp (24hrs), Av.1 °F (37.3 °C), Min:98.1 °F (36.7 °C), Max:100 °F (37.8 °C)  IOBRIEF  Intake/Output Summary (Last 24 hours) at 2022 1405  Last data filed at 2022 0936  Gross per 24 hour   Intake 960 ml   Output 0 ml   Net 960 ml       General:  Alert, cooperative, no acute distress    Pulmonary:  CTA Bilaterally. No Wheezing/Rales. Cardiovascular: Regular rate and Rhythm. GI:  Soft, Non distended, Non tender. + Bowel sounds. Extremities:  No edema. Slight tenderness just above the Achilles tendon on right leg. No swelling. Psych: Good insight. Not anxious or agitated. Neurologic: Alert and oriented X 3. Moves all ext.   Additional: Back dressing clean, no erythema redness    Medications:   Current Facility-Administered Medications   Medication Dose Route Frequency    enoxaparin (LOVENOX) injection 40 mg  40 mg SubCUTAneous Q24H    famotidine (PEPCID) tablet 20 mg  20 mg Oral BID    [Held by provider] hydroCHLOROthiazide (HYDRODIURIL) tablet 12.5 mg  12.5 mg Oral DAILY    ondansetron (ZOFRAN ODT) tablet 4 mg  4 mg Oral Q8H PRN    acetaminophen (TYLENOL) tablet 500 mg  500 mg Oral Q6H PRN    metoprolol tartrate (LOPRESSOR) tablet 12.5 mg  12.5 mg Oral Q12H    docusate sodium (COLACE) capsule 100 mg  100 mg Oral BID    magnesium hydroxide (MILK OF MAGNESIA) 400 mg/5 mL oral suspension 30 mL  30 mL Oral DAILY PRN    bisacodyL (DULCOLAX) tablet 10 mg  10 mg Oral Q48H PRN    bisacodyL (DULCOLAX) suppository 10 mg  10 mg Rectal Q48H PRN    hydrALAZINE (APRESOLINE) tablet 25 mg  25 mg Oral Q6H PRN    cyclobenzaprine (FLEXERIL) tablet 5 mg  5 mg Oral TID PRN    gabapentin (NEURONTIN) capsule 300 mg  300 mg Oral TID    therapeutic multivitamin (THERAGRAN) tablet 1 Tablet  1 Tablet Oral DAILY    oxyCODONE IR (ROXICODONE) tablet 5 mg  5 mg Oral Q6H PRN    atorvastatin (LIPITOR) tablet 20 mg  20 mg Oral QHS insulin lispro (HUMALOG) injection   SubCUTAneous AC&HS    glucose chewable tablet 16 g  4 Tablet Oral PRN    glucagon (GLUCAGEN) injection 1 mg  1 mg IntraMUSCular PRN    dextrose 10% infusion 0-250 mL  0-250 mL IntraVENous PRN       Labs:    Recent Results (from the past 24 hour(s))   GLUCOSE, POC    Collection Time: 09/26/22  4:49 PM   Result Value Ref Range    Glucose (POC) 135 (H) 70 - 110 mg/dL   GLUCOSE, POC    Collection Time: 09/26/22  9:40 PM   Result Value Ref Range    Glucose (POC) 170 (H) 70 - 296 mg/dL   METABOLIC PANEL, BASIC    Collection Time: 09/27/22  5:06 AM   Result Value Ref Range    Sodium 139 136 - 145 mmol/L    Potassium 3.3 (L) 3.5 - 5.5 mmol/L    Chloride 102 100 - 111 mmol/L    CO2 29 21 - 32 mmol/L    Anion gap 8 3.0 - 18 mmol/L    Glucose 123 (H) 74 - 99 mg/dL    BUN 13 7.0 - 18 MG/DL    Creatinine 0.75 0.6 - 1.3 MG/DL    BUN/Creatinine ratio 17 12 - 20      GFR est AA >60 >60 ml/min/1.73m2    GFR est non-AA >60 >60 ml/min/1.73m2    Calcium 8.6 8.5 - 10.1 MG/DL   CBC WITH AUTOMATED DIFF    Collection Time: 09/27/22  5:06 AM   Result Value Ref Range    WBC 10.9 4.6 - 13.2 K/uL    RBC 2.69 (L) 4.20 - 5.30 M/uL    HGB 7.5 (L) 12.0 - 16.0 g/dL    HCT 24.1 (L) 35.0 - 45.0 %    MCV 89.6 78.0 - 100.0 FL    MCH 27.9 24.0 - 34.0 PG    MCHC 31.1 31.0 - 37.0 g/dL    RDW 14.7 (H) 11.6 - 14.5 %    PLATELET 201 099 - 100 K/uL    MPV 10.4 9.2 - 11.8 FL    NRBC 0.0 0  WBC    ABSOLUTE NRBC 0.00 0.00 - 0.01 K/uL    NEUTROPHILS 67 40 - 73 %    LYMPHOCYTES 22 21 - 52 %    MONOCYTES 9 3 - 10 %    EOSINOPHILS 2 0 - 5 %    BASOPHILS 0 0 - 2 %    IMMATURE GRANULOCYTES 0 0.0 - 0.5 %    ABS. NEUTROPHILS 7.3 1.8 - 8.0 K/UL    ABS. LYMPHOCYTES 2.4 0.9 - 3.6 K/UL    ABS. MONOCYTES 1.0 0.05 - 1.2 K/UL    ABS. EOSINOPHILS 0.2 0.0 - 0.4 K/UL    ABS. BASOPHILS 0.0 0.0 - 0.1 K/UL    ABS. IMM.  GRANS. 0.0 0.00 - 0.04 K/UL    DF MANUAL      PLATELET COMMENTS ADEQUATE PLATELETS      RBC COMMENTS NORMOCYTIC, NORMOCHROMIC     GLUCOSE, POC    Collection Time: 09/27/22  7:39 AM   Result Value Ref Range    Glucose (POC) 113 (H) 70 - 110 mg/dL   GLUCOSE, POC    Collection Time: 09/27/22 11:55 AM   Result Value Ref Range    Glucose (POC) 177 (H) 70 - 110 mg/dL       Signed By: Rose Decker MD     September 27, 2022      Disclaimer: Sections of this note are dictated using utilizing voice recognition software. Minor typographical errors may be present. If questions arise, please do not hesitate to contact me or call our department.

## 2022-09-27 NOTE — REHAB NOTE
Wound Prevention Checklist    Patient: Yury Torres (9 y.o. female)  Date: 9/27/2022  Diagnosis: Impaired mobility and ADLs [Z74.09, Z78.9] <principal problem not specified>    Precautions: Fall, Back, Spinal       []  Heel prevention boots placed on patient    []  Patient turned q2h during shift    []  Lift team ordered    []  Patient on [place] bed/Specialty bed    []  Each Wound is documented during shift (Stage, Color, drainage, odor, measurements, and dressings)    []  Dual skin check done with Al Nuno RN

## 2022-09-27 NOTE — ROUTINE PROCESS
0800 Pt. Awake sitting up in bed no change in assessment pt. Reported to be feeling fine. 0930 Pt. Sitting up in chair eating breakfast.   1200 with therapy. 1330 able to transfer from bed to chair with assist.  1500 no change in assessment. 1800 Pt. Sitting up in be eating dinner.

## 2022-09-27 NOTE — ROUTINE PROCESS
SHIFT CHANGE NOTE FOR [facility]    Bedside and Verbal shift change report given to Stephan Hummel RN (oncoming nurse) by Nati Kilgore RN (offgoing nurse). Report included the following information SBAR, Kardex, MAR and Recent Results.     Situation:   Code Status: Full Code   Hospital Day: 4   Problem List:   Hospital Problems  Never Reviewed            Codes Class Noted POA    Impaired mobility and ADLs ICD-10-CM: Z74.09, Z78.9  ICD-9-CM: V49.89  9/23/2022 Unknown           Background:   Past Medical History:   Past Medical History:   Diagnosis Date    Arthritis     back and hands    Chronic pain     back    COVID-19     Summer of  2021    Diabetes ([facility] [address]     NIDDM    Hypertension     Nausea & vomiting     Surgery only not colonoscopy        Assessment:  Changes in Assessment throughout shift: No change to previous assessment    Patient has a central line: no Reasons if yes: na  Insertion date:na Last dressing date:na  Patient has Aly Cath: no Reasons if yes: na   Insertion date:na  Shift aly care completed: NO    Last Vitals:     Vitals:    09/26/22 2030 09/26/22 2138 09/27/22 0731 09/27/22 1600   BP: (!) 106/58  135/86 124/71   Pulse: (!) 102 92 93 92   Resp: 18  19 18   Temp: 100 °F (37.8 °C)  99.1 °F (37.3 °C) 97.9 °F (36.6 °C)   SpO2: 100%  99% 100%       PAIN    Pain Assessment    Pain Intensity 1: 0 (09/27/22 1600) Pain Intensity 1: 2 (12/29/14 1105)    Pain Location 1: Back Pain Location 1: Abdomen    Pain Intervention(s) 1: Medication (see MAR) Pain Intervention(s) 1: Medication (see MAR)  Patient Stated Pain Goal: 0 Patient Stated Pain Goal: 0  Intervention effective: yes  Other actions taken for pain: Medication (see MAR)    Skin Assessment  Skin color Skin Color: Appropriate for ethnicity  Condition/Temperature Skin Condition/Temp: Dry, Warm  Integrity Skin Integrity: Incision (comment)  Turgor    Weekly Pressure Ulcer Documentation  Pressure  Injury Documentation: No Pressure Injury Noted-Pressure Ulcer Prevention Initiated  Wound Prevention & Protection Methods  Orientation of wound Orientation of Wound Prevention: Posterior  Location of Prevention Location of Wound Prevention: Sacrum/Coccyx  Dressing Present Dressing Present : No  Dressing Status    Wound Offloading Wound Offloading (Prevention Methods): Bed, pressure reduction mattress, Bed, pressure redistribution/air, Elevate heels, Pillows, Repositioning, Turning, Wheelchair    INTAKE/OUPUT  Date 09/26/22 0700 - 09/27/22 0659 09/27/22 0700 - 09/28/22 0659   Shift 0700-1859 1900-0659 24 Hour Total 0289-6934 4844-1622 24 Hour Total   INTAKE   P.O. 1875 766 9449 1200  1200     P. O. 8867 882 1929 1200  1200   Shift Total 1257 156 3016 1200  1200   OUTPUT   Urine  0 0 500  500     Urine Voided  0 0 500  500     Urine Occurrence(s) 4 x 5 x 9 x 4 x  4 x   Stool           Stool Occurrence(s) 0 x 0 x 0 x 0 x  0 x   Shift Total  0 0 500  500   NET 4171 298 2166 700  700   Weight (kg)             Recommendations:  Patient needs and requests: na    Pending tests/procedures: na     Functional Level/Equipment: Partial (one person) / Bed (comment)    Fall Precautions:   Fall risk precautions were reinforced with the patient; she was instructed to call for help prior to getting up. The following fall risk precautions were continued: bed/ chair alarms, door signage, yellow bracelet and socks as well as update of the Barlow Blades tool in the patient's room. Erta Score: 4    HEALS Safety Check    A safety check occurred in the patient's room between off going nurse and oncoming nurse listed above. The safety check included the below items  Area Items   H  High Alert Medications Verify all high alert medication drips (heparin, PCA, etc.)   E  Equipment Suction is set up for ALL patients (with yanker)  Red plugs utilized for all equipment (IV pumps, etc.)  WOWs wiped down at end of shift.   Room stocked with oxygen, suction, and other unit-specific supplies   A  Alarms Bed alarm is set for fall risk patients  Ensure chair alarm is in place and activated if patient is up in a chair   L  Lines Check IV for any infiltration  Hughes bag is empty if patient has a Hughes   Tubing and IV bags are labeled   S  Safety   Room is clean, patient is clean, and equipment is clean. Hallways are clear from equipment besides carts. Fall bracelet on for fall risk patients  Ensure room is clear and free of clutter  Suction is set up for ALL patients (with yanker)  Hallways are clear from equipment besides carts.    Isolation precautions followed, supplies available outside room, sign posted     Mo Blackburn RN

## 2022-09-28 LAB
ANION GAP SERPL CALC-SCNC: 8 MMOL/L (ref 3–18)
BUN SERPL-MCNC: 12 MG/DL (ref 7–18)
BUN/CREAT SERPL: 18 (ref 12–20)
CALCIUM SERPL-MCNC: 8.7 MG/DL (ref 8.5–10.1)
CHLORIDE SERPL-SCNC: 102 MMOL/L (ref 100–111)
CO2 SERPL-SCNC: 30 MMOL/L (ref 21–32)
CREAT SERPL-MCNC: 0.67 MG/DL (ref 0.6–1.3)
GLUCOSE BLD STRIP.AUTO-MCNC: 110 MG/DL (ref 70–110)
GLUCOSE BLD STRIP.AUTO-MCNC: 114 MG/DL (ref 70–110)
GLUCOSE BLD STRIP.AUTO-MCNC: 124 MG/DL (ref 70–110)
GLUCOSE BLD STRIP.AUTO-MCNC: 130 MG/DL (ref 70–110)
GLUCOSE SERPL-MCNC: 120 MG/DL (ref 74–99)
POTASSIUM SERPL-SCNC: 3.9 MMOL/L (ref 3.5–5.5)
SODIUM SERPL-SCNC: 140 MMOL/L (ref 136–145)

## 2022-09-28 PROCEDURE — 65310000000 HC RM PRIVATE REHAB

## 2022-09-28 PROCEDURE — 82962 GLUCOSE BLOOD TEST: CPT

## 2022-09-28 PROCEDURE — 74011250636 HC RX REV CODE- 250/636: Performed by: HOSPITALIST

## 2022-09-28 PROCEDURE — 97116 GAIT TRAINING THERAPY: CPT

## 2022-09-28 PROCEDURE — 97530 THERAPEUTIC ACTIVITIES: CPT

## 2022-09-28 PROCEDURE — 74011250637 HC RX REV CODE- 250/637: Performed by: INTERNAL MEDICINE

## 2022-09-28 PROCEDURE — 97150 GROUP THERAPEUTIC PROCEDURES: CPT

## 2022-09-28 PROCEDURE — 74011250637 HC RX REV CODE- 250/637: Performed by: HOSPITALIST

## 2022-09-28 PROCEDURE — 80048 BASIC METABOLIC PNL TOTAL CA: CPT

## 2022-09-28 PROCEDURE — 36415 COLL VENOUS BLD VENIPUNCTURE: CPT

## 2022-09-28 PROCEDURE — 97535 SELF CARE MNGMENT TRAINING: CPT

## 2022-09-28 PROCEDURE — 99232 SBSQ HOSP IP/OBS MODERATE 35: CPT | Performed by: HOSPITALIST

## 2022-09-28 PROCEDURE — 97112 NEUROMUSCULAR REEDUCATION: CPT

## 2022-09-28 RX ADMIN — OXYCODONE HYDROCHLORIDE 5 MG: 5 TABLET ORAL at 08:14

## 2022-09-28 RX ADMIN — DOCUSATE SODIUM 100 MG: 100 CAPSULE, LIQUID FILLED ORAL at 18:17

## 2022-09-28 RX ADMIN — THERA TABS 1 TABLET: TAB at 08:14

## 2022-09-28 RX ADMIN — ATORVASTATIN CALCIUM 20 MG: 40 TABLET, FILM COATED ORAL at 21:40

## 2022-09-28 RX ADMIN — ENOXAPARIN SODIUM 40 MG: 100 INJECTION SUBCUTANEOUS at 13:00

## 2022-09-28 RX ADMIN — GABAPENTIN 300 MG: 300 CAPSULE ORAL at 16:25

## 2022-09-28 RX ADMIN — GABAPENTIN 300 MG: 300 CAPSULE ORAL at 08:14

## 2022-09-28 RX ADMIN — GABAPENTIN 300 MG: 300 CAPSULE ORAL at 21:40

## 2022-09-28 RX ADMIN — METOPROLOL TARTRATE 12.5 MG: 25 TABLET, FILM COATED ORAL at 10:11

## 2022-09-28 RX ADMIN — DOCUSATE SODIUM 100 MG: 100 CAPSULE, LIQUID FILLED ORAL at 08:14

## 2022-09-28 RX ADMIN — OXYCODONE HYDROCHLORIDE 5 MG: 5 TABLET ORAL at 16:25

## 2022-09-28 RX ADMIN — BISACODYL 10 MG: 5 TABLET, COATED ORAL at 18:16

## 2022-09-28 RX ADMIN — METOPROLOL TARTRATE 12.5 MG: 25 TABLET, FILM COATED ORAL at 21:39

## 2022-09-28 RX ADMIN — FAMOTIDINE 20 MG: 20 TABLET ORAL at 08:14

## 2022-09-28 RX ADMIN — FAMOTIDINE 20 MG: 20 TABLET ORAL at 18:16

## 2022-09-28 NOTE — PROGRESS NOTES
[facility]    Bedside and Verbal shift change report given to Marquez Escobar (oncoming nurse) by Otha Schwab, RN (offgoing nurse). Report included the following information SBAR, Kardex, MAR and Recent Results. Situation:   Code Status: Full Code   Hospital Day: 5   Problem List:   Hospital Problems  Never Reviewed            Codes Class Noted POA    Impaired mobility and ADLs ICD-10-CM: Z74.09, Z78.9  ICD-9-CM: V49.89  9/23/2022 Unknown         Background:   Past Medical History:   Past Medical History:   Diagnosis Date    Arthritis     back and hands    Chronic pain     back    COVID-19     Summer of  2021    Diabetes ([facility] [address]     NIDDM    Hypertension     Nausea & vomiting     Surgery only not colonoscopy        Assessment:  Changes in Assessment throughout shift: No change to previous assessment    Patient has a central line: no Reasons if yes: Insertion date: Last dressing date:  Patient has Aly Cath: no Reasons if yes:     Insertion date:  Shift aly care completed:     Last Vitals:     Vitals:    09/26/22 2138 09/27/22 0731 09/27/22 1600 09/27/22 2138   BP:  135/86 124/71 129/77   Pulse: 92 93 92 98   Resp:  19 18 18   Temp:  99.1 °F (37.3 °C) 97.9 °F (36.6 °C) 98.7 °F (37.1 °C)   SpO2:  99% 100% 99%       PAIN    Pain Assessment    Pain Intensity 1: 0 (09/28/22 0534) Pain Intensity 1: 2 (12/29/14 1105)    Pain Location 1: Back Pain Location 1: Abdomen    Pain Intervention(s) 1: Medication (see MAR) Pain Intervention(s) 1: Medication (see MAR)  Patient Stated Pain Goal: 0 Patient Stated Pain Goal: 0  Intervention effective: yes  Other actions taken for pain:      Skin Assessment  Skin color Skin Color: Appropriate for ethnicity  Condition/Temperature Skin Condition/Temp: Dry, Warm  Integrity Skin Integrity: Incision (comment), Wound (add Wound LDA) (incicion x3 (abdomen x2 and back x1))  Turgor    Weekly Pressure Ulcer Documentation  Pressure  Injury Documentation: No Pressure Injury Noted-Pressure Ulcer Prevention Initiated  Wound Prevention & Protection Methods  Orientation of wound Orientation of Wound Prevention: Posterior  Location of Prevention Location of Wound Prevention: Sacrum/Coccyx  Dressing Present Dressing Present : No  Dressing Status    Wound Offloading Wound Offloading (Prevention Methods): Bed, pressure reduction mattress    INTAKE/OUPUT  Date 09/27/22 0700 - 09/28/22 0659 09/28/22 0700 - 09/29/22 0659   Shift 0700-1859 [id number] 24 Hour Total 0700-1859 0857-4434 24 Hour Total   INTAKE   P.O. [phone]        P.O. [phone]      Shift Total [id number]      OUTPUT   Urine 500 0 500        Urine Voided 500 0 500        Urine Occurrence(s) 4 x 6 x 10 x      Stool           Stool Occurrence(s) 0 x 0 x 0 x      Shift Total 500 0 500       [phone]      Weight (kg)               Recommendations:  Patient needs and requests: needs to call surgeon regarding dressing order. AM nurse made aware and will follow up. Pending tests/procedures: none at this time     Functional Level/Equipment: Partial (one person) /      Fall Precautions:   Fall risk precautions were reinforced with the patient; she was instructed to call for help prior to getting up. The following fall risk precautions were continued: bed/ chair alarms, door signage, yellow bracelet and socks as well as update of the [place] Fail tool in the patient's room. Reta Score: 4    HEALS Safety Check    A safety check occurred in the patient's room between off going nurse and oncoming nurse listed above. The safety check included the below items  Area Items   H  High Alert Medications Verify all high alert medication drips (heparin, PCA, etc.)   E  Equipment Suction is set up for ALL patients (with yanker)  Red plugs utilized for all equipment (IV pumps, etc.)  WOWs wiped down at end of shift.   Room stocked with oxygen, suction, and other unit-specific supplies   A  Alarms Bed alarm is set for fall risk patients  Ensure chair alarm is in place and activated if patient is up in a chair   L  Lines Check IV for any infiltration  Hughes bag is empty if patient has a Hughes   Tubing and IV bags are labeled   S  Safety   Room is clean, patient is clean, and equipment is clean. Hallways are clear from equipment besides carts. Fall bracelet on for fall risk patients  Ensure room is clear and free of clutter  Suction is set up for ALL patients (with chloe)  Hallways are clear from equipment besides carts.    Isolation precautions followed, supplies available outside room, sign posted     Shahzad Flores RN

## 2022-09-28 NOTE — CONSULTS
ARU PSYCHOLOGICAL SCREENING    Assessment Initiated: September 28, 2022    Rehab Diagnosis: Spinal Stenosis    Pertinent Physical/Psychiatric History:     Patient Active Problem List   Diagnosis Code    Spinal stenosis of lumbar region at multiple levels M48.061    Impaired mobility and ADLs Z74.09, Z78.9       Patient denies history of mental health services and is not requiring psychotropic medication for stability on admit to ARU. She does not have history of substance abuse nor dependence. OBJECTIVE  GENERAL OBSERVATIONS  Willingness to participate in program: [x] good   [] fair [] indifferent [] poor    General Appearance: Patient is observed casually and appropriately dressed and groomed, sitting upright in wheelchair in bedroom and immediately engaged in dialogue on contact    Sensory Impairments: patient wearing eyeglasses for correction; she is observed able to voluntarily move all extremities; she presents with satisfactory auditory reception and comprehension and responds to all inquiry with intelligibility.     Jehovah's witness Affiliation:  Kelsey Schlatter    Admission Assessment  Discharge Status   [x] alert  [] lethargic  [] difficult to arouse  [] fluctuating  [] other: Level of Consciousness [] alert  [] lethargic  [] difficult to arouse  [] fluctuating  [] other:   [x] person  [x] place  [x] time  [x] situation Oriented [] person  [] place  [] time  [] situation   [x] within normal limits  [] impaired       [] mild        [] moderate        [] severe Attention [] within normal limits  [] impaired       [] mild        [] moderate        [] severe   [x] within normal limits  [x] impaired       [x] mild        [] moderate        [] severe Memory [] within normal limits  [] impaired       [] mild        [] moderate        [] severe   [x] appropriate to situation  [] depressed  [] anxious  [] angry   [] fearful  [] emotionally labile  [x] other: Patient is denying acute feelings of emotional distress but admits to feeling stressed in recovery Mood [] appropriate to situation  [] depressed  [] anxious  [] angry   [] fearful  [] emotionally labile  [] other:   [x] appropriate  [] flat  [] inappropriate to content of speech Affect [] appropriate  [] flat  [] inappropriate to content of speech   [x] appropriate  [] aggressive/agitated  [] withdrawn  [] inappropriate  [x] other: Patient observed calm and composed, neither restless nor agitated Behavior [] appropriate  [] aggressive/agitated  [] withdrawn  [] inappropriate  [] other:   [] good  [x] limited  [] denial  [] none Insight Into Illness [] good  [] limited  [] denial  [] none   [x] intact  [x] impaired       [x] mild        [] moderate        [] severe       Describe: Patient appears to be functioning at or near baseline of ability but will sometimes benefit from cues and prompts for maximum recall Cognition [] intact  [] impaired       [] mild        [] moderate        [] severe       Describe:    [x] coping  [] demonstrates poor adjustment  [] undetermined       As evidenced by: She is relieved to be done with surgery and is motivated to further regain function Patient Adjustment to Disability [] coping  [] demonstrates poor adjustment  [] undetermined       As evidenced by:    [] coping  [] demonstrates poor adjustment  [x] undetermined      As evidenced by: Not available on evaluation but patient feels well supported by her two daughters Family Adjustment to Disability [] coping  [] demonstrates poor adjustment  [] undetermined      As evidenced by:      ASSESSMENT  Clinical Impression: Patient is a 67year old, single, retired (in 2021 after more than three decades with the [employer]), [address] female. She resides with a daughter in [place] residence that is two story with four steps to enter, and having fifteen steps to sleep upstairs but also, already with chair lift.   On discharge, patient suggests she might sleep in RV outside of residence in order to best accommodate herself in her continued recovery. She also has another daughter residing in [place] and patient feels well supported by both. Patient describes actually years of back problems that were impacting her in her employment and culminated in recent surgery and now admit to rehabilitation. She seems to have a good understanding of the parameters of her recovery and that she will need to give herself adequate time to appropriately recovery. She will benefit from further education to best identify realistic goals for herself, especially in relation to return to independence versus dependency in her recovery. Emotionally, patient admits to feeling stressed by the totality of her circumstances and not being accustomed to feeling so dependent. Yet, she denies feeling acute emotional distress and no lability is observed. She further denies history of mental health services. She is not requiring psychotropic medication for stabilization. Patient will be monitored for any emergent, emotional and/or behavioral difficulties that might impact her therapy effort and recovery on ARU. Of note, she was encouraged to try and be proactive in managing pain medication in relation to her scheduled therapy on ARU. By sometimes making sure that she is receiving medication in advance of therapy, she will most likely participate best and not be burdened by unnecessary discomfort. Cognitively, patient is thought to be functioning at her baseline of ability. Yet, she will benefit from periodic cues and prompts for maximum recall of novel information. In general, her attention, reasoning and problem solving abilities appear to be quite functional for her.     Patient Strengths: Alert, oriented, pleasant, cooperative, motivated to improve and hopeful for herself in recovery    Patient Preferences: Patient expects to return to home with support available to her, as needed    Rehab Potential: Good    Educational Needs: Under each heading list the specific items in which the patient or family will need education/training. Example: hip precautions, use of walker, ADL equipment, neglect, judgment, adjustment, etc.     Special considerations or accommodations for teaching:  [x] Yes     [] No     [] NA  If Yes, explain: Timing of pain medications for maximum treatment participation Discharge Status    Completed Demonstrated/ Verbalized Understanding    Yes No Yes No   Info regarding disability:  [] [] [] []   Adjustment:  [] [] [] []   Cognition:  [] [] [] []   Other: [] [] [] []   Other: [] [] [] []   If education not completed, explain: [] [] [] []     PLAN  Problem: Full insight about all parameters of recovery  Long Term Goal: Maximize insight about recovery  Intervention: Patient education  At Discharge - LTG Achieved: [] Yes [] No If not achieved, explain:    Problem: Pain management  Long Term Goal: Effective pain management around therapy sessions  Intervention: Patient prompting for medication, as necessary, with awareness of scheduled therapy  At Discharge - LTG Achieved: [] Yes [] No If not achieved, explain:    Problem: Stress with prolonged illness and now post surgery recovery  Long Term Goal: Minimize subjective stress and distress  Intervention: Support  and patient education, as needed  At Discharge - LTG Achieved: [] Yes [] No If not achieved, explain:    Problem:  Forced dependency  Long Term Goal: Accept dependency versus independence in recovery  Intervention: Patient education and behavioral redirection  At Discharge - LTG Achieved: [] Yes [] No If not achieved, explain:    Problem:   Long Term Goal:   Intervention:   At Discharge - LTG Achieved: [] Yes [] No If not achieved, explain:    Vitor Dominique, THE [facility]  9/28/2022 10:43 AM    DISCHARGE STATUS    Clinical Impressions: Patient only seen for Initial Evaluation on admit to ARU as I was not informed of need for follow up on account of significant behavior nor emotional difficulties occurring rehabilitation effort. Patient able to discharge to home and continue with scheduled outpatient therapies.     Follow-up Services Recommended Purpose                 Erica Babin PHD  Discharge Date/Time:

## 2022-09-28 NOTE — PROGRESS NOTES
Wound Prevention Checklist    Patient: Nanci Salgado (82 y.o. female)  Date: 9/28/2022  Diagnosis: Impaired mobility and ADLs [Z74.09, Z78.9] <principal problem not specified>    Precautions: Fall, Back, Spinal       []  Heel prevention boots placed on patient    [x]  Patient turned q2h during shift    []  Lift team ordered    []  Patient on [place] bed/Specialty bed    []  Each Wound is documented during shift (Stage, Color, drainage, odor, measurements, and dressings)    [x]  Dual skin check done with IVET Menchaca RN

## 2022-09-28 NOTE — PROGRESS NOTES
[x] Psychology  [] Social Work [] Recreational Therapy    INTERVENTION  UNITS/TIME OF SERVICE   Assessment  September 28, 2022   Supportive Counseling    Orientation    Discharge Planning    Resource Linkage              Progress/Current Status    Patient seen for Psychological Evaluation as requested on admission to ARU by MD.  Patient found sitting upright in wheelchair in bedroom and immediately responsive to my inquiry. She is fully alert and oriented, very pleasant and cooperative and obviously motivated to improve and hopeful for herself in recovery. She is able to describe history of medical circumstances and feeling relieved that surgery is behind her, now, and she can look forward to regaining functional independence. She denies history of mental health services and is not requiring psychotropic medication for stability. But, she admits to feeling stressed by the course of events and finding herself having to depend on others for assist.  She will be monitored for any possible, emergent, emotional and/or behavioral difficulty while on ARU and be encouraged to persevere in her recovery.     Sade Mc, THE [facility] 9/28/2022 10:39 AM

## 2022-09-28 NOTE — PROGRESS NOTES
Problem: Mobility Impaired (Adult and Pediatric)  Goal: *Therapy Goal (Edit Goal, Insert Text)  Description: Physical Therapy Short Term Goals  Initiated 9/24/2022 and to be accomplished within 7 day(s) on 9/30/2022  1. Patient will move from supine to sit and sit to supine , scoot up and down, and roll side to side in bed with minimal assistance/contact guard assist.    2.  Patient will transfer from bed to chair and chair to bed with minimal assistance/contact guard assist using the least restrictive device. 3.  Patient will perform sit to stand with moderate assistance . 4. Patient will ambulate with minimal assistance/contact guard assist for 150 feet with the least restrictive device. 5.  Patient will ascend/descend 4 stairs with 1 handrail(s) with moderate assistance . Physical Therapy Long Term Goals  Initiated 9/24/2022 and to be accomplished within 21 day(s) on 10/14/2022  1. Patient will move from supine to sit and sit to supine , scoot up and down, and roll side to side in bed with supervision/set-up. 2.  Patient will transfer from bed to chair and chair to bed with supervision/set-up using the least restrictive device. 3.  Patient will perform sit to stand with supervision/set-up. 4.  Patient will ambulate with supervision/set-up for 150 feet with the least restrictive device. 5.  Patient will ascend/descend 4 stairs with 1 handrail(s) with CG/SBA. Outcome: Progressing Towards Goal   PHYSICAL THERAPY TREATMENT    Patient: Feliciano Edward (69 y.o. female)  Date: 9/28/2022  Diagnosis: Impaired mobility and ADLs [Z74.09, Z78.9]   Precautions: Fall, Back, Spinal  Chart, physical therapy assessment, plan of care and goals were reviewed. Time In:1330  Time Out:1500    Patient seen for: Gait training;Patient education;Transfer training; Wheelchair mobility (cannalith repositioning)    Pain:  Pt reporting minimal/moderate low back pain at beginning of treatment, primarily in anterior right LE. During canalith repositioning manuever, patient reporting one instance of shooting pain down bilat UE that resolved within seconds. Patient noted to have tensed her neck during that time but then later was relaxed, no change in cervical AROM following CRM observed and no increased pain or reproduction of this pain symptom for the rest of patient's treatment. Nurse practitioner Shonna Ramos) notified. No increased low back or LE pain during or after treatment (as long as spinal precautions and logrolling method used with CRM and pillow placed between bilat LE during task). Patient identified with name and : yes    SUBJECTIVE:      Patient agreeable to treatment. Continues to report vertigo with left rolling. OBJECTIVE DATA SUMMARY:    Objective:     TRANSFERS Daily Assessment     Transfer Type: Other  Other: stand step with RW  Transfer Assistance : 4 (Minimal assistance)  Sit to Stand Assistance: Moderate assistance (mod A for sit to stand as patient at times quite guarded with her pain.)  Car Transfers: Not tested  Car Type: N/A    Transfers with min A using RW with cues for safe approach to transfer surface. Mod A for sit to stand, emphasis on more symmetrical weightbearing through bilat LE and for increased use of bilat LE as patient with tendency to reach up to RW, pausing as she attempts to push up with right UE from transfer surface resulting in slight sidebend needing PT to assist with patient staying more in midline position. Performing 4 x 5 reps from 23\" height mat table.          GAIT Daily Assessment    Gait Description (WDL) Exceptions to WDL    Gait Abnormalities Decreased step clearance (forward flexed trunk and slow gait speed)    Assistive device Gait belt;Walker, rolling    Ambulation assistance - level surface 4 (Minimal assistance)    Distance 55 Feet (ft) (55 ft, then 75 ft)    Ambulation assistance- uneven surface  (NT)    Comments Cues for stepping into middle of RW and keeping gaze forward instead of looking at floor to reduce forward flexed posture. STEPS/STAIRS Daily Assessment     Steps/Stairs ambulated 2 (4\" step)    Assistance Required 4 (Minimal assistance)    Rail Use Both (two rails for one step, then right railing sidestepping up/down for additional bout.)    Comments  Patient declining continued stairs training as she reports ramp to enter home in back of house and that she felt more comfortable with getting into her house at this entrance at this time. Patient reporting feeling like her right LE is going to buckle. Curbs/Ramps  (NT)        BALANCE Daily Assessment     Sitting - Static: Good (unsupported)  Sitting - Dynamic: Fair (occasional)  Standing - Static: Fair  Standing - Dynamic : Impaired        WHEELCHAIR MOBILITY Daily Assessment     Able to Propel (ft): 75 feet (using bilat UE/LE to propel)  Functional Level:  (SBA)  Curbs/Ramps Assist Required (FIM Score): 0 (Not tested)  Wheelchair Setup Assist Required : 3 (Moderate assistance)  Wheelchair Management: Manages left brake;Manages right brake    Neuro Re-Education:  Patient assessed for BPPV. Vertebral artery test (-). Needing second person for assistance to maintain spinal precautions, patient performing modified [place] using sidelying test and was (-) on right, (+) on left with corresponding left torsional upward beating nystagmus lasting ~ 15 seconds (left posterior canal canalithiasis BPPV). PT performed CRM with careful attention to patient maintaining lumbar spinal precautions. Second person assisting with logrolling, positioning with pillows. No increased symptoms of vertigo reported with treatment other than first position. Patient did report bilat UE shooting pain at one instance when rolling to her right side (and turning neck to her right side), only lasting a few seconds (<10-15 seconds) not reproducible in sitting when patient performing AROM.  Patient did not have increased vertigo or pain symptoms following CRM. Patient and nurse notified of treatment precautions to avoid rolling to left side and to keep head of bed 30 degrees for next couple nights. ASSESSMENT:  Patient demonstrating symptoms consistent with left sided BPPV as described above. Patient only able to tolerate one CRM and will need reassessment after at least a couple days to determine if patient still having vertigo symptoms. Patient demonstrating guarding with all mobility, not just during CRM and needing encouragement to increase functional use of bilat LE and not be so reliant on bilat UE to come into standing and during gait using RW. Progression toward goals:  []      Improving appropriately and progressing toward goals  [x]      Improving slowly and progressing toward goals  []      Not making progress toward goals and plan of care will be adjusted      PLAN:  Patient continues to benefit from skilled intervention to address the above impairments. Continue treatment per established plan of care.   Discharge Recommendations:  Home Health and caregiver assistance  Further Equipment Recommendations for Discharge:  rolling walker      Estimated Discharge Date:10/4/2022      Activity Tolerance:   Fair due to fatigue and right LE pain    After treatment:   [] Patient left in no apparent distress in bed  [x] Patient left in no apparent distress sitting up in chair  [] Patient left in no apparent distress in w/c mobilizing under own power  [] Patient left in no apparent distress dining area  [] Patient left in no apparent distress mobilizing under own power  [x] Call bell left within reach  [x] Nursing notified  [] Caregiver present  [] Bed alarm activated   [x] Chair alarm activated      Georgette Woo, PT  9/28/2022

## 2022-09-28 NOTE — PROGRESS NOTES
Problem: Falls - Risk of  Goal: *Absence of Falls  Description: Document Carolina Don Fall Risk and appropriate interventions in the flowsheet. Outcome: Progressing Towards Goal  Note: Fall Risk Interventions:  Mobility Interventions: Bed/chair exit alarm, Patient to call before getting OOB, Utilize walker, cane, or other assistive device    Mentation Interventions: Bed/chair exit alarm, Door open when patient unattended, Room close to nurse's station    Medication Interventions: Bed/chair exit alarm, Patient to call before getting OOB, Teach patient to arise slowly    Elimination Interventions: Bed/chair exit alarm, Call light in reach, Patient to call for help with toileting needs, Stay With Me (per policy)    History of Falls Interventions: Bed/chair exit alarm, Door open when patient unattended, Room close to nurse's station         Problem: Patient Education: Go to Patient Education Activity  Goal: Patient/Family Education  Outcome: Progressing Towards Goal     Problem: Pain  Goal: *Control of Pain  Outcome: Progressing Towards Goal     Problem: Patient Education: Go to Patient Education Activity  Goal: Patient/Family Education  Outcome: Progressing Towards Goal     Problem: Pressure Injury - Risk of  Goal: *Prevention of pressure injury  Description: Document Rashid Scale and appropriate interventions in the flowsheet.   Outcome: Progressing Towards Goal  Note: Pressure Injury Interventions:  Sensory Interventions: Assess changes in LOC, Check visual cues for pain, Keep linens dry and wrinkle-free, Minimize linen layers    Moisture Interventions: Absorbent underpads, Maintain skin hydration (lotion/cream), Minimize layers, Moisture barrier    Activity Interventions: Increase time out of bed, Chair cushion, Pressure redistribution bed/mattress(bed type)    Mobility Interventions: Chair cushion, Float heels, HOB 30 degrees or less, Pressure redistribution bed/mattress (bed type)    Nutrition Interventions: Document food/fluid/supplement intake    Friction and Shear Interventions: HOB 30 degrees or less, Lift sheet, Foam dressings/transparent film/skin sealants, Feet elevated on foot rest, Minimize layers                Problem: Patient Education: Go to Patient Education Activity  Goal: Patient/Family Education  Outcome: Progressing Towards Goal

## 2022-09-28 NOTE — PROGRESS NOTES
Problem: Falls - Risk of  Goal: *Absence of Falls  Description: Document Senia Duverney Fall Risk and appropriate interventions in the flowsheet.   Note: Fall Risk Interventions:  Mobility Interventions: Bed/chair exit alarm, Patient to call before getting OOB, Utilize walker, cane, or other assistive device    Mentation Interventions: Bed/chair exit alarm, Door open when patient unattended, Room close to nurse's station    Medication Interventions: Bed/chair exit alarm, Patient to call before getting OOB, Teach patient to arise slowly    Elimination Interventions: Bed/chair exit alarm, Call light in reach, Patient to call for help with toileting needs, Stay With Me (per policy)    History of Falls Interventions: Bed/chair exit alarm, Door open when patient unattended, Room close to nurse's station

## 2022-09-28 NOTE — PROGRESS NOTES
[facility]  Progress Note    Patient: Rodney Citizen Age: 67 y.o. : 1950 MR#: [id number] SSN: [id number]  Date/Time: 2022     Subjective: Patient feels fine, no new complaints. She continues to deny dysuria, no urgency, no shortness of breath, no cough. Pain well controlled. Assessment/Plan:   1. Impaired mobility and ADLs secondary to #2  2. Lumbar spinal stenosis without neurological claudication status post anterior lumbar interbody fusion, L4-L5 and L5-S1 1 prone transpoas fusion  3. Diabetes mellitus  4. Hypertension  5. Osteoarthritis  6. Low-grade fever    MEDICAL PLAN:     1. Impaired mobility and ADLs secondary to #2  Continue PT and OT     2. Low-grade fever, resolved now: All cultures negative, no signs of infection. Lower extremity duplex negative for DVT      3. Lumbar spinal stenosis without neurological claudication status post anterior lumbar interbody fusion, L4-L5 and L5-S1 1 prone transpoas fusion  Continue Tylenol and gabapentin for pain management  Continue Roxicodone as needed for moderate to severe pain  Continue bowel protocol  Continue MiraLAX  Continue Lovenox for DVT prophylaxis, spine surgery okay with Lovenox. 4.  Diabetes mellitus:  Patient was on metformin  Continuously sliding scale    5. Hypertension:  Continue hold HCTZ and monitor  Continue beta-blocker and hydralazine as needed    6. Osteoarthritis    7. Dyslipidemia:  Continue Lipitor    Discussed with patient at the bedside and explained about my above plan care. Discussed with her about negative duplex study.     Case discussed with:  [x]Patient  []Family  [x]Nursing  []Case Management  DVT Prophylaxis:  [x]Lovenox  []Hep SQ  [x]SCDs  []Coumadin   []Eliquis/Xarelto     Objective:   VS: Visit Vitals  /66 (BP 1 Location: Left upper arm, BP Patient Position: Supine)   Pulse 86   Temp 98 °F (36.7 °C)   Resp 19   SpO2 100%      Tmax/24hrs: Temp (24hrs), Av.2 °F (36.8 °C), Min:97.9 °F (36.6 °C), Max:98.7 °F (37.1 °C)  IOBRIEF  Intake/Output Summary (Last 24 hours) at 2022 1453  Last data filed at 2022 1315  Gross per 24 hour   Intake 1270 ml   Output 676 ml   Net 594 ml       General:  Alert, cooperative, no acute distress    Pulmonary:  CTA Bilaterally. No Wheezing/Rales. Cardiovascular: Regular rate and Rhythm. GI:  Soft, Non distended, Non tender. + Bowel sounds. Extremities:  No edema. No swelling. Psych: Good insight. Not anxious or agitated. Neurologic: Alert and oriented X 3. Moves all ext. Additional: Back, left lateral and abdominal dressing clean, no erythema redness    Medications:   Current Facility-Administered Medications   Medication Dose Route Frequency    . Enter or update patient's weight  1 Each Other ONCE    enoxaparin (LOVENOX) injection 40 mg  40 mg SubCUTAneous Q24H    famotidine (PEPCID) tablet 20 mg  20 mg Oral BID    [Held by provider] hydroCHLOROthiazide (HYDRODIURIL) tablet 12.5 mg  12.5 mg Oral DAILY    ondansetron (ZOFRAN ODT) tablet 4 mg  4 mg Oral Q8H PRN    acetaminophen (TYLENOL) tablet 500 mg  500 mg Oral Q6H PRN    metoprolol tartrate (LOPRESSOR) tablet 12.5 mg  12.5 mg Oral Q12H    docusate sodium (COLACE) capsule 100 mg  100 mg Oral BID    magnesium hydroxide (MILK OF MAGNESIA) 400 mg/5 mL oral suspension 30 mL  30 mL Oral DAILY PRN    bisacodyL (DULCOLAX) tablet 10 mg  10 mg Oral Q48H PRN    bisacodyL (DULCOLAX) suppository 10 mg  10 mg Rectal Q48H PRN    hydrALAZINE (APRESOLINE) tablet 25 mg  25 mg Oral Q6H PRN    cyclobenzaprine (FLEXERIL) tablet 5 mg  5 mg Oral TID PRN    gabapentin (NEURONTIN) capsule 300 mg  300 mg Oral TID    therapeutic multivitamin (THERAGRAN) tablet 1 Tablet  1 Tablet Oral DAILY    oxyCODONE IR (ROXICODONE) tablet 5 mg  5 mg Oral Q6H PRN    atorvastatin (LIPITOR) tablet 20 mg  20 mg Oral QHS    insulin lispro (HUMALOG) injection   SubCUTAneous AC&HS    glucose chewable tablet 16 g  4 Tablet Oral PRN    glucagon (GLUCAGEN) injection 1 mg  1 mg IntraMUSCular PRN    dextrose 10% infusion 0-250 mL  0-250 mL IntraVENous PRN       Labs:    Recent Results (from the past 24 hour(s))   GLUCOSE, POC    Collection Time: 09/27/22  4:38 PM   Result Value Ref Range    Glucose (POC) 108 70 - 110 mg/dL   GLUCOSE, POC    Collection Time: 09/27/22  9:38 PM   Result Value Ref Range    Glucose (POC) 127 (H) 70 - 092 mg/dL   METABOLIC PANEL, BASIC    Collection Time: 09/28/22  6:55 AM   Result Value Ref Range    Sodium 140 136 - 145 mmol/L    Potassium 3.9 3.5 - 5.5 mmol/L    Chloride 102 100 - 111 mmol/L    CO2 30 21 - 32 mmol/L    Anion gap 8 3.0 - 18 mmol/L    Glucose 120 (H) 74 - 99 mg/dL    BUN 12 7.0 - 18 MG/DL    Creatinine 0.67 0.6 - 1.3 MG/DL    BUN/Creatinine ratio 18 12 - 20      GFR est AA >60 >60 ml/min/1.73m2    GFR est non-AA >60 >60 ml/min/1.73m2    Calcium 8.7 8.5 - 10.1 MG/DL   GLUCOSE, POC    Collection Time: 09/28/22  7:24 AM   Result Value Ref Range    Glucose (POC) 110 70 - 110 mg/dL   GLUCOSE, POC    Collection Time: 09/28/22 11:34 AM   Result Value Ref Range    Glucose (POC) 130 (H) 70 - 110 mg/dL       Signed By: Mago Hale MD     September 28, 2022      Disclaimer: Sections of this note are dictated using utilizing voice recognition software. Minor typographical errors may be present. If questions arise, please do not hesitate to contact me or call our department.

## 2022-09-28 NOTE — INTERDISCIPLINARY ROUNDS
[address]  [address] [place] [address], [address]     INPATIENT REHABILITATION  DISCHARGE RECOMMENDATION SHEET    Date: 9/28/2022     Name: Aquiles Gonzalez Age / Sex: 67 y.o. / female   CSN: [id number] MRN: [id number]   [address] [facility] Date: 9/23/2022 Discharge Date: 10/4/2022        [facility] WALKING AIDS FOLLOW-UP SERVICES      Height  []  Straight cane  [x] DMV Handicap Placard    []  #14 ½ ford height  []  Forearm crutches OUTPATIENT    []  Ford height  []  Axillary crutches  []  PT    []  Standard height  []  LBQC  []  OT    []  Reclining high back  []  SBQC  []  ST       Weight  HOME HEALTH    []  Standard weight WALKERS (patient reports has RW at home)  [x]  PT and caregiver assistance    []  Lightweight  [x]  Standard Adult Rolling Walker  [x]  OT    []  High-strength lightweight  []  Small Adult Rolling Walker  []  ST    []  Heavy Duty   []  Tall Adult Rolling Walker  []  Nursing    []  Patient is unable to self-propel a standard weight wheelchair  []  Bariatric Adult Rolling Walker  [x]  Wound care  Location of wound:anterior lower abd,right lateral & mid back  Specify needs: site check 7 dressing needs      []  Anticoagulation management       Width   []  Diabetes management    []  Narrow (16)   []  Insulin management    []  Standard (18) ACCESSORIES  []  No insulin management    []  Wide (20)  []  Rear sure glide brakes  []  BP management    []  Other  []  10 rear wheel brake  []  CHF Telehealth       Arms  []  Tall leg extensions  []  Post-CABG care/monitoring    []  Standard  []  Other:  []  Colostomy care    []  Desk/Tray   []  Tube feeding    []  Removable BATHROOM EQUIPMENT  []  PICC line care      Foot/Leg Rests  [x]  Bedside commode  []  Hughes catheter care    []  Removable  []  Extra wide bedside commode  []  Other:     []  Elevating  []  3:1 commode WITH drop arms  []  Medical Social Worker      Other  []  Tub transfer bench  []  Aide    []  Brake extensions  [] Shower chair     []  Antitippers      []  Right Arm Tray       []  Left Arm Tray                CUSHIONS OTHER     []  Foam cushion      []  Gel cushion []  Transfer board - Size:     []  Pierre Anoka II  []  Oxygen     []  Roho  []  CPM     []  Other   []  Darwin Shook      []  [address]      []  Hospital bed      []  Special mattress      []  Trapeze bar         Physical Therapy Jeimy Patiño, PT, DPT   Occupational Therapy Giovanny Lennon MSOTR/L   Speech-Language Therapy    Social Work Charmayne Pebbles, MSW   Nursing Gavin Hodgkins RN

## 2022-09-28 NOTE — PROGRESS NOTES
Problem: Self Care Deficits Care Plan (Adult)  Goal: *Therapy Goal (Edit Goal, Insert Text)  Description: Occupational Therapy Goals   Long Term Goals  Initiated 2022 and to be accomplished within 3 week(s)  1. Pt will perform self-feeding with [place]. 2. Pt will perform grooming with MOD I standing at sink. 3. Pt will perform UB bathing with MOD I.  4. Pt will perform LB bathing with MOD I.  5. Pt will perform tub/shower transfer with MOD I.   6. Pt will perform UB dressing with MOD I.  7. Pt will perform LB dressing with MOD I.  8. Pt will perform toileting task with MOD I.  9. Pt will perform toilet transfer with MOD I. Short Term Goals   Initiated 2022 and to be accomplished within 7 day(s)  1. Pt will perform grooming task while standing at sink x2 minutes  2. Pt will perform UB bathing with setup assist.  3. Pt will perform LB bathing with MIN A, use of AE prn.  4. Pt will perform tub/shower transfer with MIN A to TTB. 5. Pt will perform LB dressing with MOD A.  6. Pt will perform toileting task with MIN A.  7. Pt will perform toilet transfer with MIN A. Outcome: Progressing Towards Goal  Goal: Interventions  Outcome: Progressing Towards Goal   Occupational Therapy TREATMENT    Patient: Titus Cantor   67 y.o. Patient identified with name and : yes    Date: 2022    First Tx Session  Time In: 0730  Time Out[de-identified] [id number]        Diagnosis: Impaired mobility and ADLs [Z74.09, Z78.9]   Precautions: Fall, Back, Spinal  Chart, occupational therapy assessment, plan of care, and goals were reviewed. Pain:  Pt reports 8/10 pain or discomfort prior to treatment. Pt reports 8/10 pain or discomfort post treatment. Intervention Provided: RN notified and administered pain medication prior to shower transfer due to shooting pain in back       SUBJECTIVE:   Patient stated I have a seat in my shower.     OBJECTIVE DATA SUMMARY:     FEEDING/EATING Daily Assessment    Functional Level: 5        BATHING Daily Assessment    Functional Level: 3  Body Parts Patient Bathed: Abdomen;Arm, left;Arm, right;Chest;Leia area; Thigh, left; Thigh, right  Comments: Following consult with RN, OTR secured wound and dressing sites with washcloths and waterproof barrier. Pt performed UB showering from seated position with VC';s for maintaining spinal precautions and safety. Pt performed LB showering from seated position to wash leia area and upper BLE thighs with supervision while maintaining spinal precautions. Pt required modA to wash lower BLE legs in seated position from tub transfer bench to wash BLE feet and lower legs. UPPER BODY DRESSING Daily Assessment    Functional Level: 5  Items Applied/Steps Completed: Pullover (4 steps)  Comments: Pt performed UB dressing with supervision at EOB     LOWER BODY DRESSING Daily Assessment    Functional Level: 2  Items Applied/Steps Completed: Elastic waist pants (3 steps); Sock, left (1 step); Sock, right (1 step); Underpants (3 steps)  Comments: Pt will perform LB dressing with maxA to thread BLE feet through pants and brief and TA to don BLE socks. Pt required modA to pull up brief and pants over buttocks in standing at [facility] and/or Shoe management: 1       MOBILITY/TRANSFERS Daily Assessment          Tub/Shower Transfer Score: 3  Comments: Pt performed shower transfer with FWW to tub transfer bench with modA for sit to stand       ASSESSMENT:  Pt engaged in showering following consult from nurse re: covering dressings with washcloths and waterproof barrier. Pt is making progress with sit to stand for functional transfers for self cares however requires min-modA. Pt progress is negatively impacted by pain.    Progression toward goals:  [x]          Improving appropriately and progressing toward goals  []          Improving slowly and progressing toward goals  []          Not making progress toward goals and plan of care will be adjusted     PLAN:  Patient continues to benefit from skilled intervention to address the above impairments. Continue treatment per established plan of care. Discharge Recommendations:  Home Health  Further Equipment Recommendations for Discharge:  bedside commode      Activity Tolerance:  fair      Estimated LOS:10/4/22        Please refer to the flowsheet for vital signs taken during this treatment. After treatment:   [x]  Patient left in no apparent distress sitting up in chair   []  Patient left in no apparent distress in bed  [x]  Call bell left within reach  []  Nursing notified  []  Caregiver present  []  Bed alarm activated    COMMUNICATION/EDUCATION:   [x] Home safety education was provided and the patient/caregiver indicated understanding. [] Patient/family have participated as able in goal setting and plan of care. [x] Patient/family agree to work toward stated goals and plan of care. [] Patient understands intent and goals of therapy, but is neutral about his/her participation. [] Patient is unable to participate in goal setting and plan of care.       Isabela Juárez, OT

## 2022-09-28 NOTE — INTERDISCIPLINARY ROUNDS
[facility] PHYSICAL REHABILITATION  [address] [place] [address], [address]    INPATIENT REHABILITATION  PRE-TEAM CONFERENCE SUMMARY     Date of Conference: 9/29/2022    Patient Information:        Name: Janis Ospina Age / Sex: 67 y.o. / female   CSN: [id number] MRN: [id number]   [address] [place] [address] Date: 9/23/2022 Length of Stay: 5 days     Primary Rehabilitation Diagnosis  1. Impaired Mobility and ADLs  2. Lumbar spinal stenosis without neurological claudication status post anterior lumbar interbody fusion, L4-L5 and L5-S1 1 prone transpoas fusion    Comorbidities  Patient Active Problem List   Diagnosis Code    Spinal stenosis of lumbar region at multiple levels M48.061    Impaired mobility and ADLs Z74.09, Z78.9         Therapy:     FIM SCORES Initial Assessment Weekly Progress Assessment 9/28/2022   Eating Functional Level: 5  5   Swallowing     Grooming 5     Bathing 3  3   Upper Body Dressing Functional Level: 4  Items Applied/Steps Completed: Pullover (4 steps)  5   Lower Body Dressing Functional Level: 2  Items Applied/Steps Completed: Sock, left (1 step), Sock, right (1 step), Underpants (3 steps), Elastic waist pants (3 steps)  2   Toileting Functional Level: 3  3   Bladder 1 1   Bowel  0 0   Toilet Transfer [place] Toilet Transfer Score: 3  Comments: stand step with RW  3   Tub/Shower Transfer [place] Tub or Shower Type: Shower  Tub/Shower Transfer Score: 3  Comments: stand step transfer with RW    3   Comprehension Primary Mode of Comprehension: Auditory  Score: 6  6     Expression Primary Mode of Expression: Verbal  Score: 6  6   Social Interaction Score: 5  5   Problem Solving Score: 4  4   Memory Score: 5  5     FIM SCORES Initial Assessment Weekly Progress Assessment 9/28/2022   Bed/Chair/Wheelchair Transfers Transfer Type:  Other  Other: stand step with RW  Transfer Assistance : 3 (Moderate assistance )  Sit to Stand Assistance: Maximum assistance (needing lifting and lowering assistance, particularly with fatigue)  Car Transfers: Not tested (NT due to assistance required for getting into standing)  Car Type: N/A  Min A transfers with RW   Bed Mobility Rolling Right     Rolling Left 4 (Minimal assistance) (using bedrail to assist)   Supine to Sit 3 (Moderate assistance)   Sit to Stand Maximum assistance (needing lifting and lowering assistance, particularly with fatigue)   Sit to Supine 2 (Maximal assistance) (using logrolling technique)    Rolling Right    Min A   Rolling Left    Min A   Supine to Sit    Mod A   Sit to Stand    Mod A   Sit to Supine    Mod A      Locomotion (W/C) Able to Propel (ft): 75 feet  Functional Level:  (partial assistance for turning, backing up etc)  Curbs/Ramps Assist Required (FIM Score): 0 (Not tested)  Wheelchair Setup Assist Required : 3 (Moderate assistance)  Wheelchair Management: Manages left brake, Manages right brake (using brake extender) Function  SBA even surface  Setup Assistance mod A         Locomotion (W/C distance)    75 ft   Locomotion (Walk) 4 (Minimal assistance)  Min A      Locomotion (Walk dist.) 75 Feet (ft)  50-75 ft   Steps/Stairs Steps/Stairs Ambulated (#): 0  Level of Assist : 0 (Not tested)  Rail Use:  (NT)  2 steps min A with bilat railings         Nursing:     Neuro:   AAA&O x        4  Respiratory:   [x] WNL   [] O2 LPM:   Other:  Peripheral Vascular:   [] TEDS present   [] Edema present ____ Grade   Cardiac:   [x] WNL   [] Other  Genitourinary:   [x] continent   [] incontinent   [] aly  Abdominal _______ LBM  GI: __Reg___ Diet _Thin__ Liquids _No_ tube feeds  Musculoskeletal: ____ ROM Transfers _____ Assistive Device Used  ____ Level of Assistance  Skin Integumentary:   [] Intact   [x] Not Intact   __________Preventative Measures  Details__ s/p spinal /lumbar fussion_______________________________________________________  Pain: [x] Controlled   [] Not Controlled   Pain Meds:   [] Scheduled   [x] PRN        Interdisciplinary Team Goals: 1. Discipline  Physical Therapy    Goal  Patient will perform bed mobility with minimal assistance, maintaining lumbar spinal precautions and using logrolling technique    Barrier  Pain, decreased strength, endurance    Intervention  Therex, theract    Goal written by:   David Flower, PT, DPT     2. Discipline  Occupational Therapy    Goal  Pt will perform toilet and shower transfers with Nancy consistently and AE. Barrier  Impaired stability, impaired strength, impaired safety and transition with sit to stand, spinal precautions    Intervention  ADL training, transfer training, safety    Goal written by:  Jesús Burton MSOTR/L     3. Discipline  Speech Therapy    Goal      Barrier      Intervention      Goal written by:       4. Discipline  Nursing    Goal  Incision will remain free  of infection upon discharge    Barrier  Inability to see & reach the surgical site. Intervention  Monitoring of  surgical site  to see if there is any s/s of impending infx and keeping it clean dry & intact . Goal written by:  Altaf Fang RN     5. Discipline  Clinical Psychology    Goal  Minimize subjective stress and distress    Barrier  Stress with prolonged illness and post surgery recovery    Intervention  Support  and patient education, as needed    Goal written by:  Calvin Rojas, PhD         Disposition / Discharge Planning:      Follow-up services:  [x] Physical Therapy             [x] Occupational Therapy       [] Speech Therapy           [] Skilled Nursing      [] Medical Social Worker   [] Aide        [] Outpatient      [] vs   [x] Home Health  [] vs       [] to progress to outpatient       [] with 24-hour supervision       [x] with 24-hour assistance   [] Rakesh VILLANUEVA recommendations:  RW   Estimated discharge date:  tbd   Discharge Location:  [] Home  [] versus    [] [facility]    [] [address]   [] Other:           Electronic Signatures: Signature Date Signed   Physical Therapist    Terry Conde, PT, DPT  9/29/2022   Occupational Therapist    Wyvonne Brittle MSOTR/L  9/28/22   Speech Therapist         Nursing    Ulises Hays RN  09/28/22   Clinical Psychologist    Sade Mc, PhD  9/29/2022   Physician    Kinza Boyd MD    09/28/22         Rob Salas, MSW  9/28/2022

## 2022-09-29 LAB
ANION GAP SERPL CALC-SCNC: 6 MMOL/L (ref 3–18)
BUN SERPL-MCNC: 13 MG/DL (ref 7–18)
BUN/CREAT SERPL: 19 (ref 12–20)
CALCIUM SERPL-MCNC: 8.8 MG/DL (ref 8.5–10.1)
CHLORIDE SERPL-SCNC: 102 MMOL/L (ref 100–111)
CO2 SERPL-SCNC: 31 MMOL/L (ref 21–32)
CREAT SERPL-MCNC: 0.7 MG/DL (ref 0.6–1.3)
GLUCOSE BLD STRIP.AUTO-MCNC: 103 MG/DL (ref 70–110)
GLUCOSE BLD STRIP.AUTO-MCNC: 116 MG/DL (ref 70–110)
GLUCOSE BLD STRIP.AUTO-MCNC: 117 MG/DL (ref 70–110)
GLUCOSE BLD STRIP.AUTO-MCNC: 140 MG/DL (ref 70–110)
GLUCOSE SERPL-MCNC: 122 MG/DL (ref 74–99)
POTASSIUM SERPL-SCNC: 4.2 MMOL/L (ref 3.5–5.5)
SODIUM SERPL-SCNC: 139 MMOL/L (ref 136–145)

## 2022-09-29 PROCEDURE — 74011250636 HC RX REV CODE- 250/636: Performed by: HOSPITALIST

## 2022-09-29 PROCEDURE — 97150 GROUP THERAPEUTIC PROCEDURES: CPT

## 2022-09-29 PROCEDURE — 97110 THERAPEUTIC EXERCISES: CPT

## 2022-09-29 PROCEDURE — 65310000000 HC RM PRIVATE REHAB

## 2022-09-29 PROCEDURE — 74011250637 HC RX REV CODE- 250/637: Performed by: INTERNAL MEDICINE

## 2022-09-29 PROCEDURE — 2709999900 HC NON-CHARGEABLE SUPPLY

## 2022-09-29 PROCEDURE — 97116 GAIT TRAINING THERAPY: CPT

## 2022-09-29 PROCEDURE — 99232 SBSQ HOSP IP/OBS MODERATE 35: CPT | Performed by: EMERGENCY MEDICINE

## 2022-09-29 PROCEDURE — 80048 BASIC METABOLIC PNL TOTAL CA: CPT

## 2022-09-29 PROCEDURE — 97112 NEUROMUSCULAR REEDUCATION: CPT

## 2022-09-29 PROCEDURE — 74011250637 HC RX REV CODE- 250/637: Performed by: HOSPITALIST

## 2022-09-29 PROCEDURE — 82962 GLUCOSE BLOOD TEST: CPT

## 2022-09-29 PROCEDURE — 97530 THERAPEUTIC ACTIVITIES: CPT

## 2022-09-29 PROCEDURE — 36415 COLL VENOUS BLD VENIPUNCTURE: CPT

## 2022-09-29 RX ADMIN — ENOXAPARIN SODIUM 40 MG: 100 INJECTION SUBCUTANEOUS at 12:42

## 2022-09-29 RX ADMIN — OXYCODONE HYDROCHLORIDE 5 MG: 5 TABLET ORAL at 08:13

## 2022-09-29 RX ADMIN — MAGNESIUM HYDROXIDE 30 ML: 400 SUSPENSION ORAL at 13:34

## 2022-09-29 RX ADMIN — OXYCODONE HYDROCHLORIDE 5 MG: 5 TABLET ORAL at 01:45

## 2022-09-29 RX ADMIN — GABAPENTIN 300 MG: 300 CAPSULE ORAL at 17:12

## 2022-09-29 RX ADMIN — GABAPENTIN 300 MG: 300 CAPSULE ORAL at 21:22

## 2022-09-29 RX ADMIN — THERA TABS 1 TABLET: TAB at 08:13

## 2022-09-29 RX ADMIN — DOCUSATE SODIUM 100 MG: 100 CAPSULE, LIQUID FILLED ORAL at 08:13

## 2022-09-29 RX ADMIN — ATORVASTATIN CALCIUM 20 MG: 40 TABLET, FILM COATED ORAL at 21:22

## 2022-09-29 RX ADMIN — FAMOTIDINE 20 MG: 20 TABLET ORAL at 17:12

## 2022-09-29 RX ADMIN — DOCUSATE SODIUM 100 MG: 100 CAPSULE, LIQUID FILLED ORAL at 17:12

## 2022-09-29 RX ADMIN — GABAPENTIN 300 MG: 300 CAPSULE ORAL at 08:13

## 2022-09-29 RX ADMIN — METOPROLOL TARTRATE 12.5 MG: 25 TABLET, FILM COATED ORAL at 08:13

## 2022-09-29 RX ADMIN — FAMOTIDINE 20 MG: 20 TABLET ORAL at 08:13

## 2022-09-29 NOTE — PROGRESS NOTES
SHIFT CHANGE NOTE FOR [facility]    Bedside and Verbal shift change report given to Sarah Coles RN (oncoming nurse) by Amy Foley RN (offgoing nurse). Report included the following information SBAR, Kardex, MAR and Recent Results. Situation:   Code Status: Full Code   Hospital Day: 6   Problem List:   Hospital Problems  Never Reviewed            Codes Class Noted POA    Impaired mobility and ADLs ICD-10-CM: Z74.09, Z78.9  ICD-9-CM: V49.89  9/23/2022 Unknown       Background:   Past Medical History:   Past Medical History:   Diagnosis Date    Arthritis     back and hands    Chronic pain     back    COVID-19     Summer of  2021    Diabetes ([facility] [address]     NIDDM    Hypertension     Nausea & vomiting     Surgery only not colonoscopy        Assessment:  Changes in Assessment throughout shift: No change to previous assessment    Patient has a central line: no Reasons if yes: Insertion date: Last dressing date:  Patient has Aly Cath: no Reasons if yes:     Insertion date:  Shift aly care completed:     Last Vitals:     Vitals:    09/28/22 1610 09/28/22 2047 09/29/22 0720 09/29/22 1706   BP: 117/65 126/73 130/80 98/64   Pulse: 91 87 87 97   Resp: 18 18 18 19   Temp: 99.3 °F (37.4 °C) 98.4 °F (36.9 °C) 97.8 °F (36.6 °C) 99.4 °F (37.4 °C)   SpO2: 99% 99% 98% 100%       PAIN    Pain Assessment    Pain Intensity 1: 6 (09/29/22 0906) Pain Intensity 1: 2 (12/29/14 1105)    Pain Location 1: Back Pain Location 1: Abdomen    Pain Intervention(s) 1: Medication (see MAR) Pain Intervention(s) 1: Medication (see MAR)  Patient Stated Pain Goal: 0 Patient Stated Pain Goal: 0  Intervention effective: yes  Other actions taken for pain: Medication (see MAR)    Skin Assessment  Skin color Skin Color: Appropriate for ethnicity  Condition/Temperature Skin Condition/Temp: Dry  Integrity Skin Integrity: Incision (comment)  Turgor    Weekly Pressure Ulcer Documentation  Pressure  Injury Documentation: No Pressure Injury Noted-Pressure Ulcer Prevention Initiated  Wound Prevention & Protection Methods  Orientation of wound Orientation of Wound Prevention: Posterior  Location of Prevention Location of Wound Prevention: Buttocks, Sacrum/Coccyx  Dressing Present Dressing Present : No  Dressing Status    Wound Offloading Wound Offloading (Prevention Methods): Bed, pressure redistribution/air    INTAKE/OUPUT  Date 09/28/22 1900 - 09/29/22 0659 09/29/22 0700 - 09/30/22 0659   Shift 0716-7823 24 Hour Total 6145-1974 9394-4782 24 Hour Total   INTAKE   P.O.  730 900  900     P. O.  730 900  900   Shift Total  730 900  900   OUTPUT   Urine  300        Urine Voided  300        Urine Occurrence(s) 4 x 10 x 7 x  7 x   Emesis/NG output  76        Emesis  76      Stool          Stool Occurrence(s) 0 x 0 x 0 x  0 x   Shift Total  376      NET  354 900  900   Weight (kg)              Recommendations:  Patient needs and requests: needs to call surgeon regarding dressing order. AM nurse made aware and will follow up. Pending tests/procedures: none at this time     Functional Level/Equipment: Partial (one person) / [employer]    Fall Precautions:   Fall risk precautions were reinforced with the patient; she was instructed to call for help prior to getting up. The following fall risk precautions were continued: bed/ chair alarms, door signage, yellow bracelet and socks as well as update of the [employer] tool in the patient's room. Reta Score: 4    HEALS Safety Check    A safety check occurred in the patient's room between off going nurse and oncoming nurse listed above. The safety check included the below items  Area Items   H  High Alert Medications Verify all high alert medication drips (heparin, PCA, etc.)   E  Equipment Suction is set up for ALL patients (with yanker)  Red plugs utilized for all equipment (IV pumps, etc.)  WOWs wiped down at end of shift.   Room stocked with oxygen, suction, and other unit-specific supplies   A  Alarms Bed alarm is set for fall risk patients  Ensure chair alarm is in place and activated if patient is up in a chair   L  Lines Check IV for any infiltration  Hughes bag is empty if patient has a Hughes   Tubing and IV bags are labeled   S  Safety   Room is clean, patient is clean, and equipment is clean. Hallways are clear from equipment besides carts. Fall bracelet on for fall risk patients  Ensure room is clear and free of clutter  Suction is set up for ALL patients (with chloe)  Hallways are clear from equipment besides carts.    Isolation precautions followed, supplies available outside room, sign posted     Ebonie Smith RN

## 2022-09-29 NOTE — PROGRESS NOTES
Called DR. Casas's office spoke with his staff  to ask regarding dressing orders to be done on the surgical site . Sinan Arreola said she will form [facility] & will fax us the orders once received.

## 2022-09-29 NOTE — PROGRESS NOTES
SHIFT CHANGE NOTE FOR [facility]    Bedside and Verbal shift change report given to Shaunna Escobar RN (oncoming nurse) by Claudean Pilling, RN (offgoing nurse). Report included the following information SBAR, Kardex, MAR and Recent Results. Situation:   Code Status: Full Code   Hospital Day: 5   Problem List:   Hospital Problems  Never Reviewed            Codes Class Noted POA    Impaired mobility and ADLs ICD-10-CM: Z74.09, Z78.9  ICD-9-CM: V49.89  9/23/2022 Unknown       Background:   Past Medical History:   Past Medical History:   Diagnosis Date    Arthritis     back and hands    Chronic pain     back    COVID-19     Summer of  2021    Diabetes ([facility] [address]     NIDDM    Hypertension     Nausea & vomiting     Surgery only not colonoscopy        Assessment:  Changes in Assessment throughout shift: No change to previous assessment    Patient has a central line: no Reasons if yes: Insertion date: Last dressing date:  Patient has Aly Cath: no Reasons if yes:     Insertion date:  Shift aly care completed:     Last Vitals:     Vitals:    09/27/22 1600 09/27/22 2138 09/28/22 0719 09/28/22 1610   BP: 124/71 129/77 122/66 117/65   Pulse: 92 98 86 91   Resp: 18 18 19 18   Temp: 97.9 °F (36.6 °C) 98.7 °F (37.1 °C) 98 °F (36.7 °C) 99.3 °F (37.4 °C)   SpO2: 100% 99% 100% 99%       PAIN    Pain Assessment    Pain Intensity 1: 8 (09/28/22 1627) Pain Intensity 1: 2 (12/29/14 1105)    Pain Location 1: Back Pain Location 1: Abdomen    Pain Intervention(s) 1: Medication (see MAR) Pain Intervention(s) 1: Medication (see MAR)  Patient Stated Pain Goal: 0 Patient Stated Pain Goal: 0  Intervention effective: yes  Other actions taken for pain:      Skin Assessment  Skin color Skin Color: Appropriate for ethnicity  Condition/Temperature Skin Condition/Temp: Dry, Warm  Integrity Skin Integrity: Incision (comment), Wound (add Wound LDA) (incicion x3 (abdomen x2 and back x1))  Turgor    Weekly Pressure Ulcer Documentation  Pressure  Injury Documentation: No Pressure Injury Noted-Pressure Ulcer Prevention Initiated  Wound Prevention & Protection Methods  Orientation of wound Orientation of Wound Prevention: Posterior  Location of Prevention Location of Wound Prevention: Buttocks  Dressing Present Dressing Present : No  Dressing Status    Wound Offloading Wound Offloading (Prevention Methods): Bed, pressure reduction mattress    INTAKE/OUPUT  Date 09/27/22 1900 - 09/28/22 0659 09/28/22 0700 - 09/29/22 0659   Shift 2392-9075 24 Hour Total 7192-9478 1310-7769 24 Hour Total   INTAKE   P.O. 360 1560 730  730     P. O. 360 1560 730  730   Shift Total 360 1560 730  730   OUTPUT   Urine 0 500 300  300     Urine Voided 0 500 300  300     Urine Occurrence(s) 6 x 10 x 6 x  6 x   Emesis/NG output   76  76     Emesis   76  76   Stool          Stool Occurrence(s) 0 x 0 x 0 x  0 x   Shift Total 0 500 376  376    1060 354  354   Weight (kg)              Recommendations:  Patient needs and requests: needs to call surgeon regarding dressing order. AM nurse made aware and will follow up. Pending tests/procedures: none at this time     Functional Level/Equipment:   /      Fall Precautions:   Fall risk precautions were reinforced with the patient; she was instructed to call for help prior to getting up. The following fall risk precautions were continued: bed/ chair alarms, door signage, yellow bracelet and socks as well as update of the [employer] tool in the patient's room. Reta Score:      HEALS Safety Check    A safety check occurred in the patient's room between off going nurse and oncoming nurse listed above. The safety check included the below items  Area Items   H  High Alert Medications Verify all high alert medication drips (heparin, PCA, etc.)   E  Equipment Suction is set up for ALL patients (with yanker)  Red plugs utilized for all equipment (IV pumps, etc.)  WOWs wiped down at end of shift.   Room stocked with oxygen, suction, and other unit-specific supplies   A  Alarms Bed alarm is set for fall risk patients  Ensure chair alarm is in place and activated if patient is up in a chair   L  Lines Check IV for any infiltration  Hughes bag is empty if patient has a Hughes   Tubing and IV bags are labeled   S  Safety   Room is clean, patient is clean, and equipment is clean. Hallways are clear from equipment besides carts. Fall bracelet on for fall risk patients  Ensure room is clear and free of clutter  Suction is set up for ALL patients (with chloe)  Hallways are clear from equipment besides carts.    Isolation precautions followed, supplies available outside room, sign posted     Ebonie Smith RN

## 2022-09-29 NOTE — REHAB NOTE
SHIFT CHANGE NOTE FOR [facility]    Bedside and Verbal shift change report given to IVET Villarreal (oncoming nurse) by Franci Arevalo (offgoing nurse). Report included the following information SBAR, Kardex, MAR and Recent Results. Situation:   Code Status: Full Code   Reason for Admission: Spinal surgery  Hospital Day: 6   Problem List:   Hospital Problems  Never Reviewed            Codes Class Noted POA    Impaired mobility and ADLs ICD-10-CM: Z74.09, Z78.9  ICD-9-CM: V49.89  9/23/2022 Unknown           Background:   Past Medical History:   Past Medical History:   Diagnosis Date    Arthritis     back and hands    Chronic pain     back    COVID-19     Summer of  2021    Diabetes ([facility] [address]     NIDDM    Hypertension     Nausea & vomiting     Surgery only not colonoscopy      Patient taking anticoagulants Lovenox    Patient has a defibrillator: no     Assessment:  Changes in Assessment throughout shift: none    Patient has central line: no Reasons if yes: Insertion date: Last dressing date:  Patient has Hughes Cath: no Reasons if yes:     Insertion date:    Last Vitals:     Vitals:    09/27/22 2138 09/28/22 0719 09/28/22 1610 09/28/22 2047   BP: 129/77 122/66 117/65 126/73   Pulse: 98 86 91 87   Resp: 18 19 18 18   Temp: 98.7 °F (37.1 °C) 98 °F (36.7 °C) 99.3 °F (37.4 °C) 98.4 °F (36.9 °C)   SpO2: 99% 100% 99% 99%       PAIN    Pain Assessment    Pain Intensity 1: 0 (09/29/22 0400) Pain Intensity 1: 2 (12/29/14 1105)    Pain Location 1: Groin Pain Location 1: Abdomen    Pain Intervention(s) 1: Medication (see MAR) Pain Intervention(s) 1: Medication (see MAR)  Patient Stated Pain Goal: 0 Patient Stated Pain Goal: 0  Intervention effective: yes    Other actions taken for pain:none     Skin Assessment  Skin color Skin Color: Appropriate for ethnicity  Condition/Temperature Skin Condition/Temp: Dry  Integrity Skin Integrity: Incision (comment)  Turgor    Weekly Pressure Ulcer Documentation  Pressure  Injury Documentation: No Pressure Injury Noted-Pressure Ulcer Prevention Initiated  Wound Prevention & Protection Methods  Orientation of wound Orientation of Wound Prevention: Posterior  Location of Prevention Location of Wound Prevention: Buttocks, Sacrum/Coccyx  Dressing Present Dressing Present : No  Dressing Status    Wound Offloading Wound Offloading (Prevention Methods): Bed, pressure redistribution/air    INTAKE/OUPUT  Date 09/28/22 0700 - 09/29/22 0659 09/29/22 0700 - 09/30/22 0659   Shift 0700-1859 1900-0659 24 Hour Total 0700-1859 1900-0659 24 Hour Total   INTAKE   P.O. 730  730        P. O. 730  730      Shift Total 730  730      OUTPUT   Urine 300  300        Urine Voided 300  300        Urine Occurrence(s) 6 x 3 x 9 x      Emesis/NG output 76  76        Emesis 76  76      Stool           Stool Occurrence(s) 0 x 0 x 0 x      Shift Total 376  376        354      Weight (kg)             Recommendations:  Patient needs and requests: toileting,pain management,ADL's    Diet: 4 carb choice    Pending tests/procedures: none     Functional Level/Equipment: assist x 1/wheelchair    Estimated Discharge Date: 10/4/22 Posted on Whiteboard in [facility] Room: yes     HEALS Safety Check    A safety check occurred in the patient's room between off going nurse and oncoming nurse listed above. The safety check included the below items  Area Items   H  High Alert Medications Verify all high alert medication drips (heparin, PCA, etc.)   E  Equipment Suction is set up for ALL patients (with yanker)  Red plugs utilized for all equipment (IV pumps, etc.)  WOWs wiped down at end of shift.   Room stocked with oxygen, suction, and other unit-specific supplies   A  Alarms Bed alarm is set for fall risk patients  Ensure chair alarm is in place and activated if patient is up in a chair   L  Lines Check IV for any infiltration  Hughes bag is empty if patient has a Hughes   Tubing and IV bags are labeled   S  Safety   Room is clean, patient is clean, and equipment is clean. Hallways are clear from equipment besides carts. Fall bracelet on for fall risk patients  Ensure room is clear and free of clutter  Suction is set up for ALL patients (with chloe)  Hallways are clear from equipment besides carts.    Isolation precautions followed, supplies available outside room, sign posted

## 2022-09-29 NOTE — PROGRESS NOTES
Problem: Self Care Deficits Care Plan (Adult)  Goal: *Therapy Goal (Edit Goal, Insert Text)  Description: Occupational Therapy Goals   Long Term Goals  Initiated 2022 and to be accomplished within 3 week(s)  1. Pt will perform self-feeding with [place]. 2. Pt will perform grooming with MOD I standing at sink. 3. Pt will perform UB bathing with MOD I.  4. Pt will perform LB bathing with MOD I.  5. Pt will perform tub/shower transfer with MOD I.   6. Pt will perform UB dressing with MOD I.  7. Pt will perform LB dressing with MOD I.  8. Pt will perform toileting task with MOD I.  9. Pt will perform toilet transfer with MOD I. Short Term Goals   Initiated 2022 and to be accomplished within 7 day(s)  1. Pt will perform grooming task while standing at sink x2 minutes  2. Pt will perform UB bathing with setup assist.  3. Pt will perform LB bathing with MIN A, use of AE prn.  4. Pt will perform tub/shower transfer with MIN A to TTB. 5. Pt will perform LB dressing with MOD A.  6. Pt will perform toileting task with MIN A.  7. Pt will perform toilet transfer with MIN A. Outcome: Progressing Towards Goal  Goal: Interventions  Outcome: Progressing Towards Goal   Occupational Therapy TREATMENT    Patient: Titus Cantor   67 y.o. Patient identified with name and : yes    Date: 2022    First Tx Session  Time In: 56  Time Out[de-identified] 0512        Diagnosis: Impaired mobility and ADLs [Z74.09, Z78.9]   Precautions: Fall, Back, Spinal  Chart, occupational therapy assessment, plan of care, and goals were reviewed. Pain:  Pt reports 6/10 pain or discomfort prior to treatment. Pt reports 6/10 pain or discomfort post treatment. Intervention Provided: Pt declined pain medication and agreeable to participate in OT as tolerated. SUBJECTIVE:   Patient stated I don't want to miss group.     OBJECTIVE DATA SUMMARY:     THERAPEUTIC EXERCISE Daily Assessment    Pt performed BUE strengthening with 3# weight this date with RB's in between sets to increase BUE strength for carryover with self cares. Pt completed (shoulder flexion/extension, abduction/adduction, bicep curls, chest press, IR/ER, rowing x2) 7x15 reps, 2 sets. GROUP Daily Assessment    Pt engaged in group activity which involved playing game of cards with BUE and 1 1/2# weight to improve BUE strength and FM control. TOILETING Daily Assessment    Pt performed toileting with Nancy using FWW for stability with sit to stand and SBA once in standing with toileting tasks at [facility]. MOBILITY/TRANSFERS Daily Assessment        Pt performed toilet transfer with Nancy and FWW. Pt performed functional mobility from w/c level with supervision/Cary. ASSESSMENT:  Pt is making progress toward goals and increasing independence with self cares however continues to require min-modA with sit to  prep for self cares and increased assistance in mornings. Progression toward goals:  []          Improving appropriately and progressing toward goals  [x]          Improving slowly and progressing toward goals  []          Not making progress toward goals and plan of care will be adjusted     PLAN:  Patient continues to benefit from skilled intervention to address the above impairments. Continue treatment per established plan of care. Discharge Recommendations:  Home Health vs SNF  Further Equipment Recommendations for Discharge:  bedside commode      Activity Tolerance:  good      Estimated LOS:10/4/22        Please refer to the flowsheet for vital signs taken during this treatment. After treatment:   [x]  Patient left in no apparent distress sitting up in chair   []  Patient left in no apparent distress in bed  [x]  Call bell left within reach  []  Nursing notified  []  Caregiver present  []  Bed alarm activated    COMMUNICATION/EDUCATION:   [] Home safety education was provided and the patient/caregiver indicated understanding.   [] Patient/family have participated as able in goal setting and plan of care. [x] Patient/family agree to work toward stated goals and plan of care. [] Patient understands intent and goals of therapy, but is neutral about his/her participation. [] Patient is unable to participate in goal setting and plan of care.       Shorty Becker, OT

## 2022-09-29 NOTE — PROGRESS NOTES
Problem: Mobility Impaired (Adult and Pediatric)  Goal: *Therapy Goal (Edit Goal, Insert Text)  Description: Physical Therapy Short Term Goals  Initiated 9/24/2022 and to be accomplished within 7 day(s) on 9/30/2022  1. Patient will move from supine to sit and sit to supine , scoot up and down, and roll side to side in bed with minimal assistance/contact guard assist.    2.  Patient will transfer from bed to chair and chair to bed with minimal assistance/contact guard assist using the least restrictive device. 3.  Patient will perform sit to stand with moderate assistance . 4. Patient will ambulate with minimal assistance/contact guard assist for 150 feet with the least restrictive device. 5.  Patient will ascend/descend 4 stairs with 1 handrail(s) with moderate assistance . Physical Therapy Long Term Goals  Initiated 9/24/2022 and to be accomplished within 21 day(s) on 10/14/2022  1. Patient will move from supine to sit and sit to supine , scoot up and down, and roll side to side in bed with supervision/set-up. 2.  Patient will transfer from bed to chair and chair to bed with supervision/set-up using the least restrictive device. 3.  Patient will perform sit to stand with supervision/set-up. 4.  Patient will ambulate with supervision/set-up for 150 feet with the least restrictive device. 5.  Patient will ascend/descend 4 stairs with 1 handrail(s) with CG/SBA. Outcome: Progressing Towards Goal   PHYSICAL THERAPY TREATMENT    Patient: Venkata Nair (49 y.o. female)  Date: 9/29/2022  Diagnosis: Impaired mobility and ADLs [Z74.09, Z78.9]   Precautions: Fall, Back, Spinal  Chart, physical therapy assessment, plan of care and goals were reviewed. Time In:1330  Time Out:1500    Patient seen for: AM;Balance activities; Patient education; Therapeutic exercise;Transfer training; Wheelchair mobility    Pain:  Pt reports increased pain with WBing.  Patient also reports that pain regiment is working at this time.    Patient identified with name and :yes    SUBJECTIVE:      I am nervous with the transfers and I know that tensing up doesn't do me a bit of good. I do feel that the vertigo has something to do with my anxiety as it makes me nauseated and I feel terrible. (Patient revealed that 20 yrs ago, patient had vertigo that she related to an ear infection. She reported not having difficulty with it since being bed bound after the surgery that she recently had)    OBJECTIVE DATA SUMMARY:    Objective:       TRANSFERS Daily Assessment     Transfer Type: Other  Other: stand step with RW  Transfer Assistance : 4 (Minimal assistance)  Sit to Stand Assistance: Moderate assistance  Car Transfers: Not tested  Car Type: N/A        GAIT Daily Assessment    Gait Description (WDL) Exceptions to WDL    Gait Abnormalities Decreased step clearance    Assistive device Gait belt;Walker, rolling    Ambulation assistance - level surface 4 (Minimal assistance)    Distance 44 ft, 85 ft     Ambulation assistance- uneven surface  CGA    Comments Patient was challenged with slight inclines to mimic a ramp. Patient has a ramp to enter and exit home. Patient with decreased step clearance, narrow AKIRA and decreased stance, R>L. Patient required cues for upright posture and activation of core muscle groups to assist with decreased stress of lumbar region. Patient with slow pace and requires verbal cues to improve step length and width as well as advancement of RW far enough away from her body to safely advance LEs. BALANCE Daily Assessment     Sitting - Static: Good (unsupported)  Sitting - Dynamic: Fair (occasional)  Standing - Static: Fair  Standing - Dynamic : Impaired          Neuro Re-Education:  Patient was challenged with the mirror  for visual feedback to assist with improved standing upright posture to take the stress off her lumbar spine. Patient stands at [facility] for 3 minutes before fatiguing and requiring seated rest break. Patient with 2 additional attempts that were 2 minutes long before fatiguing. Continue to address current balance deficits and weakness for improved functional independence. ASSESSMENT:  Patient is seen for deficits in strength, mobility, transfers, ambulation, safety and balance. Patient with improved pain management as indicated by today's reports of pain regiment adequate and working at this time. Patient is agreeable to participation in skilled session. Patient is challenged with sit to stand  and transfers from the R Kathleen Philippe 23 to the mat table. Patient required minimal to moderate assistance and verbal cues for safety and balance. Patient with bilateral knee buckling during transfers from the R Kathleen Philippe 23 to standing. Patient was able to safely reach back to R Kathleen Philippe 23 armrest to lower self back to chair without incident. Patient was challenged with sit to stand transfers x 6 reps and with moderate to minimal assistance 2/2 requiring additional safety cues with transfers. Patient has been educated on set up for ascending off of surfaces that require her to push up from the R Kathleen Philippe 23 armrests. She was given step by step instructions on the progression of transfers and she had difficulty with loading BLEs and with anterior lift off from surfaces and WC. Patient was given time to rest before attempting transfers from the high/low mat, with the mat elevated to 26 inches to active her quadriceps and decrease her posterior leaning and twisting. Patient with improved activation of quads and in turn, improved sit to stand transfers from the R Kathleen Philippe 23 that began to be consistently minimal assistance x 3 trials. Patient has some anxiety that she reports 2/2 previous pain and inability to find comfortable positions. Educated patient on the healing process and that her progress is slow, but she is making gains and that is what is expected. Patient reported feeling better after this session as she can see some improvement.  Patient would benefit from continued skilled therapy to address weakness, decreased transfers, safety, balance and ambulation. Progression toward goals:  []      Improving appropriately and progressing toward goals  [x]      Improving slowly and progressing toward goals  []      Not making progress toward goals and plan of care will be adjusted      PLAN:  Patient continues to benefit from skilled intervention to address the above impairments. Continue treatment per established plan of care. Discharge Recommendations:  Home Physical Therapy  Further Equipment Recommendations for Discharge:  rolling walker and N/A      Estimated Discharge Date:10/4/22? Patient would benefit with additional time to recover. Activity Tolerance:   Fair tolerance to session. Please refer to the flowsheet for vital signs taken during this treatment.     After treatment:   [] Patient left in no apparent distress in bed  [x] Patient left in no apparent distress sitting up in chair  [] Patient left in no apparent distress in w/c mobilizing under own power  [] Patient left in no apparent distress dining area  [] Patient left in no apparent distress mobilizing under own power  [x] Call bell left within reach  [x] Nursing notified  [] Caregiver present  [] Bed alarm activated   [] Chair alarm activated      Nighat Horvath  9/29/2022

## 2022-09-29 NOTE — PROGRESS NOTES
Sw called pt's daughter, Marcelo Cote, to schedule caregiver education. Daughter states that she is scheduled for knee surgery on 10/11 and is not able to offer any physical support to the pt at [place]. Sw discussed additional family, friend or community supports. Daughter inquired about a nurse to stay with the pt. Sw reviewed options for having a caregiver come to the home, medicaid, ltc policy or private pay. Sw encouraged daughter to discuss further with pt to make a safe plan. Sw spoke with pt regarding conversation with her daughter about [place]. 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 Sw encouraged pt to explore coverage and options with her family. Sandra shared above with pt's treatment team.     Sandra will follow.

## 2022-09-29 NOTE — PROGRESS NOTES
[facility] Hospitalist Group  Progress Note    Patient: Len Markham Age: 67 y.o. : 1950 MR#: [id number] SSN: [id number]  Date/Time: 2022     Subjective:     Patient is sitting in chair in no apparent distress, awake and alert    Assessment/Plan:   1. Impaired mobility and ADLs secondary to #2  2. Lumbar spinal stenosis without neurological claudication status post anterior lumbar interbody fusion, L4-L5 and L5-S1 1 prone transpoas fusion  3. Diabetes mellitus  4. Hypertension  5. Osteoarthritis  Anemia noted  Dyslipidemia    Plan  Physical therapy and Occupational Therapy  Continue judicious pain control  Bowel regimen  Monitor blood pressure  Continue statin  Sliding scale insulin, monitor sugars  Check iron studies and B12.   Monitor hemoglobin  Discussed with patient        Case discussed with:  [x]Patient  []Family  [x]Nursing  []Case Management  DVT Prophylaxis:  [x]Lovenox  []Hep SQ  [x]SCDs  []Coumadin   []Eliquis/Xarelto     Objective:   VS: Visit Vitals  BP 98/64 (BP 1 Location: Left upper arm, BP Patient Position: Sitting)   Pulse 97   Temp 99.4 °F (37.4 °C)   Resp 19   SpO2 100%      Tmax/24hrs: Temp (24hrs), Av.5 °F (36.9 °C), Min:97.8 °F (36.6 °C), Max:99.4 °F (37.4 °C)  IOBRIEF  Intake/Output Summary (Last 24 hours) at 2022 1842  Last data filed at 2022 1820  Gross per 24 hour   Intake 900 ml   Output --   Net 900 ml       General:  Awake, alert  Cardiovascular:  S1S2+, RRR  Pulmonary:  CTA b/l  GI:  Soft, BS+, NT, ND  Extremities:  trace edema     Functional Progress:    PHYSICAL THERAPY    ON ADMISSION MOST RECENT   Wheelchair Mobility/Management  Able to Propel (ft): 75 feet  Functional Level:  (partial assistance for turning, backing up etc)  Curbs/Ramps Assist Required (FIM Score): 0 (Not tested)  Wheelchair Setup Assist Required : 3 (Moderate assistance)  Wheelchair Management: Manages left brake, Manages right brake (using brake extender) Wheelchair Mobility/Management  Able to Propel (ft): 75 feet (using bilat UE/LE to propel)  Functional Level:  (SBA)  Curbs/Ramps Assist Required (FIM Score): 0 (Not tested)  Wheelchair Setup Assist Required : 3 (Moderate assistance)  Wheelchair Management: Manages left brake, Manages right brake     Gait  Amount of Assistance: 4 (Minimal assistance)  Distance (ft): 75 Feet (ft)  Assistive Device: Gait belt, Walker, rolling Gait  Amount of Assistance: 4 (Minimal assistance)  Distance (ft): 55 Feet (ft) (55 ft, then 75 ft)  Assistive Device: Gait belt, Walker, rolling     Balance-Sitting/Standing  Sitting - Static: Good (unsupported)  Sitting - Dynamic: Fair (occasional)  Standing - Static: Fair  Standing - Dynamic : Impaired Balance-Sitting/Standing  Sitting - Static: Good (unsupported)  Sitting - Dynamic: Fair (occasional)  Standing - Static: Fair  Standing - Dynamic : Impaired     Bed/Mat Mobility  Rolling Left : 4 (Minimal assistance) (using bedrail to assist)  Supine to Sit : 3 (Moderate assistance)  Sit to Supine : 2 (Maximal assistance) (using logrolling technique) Bed/Mat Mobility  Rolling Left : 4 (Minimal assistance) (using bedrail to assist)  Supine to Sit : 3 (Moderate assistance)  Sit to Supine : 2 (Maximal assistance) (using logrolling technique)     Transfers  Transfer Type: Other  Other: stand step with RW  Transfer Assistance : 3 (Moderate assistance )  Sit to Stand Assistance: Maximum assistance (needing lifting and lowering assistance, particularly with fatigue)  Car Transfers: Not tested (NT due to assistance required for getting into standing)  Car Type: N/A Transfers  Transfer Type: Other  Other: stand step with RW  Transfer Assistance : 4 (Minimal assistance)  Sit to Stand Assistance:  Moderate assistance  Car Transfers: Not tested  Car Type: N/A     Steps or Stairs  Steps/Stairs Ambulated (#): 0  Level of Assist : 0 (Not tested)  Rail Use:  (NT) Steps or Stairs  Steps/Stairs Ambulated (#): 2 (4\" step)  Level of Assist : 4 (Minimal assistance)  Rail Use: Both (two rails for one step, then right railing sidestepping up/down for additional bout.)        Functional Progress:    OCCUPATIONAL THERAPY    ON ADMISSION MOST RECENT   Eating  Functional Level: 5   Eating  Functional Level: 5     Grooming  Functional Level: 5   Grooming  Functional Level: 5     Bathing  Functional Level: 3   Bathing  Functional Level: 3     Upper Body Dressing  Functional Level: 4   Upper Body Dressing  Functional Level: 5     Lower Body Dressing  Functional Level: 2   Lower Body Dressing  Functional Level: 2     Toileting  Functional Level: 3   Toileting  Functional Level: 1     Toilet Transfers  Toilet Transfer Score: 3   Toilet Transfers  Toilet Transfer Score: 3     Tub /Shower Transfers  Tub/Shower Transfer Score: 3   Tub/Shower Transfers  Tub/Shower Transfer Score: 3       Legend:   7 - Independent   6 - Modified Independent   5 - Standby Assistance / Supervision / Set-up   4 - Minimum Assistance / Contact Guard Assistance   3 - Moderate Assistance   2 - Maximum Assistance   1 - Total Assistance / Dependent          Medications:   Current Facility-Administered Medications   Medication Dose Route Frequency    enoxaparin (LOVENOX) injection 40 mg  40 mg SubCUTAneous Q24H    famotidine (PEPCID) tablet 20 mg  20 mg Oral BID    [Held by provider] hydroCHLOROthiazide (HYDRODIURIL) tablet 12.5 mg  12.5 mg Oral DAILY    ondansetron (ZOFRAN ODT) tablet 4 mg  4 mg Oral Q8H PRN    acetaminophen (TYLENOL) tablet 500 mg  500 mg Oral Q6H PRN    metoprolol tartrate (LOPRESSOR) tablet 12.5 mg  12.5 mg Oral Q12H    docusate sodium (COLACE) capsule 100 mg  100 mg Oral BID    magnesium hydroxide (MILK OF MAGNESIA) 400 mg/5 mL oral suspension 30 mL  30 mL Oral DAILY PRN    bisacodyL (DULCOLAX) tablet 10 mg  10 mg Oral Q48H PRN    bisacodyL (DULCOLAX) suppository 10 mg  10 mg Rectal Q48H PRN    hydrALAZINE (APRESOLINE) tablet 25 mg  25 mg Oral Q6H PRN    cyclobenzaprine (FLEXERIL) tablet 5 mg  5 mg Oral TID PRN    gabapentin (NEURONTIN) capsule 300 mg  300 mg Oral TID    therapeutic multivitamin (THERAGRAN) tablet 1 Tablet  1 Tablet Oral DAILY    oxyCODONE IR (ROXICODONE) tablet 5 mg  5 mg Oral Q6H PRN    atorvastatin (LIPITOR) tablet 20 mg  20 mg Oral QHS    insulin lispro (HUMALOG) injection   SubCUTAneous AC&HS    glucose chewable tablet 16 g  4 Tablet Oral PRN    glucagon (GLUCAGEN) injection 1 mg  1 mg IntraMUSCular PRN    dextrose 10% infusion 0-250 mL  0-250 mL IntraVENous PRN       Labs:    Recent Results (from the past 24 hour(s))   GLUCOSE, POC    Collection Time: 09/28/22  9:43 PM   Result Value Ref Range    Glucose (POC) 114 (H) 70 - 666 mg/dL   METABOLIC PANEL, BASIC    Collection Time: 09/29/22  7:09 AM   Result Value Ref Range    Sodium 139 136 - 145 mmol/L    Potassium 4.2 3.5 - 5.5 mmol/L    Chloride 102 100 - 111 mmol/L    CO2 31 21 - 32 mmol/L    Anion gap 6 3.0 - 18 mmol/L    Glucose 122 (H) 74 - 99 mg/dL    BUN 13 7.0 - 18 MG/DL    Creatinine 0.70 0.6 - 1.3 MG/DL    BUN/Creatinine ratio 19 12 - 20      GFR est AA >60 >60 ml/min/1.73m2    GFR est non-AA >60 >60 ml/min/1.73m2    Calcium 8.8 8.5 - 10.1 MG/DL   GLUCOSE, POC    Collection Time: 09/29/22  7:26 AM   Result Value Ref Range    Glucose (POC) 103 70 - 110 mg/dL   GLUCOSE, POC    Collection Time: 09/29/22 11:45 AM   Result Value Ref Range    Glucose (POC) 116 (H) 70 - 110 mg/dL   GLUCOSE, POC    Collection Time: 09/29/22  4:47 PM   Result Value Ref Range    Glucose (POC) 117 (H) 70 - 110 mg/dL       Signed By: Nicholas Madrigal MD     September 29, 2022      Dragon medical dictation software was used for portions of this report. Unintended errors may occur.

## 2022-09-29 NOTE — PROGRESS NOTES
Pt is a 67year old female admitted to ARU for spinal stenosis. Pt is alert and oriented, alone in the room. Pt states that she lives with her daughter Damon Owen in a 2 level home with 4 steps to enter and a ramp. Pt states that she has a chair lift to the second floor and has a walk in tub shower. Pt states that prior to admission she was able to self care with assistance of DME. Pt notes that she has a rolling walker, cane, elevated toilet seat and built in shower chair. Pt states that she has no history with home health or SNF but notes that she has been to [facility]. Pt states that her daughter, Wei Nolasco ([phone]), is her POA and notes that her daughter Suresh Morataya ([phone]) will be her helper at [place]. Pt consents to calling Nita to schedule caregiver education. Pt states that she sees Dr Jeff Hernandez as her PCP and fills her medications at [place] [phone] on [address]. Pt states that she has received 4 doses of the [address] vaccine. Pt confirms her insurance as medicare and BCBS FEP. Sw reviewed dc planning, team conference and caregiver education. Pt states understanding and denies questions. Sw will follow.

## 2022-09-29 NOTE — PROGRESS NOTES
Problem: Falls - Risk of  Goal: *Absence of Falls  Description: Document [place] Fall Risk and appropriate interventions in the flowsheet.   Outcome: Progressing Towards Goal  Note: Fall Risk Interventions:  Mobility Interventions: Bed/chair exit alarm, Patient to call before getting OOB, PT Consult for mobility concerns    Mentation Interventions: Adequate sleep, hydration, pain control, Bed/chair exit alarm, Gait belt with transfers/ambulation    Medication Interventions: Bed/chair exit alarm, Evaluate medications/consider consulting pharmacy, Patient to call before getting OOB, Teach patient to arise slowly    Elimination Interventions: Bed/chair exit alarm, Call light in reach, Elevated toilet seat, Patient to call for help with toileting needs, Stay With Me (per policy), Toilet paper/wipes in reach, Toileting schedule/hourly rounds    History of Falls Interventions: Bed/chair exit alarm         Problem: Patient Education: Go to Patient Education Activity  Goal: Patient/Family Education  Outcome: Progressing Towards Goal     Problem: Pain  Goal: *Control of Pain  Outcome: Progressing Towards Goal  Goal: *PALLIATIVE CARE:  Alleviation of Pain  Outcome: Progressing Towards Goal     Problem: Pain  Goal: *Control of Pain  Outcome: Progressing Towards Goal  Goal: *PALLIATIVE CARE:  Alleviation of Pain  Outcome: Progressing Towards Goal     Problem: Patient Education: Go to Patient Education Activity  Goal: Patient/Family Education  Outcome: Progressing Towards Goal

## 2022-09-30 LAB
BACTERIA SPEC CULT: NORMAL
BACTERIA SPEC CULT: NORMAL
BASOPHILS # BLD: 0 K/UL (ref 0–0.1)
BASOPHILS NFR BLD: 0 % (ref 0–2)
DIFFERENTIAL METHOD BLD: ABNORMAL
EOSINOPHIL # BLD: 0.1 K/UL (ref 0–0.4)
EOSINOPHIL NFR BLD: 1 % (ref 0–5)
ERYTHROCYTE [DISTWIDTH] IN BLOOD BY AUTOMATED COUNT: 14.6 % (ref 11.6–14.5)
FOLATE SERPL-MCNC: >20 NG/ML (ref 3.1–17.5)
GLUCOSE BLD STRIP.AUTO-MCNC: 110 MG/DL (ref 70–110)
GLUCOSE BLD STRIP.AUTO-MCNC: 113 MG/DL (ref 70–110)
GLUCOSE BLD STRIP.AUTO-MCNC: 116 MG/DL (ref 70–110)
GLUCOSE BLD STRIP.AUTO-MCNC: 145 MG/DL (ref 70–110)
HCT VFR BLD AUTO: 26.5 % (ref 35–45)
HGB BLD-MCNC: 7.9 G/DL (ref 12–16)
IMM GRANULOCYTES # BLD AUTO: 0.1 K/UL (ref 0–0.04)
IMM GRANULOCYTES NFR BLD AUTO: 1 % (ref 0–0.5)
IRON SATN MFR SERPL: 9 % (ref 20–50)
IRON SERPL-MCNC: 22 UG/DL (ref 50–175)
LYMPHOCYTES # BLD: 2 K/UL (ref 0.9–3.6)
LYMPHOCYTES NFR BLD: 20 % (ref 21–52)
MCH RBC QN AUTO: 26.9 PG (ref 24–34)
MCHC RBC AUTO-ENTMCNC: 29.8 G/DL (ref 31–37)
MCV RBC AUTO: 90.1 FL (ref 78–100)
MONOCYTES # BLD: 1.4 K/UL (ref 0.05–1.2)
MONOCYTES NFR BLD: 14 % (ref 3–10)
NEUTS SEG # BLD: 6.6 K/UL (ref 1.8–8)
NEUTS SEG NFR BLD: 65 % (ref 40–73)
NRBC # BLD: 0 K/UL (ref 0–0.01)
NRBC BLD-RTO: 0 PER 100 WBC
PLATELET # BLD AUTO: 408 K/UL (ref 135–420)
PMV BLD AUTO: 9.7 FL (ref 9.2–11.8)
RBC # BLD AUTO: 2.94 M/UL (ref 4.2–5.3)
SERVICE CMNT-IMP: NORMAL
SERVICE CMNT-IMP: NORMAL
TIBC SERPL-MCNC: 255 UG/DL (ref 250–450)
VIT B12 SERPL-MCNC: 880 PG/ML (ref 211–911)
WBC # BLD AUTO: 10.2 K/UL (ref 4.6–13.2)

## 2022-09-30 PROCEDURE — 97116 GAIT TRAINING THERAPY: CPT

## 2022-09-30 PROCEDURE — 2709999900 HC NON-CHARGEABLE SUPPLY

## 2022-09-30 PROCEDURE — 97110 THERAPEUTIC EXERCISES: CPT

## 2022-09-30 PROCEDURE — 74011250636 HC RX REV CODE- 250/636: Performed by: EMERGENCY MEDICINE

## 2022-09-30 PROCEDURE — 97530 THERAPEUTIC ACTIVITIES: CPT

## 2022-09-30 PROCEDURE — 97535 SELF CARE MNGMENT TRAINING: CPT

## 2022-09-30 PROCEDURE — 74011250636 HC RX REV CODE- 250/636: Performed by: HOSPITALIST

## 2022-09-30 PROCEDURE — 99232 SBSQ HOSP IP/OBS MODERATE 35: CPT | Performed by: EMERGENCY MEDICINE

## 2022-09-30 PROCEDURE — 65310000000 HC RM PRIVATE REHAB

## 2022-09-30 PROCEDURE — 85025 COMPLETE CBC W/AUTO DIFF WBC: CPT

## 2022-09-30 PROCEDURE — 82962 GLUCOSE BLOOD TEST: CPT

## 2022-09-30 PROCEDURE — 82607 VITAMIN B-12: CPT

## 2022-09-30 PROCEDURE — 74011250637 HC RX REV CODE- 250/637: Performed by: INTERNAL MEDICINE

## 2022-09-30 PROCEDURE — 36415 COLL VENOUS BLD VENIPUNCTURE: CPT

## 2022-09-30 PROCEDURE — 83540 ASSAY OF IRON: CPT

## 2022-09-30 PROCEDURE — 74011250637 HC RX REV CODE- 250/637: Performed by: HOSPITALIST

## 2022-09-30 RX ADMIN — ENOXAPARIN SODIUM 40 MG: 100 INJECTION SUBCUTANEOUS at 14:05

## 2022-09-30 RX ADMIN — OXYCODONE HYDROCHLORIDE 5 MG: 5 TABLET ORAL at 06:52

## 2022-09-30 RX ADMIN — IRON SUCROSE 200 MG: 20 INJECTION, SOLUTION INTRAVENOUS at 17:47

## 2022-09-30 RX ADMIN — DOCUSATE SODIUM 100 MG: 100 CAPSULE, LIQUID FILLED ORAL at 17:09

## 2022-09-30 RX ADMIN — METOPROLOL TARTRATE 12.5 MG: 25 TABLET, FILM COATED ORAL at 09:09

## 2022-09-30 RX ADMIN — FAMOTIDINE 20 MG: 20 TABLET ORAL at 09:12

## 2022-09-30 RX ADMIN — METOPROLOL TARTRATE 12.5 MG: 25 TABLET, FILM COATED ORAL at 22:33

## 2022-09-30 RX ADMIN — GABAPENTIN 300 MG: 300 CAPSULE ORAL at 09:09

## 2022-09-30 RX ADMIN — GABAPENTIN 300 MG: 300 CAPSULE ORAL at 17:09

## 2022-09-30 RX ADMIN — FAMOTIDINE 20 MG: 20 TABLET ORAL at 17:09

## 2022-09-30 RX ADMIN — THERA TABS 1 TABLET: TAB at 09:09

## 2022-09-30 RX ADMIN — ATORVASTATIN CALCIUM 20 MG: 40 TABLET, FILM COATED ORAL at 22:33

## 2022-09-30 RX ADMIN — BISACODYL 10 MG: 10 SUPPOSITORY RECTAL at 17:09

## 2022-09-30 RX ADMIN — DOCUSATE SODIUM 100 MG: 100 CAPSULE, LIQUID FILLED ORAL at 09:09

## 2022-09-30 RX ADMIN — GABAPENTIN 300 MG: 300 CAPSULE ORAL at 22:33

## 2022-09-30 RX ADMIN — OXYCODONE HYDROCHLORIDE 5 MG: 5 TABLET ORAL at 14:05

## 2022-09-30 NOTE — REHAB NOTE
[facility]    Bedside and Verbal shift change report given to IVET Villarreal   (oncoming nurse) by Ciro Valdes (offgoing nurse). Report included the following information SBAR, Kardex, MAR and Recent Results. Situation:   Code Status: Full Code   Reason for Admission: Spinal surgery  Hospital Day: 7   Problem List:   Hospital Problems  Never Reviewed            Codes Class Noted POA    Impaired mobility and ADLs ICD-10-CM: Z74.09, Z78.9  ICD-9-CM: V49.89  9/23/2022 Unknown           Background:   Past Medical History:   Past Medical History:   Diagnosis Date    Arthritis     back and hands    Chronic pain     back    COVID-19     Summer of  2021    Diabetes ([facility] [address]     NIDDM    Hypertension     Nausea & vomiting     Surgery only not colonoscopy      Patient taking anticoagulants Lovenox    Patient has a defibrillator: no     Assessment:  Changes in Assessment throughout shift: none    Patient has central line: no Reasons if yes: Insertion date: Last dressing date:  Patient has Hughes Cath: no Reasons if yes:     Insertion date:    Last Vitals:     Vitals:    09/28/22 2047 09/29/22 0720 09/29/22 1706 09/29/22 2045   BP: 126/73 130/80 98/64 108/60   Pulse: 87 87 97 80   Resp: 18 18 19 18   Temp: 98.4 °F (36.9 °C) 97.8 °F (36.6 °C) 99.4 °F (37.4 °C) 98.3 °F (36.8 °C)   SpO2: 99% 98% 100% 100%       PAIN    Pain Assessment    Pain Intensity 1: 0 (09/30/22 0400) Pain Intensity 1: 2 (12/29/14 1105)    Pain Location 1: Back Pain Location 1: Abdomen    Pain Intervention(s) 1: Medication (see MAR) Pain Intervention(s) 1: Medication (see MAR)  Patient Stated Pain Goal: 0 Patient Stated Pain Goal: 0  Intervention effective: na    Other actions taken for pain: na    Skin Assessment  Skin color Skin Color: Appropriate for ethnicity  Condition/Temperature Skin Condition/Temp: Dry, Warm  Integrity Skin Integrity: Incision (comment)  Turgor    Weekly Pressure Ulcer Documentation  Pressure  Injury Documentation: No Pressure Injury Noted-Pressure Ulcer Prevention Initiated  Wound Prevention & Protection Methods  Orientation of wound Orientation of Wound Prevention: Posterior  Location of Prevention Location of Wound Prevention: Buttocks, Sacrum/Coccyx  Dressing Present Dressing Present : No  Dressing Status    Wound Offloading Wound Offloading (Prevention Methods): Bed, pressure redistribution/air    INTAKE/OUPUT  Date 09/29/22 0700 - 09/30/22 0659 09/30/22 0700 - 10/01/22 0659   Shift 0700-1859 1900-0659 24 Hour Total 0700-1859 1900-0659 24 Hour Total   INTAKE   P.O. 900  900        P. O. 900  900      Shift Total 900  900      OUTPUT   Urine           Urine Occurrence(s) 7 x 5 x 12 x      Stool           Stool Occurrence(s) 0 x 0 x 0 x      Shift Total           900      Weight (kg)             Recommendations:  Patient needs and requests: toileting,ADL's,dressing change  Diet: 4 carb choice    Pending tests/procedures: labs     Functional Level/Equipment: assist x 1/wheelchair    Estimated Discharge Date: 10/04/22 Posted on White[facility] in [facility] Room: yes     HEALS Safety Check    A safety check occurred in the patient's room between off going nurse and oncoming nurse listed above. The safety check included the below items  Area Items   H  High Alert Medications Verify all high alert medication drips (heparin, PCA, etc.)   E  Equipment Suction is set up for ALL patients (with yanker)  Red plugs utilized for all equipment (IV pumps, etc.)  WOWs wiped down at end of shift. Room stocked with oxygen, suction, and other unit-specific supplies   A  Alarms Bed alarm is set for fall risk patients  Ensure chair alarm is in place and activated if patient is up in a chair   L  Lines Check IV for any infiltration  Hughes bag is empty if patient has a Hughes   Tubing and IV bags are labeled   S  Safety   Room is clean, patient is clean, and equipment is clean. Hallways are clear from equipment besides carts.    Fall bracelet on for fall risk patients  Ensure room is clear and free of clutter  Suction is set up for ALL patients (with chloe)  Hallways are clear from equipment besides carts.    Isolation precautions followed, supplies available outside room, sign posted

## 2022-09-30 NOTE — PROGRESS NOTES
[facility]  [facility]    Patient: Elayne Miles Age: 67 y.o. : 1950 MR#: [id number] SSN: [id number]  Date/Time: 2022     Subjective:     Patient is laying in bed in no apparent distress. Awake and alert. Complains of some constipation. Assessment/Plan:   1. Impaired mobility and ADLs secondary to #2  2. Lumbar spinal stenosis without neurological claudication status post anterior lumbar interbody fusion, L4-L5 and L5-S1 1 prone transpoas fusion  3. Diabetes mellitus  4. Hypertension  5. Osteoarthritis  Anemia noted. No overt bleeding  Dyslipidemia    Plan  Continue PT and OT  Continue judicious pain control  Bowel regimen. Enema today. Discussed with patient  Monitor blood pressure  Continue statin  Sliding scale insulin, monitor sugars  Iron deficiency noted. IV Venofer. Monitor blood counts intermittently. Check stool Hemoccult. Discussed with patient.   Discussed with RN        Case discussed with:  [x]Patient  []Family  [x]Nursing  []Case Management  DVT Prophylaxis:  [x]Lovenox  []Hep SQ  [x]SCDs  []Coumadin   []Eliquis/Xarelto     Objective:   VS: Visit Vitals  /64 (BP 1 Location: Right upper arm, BP Patient Position: Sitting) Comment (BP Patient Position): long sitting with BLE elevated   Pulse 96   Temp 98.9 °F (37.2 °C)   Resp 18   SpO2 98%      Tmax/24hrs: Temp (24hrs), Av.6 °F (37 °C), Min:98.3 °F (36.8 °C), Max:98.9 °F (37.2 °C)  IOBRIEFNo intake or output data in the 24 hours ending 22      General:  Awake, alert  Cardiovascular:  S1S2+, RRR  Pulmonary:  CTA b/l  GI:  Soft, BS+, NT, ND  Extremities:  trace edema      Functional Progress:    PHYSICAL THERAPY    ON ADMISSION MOST RECENT   Wheelchair Mobility/Management  Able to Propel (ft): 75 feet  Functional Level:  (partial assistance for turning, backing up etc)  Curbs/Ramps Assist Required (FIM Score): 0 (Not tested)  Wheelchair Setup Assist Required : 3 (Moderate assistance)  Wheelchair Management: Manages left brake, Manages right brake (using brake extender) Wheelchair Mobility/Management  Able to Propel (ft): 75 feet (using bilat UE/LE to propel)  Functional Level:  (SBA)  Curbs/Ramps Assist Required (FIM Score): 0 (Not tested)  Wheelchair Setup Assist Required : 3 (Moderate assistance)  Wheelchair Management: Manages left brake, Manages right brake     Gait  Amount of Assistance: 4 (Minimal assistance)  Distance (ft): 75 Feet (ft)  Assistive Device: Gait belt, Walker, rolling Gait  Amount of Assistance: 4 (Minimal assistance)  Distance (ft): 100 Feet (ft)  Assistive Device: Gait belt, Walker, rolling     Balance-Sitting/Standing  Sitting - Static: Good (unsupported)  Sitting - Dynamic: Fair (occasional)  Standing - Static: Fair  Standing - Dynamic : Impaired Balance-Sitting/Standing  Sitting - Static: Good (unsupported)  Sitting - Dynamic: Fair (occasional)  Standing - Static: Fair  Standing - Dynamic : Impaired     Bed/Mat Mobility  Rolling Right : 5 (Stand-by assistance)  Rolling Left : 4 (Minimal assistance) (using bedrail to assist)  Supine to Sit : 3 (Moderate assistance)  Sit to Supine : 2 (Maximal assistance) (using logrolling technique) Bed/Mat Mobility  Rolling Right : 5 (Stand-by assistance)  Rolling Left : 5 (Stand-by assistance)  Supine to Sit : 3 (Moderate assistance)  Sit to Supine : 4 (Minimal assistance)     Transfers  Transfer Type: Other  Other: stand step with RW  Transfer Assistance : 3 (Moderate assistance )  Sit to Stand Assistance: Maximum assistance (needing lifting and lowering assistance, particularly with fatigue)  Car Transfers: Not tested (NT due to assistance required for getting into standing)  Car Type: N/A Transfers  Transfer Type: Other  Other:  (stand step with RW)  Transfer Assistance : 4 (Minimal assistance)  Sit to Stand Assistance:  Moderate assistance  Car Transfers: Not tested  Car Type: N/A     Steps or Stairs  Steps/Stairs Ambulated (#): 0  Level of Assist : 0 (Not tested)  Rail Use:  (NT) Steps or Stairs  Steps/Stairs Ambulated (#): 2 (4\" step)  Level of Assist : 4 (Minimal assistance)  Rail Use: Both (two rails for one step, then right railing sidestepping up/down for additional bout.)        Functional Progress:    OCCUPATIONAL THERAPY    ON ADMISSION MOST RECENT   Eating  Functional Level: 5   Eating  Functional Level: 5     Grooming  Functional Level: 5   Grooming  Functional Level: 6     Bathing  Functional Level: 3   Bathing  Functional Level: 3     Upper Body Dressing  Functional Level: 4   Upper Body Dressing  Functional Level: 5     Lower Body Dressing  Functional Level: 2   Lower Body Dressing  Functional Level: 2     Toileting  Functional Level: 3   Toileting  Functional Level: 3     Toilet Transfers  Toilet Transfer Score: 3   Toilet Transfers  Toilet Transfer Score: 3     Tub /Shower Transfers  Tub/Shower Transfer Score: 3   Tub/Shower Transfers  Tub/Shower Transfer Score: 3       Legend:   7 - Independent   6 - Modified Independent   5 - Standby Assistance / Supervision / Set-up   4 - Minimum Assistance / Contact Guard Assistance   3 - Moderate Assistance   2 - Maximum Assistance   1 - Total Assistance / Dependent          Medications:   Current Facility-Administered Medications   Medication Dose Route Frequency    iron sucrose (VENOFER) injection 200 mg  200 mg IntraVENous Q24H    enoxaparin (LOVENOX) injection 40 mg  40 mg SubCUTAneous Q24H    famotidine (PEPCID) tablet 20 mg  20 mg Oral BID    [Held by provider] hydroCHLOROthiazide (HYDRODIURIL) tablet 12.5 mg  12.5 mg Oral DAILY    ondansetron (ZOFRAN ODT) tablet 4 mg  4 mg Oral Q8H PRN    acetaminophen (TYLENOL) tablet 500 mg  500 mg Oral Q6H PRN    metoprolol tartrate (LOPRESSOR) tablet 12.5 mg  12.5 mg Oral Q12H    docusate sodium (COLACE) capsule 100 mg  100 mg Oral BID    magnesium hydroxide (MILK OF MAGNESIA) 400 mg/5 mL oral suspension 30 mL  30 mL Oral DAILY PRN    bisacodyL (DULCOLAX) tablet 10 mg  10 mg Oral Q48H PRN    bisacodyL (DULCOLAX) suppository 10 mg  10 mg Rectal Q48H PRN    hydrALAZINE (APRESOLINE) tablet 25 mg  25 mg Oral Q6H PRN    cyclobenzaprine (FLEXERIL) tablet 5 mg  5 mg Oral TID PRN    gabapentin (NEURONTIN) capsule 300 mg  300 mg Oral TID    therapeutic multivitamin (THERAGRAN) tablet 1 Tablet  1 Tablet Oral DAILY    oxyCODONE IR (ROXICODONE) tablet 5 mg  5 mg Oral Q6H PRN    atorvastatin (LIPITOR) tablet 20 mg  20 mg Oral QHS    insulin lispro (HUMALOG) injection   SubCUTAneous AC&HS    glucose chewable tablet 16 g  4 Tablet Oral PRN    glucagon (GLUCAGEN) injection 1 mg  1 mg IntraMUSCular PRN    dextrose 10% infusion 0-250 mL  0-250 mL IntraVENous PRN       Labs:    Recent Results (from the past 24 hour(s))   GLUCOSE, POC    Collection Time: 09/29/22  9:04 PM   Result Value Ref Range    Glucose (POC) 140 (H) 70 - 110 mg/dL   IRON PROFILE    Collection Time: 09/30/22  6:20 AM   Result Value Ref Range    Iron 22 (L) 50 - 175 ug/dL    TIBC 255 250 - 450 ug/dL    Iron % saturation 9 (L) 20 - 50 %   VITAMIN B12 & FOLATE    Collection Time: 09/30/22  6:20 AM   Result Value Ref Range    Vitamin B12 880 211 - 911 pg/mL    Folate >20.0 (H) 3.10 - 17.50 ng/mL   CBC WITH AUTOMATED DIFF    Collection Time: 09/30/22  6:20 AM   Result Value Ref Range    WBC 10.2 4.6 - 13.2 K/uL    RBC 2.94 (L) 4.20 - 5.30 M/uL    HGB 7.9 (L) 12.0 - 16.0 g/dL    HCT 26.5 (L) 35.0 - 45.0 %    MCV 90.1 78.0 - 100.0 FL    MCH 26.9 24.0 - 34.0 PG    MCHC 29.8 (L) 31.0 - 37.0 g/dL    RDW 14.6 (H) 11.6 - 14.5 %    PLATELET 884 063 - 447 K/uL    MPV 9.7 9.2 - 11.8 FL    NRBC 0.0 0  WBC    ABSOLUTE NRBC 0.00 0.00 - 0.01 K/uL    NEUTROPHILS 65 40 - 73 %    LYMPHOCYTES 20 (L) 21 - 52 %    MONOCYTES 14 (H) 3 - 10 %    EOSINOPHILS 1 0 - 5 %    BASOPHILS 0 0 - 2 %    IMMATURE GRANULOCYTES 1 (H) 0.0 - 0.5 %    ABS. NEUTROPHILS 6.6 1.8 - 8.0 K/UL    ABS. LYMPHOCYTES 2.0 0.9 - 3.6 K/UL    ABS. MONOCYTES 1.4 (H) 0.05 - 1.2 K/UL    ABS. EOSINOPHILS 0.1 0.0 - 0.4 K/UL    ABS. BASOPHILS 0.0 0.0 - 0.1 K/UL    ABS. IMM. GRANS. 0.1 (H) 0.00 - 0.04 K/UL    DF AUTOMATED     GLUCOSE, POC    Collection Time: 09/30/22  7:37 AM   Result Value Ref Range    Glucose (POC) 113 (H) 70 - 110 mg/dL   GLUCOSE, POC    Collection Time: 09/30/22 12:30 PM   Result Value Ref Range    Glucose (POC) 110 70 - 110 mg/dL   GLUCOSE, POC    Collection Time: 09/30/22  4:28 PM   Result Value Ref Range    Glucose (POC) 116 (H) 70 - 110 mg/dL       Signed By: Felipe Conde MD     September 30, 2022      [employer] medical dictation software was used for portions of this report. Unintended errors may occur.

## 2022-09-30 NOTE — PROGRESS NOTES
Wound Prevention Checklist    Patient: Len Markham (8 y.o. female)  Date: 9/30/2022  Diagnosis: Impaired mobility and ADLs [Z74.09, Z78.9] <principal problem not specified>    Precautions: Fall, Back, Spinal       []  Heel prevention boots placed on patient    []  Patient turned q2h during shift    []  Lift team ordered    []  Patient on Lizzy bed/Specialty bed    []  Each Wound is documented during shift (Stage, Color, drainage, odor, measurements, and dressings)    [x]  Dual skin check done with Bernarda Mcguire RN  BSN

## 2022-09-30 NOTE — PROGRESS NOTES
Problem: Self Care Deficits Care Plan (Adult)  Goal: *Therapy Goal (Edit Goal, Insert Text)  Description: Occupational Therapy Goals   Long Term Goals  Initiated 2022 and to be accomplished within 3 week(s)  1. Pt will perform self-feeding with [place]. 2. Pt will perform grooming with MOD I standing at sink. 3. Pt will perform UB bathing with MOD I.  4. Pt will perform LB bathing with MOD I.  5. Pt will perform tub/shower transfer with MOD I.   6. Pt will perform UB dressing with MOD I.  7. Pt will perform LB dressing with MOD I.  8. Pt will perform toileting task with MOD I.  9. Pt will perform toilet transfer with MOD I. Short Term Goals   Initiated 2022 and to be accomplished within 7 day(s)  1. Pt will perform grooming task while standing at sink x2 minutes  2. Pt will perform UB bathing with setup assist.  3. Pt will perform LB bathing with MIN A, use of AE prn.  4. Pt will perform tub/shower transfer with MIN A to TTB. 5. Pt will perform LB dressing with MOD A.  6. Pt will perform toileting task with MIN A.  7. Pt will perform toilet transfer with MIN A. Outcome: Progressing Towards Goal   Occupational Therapy TREATMENT    Patient: Thalia Leija   67 y.o. Patient identified with name and : yes    Date: 2022    First Tx Session  Time In: 1100  Time Out[de-identified] 1230    Diagnosis: Impaired mobility and ADLs [Z74.09, Z78.9]   Precautions: Fall, Back, Spinal  Chart, occupational therapy assessment, plan of care, and goals were reviewed. Pain:  Pt reports 5/10 pain or discomfort prior to treatment. Pt reports 5/10 pain or discomfort post treatment. Intervention Provided: NA      SUBJECTIVE:   Patient stated I'm just so afraid of falling.     OBJECTIVE DATA SUMMARY:     THERAPEUTIC ACTIVITY Daily Assessment    Pt participated in standing activity to challenge standing balance, increase core strength and increase activity tolerance.  Pt stood at elevated table top with FWW and gait belt for safety. Pt stood at table top to assemble 24-piece puzzle. Pt tolerated x2 trials of standing (3:21, 5:39). Pt required Mod A for sit to stand and verbal cues to maintain upright position in stance (pt has tendency to lean forward in stance). Pt participated in arm ladder activity to increase shoulder strength, to maintain joint integrity and to increase core strength. Pt ascended/descended ladder for 2 sets x 10 reps. Pt was required to sit forward in chair without using back support during activity. For increased challenge, 1# weights were placed on wrist. Pt required multiple breaks to complete 2/2 fatigue in B shoulders. Pt participated in FM//dexterity activity for carryover to self-care tasks. Pt used therapy putty to manipulate bimanually to locate small beads hidden within. Pt able to locate all beads and, using pincer grasp,  and place in small container. THERAPEUTIC EXERCISE Daily Assessment    Pt participated in B hand strengthening exercises for carryover to functional transfers. Pt used therapy putty to perform squeezes, palm rolls, thumb/digit pinches, lateral pinch, 3-jaw maciej pinch and finger spreads. Pt participated in BUE strengthening exercises for carryover to functional tasks. Pt used FlexiBar (yellow) to perform forearm supination/pronation and wrist flexion/extension (2 sets x 15 reps each direction). Pt used 2# dowel to perform chest press, bicep curls, forward rows and backward rows (2 sets x 10 reps each exercise). GROOMING Daily Assessment    Functional Level: 6  Tasks Completed by Patient: Washed hands  Comments: Performed seated w/c level at sink. TOILETING Daily Assessment    Functional Level: 3  Comments: Pt required assistance for CM in stance. MOBILITY/TRANSFERS Daily Assessment    Toilet Transfer Score: 3  Comments: Stand-step transfer w/c to elevated seat over toilet.      Pt self-propelled w/c room <> gym using BLE/BUE to propel forward. ASSESSMENT:  Pt currently requiring Mod A for sit to stand during functional transfers. Pt demonstrating self-limiting behaviors during transfer due to increased fear of falling. Pt very pleasant throughout treatment session. Progression toward goals:  []          Improving appropriately and progressing toward goals  [x]          Improving slowly and progressing toward goals  []          Not making progress toward goals and plan of care will be adjusted     PLAN:  Patient continues to benefit from skilled intervention to address the above impairments. Continue treatment per established plan of care. Discharge Recommendations:  Home Health vs [facility]  Further Equipment Recommendations for Discharge:  bedside commode     Activity Tolerance:  Fair+      Estimated LOS:10/4/2022      Please refer to the flowsheet for vital signs taken during this treatment. After treatment:   [x]  Patient left in no apparent distress sitting up in chair with lunch  []  Patient left in no apparent distress in bed  [x]  Call bell left within reach  []  Nursing notified  []  Caregiver present  []  Bed alarm activated    COMMUNICATION/EDUCATION:   [] Home safety education was provided and the patient/caregiver indicated understanding. [] Patient/family have participated as able in goal setting and plan of care. [x] Patient/family agree to work toward stated goals and plan of care. [] Patient understands intent and goals of therapy, but is neutral about his/her participation. [] Patient is unable to participate in goal setting and plan of care.       FILIPE Drew/DARCY

## 2022-09-30 NOTE — PROGRESS NOTES
Problem: Mobility Impaired (Adult and Pediatric)  Goal: *Therapy Goal (Edit Goal, Insert Text)  Description: Physical Therapy Short Term Goals  Initiated 9/24/2022 and re-assessed and to be accomplished within 7 day(s) on 10/7/2022  1. Patient will move from supine to sit and sit to supine , scoot up and down, and roll side to side in bed with minimal assistance/contact guard assist.  Ongoing-currently modA with supine to sit and sit to supine  2. Patient will transfer from bed to chair and chair to bed with minimal assistance/contact guard assist using the least restrictive device. Ongoing-currently moderate assistance  3. Patient will perform sit to stand with moderate assistance . Ongoing-currently moderate assistance  4. Patient will ambulate with minimal assistance/contact guard assist for 150 feet with the least restrictive device. Ongoing-currently 100 ft with CGA  5. Patient will ascend/descend 4 stairs with 1 handrail(s) with moderate assistance . Ongoing-currently 2 steps with moderate assistance    Physical Therapy Long Term Goals  Initiated 9/24/2022 and to be accomplished within 21 day(s) on 10/14/2022  1. Patient will move from supine to sit and sit to supine , scoot up and down, and roll side to side in bed with supervision/set-up. 2.  Patient will transfer from bed to chair and chair to bed with supervision/set-up using the least restrictive device. 3.  Patient will perform sit to stand with supervision/set-up. 4.  Patient will ambulate with supervision/set-up for 150 feet with the least restrictive device. 5.  Patient will ascend/descend 4 stairs with 1 handrail(s) with CG/SBA. Outcome: Progressing Towards Goal   PHYSICAL THERAPY WEEKLY PROGRESS NOTE    Patient: Kira Alegre (80 y.o. female)  Date: 9/30/2022  Diagnosis: Impaired mobility and ADLs [Z74.09, Z78.9]   Precautions: Fall, Back, Spinal  Chart, physical therapy assessment, plan of care and goals were reviewed.       Time in:1330  Time out:1500    Patient seen for: AM;Balance activities;Gait training;Patient education; Therapeutic exercise;Transfer training; Wheelchair mobility      Pain:  Pt pain was reported as  8 pre-treatment. Pt pain was reported as 6 post-treatment. Patient identified with name and :yes    SUBJECTIVE:     I know what you are trying to get me to do, but the pain in my back just shoots across my back when I try it. OBJECTIVE DATA SUMMARY:           BALANCE Weekly Progress Assessment 2022    Sitting - Static: Good (unsupported)  Sitting - Dynamic: Fair (occasional)  Standing - Static: Fair  Standing - Dynamic : Impaired     BED/CHAIR/WHEELCHAIR TRANSFERS Initial Assessment Weekly Progress Assessment 2022   Rolling Right 5 (Stand-by assistance) 5 (Stand-by assistance)   Rolling Left 4 (Minimal assistance) (using bedrail to assist) 5 (Stand-by assistance)   Supine to Sit 3 (Moderate assistance) 3 (Moderate assistance)   Sit to Stand Maximum assistance (needing lifting and lowering assistance, particularly with fatigue) Moderate assistance   Sit to Supine 2 (Maximal assistance) (using logrolling technique) 4 (Minimal assistance)   Transfer Type Other Other   Transfer Assistance Needed 3 (Moderate assistance ) 4 (Minimal assistance)   Comments    Patient with some improvement noted with transfers from sit to stand, however she continues to require moderate assistance with cues for set up, BLE loading and anterior lift off. Patient has difficulty weightbearing through her BLEs before she attempts to pull herself up to the RW. Educated patient on the need to use her BLEs to accept the weight for push off so that she can decrease the stress on her LB during the transfers. Also educated patient on the need to get most of the way up before reaching for the RW as she reaches for the walker too soon and she throws her balance off.  Patient reports that she has a difficult time feeling confident in this approach and she requires extensive encouragement to attempt for safe transfers. Car Transfer Not tested (NT due to assistance required for getting into standing) Moderate assistance -placed pillow in car seat before patient attempted 2/2 low surface and patient has difficulty with anterior lift off. Patient required the majority of the assistance with her BLEs getting into the simulator. Car Type N/A  Car simulator       WHEELCHAIR MOBILITY/MANAGEMENT Initial Assessment Weekly Progress Assessment 9/30/2022   Able to Propel (dist) 75 feet  80 ft   Assistance Required  (partial assistance for turning, backing up etc) SBA   Curbs/ramps assistance required 0 (Not tested) NT    Wheelchair set up assistance required 3 (Moderate assistance)  Moderate assistance   Wheelchair management Manages left brake, Manages right brake (using brake extender)  Manages brakes   Comments  Patient with improving WC mobility this session as she is using her BLEs to assist with propulsion. GAIT Weekly Progress Assessment 9/30/2022   Gait Description (WDL) Exceptions to WDL   Gait Abnormalities Decreased step clearance       WALKING INDEPENDENCE Initial Assessment Weekly Progress Assessment 9/30/2022   Assistive device Gait belt, Walker, rolling Gait belt;Walker, rolling   Ambulation assistance - level surface 4 (Minimal assistance) 4 (Minimal assistance)   Distance 75 Feet (ft) 100 Feet (ft)   Comments   Patient with slow, gingerly steps this session 2/2 reports of increased LB discomfort. Patient was willing to continue with session as she recently was medicated for pain and reports that the meds will start helping soon. Ambulation assistance - unlevel surfaces  (NT due to safety concern)  Not tested this session, however she was challenged last visit and required CGA and VCs for safety.        STEPS/STAIRS Initial Assessment Weekly Progress Assessment 9/30/2022   Steps/Stairs ambulated 0     Assistance Required   4 (Minimal assistance)   Rail Use  (NT)     Comments    Patient refused additional training on stairs 2/2 she has a ramp for entry/exit into home. Curbs/Ramps  (NT due to safety concern)  NT           ASSESSMENT:  Patient is seen for deficits in strength, mobility, transfers, ambulation, safety and balance. Patient is agreeable to participation and she is willing to address her weekly goals as she is being assessed for progress. Challenged patient with bed mobility and addressed vertigo with patient as she was able to roll to her left with no vertigo symptoms. HOB was lowered for training and patient was able to roll to right and left with stand by assistance and additional time to complete. Patient reported that moving slower with rolling helped her focus and she did not feel dizzy. Transfers from sit to stand and from bed to [facility] require moderate assistance as patient reports moderate weakness and difficulty loading her BLEs ease the transfers. She had 2 episodes this session with right knee buckling that is unpredictable and it increases her anxiety level as she reports being fearful of falling and messing up her recent surgical intervention. Patient was educated extensively on the process of healing and that everything she was relaying to the therapist was normal in this phase of recovery. She did report that the tingling, numbness and cold feeling in her right LE are still present and that she thought that would have subsided some by now. Patient was reminded that her surgery was only 8 days ago and that she needs more time to heal. Continue to address current deficits for continued safety, transfer and mobility training.   Progression toward goals:  []      Improving appropriately and progressing toward goals  []      Improving slowly and progressing toward goals  []      Not making progress toward goals and plan of care will be adjusted     PLAN:  Patient continues to benefit from skilled intervention to address the above impairments. Continue treatment per established plan of care. Discharge Recommendations:  Home Physical Therapy  Further Equipment Recommendations for Discharge:  rolling walker and N/A     Estimated Discharge Date:10/4/2022    Activity Tolerance:   Patient with fair tolerance to session  Please refer to the flowsheet for vital signs taken during this treatment.   After treatment:   [] Patient left in no apparent distress in bed  [x] Patient left in no apparent distress sitting up in chair  [] Patient left in no apparent distress in w/c mobilizing under own power  [] Patient left in no apparent distress dining area  [] Patient left in no apparent distress mobilizing under own power  [x] Call bell left within reach  [x] Nursing notified  [] Caregiver present  [] Bed alarm activated   [] Chair alarm activated      Christina Hamilton  9/30/2022

## 2022-09-30 NOTE — PROGRESS NOTES
SHIFT CHANGE NOTE FOR [facility]    Bedside and Verbal shift change report given to Keith Davis RN (oncoming nurse) by Belinda Nance RN (offgoing nurse). Report included the following information SBAR, Kardex, MAR and Recent Results. Situation:   Code Status: Full Code   Hospital Day: 7   Problem List:   Hospital Problems  Never Reviewed            Codes Class Noted POA    Impaired mobility and ADLs ICD-10-CM: Z74.09, Z78.9  ICD-9-CM: V49.89  9/23/2022 Unknown       Background:   Past Medical History:   Past Medical History:   Diagnosis Date    Arthritis     back and hands    Chronic pain     back    COVID-19     Summer of  2021    Diabetes ([facility] [address]     NIDDM    Hypertension     Nausea & vomiting     Surgery only not colonoscopy        Assessment:  Changes in Assessment throughout shift: No change to previous assessment    Patient has a central line: no Reasons if yes: Insertion date: Last dressing date:  Patient has Aly Cath: no Reasons if yes:     Insertion date:  Shift aly care completed:     Last Vitals:     Vitals:    09/29/22 1706 09/29/22 2045 09/30/22 0734 09/30/22 1625   BP: 98/64 108/60 131/68 122/64   Pulse: 97 80 91 96   Resp: 19 18 16 18   Temp: 99.4 °F (37.4 °C) 98.3 °F (36.8 °C) 98.6 °F (37 °C) 98.9 °F (37.2 °C)   SpO2: 100% 100% 98% 98%       PAIN    Pain Assessment    Pain Intensity 1: 3 (09/30/22 1700) Pain Intensity 1: 2 (12/29/14 1105)    Pain Location 1: Groin Pain Location 1: Abdomen    Pain Intervention(s) 1: Medication (see MAR) Pain Intervention(s) 1: Medication (see MAR)  Patient Stated Pain Goal: 0 Patient Stated Pain Goal: 0  Intervention effective: yes  Other actions taken for pain: Medication (see MAR)    Skin Assessment  Skin color Skin Color: Appropriate for ethnicity  Condition/Temperature Skin Condition/Temp: Warm, Dry  Integrity Skin Integrity: Incision (comment)  Turgor    Weekly Pressure Ulcer Documentation  Pressure  Injury Documentation: No Pressure Injury Noted-Pressure Ulcer Prevention Initiated  Wound Prevention & Protection Methods  Orientation of wound Orientation of Wound Prevention: Posterior  Location of Prevention Location of Wound Prevention: Buttocks, Sacrum/Coccyx  Dressing Present Dressing Present : No  Dressing Status    Wound Offloading Wound Offloading (Prevention Methods): Bed, pressure redistribution/air    INTAKE/OUPUT  Date 09/29/22 0700 - 09/30/22 0659 09/30/22 0700 - 10/01/22 0659   Shift 0700-1859 1900-0659 24 Hour Total 0700-1859 1900-0659 24 Hour Total   INTAKE   P.O. 900  900        P. O. 900  900      Shift Total 900  900      OUTPUT   Urine           Urine Occurrence(s) 7 x 6 x 13 x 0 x  0 x   Stool           Stool Occurrence(s) 0 x 0 x 0 x 0 x  0 x   Shift Total           900      Weight (kg)               Recommendations:  Patient needs and requests: needs to call surgeon regarding dressing order. AM nurse made aware and will follow up. Pending tests/procedures: none at this time     Functional Level/Equipment: Partial (one person) / Crissy Coto; Wheelchair    Fall Precautions:   Fall risk precautions were reinforced with the patient; she was instructed to call for help prior to getting up. The following fall risk precautions were continued: bed/ chair alarms, door signage, yellow bracelet and socks as well as update of the Cosmo Jesus tool in the patient's room. Reta Score: 4    HEALS Safety Check    A safety check occurred in the patient's room between off going nurse and oncoming nurse listed above. The safety check included the below items  Area Items   H  High Alert Medications Verify all high alert medication drips (heparin, PCA, etc.)   E  Equipment Suction is set up for ALL patients (with yanker)  Red plugs utilized for all equipment (IV pumps, etc.)  WOWs wiped down at end of shift.   Room stocked with oxygen, suction, and other unit-specific supplies   A  Alarms Bed alarm is set for fall risk patients  Ensure chair alarm is in place and activated if patient is up in a chair   L  Lines Check IV for any infiltration  Hughes bag is empty if patient has a Hughes   Tubing and IV bags are labeled   S  Safety   Room is clean, patient is clean, and equipment is clean. Hallways are clear from equipment besides carts. Fall bracelet on for fall risk patients  Ensure room is clear and free of clutter  Suction is set up for ALL patients (with chloe)  Hallways are clear from equipment besides carts.    Isolation precautions followed, supplies available outside room, sign posted     Ebonie Smith RN

## 2022-10-01 LAB
GLUCOSE BLD STRIP.AUTO-MCNC: 111 MG/DL (ref 70–110)
GLUCOSE BLD STRIP.AUTO-MCNC: 111 MG/DL (ref 70–110)
GLUCOSE BLD STRIP.AUTO-MCNC: 114 MG/DL (ref 70–110)
GLUCOSE BLD STRIP.AUTO-MCNC: 166 MG/DL (ref 70–110)

## 2022-10-01 PROCEDURE — 82962 GLUCOSE BLOOD TEST: CPT

## 2022-10-01 PROCEDURE — 74011250636 HC RX REV CODE- 250/636: Performed by: EMERGENCY MEDICINE

## 2022-10-01 PROCEDURE — 74011250636 HC RX REV CODE- 250/636: Performed by: HOSPITALIST

## 2022-10-01 PROCEDURE — 74011636637 HC RX REV CODE- 636/637: Performed by: HOSPITALIST

## 2022-10-01 PROCEDURE — 74011250637 HC RX REV CODE- 250/637: Performed by: STUDENT IN AN ORGANIZED HEALTH CARE EDUCATION/TRAINING PROGRAM

## 2022-10-01 PROCEDURE — 65310000000 HC RM PRIVATE REHAB

## 2022-10-01 PROCEDURE — 74011250637 HC RX REV CODE- 250/637: Performed by: INTERNAL MEDICINE

## 2022-10-01 PROCEDURE — 74011250637 HC RX REV CODE- 250/637: Performed by: HOSPITALIST

## 2022-10-01 RX ORDER — POLYETHYLENE GLYCOL 3350 17 G/17G
17 POWDER, FOR SOLUTION ORAL DAILY
Status: DISCONTINUED | OUTPATIENT
Start: 2022-10-01 | End: 2022-10-04 | Stop reason: HOSPADM

## 2022-10-01 RX ADMIN — ATORVASTATIN CALCIUM 20 MG: 40 TABLET, FILM COATED ORAL at 22:16

## 2022-10-01 RX ADMIN — METOPROLOL TARTRATE 12.5 MG: 25 TABLET, FILM COATED ORAL at 21:45

## 2022-10-01 RX ADMIN — GABAPENTIN 300 MG: 300 CAPSULE ORAL at 16:28

## 2022-10-01 RX ADMIN — FAMOTIDINE 20 MG: 20 TABLET ORAL at 18:48

## 2022-10-01 RX ADMIN — ENOXAPARIN SODIUM 40 MG: 100 INJECTION SUBCUTANEOUS at 12:44

## 2022-10-01 RX ADMIN — THERA TABS 1 TABLET: TAB at 08:42

## 2022-10-01 RX ADMIN — OXYCODONE HYDROCHLORIDE 5 MG: 5 TABLET ORAL at 07:04

## 2022-10-01 RX ADMIN — METOPROLOL TARTRATE 12.5 MG: 25 TABLET, FILM COATED ORAL at 08:42

## 2022-10-01 RX ADMIN — DOCUSATE SODIUM 100 MG: 100 CAPSULE, LIQUID FILLED ORAL at 18:48

## 2022-10-01 RX ADMIN — DOCUSATE SODIUM 100 MG: 100 CAPSULE, LIQUID FILLED ORAL at 08:42

## 2022-10-01 RX ADMIN — FAMOTIDINE 20 MG: 20 TABLET ORAL at 08:42

## 2022-10-01 RX ADMIN — GABAPENTIN 300 MG: 300 CAPSULE ORAL at 21:45

## 2022-10-01 RX ADMIN — Medication 2 UNITS: at 21:44

## 2022-10-01 RX ADMIN — OXYCODONE HYDROCHLORIDE 5 MG: 5 TABLET ORAL at 16:28

## 2022-10-01 RX ADMIN — IRON SUCROSE 200 MG: 20 INJECTION, SOLUTION INTRAVENOUS at 13:24

## 2022-10-01 RX ADMIN — GABAPENTIN 300 MG: 300 CAPSULE ORAL at 08:42

## 2022-10-01 NOTE — PROGRESS NOTES
Problem: Falls - Risk of  Goal: *Absence of Falls  Description: Document Anthony Marter Fall Risk and appropriate interventions in the flowsheet.   Outcome: Progressing Towards Goal  Note: Fall Risk Interventions:  Mobility Interventions: Bed/chair exit alarm, Patient to call before getting OOB, Utilize walker, cane, or other assistive device    Mentation Interventions: Bed/chair exit alarm, Door open when patient unattended    Medication Interventions: Bed/chair exit alarm, Patient to call before getting OOB, Teach patient to arise slowly    Elimination Interventions: Bed/chair exit alarm, Call light in reach, Patient to call for help with toileting needs, Stay With Me (per policy)    History of Falls Interventions: Bed/chair exit alarm, Door open when patient unattended, Room close to nurse's station         Problem: Patient Education: Go to Patient Education Activity  Goal: Patient/Family Education  Outcome: Progressing Towards Goal     Problem: Pain  Goal: *Control of Pain  Outcome: Progressing Towards Goal     Problem: Patient Education: Go to Patient Education Activity  Goal: Patient/Family Education  Outcome: Progressing Towards Goal     Problem: Inpatient Rehab (Adult)  Goal: *LTG: Avoids injury/falls 100% of time related to deficits  Outcome: Progressing Towards Goal  Goal: *LTG: Avoids infection 100% of time related to deficits  Outcome: Progressing Towards Goal  Goal: *LTG: Verbalize understanding of diagnosis and risk factors for recurring stroke  Outcome: Progressing Towards Goal  Goal: *LTG: Absence of DVT during hospitalization  Outcome: Progressing Towards Goal  Goal: *LTG: Maintains Skin Integrity With No Evidence of Pressure Injury 100% of Time  Outcome: Progressing Towards Goal  Goal: Interventions  Outcome: Progressing Towards Goal     Problem: Patient Education: Go to Patient Education Activity  Goal: Patient/Family Education  Outcome: Progressing Towards Goal     Problem: Pressure Injury - Risk of  Goal: *Prevention of pressure injury  Description: Document Rashid Scale and appropriate interventions in the flowsheet.   Outcome: Progressing Towards Goal  Note: Pressure Injury Interventions:  Sensory Interventions: Assess changes in LOC, Keep linens dry and wrinkle-free, Minimize linen layers, Pressure redistribution bed/mattress (bed type), Check visual cues for pain    Moisture Interventions: Absorbent underpads, Minimize layers, Moisture barrier    Activity Interventions: Increase time out of bed, Pressure redistribution bed/mattress(bed type)    Mobility Interventions: HOB 30 degrees or less, Pressure redistribution bed/mattress (bed type)    Nutrition Interventions: Document food/fluid/supplement intake    Friction and Shear Interventions: HOB 30 degrees or less, Lift sheet, Minimize layers, Apply protective barrier, creams and emollients                Problem: Patient Education: Go to Patient Education Activity  Goal: Patient/Family Education  Outcome: Progressing Towards Goal

## 2022-10-01 NOTE — ROUTINE PROCESS
SHIFT CHANGE NOTE FOR [facility]    Bedside and Verbal shift change report given to Cathy WHYTE RN (oncoming nurse) by Nati Kilgore RN (offgoing nurse). Report included the following information SBAR, Kardex, MAR and Recent Results.     Situation:   Code Status: Full Code   Hospital Day: 8   Problem List:   Hospital Problems  Never Reviewed            Codes Class Noted POA    Impaired mobility and ADLs ICD-10-CM: Z74.09, Z78.9  ICD-9-CM: V49.89  9/23/2022 Unknown           Background:   Past Medical History:   Past Medical History:   Diagnosis Date    Arthritis     back and hands    Chronic pain     back    COVID-19     Summer of  2021    Diabetes ([facility] [address]     NIDDM    Hypertension     Nausea & vomiting     Surgery only not colonoscopy        Assessment:  Changes in Assessment throughout shift: No change to previous assessment    Patient has a central line: no Reasons if yes: na  Insertion date:na Last dressing date:na  Patient has Aly Cath: no Reasons if yes: na   Insertion date:na  Shift aly care completed: NO    Last Vitals:     Vitals:    09/30/22 2051 10/01/22 0741 10/01/22 0954 10/01/22 1620   BP: (!) 143/77 123/77  124/72   Pulse: (!) 101 88  95   Resp: 20 18  18   Temp: 99.1 °F (37.3 °C) 98.4 °F (36.9 °C)  98 °F (36.7 °C)   SpO2: 99% 98%  96%   Weight:   83 kg (183 lb)        PAIN    Pain Assessment    Pain Intensity 1: 0 (10/01/22 1728) Pain Intensity 1: 2 (12/29/14 1105)    Pain Location 1: Back Pain Location 1: Abdomen    Pain Intervention(s) 1: Medication (see MAR) Pain Intervention(s) 1: Medication (see MAR)  Patient Stated Pain Goal: 0 Patient Stated Pain Goal: 0  Intervention effective: yes  Other actions taken for pain: Medication (see MAR)    Skin Assessment  Skin color Skin Color: Appropriate for ethnicity  Condition/Temperature Skin Condition/Temp: Dry, Warm  Integrity Skin Integrity: Incision (comment) (x3)  Turgor    Weekly Pressure Ulcer Documentation  Pressure  Injury Documentation: No Pressure Injury Noted-Pressure Ulcer Prevention Initiated  Wound Prevention & Protection Methods  Orientation of wound Orientation of Wound Prevention: Posterior  Location of Prevention Location of Wound Prevention: Sacrum/Coccyx  Dressing Present Dressing Present : No  Dressing Status    Wound Offloading Wound Offloading (Prevention Methods): Bed, pressure reduction mattress    INTAKE/OUPUT  Date 09/30/22 1900 - 10/01/22 0659 10/01/22 0700 - 10/02/22 0659   Shift 1614-5402 24 Hour Total 4230-3646 5876-4450 24 Hour Total   INTAKE   P.O.   1080     P. O.   1080   Shift Total(mL/kg) (13)  1080(13)   OUTPUT   Urine 0 0 200(0.2)  200     Urine Voided 0 0 200  200     Urine Occurrence(s) 5 x 5 x 5 x  5 x   Emesis/NG output          Emesis Occurrence(s) 0 x 0 x      Stool          Stool Occurrence(s) 0 x 1 x 0 x  0 x   Shift Total(mL/kg) 0 0 200(2.4)  200(2.4)    240 880  880   Weight (kg)   83 83 83       Recommendations:  Patient needs and requests: na    Pending tests/procedures: na     Functional Level/Equipment: Partial (one person) /      Fall Precautions:   Fall risk precautions were reinforced with the patient; she was instructed to call for help prior to getting up. The following fall risk precautions were continued: bed/ chair alarms, door signage, yellow bracelet and socks as well as update of the Rillton Embs tool in the patient's room. Reta Score: 4    HEALS Safety Check    A safety check occurred in the patient's room between off going nurse and oncoming nurse listed above. The safety check included the below items  Area Items   H  High Alert Medications Verify all high alert medication drips (heparin, PCA, etc.)   E  Equipment Suction is set up for ALL patients (with yanker)  Red plugs utilized for all equipment (IV pumps, etc.)  WOWs wiped down at end of shift.   Room stocked with oxygen, suction, and other unit-specific supplies   A  Alarms Bed alarm is set for fall risk patients  Ensure chair alarm is in place and activated if patient is up in a chair   L  Lines Check IV for any infiltration  Hughes bag is empty if patient has a Hughes   Tubing and IV bags are labeled   S  Safety   Room is clean, patient is clean, and equipment is clean. Hallways are clear from equipment besides carts. Fall bracelet on for fall risk patients  Ensure room is clear and free of clutter  Suction is set up for ALL patients (with deannaker)  Hallways are clear from equipment besides carts.    Isolation precautions followed, supplies available outside room, sign posted     Mo Blackburn RN

## 2022-10-01 NOTE — PROGRESS NOTES
SHIFT CHANGE NOTE FOR [facility]    Bedside and Verbal shift change report given to Kirsten Healy RN (oncoming nurse) by Paul Rios RN (offgoing nurse). Report included the following information SBAR, Kardex, MAR and Recent Results. Situation:   Code Status: Full Code   Hospital Day: 8   Problem List:   Hospital Problems  Never Reviewed            Codes Class Noted POA    Impaired mobility and ADLs ICD-10-CM: Z74.09, Z78.9  ICD-9-CM: V49.89  9/23/2022 Unknown           Background:   Past Medical History:   Past Medical History:   Diagnosis Date    Arthritis     back and hands    Chronic pain     back    COVID-19     Summer of  2021    Diabetes ([facility] [address]     NIDDM    Hypertension     Nausea & vomiting     Surgery only not colonoscopy        Assessment:  Changes in Assessment throughout shift: No change to previous assessment    Patient has a central line: no Reasons if yes: Insertion date: Last dressing date:  Patient has Aly Cath: no Reasons if yes:     Insertion date:  Shift aly care completed:     Last Vitals:     Vitals:    09/29/22 2045 09/30/22 0734 09/30/22 1625 09/30/22 2051   BP: 108/60 131/68 122/64 (!) 143/77   Pulse: 80 91 96 (!) 101   Resp: 18 16 18 20   Temp: 98.3 °F (36.8 °C) 98.6 °F (37 °C) 98.9 °F (37.2 °C) 99.1 °F (37.3 °C)   SpO2: 100% 98% 98% 99%       PAIN    Pain Assessment    Pain Intensity 1: 0 (10/01/22 0214) Pain Intensity 1: 2 (12/29/14 1105)    Pain Location 1: Groin Pain Location 1: Abdomen    Pain Intervention(s) 1: Medication (see MAR) Pain Intervention(s) 1: Medication (see MAR)  Patient Stated Pain Goal: 0 Patient Stated Pain Goal: 0  Intervention effective: yes  Other actions taken for pain:      Skin Assessment  Skin color Skin Color: Appropriate for ethnicity  Condition/Temperature Skin Condition/Temp: Dry, Warm  Integrity Skin Integrity: Incision (comment) (x3)  Turgor    Weekly Pressure Ulcer Documentation  Pressure  Injury Documentation: No Pressure Injury Noted-Pressure Ulcer Prevention Initiated  Wound Prevention & Protection Methods  Orientation of wound Orientation of Wound Prevention: Posterior  Location of Prevention Location of Wound Prevention: Sacrum/Coccyx  Dressing Present Dressing Present : No  Dressing Status    Wound Offloading Wound Offloading (Prevention Methods): Bed, pressure reduction mattress    INTAKE/OUPUT  Date 09/30/22 0700 - 10/01/22 0659 10/01/22 0700 - 10/02/22 0659   Shift 0700-1859 1900-0659 24 Hour Total 0700-1859 1900-0659 24 Hour Total   INTAKE   P.O.  240 240        P. O.  240 240      Shift Total  240 240      OUTPUT   Urine  0 0        Urine Voided  0 0        Urine Occurrence(s) 0 x 2 x 2 x      Stool           Stool Occurrence(s) 1 x 0 x 1 x      Shift Total  0 0      NET  240 240      Weight (kg)             Recommendations:  Patient needs and requests: none at this time    Pending tests/procedures: none at this time     Functional Level/Equipment: Partial (one person) /      Fall Precautions:   Fall risk precautions were reinforced with the patient; she was instructed to call for help prior to getting up. The following fall risk precautions were continued: bed/ chair alarms, door signage, yellow bracelet and socks as well as update of the Amina Zventsesdras tool in the patient's room. Reta Score: 4    HEALS Safety Check    A safety check occurred in the patient's room between off going nurse and oncoming nurse listed above. The safety check included the below items  Area Items   H  High Alert Medications Verify all high alert medication drips (heparin, PCA, etc.)   E  Equipment Suction is set up for ALL patients (with yanker)  Red plugs utilized for all equipment (IV pumps, etc.)  WOWs wiped down at end of shift.   Room stocked with oxygen, suction, and other unit-specific supplies   A  Alarms Bed alarm is set for fall risk patients  Ensure chair alarm is in place and activated if patient is up in a chair   L  Lines Check IV for any infiltration  Hughes bag is empty if patient has a Hughes   Tubing and IV bags are labeled   S  Safety   Room is clean, patient is clean, and equipment is clean. Hallways are clear from equipment besides carts. Fall bracelet on for fall risk patients  Ensure room is clear and free of clutter  Suction is set up for ALL patients (with chloe)  Hallways are clear from equipment besides carts.    Isolation precautions followed, supplies available outside room, sign posted     Sera Blackmon RN

## 2022-10-01 NOTE — ROUTINE PROCESS
0800 Pt. Awake sitting up in bed no change in assessment. 0930 Pt. Sitting up in chair eating breakfast.  1200  with therapy. 1330 able to transfer from bed to chair with assist.   1500 no change in assessment. 1800 Pt. Sitting up in bed eating dinner.

## 2022-10-01 NOTE — PROGRESS NOTES
Progress Note    Patient: Corazon Shukla MRN: [id number]  CSN: [id number]    YOB: 1950  Age: 67 y.o. Sex: female    DOA: 9/23/2022 LOS:  LOS: 8 days                    Subjective:    Patient feels well overall. Has some 'leg heaviness', otherwise, denies any significant pain. Has some trouble lifting her legs up, otherwise, she does feel like strength is improving. No sciatica, incontinence, or severe pain. No back pain. No numbness or tingling. Review of systems  General: No fevers or chills. Cardiovascular: No chest pain or pressure. No palpitations. Pulmonary: No shortness of breath, cough or wheeze. Gastrointestinal: No abdominal pain, nausea, vomiting or diarrhea. Genitourinary: No urinary frequency, urgency, hesitancy or dysuria. Musculoskeletal: as per HPI  Neurologic: as per HPI    Objective:     Physical Exam:  Visit Vitals  /64 (BP 1 Location: Left upper arm, BP Patient Position: Lying)   Pulse (!) 104   Temp 98.3 °F (36.8 °C)   Resp 19   Wt 83 kg (183 lb)   SpO2 96%   BMI 29.54 kg/m²        General:         Alert, cooperative, no acute distress    HEENT: NC, Atraumatic. PERRLA, anicteric sclerae. Lungs: CTA Bilaterally. No Wheezing/Rhonchi/Rales. Heart:  Regular  rhythm,  No murmur, No Rubs, No Gallops  Abdomen: Soft, Non distended, Non tender. Psych:   Good insight. Not anxious or agitated. Neurologic:  CN 2-12 grossly intact      Intake and Output:  Current Shift:  No intake/output data recorded. Last three shifts:  09/30 0701 - 10/01 1900  In: 1320 [P.O.:1320]  Out: 200 [Urine:200]    Labs: Results:       Chemistry Recent Labs     09/29/22  0709   *      K 4.2      CO2 31   BUN 13   CREA 0.70   CA 8.8   AGAP 6   BUCR 19      CBC w/Diff Recent Labs     09/30/22  0620   WBC 10.2   RBC 2.94*   HGB 7.9*   HCT 26.5*      GRANS 65   LYMPH 20*   EOS 1      Cardiac Enzymes No results for input(s): CPK, CKND1, CB in the last 72 hours.     No lab exists for component: CKRMB, TROIP   Coagulation No results for input(s): PTP, INR, APTT, INREXT, INREXT in the last 72 hours. Lipid Panel No results found for: CHOL, CHOLPOCT, CHOLX, CHLST, CHOLV, 169773, HDL, HDLP, LDL, LDLC, DLDLP, 787692, VLDLC, VLDL, TGLX, TRIGL, TRIGP, TGLPOCT, CHHD, CHHDX   BNP No results for input(s): BNPP in the last 72 hours. Liver Enzymes No results for input(s): TP, ALB, TBIL, AP in the last 72 hours.     No lab exists for component: SGOT, GPT, DBIL   Thyroid Studies No results found for: T4, T3U, TSH, TSHEXT, TSHEXT       Procedures/imaging: see electronic medical records for all procedures/Xrays and details which were not copied into this note but were reviewed prior to creation of Plan    Medications:   Current Facility-Administered Medications   Medication Dose Route Frequency    polyethylene glycol (MIRALAX) packet 17 g  17 g Oral DAILY    iron sucrose (VENOFER) injection 200 mg  200 mg IntraVENous Q24H    enoxaparin (LOVENOX) injection 40 mg  40 mg SubCUTAneous Q24H    famotidine (PEPCID) tablet 20 mg  20 mg Oral BID    [Held by provider] hydroCHLOROthiazide (HYDRODIURIL) tablet 12.5 mg  12.5 mg Oral DAILY    ondansetron (ZOFRAN ODT) tablet 4 mg  4 mg Oral Q8H PRN    acetaminophen (TYLENOL) tablet 500 mg  500 mg Oral Q6H PRN    metoprolol tartrate (LOPRESSOR) tablet 12.5 mg  12.5 mg Oral Q12H    docusate sodium (COLACE) capsule 100 mg  100 mg Oral BID    magnesium hydroxide (MILK OF MAGNESIA) 400 mg/5 mL oral suspension 30 mL  30 mL Oral DAILY PRN    bisacodyL (DULCOLAX) tablet 10 mg  10 mg Oral Q48H PRN    bisacodyL (DULCOLAX) suppository 10 mg  10 mg Rectal Q48H PRN    hydrALAZINE (APRESOLINE) tablet 25 mg  25 mg Oral Q6H PRN    cyclobenzaprine (FLEXERIL) tablet 5 mg  5 mg Oral TID PRN    gabapentin (NEURONTIN) capsule 300 mg  300 mg Oral TID    therapeutic multivitamin (THERAGRAN) tablet 1 Tablet  1 Tablet Oral DAILY    oxyCODONE IR (ROXICODONE) tablet 5 mg  5 mg Oral Q6H PRN    atorvastatin (LIPITOR) tablet 20 mg  20 mg Oral QHS    insulin lispro (HUMALOG) injection   SubCUTAneous AC&HS    glucose chewable tablet 16 g  4 Tablet Oral PRN    glucagon (GLUCAGEN) injection 1 mg  1 mg IntraMUSCular PRN    dextrose 10% infusion 0-250 mL  0-250 mL IntraVENous PRN       Assessment/Plan     Active Problems:    Impaired mobility and ADLs (9/23/2022)  1. Impaired mobility and ADLs secondary to #2  2. Lumbar spinal stenosis without neurological claudication status post anterior lumbar interbody fusion, L4-L5 and L5-S1 1 prone transpoas fusion  3. Diabetes mellitus  4. Hypertension  5. Osteoarthritis  Anemia noted. No overt bleeding  Dyslipidemia     Plan  Bowel regimen. Did not receive enema today. Planning for suppository tonight. Continue miralax, changed to scheduled daily  Continue PT/OT  Pain well controlled at this time. Monitor blood pressure  Continue statin  Sliding scale insulin, monitor sugars  Iron deficiency noted. IV Venofer. Monitor blood counts intermittently. Check stool Hemoccult. Discussed with patient.   Discussed with IVET Oakley MD  10/1/2022 6:23 PM        Time spent 30 min

## 2022-10-01 NOTE — REHAB NOTE
Wound Prevention Checklist    Patient: Janis Ospina (27 y.o. female)  Date: 10/1/2022  Diagnosis: Impaired mobility and ADLs [Z74.09, Z78.9] <principal problem not specified>    Precautions: Fall, Back, Spinal       []  Heel prevention boots placed on patient    []  Patient turned q2h during shift    []  Lift team ordered    []  Patient on Lizzy bed/Specialty bed    []  Each Wound is documented during shift (Stage, Color, drainage, odor, measurements, and dressings)    []  Dual skin check done with EFRAÍN Collins RN

## 2022-10-01 NOTE — PROGRESS NOTES
Patient refused soup suds to be done tonight. Per patient, she will let the nurse know when she is ready.

## 2022-10-02 LAB
GLUCOSE BLD STRIP.AUTO-MCNC: 107 MG/DL (ref 70–110)
GLUCOSE BLD STRIP.AUTO-MCNC: 111 MG/DL (ref 70–110)
GLUCOSE BLD STRIP.AUTO-MCNC: 154 MG/DL (ref 70–110)
GLUCOSE BLD STRIP.AUTO-MCNC: 99 MG/DL (ref 70–110)

## 2022-10-02 PROCEDURE — 74011250636 HC RX REV CODE- 250/636: Performed by: HOSPITALIST

## 2022-10-02 PROCEDURE — 74011636637 HC RX REV CODE- 636/637: Performed by: HOSPITALIST

## 2022-10-02 PROCEDURE — 74011250636 HC RX REV CODE- 250/636: Performed by: EMERGENCY MEDICINE

## 2022-10-02 PROCEDURE — 97110 THERAPEUTIC EXERCISES: CPT

## 2022-10-02 PROCEDURE — 74011250637 HC RX REV CODE- 250/637: Performed by: HOSPITALIST

## 2022-10-02 PROCEDURE — 65310000000 HC RM PRIVATE REHAB

## 2022-10-02 PROCEDURE — 74011250637 HC RX REV CODE- 250/637: Performed by: INTERNAL MEDICINE

## 2022-10-02 PROCEDURE — 97530 THERAPEUTIC ACTIVITIES: CPT

## 2022-10-02 PROCEDURE — 82962 GLUCOSE BLOOD TEST: CPT

## 2022-10-02 PROCEDURE — 2709999900 HC NON-CHARGEABLE SUPPLY

## 2022-10-02 RX ADMIN — FAMOTIDINE 20 MG: 20 TABLET ORAL at 18:36

## 2022-10-02 RX ADMIN — THERA TABS 1 TABLET: TAB at 09:18

## 2022-10-02 RX ADMIN — IRON SUCROSE 200 MG: 20 INJECTION, SOLUTION INTRAVENOUS at 13:04

## 2022-10-02 RX ADMIN — ATORVASTATIN CALCIUM 20 MG: 40 TABLET, FILM COATED ORAL at 21:12

## 2022-10-02 RX ADMIN — DOCUSATE SODIUM 100 MG: 100 CAPSULE, LIQUID FILLED ORAL at 18:36

## 2022-10-02 RX ADMIN — GABAPENTIN 300 MG: 300 CAPSULE ORAL at 21:12

## 2022-10-02 RX ADMIN — ENOXAPARIN SODIUM 40 MG: 100 INJECTION SUBCUTANEOUS at 13:04

## 2022-10-02 RX ADMIN — OXYCODONE HYDROCHLORIDE 5 MG: 5 TABLET ORAL at 09:18

## 2022-10-02 RX ADMIN — DOCUSATE SODIUM 100 MG: 100 CAPSULE, LIQUID FILLED ORAL at 09:18

## 2022-10-02 RX ADMIN — FAMOTIDINE 20 MG: 20 TABLET ORAL at 09:18

## 2022-10-02 RX ADMIN — Medication 2 UNITS: at 21:12

## 2022-10-02 RX ADMIN — GABAPENTIN 300 MG: 300 CAPSULE ORAL at 09:18

## 2022-10-02 RX ADMIN — GABAPENTIN 300 MG: 300 CAPSULE ORAL at 16:41

## 2022-10-02 RX ADMIN — METOPROLOL TARTRATE 12.5 MG: 25 TABLET, FILM COATED ORAL at 09:18

## 2022-10-02 NOTE — ROUTINE PROCESS
[facility]    Bedside and Verbal shift change report given to RAMOS COONEY (oncoming nurse) by Kale Ortega RN (offgoing nurse). Report included the following information SBAR, Kardex, MAR and Recent Results.     Situation:   Code Status: Full Code   Hospital Day: 9   Problem List:   Hospital Problems  Never Reviewed            Codes Class Noted POA    Impaired mobility and ADLs ICD-10-CM: Z74.09, Z78.9  ICD-9-CM: V49.89  9/23/2022 Unknown         Background:   Past Medical History:   Past Medical History:   Diagnosis Date    Arthritis     back and hands    Chronic pain     back    COVID-19     Summer of  2021    Diabetes ([facility] [address]     NIDDM    Hypertension     Nausea & vomiting     Surgery only not colonoscopy        Assessment:  Changes in Assessment throughout shift: No change to previous assessment    Patient has a central line: no Reasons if yes: na  Insertion date:na Last dressing date:na  Patient has Aly Cath: no Reasons if yes: na   Insertion date:na  Shift aly care completed: NO    Last Vitals:     Vitals:    10/01/22 0741 10/01/22 0954 10/01/22 1620 10/01/22 2030   BP: 123/77  124/72 123/64   Pulse: 88  95 (!) 104   Resp: 18  18 19   Temp: 98.4 °F (36.9 °C)  98 °F (36.7 °C) 98.3 °F (36.8 °C)   SpO2: 98%  96% 96%   Weight:  83 kg (183 lb)         PAIN    Pain Assessment    Pain Intensity 1: 0 (10/02/22 0405) Pain Intensity 1: 2 (12/29/14 1105)    Pain Location 1: Back Pain Location 1: Abdomen    Pain Intervention(s) 1: Medication (see MAR) Pain Intervention(s) 1: Medication (see MAR)  Patient Stated Pain Goal: 0 Patient Stated Pain Goal: 0  Intervention effective: yes  Other actions taken for pain:      Skin Assessment  Skin color Skin Color: Appropriate for ethnicity  Condition/Temperature Skin Condition/Temp: Warm, Dry  Integrity Skin Integrity: Incision (comment)  Turgor    Weekly Pressure Ulcer Documentation  Pressure  Injury Documentation: No Pressure Injury Noted-Pressure Ulcer Prevention Initiated  Wound Prevention & Protection Methods  Orientation of wound Orientation of Wound Prevention: Posterior  Location of Prevention Location of Wound Prevention: Sacrum/Coccyx  Dressing Present Dressing Present : No  Dressing Status    Wound Offloading Wound Offloading (Prevention Methods): Bed, pressure reduction mattress, Chair cushion, Elevate heels, Pillows, Walker, Wheelchair    INTAKE/OUPUT  Date 10/01/22 0700 - 10/02/22 0659 10/02/22 0700 - 10/03/22 0659   Shift 0700-1859 1900-0659 24 Hour Total 0700-1859 1900-0659 24 Hour Total   INTAKE   P.O. 1080  1080        P. O. 1080  1080      Shift Total(mL/kg) 1080(13)  1096(69)      OUTPUT   Urine(mL/kg/hr) 200(0.2)  200(0.1)        Urine Voided 200  200        Urine Occurrence(s) 5 x 3 x 8 x      Emesis/NG output           Emesis Occurrence(s)  0 x 0 x      Stool           Stool Occurrence(s) 0 x 0 x 0 x      Shift Total(mL/kg) 200(2.4)  200(2.4)        880      Weight (kg) 83 83 83 83 83 83         Recommendations:  Patient needs and requests: na    Pending tests/procedures: na     Functional Level/Equipment: Partial (one person) / Bed (comment); Lorren Barer; Wheelchair    Fall Precautions:   Fall risk precautions were reinforced with the patient; she was instructed to call for help prior to getting up. The following fall risk precautions were continued: bed/ chair alarms, door signage, yellow bracelet and socks as well as update of the [place] Beer tool in the patient's room. Reta Score: 4    HEALS Safety Check    A safety check occurred in the patient's room between off going nurse and oncoming nurse listed above. The safety check included the below items  Area Items   H  High Alert Medications Verify all high alert medication drips (heparin, PCA, etc.)   E  Equipment Suction is set up for ALL patients (with chloe)  Red plugs utilized for all equipment (IV pumps, etc.)  WOWs wiped down at end of shift.   Room stocked with oxygen, suction, and other unit-specific supplies   A  Alarms Bed alarm is set for fall risk patients  Ensure chair alarm is in place and activated if patient is up in a chair   L  Lines Check IV for any infiltration  Hughes bag is empty if patient has a Hughes   Tubing and IV bags are labeled   S  Safety   Room is clean, patient is clean, and equipment is clean. Hallways are clear from equipment besides carts. Fall bracelet on for fall risk patients  Ensure room is clear and free of clutter  Suction is set up for ALL patients (with chloe)  Hallways are clear from equipment besides carts.    Isolation precautions followed, supplies available outside room, sign posted     Nanci Rogel RN

## 2022-10-02 NOTE — REHAB NOTE
Wound Prevention Checklist    Patient: Tabitha Carvajal (89 y.o. female)  Date: 10/2/2022  Diagnosis: Impaired mobility and ADLs [Z74.09, Z78.9] <principal problem not specified>    Precautions: Fall, Back, Spinal       []  Heel prevention boots placed on patient    []  Patient turned q2h during shift    []  Lift team ordered    []  Patient on [place] bed/Specialty bed    []  Each Wound is documented during shift (Stage, Color, drainage, odor, measurements, and dressings)    []  Dual skin check done with EFRAÍN Escoto RN

## 2022-10-02 NOTE — ROUTINE PROCESS
0800 Pt. Awake sitting up in bed no change in assessment pt. Reported to be feeling fine. 0930 PT. Sitting up in bed eating breakfast.   1200  no complaints. 1330 able to transfer from bed to chair with assist.  1500 no change in assessment. 1800 Pt. Sitting up in bed eating dinner.

## 2022-10-02 NOTE — ROUTINE PROCESS
SHIFT CHANGE NOTE FOR [facility]    Bedside and Verbal shift change report given to Francisco Berry RN (oncoming nurse) by Faina Nuno RN (offgoing nurse). Report included the following information SBAR, Kardex, MAR and Recent Results.     Situation:   Code Status: Full Code   Hospital Day: 9   Problem List:   Hospital Problems  Never Reviewed            Codes Class Noted POA    Impaired mobility and ADLs ICD-10-CM: Z74.09, Z78.9  ICD-9-CM: V49.89  9/23/2022 Unknown           Background:   Past Medical History:   Past Medical History:   Diagnosis Date    Arthritis     back and hands    Chronic pain     back    COVID-19     Summer of  2021    Diabetes ([facility] [address]     NIDDM    Hypertension     Nausea & vomiting     Surgery only not colonoscopy        Assessment:  Changes in Assessment throughout shift: No change to previous assessment    Patient has a central line: no Reasons if yes: na  Insertion date:na Last dressing date:na  Patient has Aly Cath: no Reasons if yes: na   Insertion date:na  Shift aly care completed: NO    Last Vitals:     Vitals:    10/01/22 1620 10/01/22 2030 10/02/22 0730 10/02/22 1618   BP: 124/72 123/64 (!) 131/98 127/69   Pulse: 95 (!) 104 89 92   Resp: 18 19 18 18   Temp: 98 °F (36.7 °C) 98.3 °F (36.8 °C) 98.3 °F (36.8 °C) 98.6 °F (37 °C)   SpO2: 96% 96% 100% 98%   Weight:           PAIN    Pain Assessment    Pain Intensity 1: 0 (10/02/22 1618) Pain Intensity 1: 2 (12/29/14 1105)    Pain Location 1: Back Pain Location 1: Abdomen    Pain Intervention(s) 1: Medication (see MAR) Pain Intervention(s) 1: Medication (see MAR)  Patient Stated Pain Goal: 0 Patient Stated Pain Goal: 0  Intervention effective: yes  Other actions taken for pain: Medication (see MAR)    Skin Assessment  Skin color Skin Color: Appropriate for ethnicity  Condition/Temperature Skin Condition/Temp: Warm, Dry  Integrity Skin Integrity: Incision (comment)  Turgor    Weekly Pressure Ulcer Documentation  Pressure  Injury Documentation: No Pressure Injury Noted-Pressure Ulcer Prevention Initiated  Wound Prevention & Protection Methods  Orientation of wound Orientation of Wound Prevention: Posterior  Location of Prevention Location of Wound Prevention: Sacrum/Coccyx  Dressing Present Dressing Present : No  Dressing Status    Wound Offloading Wound Offloading (Prevention Methods): Bed, pressure reduction mattress, Chair cushion, Elevate heels, Pillows, Walker, Wheelchair    INTAKE/OUPUT  Date 10/01/22 0700 - 10/02/22 0659 10/02/22 0700 - 10/03/22 0659   Shift 0700-1859 1900-0659 24 Hour Total 0700-1859 1900-0659 24 Hour Total   INTAKE   P.O. 1080  1080 1200  1200     P. O. 1080  1080 1200  1200   Shift Total(mL/kg) 1080(13)  6121(26) 1200(14.5)  1200(14.5)   OUTPUT   Urine(mL/kg/hr) 200(0.2)  200(0.1)        Urine Voided 200  200        Urine Occurrence(s) 5 x 3 x 8 x 5 x  5 x   Emesis/NG output           Emesis Occurrence(s)  0 x 0 x      Stool           Stool Occurrence(s) 0 x 0 x 0 x 0 x  0 x   Shift Total(mL/kg) 200(2.4)  200(2.4)        880 1200  1200   Weight (kg) 83 83 83 83 83 83       Recommendations:  Patient needs and requests: na    Pending tests/procedures: na     Functional Level/Equipment: Partial (one person) /      Fall Precautions:   Fall risk precautions were reinforced with the patient; she was instructed to call for help prior to getting up. The following fall risk precautions were continued: bed/ chair alarms, door signage, yellow bracelet and socks as well as update of the Delisa Marking tool in the patient's room. Reta Score: 4    HEALS Safety Check    A safety check occurred in the patient's room between off going nurse and oncoming nurse listed above.     The safety check included the below items  Area Items   H  High Alert Medications Verify all high alert medication drips (heparin, PCA, etc.)   E  Equipment Suction is set up for ALL patients (with yanker)  Red plugs utilized for all equipment (IV pumps, etc.)  WOWs wiped down at end of shift. Room stocked with oxygen, suction, and other unit-specific supplies   A  Alarms Bed alarm is set for fall risk patients  Ensure chair alarm is in place and activated if patient is up in a chair   L  Lines Check IV for any infiltration  Hughes bag is empty if patient has a Hughes   Tubing and IV bags are labeled   S  Safety   Room is clean, patient is clean, and equipment is clean. Hallways are clear from equipment besides carts. Fall bracelet on for fall risk patients  Ensure room is clear and free of clutter  Suction is set up for ALL patients (with chloe)  Hallways are clear from equipment besides carts.    Isolation precautions followed, supplies available outside room, sign posted     Claus Selby RN

## 2022-10-02 NOTE — PROGRESS NOTES
Progress Note    Patient: Manan Peters MRN: [id number]  CSN: [id number]    YOB: 1950  Age: 67 y.o. Sex: female    DOA: 9/23/2022 LOS:  LOS: 9 days                    Subjective:  Feels well with no acute complaints. Still no bowel movement. Did not receive suppository or enema. Otherwise, denies any pain, abdominal pain, chest pain, palpitations, nausea, vomit, fevers or chills. Review of systems  General: No fevers or chills. Cardiovascular: No chest pain or pressure. No palpitations. Pulmonary: No shortness of breath, cough or wheeze. Gastrointestinal: No abdominal pain, nausea, vomiting or diarrhea. Genitourinary: No urinary frequency, urgency, hesitancy or dysuria. Musculoskeletal: as per HPI  Neurologic: as per HPI    Objective:     Physical Exam:  Visit Vitals  /69 (BP 1 Location: Left upper arm, BP Patient Position: Supine)   Pulse 92   Temp 98.6 °F (37 °C)   Resp 18   Wt 83 kg (183 lb)   SpO2 98%   BMI 29.54 kg/m²        General:         Alert, cooperative, no acute distress    HEENT: NC, Atraumatic. PERRLA, anicteric sclerae. Lungs: CTA Bilaterally. No Wheezing/Rhonchi/Rales. Heart:  Regular  rhythm,  No murmur, No Rubs, No Gallops  Abdomen: Soft, Non distended, Non tender. Psych:   Good insight. Not anxious or agitated. Neurologic:  CN 2-12 grossly intact      Intake and Output:  Current Shift:  10/02 0701 - 10/02 1900  In: 840 [P.O.:840]  Out: -   Last three shifts:  09/30 1901 - 10/02 0700  In: 1320 [P.O.:1320]  Out: 200 [Urine:200]    Labs: Results:       Chemistry No results for input(s): GLU, NA, K, CL, CO2, BUN, CREA, CA, AGAP, BUCR, TBIL, AP, TP, ALB, GLOB, AGRAT in the last 72 hours. No lab exists for component: GPT     CBC w/Diff Recent Labs     09/30/22  0620   WBC 10.2   RBC 2.94*   HGB 7.9*   HCT 26.5*      GRANS 65   LYMPH 20*   EOS 1      Cardiac Enzymes No results for input(s): CPK, CKND1, CB in the last 72 hours.     No lab exists for component: CKRMB, TROIP   Coagulation No results for input(s): PTP, INR, APTT, INREXT, INREXT in the last 72 hours. Lipid Panel No results found for: CHOL, CHOLPOCT, CHOLX, CHLST, CHOLV, 324467, HDL, HDLP, LDL, LDLC, DLDLP, 690492, VLDLC, VLDL, TGLX, TRIGL, TRIGP, TGLPOCT, CHHD, CHHDX   BNP No results for input(s): BNPP in the last 72 hours. Liver Enzymes No results for input(s): TP, ALB, TBIL, AP in the last 72 hours.     No lab exists for component: SGOT, GPT, DBIL   Thyroid Studies No results found for: T4, T3U, TSH, TSHEXT, TSHEXT       Procedures/imaging: see electronic medical records for all procedures/Xrays and details which were not copied into this note but were reviewed prior to creation of Plan    Medications:   Current Facility-Administered Medications   Medication Dose Route Frequency    polyethylene glycol (MIRALAX) packet 17 g  17 g Oral DAILY    enoxaparin (LOVENOX) injection 40 mg  40 mg SubCUTAneous Q24H    famotidine (PEPCID) tablet 20 mg  20 mg Oral BID    [Held by provider] hydroCHLOROthiazide (HYDRODIURIL) tablet 12.5 mg  12.5 mg Oral DAILY    ondansetron (ZOFRAN ODT) tablet 4 mg  4 mg Oral Q8H PRN    acetaminophen (TYLENOL) tablet 500 mg  500 mg Oral Q6H PRN    metoprolol tartrate (LOPRESSOR) tablet 12.5 mg  12.5 mg Oral Q12H    docusate sodium (COLACE) capsule 100 mg  100 mg Oral BID    magnesium hydroxide (MILK OF MAGNESIA) 400 mg/5 mL oral suspension 30 mL  30 mL Oral DAILY PRN    bisacodyL (DULCOLAX) tablet 10 mg  10 mg Oral Q48H PRN    bisacodyL (DULCOLAX) suppository 10 mg  10 mg Rectal Q48H PRN    hydrALAZINE (APRESOLINE) tablet 25 mg  25 mg Oral Q6H PRN    cyclobenzaprine (FLEXERIL) tablet 5 mg  5 mg Oral TID PRN    gabapentin (NEURONTIN) capsule 300 mg  300 mg Oral TID    therapeutic multivitamin (THERAGRAN) tablet 1 Tablet  1 Tablet Oral DAILY    oxyCODONE IR (ROXICODONE) tablet 5 mg  5 mg Oral Q6H PRN    atorvastatin (LIPITOR) tablet 20 mg  20 mg Oral QHS    insulin lispro (HUMALOG) injection   SubCUTAneous AC&HS    glucose chewable tablet 16 g  4 Tablet Oral PRN    glucagon (GLUCAGEN) injection 1 mg  1 mg IntraMUSCular PRN    dextrose 10% infusion 0-250 mL  0-250 mL IntraVENous PRN       Assessment/Plan     Active Problems:    Impaired mobility and ADLs (9/23/2022)  1. Impaired mobility and ADLs secondary to #2  2. Lumbar spinal stenosis without neurological claudication status post anterior lumbar interbody fusion, L4-L5 and L5-S1 1 prone transpoas fusion  3. Diabetes mellitus  4. Hypertension  5. Osteoarthritis  Anemia noted. No overt bleeding  Dyslipidemia     Plan  Bowel regimen. Continue miralax, changed to scheduled daily  Continue PT/OT  Pain well controlled at this time. Monitor blood pressure  Continue statin  Sliding scale insulin, monitor sugars  Iron deficiency noted. IV Venofer. Monitor blood counts intermittently. Check stool Hemoccult. Discussed with patient.   Discussed with IVET Vega MD  10/2/2022 6:23 PM        Time spent 30 min

## 2022-10-02 NOTE — PROGRESS NOTES
Problem: Self Care Deficits Care Plan (Adult)  Goal: *Therapy Goal (Edit Goal, Insert Text)  Description: Occupational Therapy Goals   Long Term Goals  Initiated 2022 and to be accomplished within 3 week(s)  1. Pt will perform self-feeding with [place]. 2. Pt will perform grooming with MOD I standing at sink. 3. Pt will perform UB bathing with MOD I.  4. Pt will perform LB bathing with MOD I.  5. Pt will perform tub/shower transfer with MOD I.   6. Pt will perform UB dressing with MOD I.  7. Pt will perform LB dressing with MOD I.  8. Pt will perform toileting task with MOD I.  9. Pt will perform toilet transfer with MOD I. Short Term Goals   Initiated 2022 and to be accomplished within 7 day(s)  1. Pt will perform grooming task while standing at sink x2 minutes  2. Pt will perform UB bathing with setup assist.  3. Pt will perform LB bathing with MIN A, use of AE prn.  4. Pt will perform tub/shower transfer with MIN A to TTB. 5. Pt will perform LB dressing with MOD A.  6. Pt will perform toileting task with MIN A.  7. Pt will perform toilet transfer with MIN A. Outcome: Progressing Towards Goal     Occupational Therapy TREATMENT    Patient: Anaya Raya   67 y.o. Patient identified with name and : yes    Date: 10/2/2022    First Tx Session  Time In: 0930  Time Out[de-identified] 1769    Diagnosis: Impaired mobility and ADLs [Z74.09, Z78.9]   Precautions: Fall, Back, Spinal  Chart, occupational therapy assessment, plan of care, and goals were reviewed. Pain:  Pt reports 7/10 pain or discomfort prior to treatment. Pt reports 5/10 pain or discomfort post treatment. Intervention Provided: RN informed and aware. Pain medication administered via RN prior to session.       SUBJECTIVE:   Patient stated We set up a home health nurse to help me out at home since my daughter is having surgery on the .    OBJECTIVE DATA SUMMARY:     THERAPEUTIC ACTIVITY Daily Assessment    The pt participated in 2 rounds of GARY game w/ therapist in stance to promote standing tolerance, static standing balance during bimanual task while reaching outside of AKIRA, and activity tolerance. The pt was able to tolerate 1 min 23 sec followed by 1 min 8 sec standing before requiring seated rest break d/t pain. The pt sorted 6 simulated medications (number of reported medications taken at home) into twice daily pill organizer to promote [address] and independence w/ medication management. The pt completed task w/ 95% accuracy and educated on use of weekly pill organizer to increase independence and adherence w/ medication management, including safety considerations. THERAPEUTIC EXERCISE Daily Assessment    The pt completed the following therapeutic exercises to facilitate BUE strengthening and functional endurance while maintaining spinal precautions:  - front raises: 2# dowel, 3x10  - bicep curls: 2# dowel, 3x12  - chest press: 2# dowel, 4x12  - lateral raises: 1# dumbbells, 4x10    Exercises performed while seated at EOM. Brief rest break required b/w each set 2/2 fatigue. Visual demonstration provided for instruction and min VC's for technique and proper breathing. The pt completed mass practice x5 of sit > stand transfers to increase independence w/ functional transfers. The pt required grossly CGA-Min A for transfers at edge of mat using FWW. Min VC's to scoot to edge of mat and to reach back for mat for slow, controlled descent. The pt propelled wheelchair from room > therapy gym w/ SBA and no rest breaks needed. The pt ambulated from therapy gym > room w/ CGA and no rest breaks or LOB noted. ASSESSMENT:  The pt is making gradual progress towards goals. Level of assistance needed w/ functional transfers improved this session w/ no c/o pain during therapeutic exercises (light weights used to maintain spinal precautions). The pt requires several rest breaks 2/2 fatigue.     Progression toward goals:  [] Improving appropriately and progressing toward goals  [x]          Improving slowly and progressing toward goals  []          Not making progress toward goals and plan of care will be adjusted     PLAN:  Patient continues to benefit from skilled intervention to address the above impairments. Continue treatment per established plan of care. Discharge Recommendations:  Home Health  Further Equipment Recommendations for Discharge:  bedside commode     Activity Tolerance:  Fair+      Estimated LOS: 10/04/2022  IRF-MATT Completed: N/A      Please refer to the flowsheet for vital signs taken during this treatment. After treatment:   [x]  Patient left in no apparent distress sitting up in chair   []  Patient left in no apparent distress in bed  [x]  Call bell left within reach  []  Nursing notified  []  Caregiver present  []  Bed alarm activated    COMMUNICATION/EDUCATION:   [x] Home safety education was provided and the patient/caregiver indicated understanding. [x] Patient/family have participated as able in goal setting and plan of care. [x] Patient/family agree to work toward stated goals and plan of care. [] Patient understands intent and goals of therapy, but is neutral about his/her participation. [] Patient is unable to participate in goal setting and plan of care.       Asad Hogue, OT

## 2022-10-03 LAB
ANION GAP SERPL CALC-SCNC: 6 MMOL/L (ref 3–18)
BUN SERPL-MCNC: 13 MG/DL (ref 7–18)
BUN/CREAT SERPL: 16 (ref 12–20)
CALCIUM SERPL-MCNC: 9.1 MG/DL (ref 8.5–10.1)
CHLORIDE SERPL-SCNC: 105 MMOL/L (ref 100–111)
CO2 SERPL-SCNC: 30 MMOL/L (ref 21–32)
CREAT SERPL-MCNC: 0.8 MG/DL (ref 0.6–1.3)
GLUCOSE BLD STRIP.AUTO-MCNC: 113 MG/DL (ref 70–110)
GLUCOSE BLD STRIP.AUTO-MCNC: 127 MG/DL (ref 70–110)
GLUCOSE BLD STRIP.AUTO-MCNC: 144 MG/DL (ref 70–110)
GLUCOSE BLD STRIP.AUTO-MCNC: 95 MG/DL (ref 70–110)
GLUCOSE SERPL-MCNC: 108 MG/DL (ref 74–99)
POTASSIUM SERPL-SCNC: 4.4 MMOL/L (ref 3.5–5.5)
SODIUM SERPL-SCNC: 141 MMOL/L (ref 136–145)

## 2022-10-03 PROCEDURE — 97116 GAIT TRAINING THERAPY: CPT

## 2022-10-03 PROCEDURE — 2709999900 HC NON-CHARGEABLE SUPPLY

## 2022-10-03 PROCEDURE — 97535 SELF CARE MNGMENT TRAINING: CPT

## 2022-10-03 PROCEDURE — 80048 BASIC METABOLIC PNL TOTAL CA: CPT

## 2022-10-03 PROCEDURE — 36415 COLL VENOUS BLD VENIPUNCTURE: CPT

## 2022-10-03 PROCEDURE — 74011250637 HC RX REV CODE- 250/637: Performed by: HOSPITALIST

## 2022-10-03 PROCEDURE — 74011250636 HC RX REV CODE- 250/636: Performed by: HOSPITALIST

## 2022-10-03 PROCEDURE — 97110 THERAPEUTIC EXERCISES: CPT

## 2022-10-03 PROCEDURE — 74011250637 HC RX REV CODE- 250/637: Performed by: INTERNAL MEDICINE

## 2022-10-03 PROCEDURE — 97530 THERAPEUTIC ACTIVITIES: CPT

## 2022-10-03 PROCEDURE — 82962 GLUCOSE BLOOD TEST: CPT

## 2022-10-03 PROCEDURE — 65310000000 HC RM PRIVATE REHAB

## 2022-10-03 PROCEDURE — 74011250637 HC RX REV CODE- 250/637: Performed by: STUDENT IN AN ORGANIZED HEALTH CARE EDUCATION/TRAINING PROGRAM

## 2022-10-03 PROCEDURE — 99232 SBSQ HOSP IP/OBS MODERATE 35: CPT | Performed by: INTERNAL MEDICINE

## 2022-10-03 RX ADMIN — FAMOTIDINE 20 MG: 20 TABLET ORAL at 09:19

## 2022-10-03 RX ADMIN — METOPROLOL TARTRATE 12.5 MG: 25 TABLET, FILM COATED ORAL at 09:19

## 2022-10-03 RX ADMIN — ENOXAPARIN SODIUM 40 MG: 100 INJECTION SUBCUTANEOUS at 13:04

## 2022-10-03 RX ADMIN — GABAPENTIN 300 MG: 300 CAPSULE ORAL at 09:19

## 2022-10-03 RX ADMIN — DOCUSATE SODIUM 100 MG: 100 CAPSULE, LIQUID FILLED ORAL at 17:35

## 2022-10-03 RX ADMIN — ATORVASTATIN CALCIUM 20 MG: 40 TABLET, FILM COATED ORAL at 21:19

## 2022-10-03 RX ADMIN — ACETAMINOPHEN 500 MG: 500 TABLET ORAL at 13:10

## 2022-10-03 RX ADMIN — OXYCODONE HYDROCHLORIDE 5 MG: 5 TABLET ORAL at 00:41

## 2022-10-03 RX ADMIN — DOCUSATE SODIUM 100 MG: 100 CAPSULE, LIQUID FILLED ORAL at 09:20

## 2022-10-03 RX ADMIN — GABAPENTIN 300 MG: 300 CAPSULE ORAL at 17:35

## 2022-10-03 RX ADMIN — BISACODYL 10 MG: 10 SUPPOSITORY RECTAL at 17:35

## 2022-10-03 RX ADMIN — POLYETHYLENE GLYCOL 3350 17 G: 17 POWDER, FOR SOLUTION ORAL at 09:19

## 2022-10-03 RX ADMIN — GABAPENTIN 300 MG: 300 CAPSULE ORAL at 21:19

## 2022-10-03 RX ADMIN — OXYCODONE HYDROCHLORIDE 5 MG: 5 TABLET ORAL at 09:20

## 2022-10-03 RX ADMIN — FAMOTIDINE 20 MG: 20 TABLET ORAL at 17:35

## 2022-10-03 RX ADMIN — THERA TABS 1 TABLET: TAB at 09:19

## 2022-10-03 RX ADMIN — METOPROLOL TARTRATE 12.5 MG: 25 TABLET, FILM COATED ORAL at 21:19

## 2022-10-03 NOTE — PROGRESS NOTES
Problem: Falls - Risk of  Goal: *Absence of Falls  Description: Document Richy Zamarripa Fall Risk and appropriate interventions in the flowsheet.   Outcome: Progressing Towards Goal  Note: Fall Risk Interventions:  Mobility Interventions: Bed/chair exit alarm, Patient to call before getting OOB, Utilize walker, cane, or other assistive device    Mentation Interventions: Bed/chair exit alarm, Door open when patient unattended, More frequent rounding, Room close to nurse's station    Medication Interventions: Bed/chair exit alarm, Patient to call before getting OOB, Teach patient to arise slowly    Elimination Interventions: Bed/chair exit alarm, Call light in reach, Patient to call for help with toileting needs, Stay With Me (per policy)    History of Falls Interventions: Bed/chair exit alarm, Door open when patient unattended, Room close to nurse's station         Problem: Patient Education: Go to Patient Education Activity  Goal: Patient/Family Education  Outcome: Progressing Towards Goal     Problem: Pain  Goal: *Control of Pain  Outcome: Progressing Towards Goal     Problem: Patient Education: Go to Patient Education Activity  Goal: Patient/Family Education  Outcome: Progressing Towards Goal     Problem: Inpatient Rehab (Adult)  Goal: *LTG: Avoids injury/falls 100% of time related to deficits  Outcome: Progressing Towards Goal  Goal: *LTG: Avoids infection 100% of time related to deficits  Outcome: Progressing Towards Goal  Goal: *LTG: Verbalize understanding of diagnosis and risk factors for recurring stroke  Outcome: Progressing Towards Goal  Goal: *LTG: Absence of DVT during hospitalization  Outcome: Progressing Towards Goal  Goal: *LTG: Maintains Skin Integrity With No Evidence of Pressure Injury 100% of Time  Outcome: Progressing Towards Goal  Goal: Interventions  Outcome: Progressing Towards Goal     Problem: Pressure Injury - Risk of  Goal: *Prevention of pressure injury  Description: Document Rashid Scale and appropriate interventions in the flowsheet. Outcome: Progressing Towards Goal  Note: Pressure Injury Interventions:  Sensory Interventions: Assess changes in LOC, Check visual cues for pain, Keep linens dry and wrinkle-free, Pressure redistribution bed/mattress (bed type), Turn and reposition approx.  every two hours (pillows and wedges if needed)    Moisture Interventions: Absorbent underpads, Minimize layers, Moisture barrier    Activity Interventions: Increase time out of bed, Pressure redistribution bed/mattress(bed type)    Mobility Interventions: HOB 30 degrees or less, Pressure redistribution bed/mattress (bed type), Float heels    Nutrition Interventions: Document food/fluid/supplement intake    Friction and Shear Interventions: Foam dressings/transparent film/skin sealants, HOB 30 degrees or less, Lift sheet, Minimize layers, Apply protective barrier, creams and emollients                Problem: Patient Education: Go to Patient Education Activity  Goal: Patient/Family Education  Outcome: Progressing Towards Goal

## 2022-10-03 NOTE — PROGRESS NOTES
[facility]    Bedside and Verbal shift change report given to Morgan RN (oncoming nurse) by Foreign Zepeda RN (offgoing nurse). Report included the following information SBAR, Kardex, MAR and Recent Results. Situation:   Code Status: Full Code   Hospital Day: 10   Problem List:   Hospital Problems  Never Reviewed            Codes Class Noted POA    Impaired mobility and ADLs ICD-10-CM: Z74.09, Z78.9  ICD-9-CM: V49.89  9/23/2022 Unknown         Background:   Past Medical History:   Past Medical History:   Diagnosis Date    Arthritis     back and hands    Chronic pain     back    COVID-19     Summer of  2021    Diabetes ([facility] [address]     NIDDM    Hypertension     Nausea & vomiting     Surgery only not colonoscopy        Assessment:  Changes in Assessment throughout shift: No change to previous assessment    Patient has a central line: no Reasons if yes: Insertion date: Last dressing date:  Patient has Aly Cath: no Reasons if yes:     Insertion date:  Shift aly care completed:     Last Vitals:     Vitals:    10/02/22 1618 10/02/22 2053 10/03/22 0730 10/03/22 1623   BP: 127/69 (!) 109/50 125/60 115/70   Pulse: 92 96 94 91   Resp: 18 18 18 18   Temp: 98.6 °F (37 °C) 99.8 °F (37.7 °C) 97.9 °F (36.6 °C) 97.8 °F (36.6 °C)   SpO2: 98% 98% 96% 99%   Weight:           PAIN    Pain Assessment    Pain Intensity 1: 0 (10/03/22 1628) Pain Intensity 1: 2 (12/29/14 1105)    Pain Location 1: Neck Pain Location 1: Abdomen    Pain Intervention(s) 1: Medication (see MAR) Pain Intervention(s) 1: Medication (see MAR)  Patient Stated Pain Goal: 0 Patient Stated Pain Goal: 0  Intervention effective: yes  Other actions taken for pain: Medication (see MAR)    Skin Assessment  Skin color Skin Color: Appropriate for ethnicity  Condition/Temperature Skin Condition/Temp: Dry, Warm  Integrity Skin Integrity: Incision (comment), Wound (add Wound LDA) (surgical incision x3 dressing C/D/I)  Turgor    Weekly Pressure Ulcer Documentation  Pressure  Injury Documentation: No Pressure Injury Noted-Pressure Ulcer Prevention Initiated  Wound Prevention & Protection Methods  Orientation of wound Orientation of Wound Prevention: Posterior  Location of Prevention Location of Wound Prevention: Buttocks, Sacrum/Coccyx  Dressing Present Dressing Present : No  Dressing Status    Wound Offloading Wound Offloading (Prevention Methods): Bed, pressure reduction mattress, Heel boots    INTAKE/OUPUT  Date 10/02/22 1900 - 10/03/22 0659 10/03/22 0700 - 10/04/22 0659   Shift 1541-3181 24 Hour Total 4617-1642 8671-0565 24 Hour Total   INTAKE   P.O. 720 1920 780  780     P. O. 720 1920 780  780   Shift Total(mL/kg) 720(8.7) 1920(23.1) 780(9.4)  780(9.4)   OUTPUT   Urine(mL/kg/hr) 0 0        Urine Voided 0 0        Urine Occurrence(s) 5 x 10 x 4 x  4 x   Stool          Stool Occurrence(s) 0 x 0 x 0 x  0 x   Shift Total(mL/kg) 0(0) 0(0)       1920 780  780   Weight (kg) 83 83 83 83 83         Recommendations:  Patient needs and requests: none at this time    Pending tests/procedures: routine labs     Functional Level/Equipment: Partial (one person) / Wheelchair;Walker    Fall Precautions:   Fall risk precautions were reinforced with the patient; she was instructed to call for help prior to getting up. The following fall risk precautions were continued: bed/ chair alarms, door signage, yellow bracelet and socks as well as update of the [place] tool in the patient's room. Reta Score: 4    HEALS Safety Check    A safety check occurred in the patient's room between off going nurse and oncoming nurse listed above. The safety check included the below items  Area Items   H  High Alert Medications Verify all high alert medication drips (heparin, PCA, etc.)   E  Equipment Suction is set up for ALL patients (with chloe)  Red plugs utilized for all equipment (IV pumps, etc.)  WOWs wiped down at end of shift.   Room stocked with oxygen, suction, and other unit-specific supplies   A  Alarms Bed alarm is set for fall risk patients  Ensure chair alarm is in place and activated if patient is up in a chair   L  Lines Check IV for any infiltration  Hughes bag is empty if patient has a Hughes   Tubing and IV bags are labeled   S  Safety   Room is clean, patient is clean, and equipment is clean. Hallways are clear from equipment besides carts. Fall bracelet on for fall risk patients  Ensure room is clear and free of clutter  Suction is set up for ALL patients (with chloe)  Hallways are clear from equipment besides carts.    Isolation precautions followed, supplies available outside room, sign posted     Vinayak Wilkerson RN

## 2022-10-03 NOTE — PROGRESS NOTES
Pt is scheduled for dc tomorrow to home. Pt's daughter was scheduled for for caregiver education today at 8 but had to cancel due to school closures. Sandra rescheduled education for tomorrow at 10 with dc to follow. Sandra delivered a rolling walker to the pt's room from [address] . Sandra called and left a message for [facility] Forrest to determine if they provide therapy. Sandra will follow.

## 2022-10-03 NOTE — PROGRESS NOTES
Problem: Self Care Deficits Care Plan (Adult)  Goal: *Therapy Goal (Edit Goal, Insert Text)  Description: Occupational Therapy Goals   Long Term Goals  Initiated 2022 and to be accomplished within 3 week(s)  1. Pt will perform self-feeding with [place]. 2. Pt will perform grooming with MOD I standing at sink. 3. Pt will perform UB bathing with MOD I.  4. Pt will perform LB bathing with MOD I.  5. Pt will perform tub/shower transfer with MOD I.   6. Pt will perform UB dressing with MOD I.  7. Pt will perform LB dressing with MOD I.  8. Pt will perform toileting task with MOD I.  9. Pt will perform toilet transfer with MOD I. Short Term Goals   Initiated 2022 and to be accomplished within 7 day(s)  1. Pt will perform grooming task while standing at sink x2 minutes  2. Pt will perform UB bathing with setup assist.  3. Pt will perform LB bathing with MIN A, use of AE prn.  4. Pt will perform tub/shower transfer with MIN A to TTB. 5. Pt will perform LB dressing with MOD A.  6. Pt will perform toileting task with MIN A.  7. Pt will perform toilet transfer with MIN A. Outcome: Progressing Towards Goal   OCCUPATIONAL THERAPY DISCHARGE    Patient: Kira Alegre (15 y.o. female)  Date: 10/3/2022    First Tx Session  Time In: 56  Time Out[de-identified] 80    Second Tx Session  Time In: 1330  Time Out[de-identified] 1500    Primary Diagnosis: Impaired mobility and ADLs [Z74.09, Z78.9] <principal problem not specified>    Precautions:   Fall, Back, Spinal    Barriers to Learning/Limitations: yes;  physical  Compensate with: visual, verbal, tactile, kinesthetic cues/model     Patient identified with name and :yes    SUBJECTIVE:   Patient stated My daughter is staying in my house.     OBJECTIVE DATA SUMMARY:     Past Medical History:   Diagnosis Date    Arthritis     back and hands    Chronic pain     back    COVID-19     Summer of  2021    Diabetes [facility])     NIDDM    Hypertension     Nausea & vomiting     Surgery only not colonoscopy     Past Surgical History:   Procedure Laterality Date    COLONOSCOPY N/A 6/24/2022    COLONOSCOPY performed by Angel Bonilla MD at [facility]COPY    HX COLONOSCOPY      HX TUBAL LIGATION       Prior Level of Function/Home Situation: Modified Independent  Home Situation  Home Environment: Private residence  # Steps to Enter: 4  Rails to Enter: Yes  Hand Rails : Bilateral (too far apart hold simultaneously)  One/Two Story Residence: Two story  # of Interior Steps: 13  Interior Rails: Right  Lift Chair Available: Yes  Living Alone: No  Support Systems: Child(chago) (daughter lives with her but will be getting knee surgery on October 11th)  Patient Expects to be Discharged to[de-identified] Home  Current DME Used/Available at Home: Valentino Sings, fredyator, rodrigo Carver (started using rollator walker in May, prior to May was using a cane.)  Tub or Shower Type: Shower  [x]     Right hand dominant   []     Left hand dominant    Therapeutic Activities:  During first session, educated pt on use of AE to facilitate increased independence with LB bathing and dressing. Provided pt with sock aid and LH sponge and visually demonstrated proper use. Pt able to return demonstrate proper technique    Pain:  Pt reports no pain or discomfort prior to treatment. Pt reports no pain or discomfort post treatment.    Problem List:    Decreased strength B UE  []     Decreased strength trunk/core  [x]     Decreased AROM   []     Decreased PROM  []     Decreased balance sitting  []     Decreased balance standing  [x]     Decreased endurance  [x]     Pain  [x]       Functional Limitations:   Decreased independence with ADL  [x]     Decreased independence with functional transfers  [x]     Decreased independence with ambulation  [x]     Decreased independence with IADL  [x]       Outcome Measures:      MMT Initial Assessment   Right Upper Extremity  Left Upper Extremity    UE AROM ROM WFL; MMT grossly 4+/5 ROM WFL; MMT grossly 4+/5   Shoulder flexion     Shoulder extension     Shoulder ABDuction     Shoulder ADDUction     Elbow Flexion     Elbow Extension     Wrist Extension/Flexion                   MMT Discharge Assessment   Right Upper Extremity  Left Upper Extremity    UE AROM ROM WFL; MMT grossly 4+/5 ROM WFL; MMT grossly 4+/5   Shoulder flexion     Shoulder extension     Shoulder ABDuction     Shoulder ADDUction     Elbow Flexion     Elbow Extension     Wrist Extension/Flexion              0/5 No palpable muscle contraction  1/5 Palpable muscle contraction, no joint movement  2-/5 Less than full range of motion in gravity eliminated position  2/5 Able to complete full range of motion in gravity eliminated position  2+/5 Able to initiate movement against gravity  3-/5 More than half but not full range of motion against gravity  3/5 Able to complete full range of motion against gravity  3+/5 Completes full range of motion against gravity with minimal resistance  4-/5 Completes full range of motion against gravity with minimal resistance  4/5 Completes full range of motion against gravity with moderate resistance  5/5 Completes full range of motion against gravity with maximum resistance    Coordination: Intact  Sensation: Intact    FIM SCORES Initial Assessment Discharge Assessment   Eating 5 Functional Level: 7   Grooming 5 Functional Level: 5    Oral Hygiene FIM: 6 (sitting)    Bathing 3 Functional Level: 4 (UB: 6; LB: 4 (CGA))  Body Parts Patient Bathed: Abdomen;Arm, left;Arm, right;Buttocks; Chest;Lower leg and foot, left; Lower leg and foot, right;Dejah area; Thigh, left; Thigh, right  Comments: Pt washed UB areas seated on TTB in shower. Pt used LH sponge to wash B lower legs and feet. Pt stood with CGA to wash periarea and buttocks. Upper Body Dressing 4 Functional Level: 6  Items Applied/Steps Completed: Pullover (4 steps)  Comments: Pt donned pullover shirt seated in chair.    Lower Body Dressing 2 Functional Level: 4     Comments: Pt used reacher to thread B feet into elastic waist pants and adult brief. Pt required Min A to thread right foot. Pt stood to pull up over hips/buttocks. Pt used sock aid to kentrell socks B feet. Sock and/or Shoe Management FIM: 4   Toileting 3 Functional Level: 4 (CGA)  Comments: Pt required assistance with CM. Tub/Shower Transfer 3 Tub/Shower Transfer Score: 4 (CGA)  Comments: Pt used FWW to walk from room to bathroom. Toilet Transfer 3 Toilet Transfer Score: 4 (4)  Comments: Pt used FWW to perform stand-step transfer to elevated seat over commode. Comprehension 6  6   Expression 6  6   Social Interaction 5  5   Problem Solving 4  4   Memory 5  5   Please see [facility] Interdisciplinary Eval: Coordination/Balance Section for details regarding FIM score description. Activity Tolerance:   Good    ASSESSMENT:  Pt making progress with increasing independence in self-care tasks. Pt has achieved 3/9 LTGs. Pt should continue to make progress with transition home and with home health. Pt requiring minimal cuing for maintaining spinal precautions. Progression toward goals:  [x]      Improving appropriately and progressing toward goals  []      Improving slowly and progressing toward goals  []      Not making progress toward goals and plan of care will be adjusted     PLAN:  Pt would benefit from continued skilled occupational therapy in order to improve ADL function and IADL with use of least restrictive device. Interventions may include range of motion (AROM, PROM B UE/trunk), motor function (B UE/trunk strengthening/coordination), activity tolerance (vitals, oxygen saturation levels), ADL, balance activities, IADL, and functional transfer training. Discharge Recommendations: Home Health with assistance  Further Equipment Recommendations for Discharge: bedside commode       Please refer to the flow sheet for vital signs taken during this treatment.   After treatment:   [x]  Patient left in no apparent distress sitting up in chair  []  Patient left in no apparent distress in bed  [x]  Call bell left within reach  []  Nursing notified  []  Caregiver present  []  Bed alarm activated    COMMUNICATION/EDUCATION:   Communication/Collaboration:  [x]      Home safety education was provided and the patient/caregiver indicated understanding. [x]      Patient/family have participated as able and agree with findings and recommendations. []      Patient is unable to participate in plan of care at this time.     TATYANA Yoo  10/3/2022

## 2022-10-03 NOTE — PROGRESS NOTES
[address]  [address] [place] [address], [address]     INPATIENT REHABILITATION  DAILY PROGRESS NOTE     Date: 10/3/2022    Name: Angela Camacho Age / Sex: 67 y.o. / female   CSN: [id number] MRN: [id number]   [facility] Date: 9/23/2022 Length of Stay: 10 days     Primary Rehabilitation Diagnosis: Impaired Mobility and ADLs secondary to Lumbar spinal stenosis without neurological claudication status post anterior lumbar interbody fusion, L4-L5 and L5-S1 1 prone transpoas fusion. Subjective:     Patient seen and examined in recliner in no apparent distress. Patient denies any n/v/d, shortness of breath, chest pain or headache. Blood pressure controlled. Patient still has not had a bowel movement. Stated that she will receive suppository this afternoon. Has not received suppository previously. Patient's Complaint:   No significant medical complaints    Pain Control: stable, mild-to-moderate joint symptoms intermittently, reasonably well controlled by current meds      Objective:     Vital Signs:  Patient Vitals for the past 24 hrs:   BP Temp Pulse Resp SpO2   10/03/22 0730 125/60 97.9 °F (36.6 °C) 94 18 96 %   10/02/22 2053 (!) 109/50 99.8 °F (37.7 °C) 96 18 98 %        Physical Examination:  GENERAL SURVEY: Patient is awake, alert, oriented x 3, sitting comfortably on the chair, not in acute respiratory distress.   HEENT: pink palpebral conjunctivae, anicteric sclerae, no nasoaural discharge, moist oral mucosa  NECK: supple, no jugular venous distention, no palpable lymph nodes  CHEST/LUNGS: symmetrical chest expansion, good air entry, clear breath sounds  HEART: adynamic precordium, good S1 S2, no S3, regular rhythm, no murmurs  ABDOMEN: flat, bowel sounds appreciated, soft, non-tender  EXTREMITIES: pink nailbeds, no edema, full and equal pulses, no calf tenderness   NEUROLOGICAL EXAM: The patient is awake, alert and oriented x3, able to answer questions fairly appropriately, able to follow 1 and 2 step commands. Able to tell time from the wall clock. Cranial nerves II-XII are grossly intact. No gross sensory deficit. Current Medications:  Current Facility-Administered Medications   Medication Dose Route Frequency    polyethylene glycol (MIRALAX) packet 17 g  17 g Oral DAILY    enoxaparin (LOVENOX) injection 40 mg  40 mg SubCUTAneous Q24H    famotidine (PEPCID) tablet 20 mg  20 mg Oral BID    [Held by provider] hydroCHLOROthiazide (HYDRODIURIL) tablet 12.5 mg  12.5 mg Oral DAILY    ondansetron (ZOFRAN ODT) tablet 4 mg  4 mg Oral Q8H PRN    acetaminophen (TYLENOL) tablet 500 mg  500 mg Oral Q6H PRN    metoprolol tartrate (LOPRESSOR) tablet 12.5 mg  12.5 mg Oral Q12H    docusate sodium (COLACE) capsule 100 mg  100 mg Oral BID    magnesium hydroxide (MILK OF MAGNESIA) 400 mg/5 mL oral suspension 30 mL  30 mL Oral DAILY PRN    bisacodyL (DULCOLAX) tablet 10 mg  10 mg Oral Q48H PRN    bisacodyL (DULCOLAX) suppository 10 mg  10 mg Rectal Q48H PRN    hydrALAZINE (APRESOLINE) tablet 25 mg  25 mg Oral Q6H PRN    cyclobenzaprine (FLEXERIL) tablet 5 mg  5 mg Oral TID PRN    gabapentin (NEURONTIN) capsule 300 mg  300 mg Oral TID    therapeutic multivitamin (THERAGRAN) tablet 1 Tablet  1 Tablet Oral DAILY    oxyCODONE IR (ROXICODONE) tablet 5 mg  5 mg Oral Q6H PRN    atorvastatin (LIPITOR) tablet 20 mg  20 mg Oral QHS    insulin lispro (HUMALOG) injection   SubCUTAneous AC&HS    glucose chewable tablet 16 g  4 Tablet Oral PRN    glucagon (GLUCAGEN) injection 1 mg  1 mg IntraMUSCular PRN    dextrose 10% infusion 0-250 mL  0-250 mL IntraVENous PRN       Allergies:   Allergies   Allergen Reactions    Codeine Nausea and Vomiting    Penicillin G Swelling       Lab/Data Review:  Recent Results (from the past 24 hour(s))   GLUCOSE, POC    Collection Time: 10/02/22  8:52 PM   Result Value Ref Range    Glucose (POC) 154 (H) 70 - 669 mg/dL   METABOLIC PANEL, BASIC    Collection Time: 10/03/22  5:58 AM   Result Value Ref Range    Sodium 141 136 - 145 mmol/L    Potassium 4.4 3.5 - 5.5 mmol/L    Chloride 105 100 - 111 mmol/L    CO2 30 21 - 32 mmol/L    Anion gap 6 3.0 - 18 mmol/L    Glucose 108 (H) 74 - 99 mg/dL    BUN 13 7.0 - 18 MG/DL    Creatinine 0.80 0.6 - 1.3 MG/DL    BUN/Creatinine ratio 16 12 - 20      GFR est AA >60 >60 ml/min/1.73m2    GFR est non-AA >60 >60 ml/min/1.73m2    Calcium 9.1 8.5 - 10.1 MG/DL   GLUCOSE, POC    Collection Time: 10/03/22  7:32 AM   Result Value Ref Range    Glucose (POC) 95 70 - 110 mg/dL   GLUCOSE, POC    Collection Time: 10/03/22 11:31 AM   Result Value Ref Range    Glucose (POC) 113 (H) 70 - 110 mg/dL       Assessment:     Primary Rehabilitation Diagnosis  1. Impaired Mobility and ADLs  2. Lumbar spinal stenosis without neurological claudication status post anterior lumbar interbody fusion, L4-L5 and L5-S1 1 prone transpoas fusion. Comorbidities  Patient Active Problem List   Diagnosis Code    Spinal stenosis of lumbar region at multiple levels M48.061    Impaired mobility and ADLs Z74.09, Z78.9     Plan:     1. Justification for continued stay: Good progression towards established rehabilitation goals. 2. Medical Issues being followed closely:    [x]  Fall and safety precautions     []  Wound Care     [x]  Bowel and Bladder Function     [x]  Fluid Electrolyte and Nutrition Balance     []  Pain Control      3. Issues that 24 hour rehabilitation nursing is following:    [x]  Fall and safety precautions     []  Wound Care     [x]  Bowel and Bladder Function     [x]  Fluid Electrolyte and Nutrition Balance     []  Pain Control      [x]  Assistance with and education on in-room safety with transfers to and from the bed, wheelchair, toilet and shower. 4. Acute rehabilitation plan of care:    [x]  Continue current care and rehab.            [x]  Physical Therapy           [x]  Occupational Therapy           []  Speech Therapy     []  Hold Rehab until further notice     5. Medications:    [x]  MAR Reviewed     [x]  Continue Present Medications     6. Chemical DVT Prophylaxis:      [x]  Enoxaparin     []  Unfractionated Heparin     []  Warfarin     []  NOAC     []  Aspirin     []  None     7. Mechanical DVT Prophylaxis:      []  FLORECITA Stockings     []  Sequential Compression Device     []  None     8. GI Prophylaxis:      []  PPI     [x]  H2 Blocker     []  None / Not indicated     9. Code status:    [x]  Full code     []  Partial code     []  Do not intubate     []  Do not resuscitate       11. Orders:   Impaired mobility and ADLs secondary to Lumbar spinal stenosis without neurological claudication status post anterior lumbar interbody fusion, L4-L5 and L5-S1 1 prone transpoas fusion. >> Continue PT/OT    Diabetes Mellitus   >> Sliding scale insulin, monitor sugars    Hypertension  >> Metoprolol 12.5 mg q 12 hours - holding parameters. >> HCTZ 12.5 mg daily - HELD   >> Hydralazine 25 mg PRN     Anemia - Iron deficiency   9/30: Hgb/Hct = 7.9/26.5  9/30 Iron = 22, Iron % Saturation 22   IV Venofer 200 mg given 9/30 - 10/2   Will continue to monitor. Dyslipidemia  Atorvastatin 20 mg PO at bedtime    Judicious pain control  Continue bowel regimen    12. Personal Protective Equipment (N95 face mask and eye goggles) was used while interacting with the patient. Patient was using a surgical mask. 15. Patient's progress in rehabilitation and medical issues discussed with the patient. All questions answered to the best of my ability. Care plan discussed with patient and nurse. 14. Total clinical care time is 30 minutes, including review of chart including all labs, radiology, past medical history, and discussion with patient and family. Greater than 50% of my time was spent in coordination of care and counseling.       Signed:    Bety Gunn NP    October 3, 2022

## 2022-10-03 NOTE — PROGRESS NOTES
Wound Prevention Checklist    Patient: Ibis Mata (66 y.o. female)  Date: 10/2/2022  Diagnosis: Impaired mobility and ADLs [Z74.09, Z78.9] <principal problem not specified>    Precautions: Fall, Back, Spinal       []  Heel prevention boots placed on patient    [x]  Patient turns self q2h during shift    []  Lift team ordered    []  Patient on [place] bed/Specialty bed    []  Each Wound is documented during shift (Stage, Color, drainage, odor, measurements, and dressings)    [x]  Dual skin check done with IVET Menchaca RN

## 2022-10-03 NOTE — PROGRESS NOTES
Problem: Mobility Impaired (Adult and Pediatric)  Goal: Interventions  Outcome: Progressing Towards Goal     Problem: Mobility Impaired (Adult and Pediatric)  Goal: Interventions  Outcome: Progressing Towards Goal     Problem: Mobility Impaired (Adult and Pediatric)  Goal: *Therapy Goal (Edit Goal, Insert Text)  Description: Physical Therapy Short Term Goals  Initiated 9/24/2022 and re-assessed 10/3/22 and to be accomplished by discharge on day(s) on 10/3/2022  1. Patient will move from supine to sit and sit to supine , scoot up and down, and roll side to side in bed with minimal assistance/contact guard assist.  Goal met 10/3/22  2. Patient will transfer from bed to chair and chair to bed with minimal assistance/contact guard assist using the least restrictive device. Goal partially met 10/3/22  3. Patient will perform sit to stand with moderate assistance . Goal met 10/3/22  4. Patient will ambulate with minimal assistance/contact guard assist for 150 feet with the least restrictive device. Goal met 10/3/22  5. Patient will ascend/descend 4 stairs with 1 handrail(s) with moderate assistance . Goal was addressed and patient is not willing to address 2/2 patient has ramp. 10/3/22    Physical Therapy Long Term Goals  Initiated 9/24/2022 and to be accomplished within 21 day(s) on 10/14/2022  1. Patient will move from supine to sit and sit to supine , scoot up and down, and roll side to side in bed with supervision/set-up. 2.  Patient will transfer from bed to chair and chair to bed with supervision/set-up using the least restrictive device. 3.  Patient will perform sit to stand with supervision/set-up. 4.  Patient will ambulate with supervision/set-up for 150 feet with the least restrictive device. 5.  Patient will ascend/descend 4 stairs with 1 handrail(s) with CG/SBA.       Outcome: Progressing Towards Goal   PHYSICAL THERAPY DISCHARGE NOTE    Patient: Whitney Agustin (10 y.o. female)  Date: 10/3/2022  Diagnosis: Impaired mobility and ADLs [Z74.09, Z78.9]   Precautions: Fall, Back, Spinal  Chart, physical therapy assessment, plan of care and goals were reviewed. Time in:1000  Time out:1030    Patient seen for: AM;Balance activities;Transfer training  Time In:1430  Time Out: 5758  Patient seen for: PM; balance, transfer, mobility, ambulation and safety    Pain:  AM-5/10 pre-rx, 3/10 after session  PM session-Pt pain was reported as  7 pre-treatment. Pt pain was reported as 5 post-treatment. Intervention: Pain medication    Patient identified with name and :yes    SUBJECTIVE:   AM-I have not had any episodes of the vertigo since we practiced on Friday. PM-Patient stated:I really appreciate the support that I have been given here. Patient was challenged with rolling left for DC summary scores and she became slightly nauseated and c/o the room spinning. Patient was advised to always stop from rolling from either side to the center and then to precede slowly to roll. She reported understanding of instruction and when challenged for 2nd trial, she reported no vertigo symptoms. OBJECTIVE DATA SUMMARY:     GROSS ASSESSMENT Discharge Assessment 10/3/2022   AROM Generally decreased, functional   Strength Generally decreased, functional   Coordination Generally decreased, functional   Tone Normal   Sensation Impaired   PROM  (NT)NT       POSTURE Discharge Assessment 10/3/2022   Posture (WDL) ExceptExceptions to [facility]   Posture Assessment Forward head;Rounded shoulders;Trunk flexion; Increased  Patient with forward head, rounded shoulders and flexed trunk. Patient's RW was elevated to decrease patient's trunk flexion and it helped with improved upright aligned posture.          BALANCE Discharge Assessment 10/3/2022    Sitting - Static: Good (unsupported)  Sitting - Dynamic: Fair (occasional)  Standing - Static: Fair  Standing - Dynamic : Impaired    Ability to  small object from floor:NT patient with spinal precautions       BED/CHAIR/WHEELCHAIR TRANSFERS Initial Assessment Discharge Assessment   Rolling Right 5 (Stand-by assistance) 5 (Stand-by assistance)   Rolling Left 4 (Minimal assistance) (using bedrail to assist) 5 (Stand-by assistance)   Supine to Sit 3 (Moderate assistance) 5 (Stand-by assistance)   Sit to Stand Maximum assistance (needing lifting and lowering assistance, particularly with fatigue) Minimal assistance   Sit to Supine 2 (Maximal assistance) (using logrolling technique) 4 (Minimal assistance)   Transfer Assistance Level 3 (Moderate assistance ) 4 (Minimal assistance)   Transfer Type Other Other   Comments    Patient with progress noted, especially with sit to stand transfers. Patient was challenged with sit to stand from high/low mat table x 10 reps at 60 cm to active quadriceps for improved ease with lift off from WC height and bed height surfaces. Transfers from the bed to [facility] with minimal assistance with use of RW to maintain spinal precautions. Car Transfer Not tested (NT due to assistance required for getting into standing) Minimum assistance   Car Type N/A car simulator (pillow to elevate seat)       WHEELCHAIR MOBILITY/MANAGEMENT Initial Assessment Discharge Assessment   Able to Propel 75 feet 100    Assistance Level  (partial assistance for turning, backing up etc) 4 (minimal assistance)   Curbs/ramps assistance required 0 (Not tested)  0 NT   Wheelchair set up assistance required 3 (Moderate assistance)  moderate   Wheelchair management Manages left brake, Manages right brake (using brake extender)  Managed left and right brakes with extender.        GAIT Discharge Assessment 10/3/2022   Gait Description (WDL) Exceptions to WDL   Gait Abnormalities Decreased step clearance       WALKING INDEPENDENCE Initial Assessment Discharge Assessment   Assistive device Gait belt, Walker, rolling Gait belt;Walker, rolling   Ambulation assistance - level surface 4 (Minimal assistance) 4 (Contact guard assistance)   Distance 75 Feet (ft) 150 ft  86 ft   Comments    Patient with slow pace, however patient is making progress with step through gait pattern and with increased step length and width for improved gait. Ambulation assistance - unlevel surface  (NT due to safety concern) NT (inclement weather)        STEPS/STAIRS Initial Assessment Discharge Assessment   Steps/Stairs ambulated 0 (Not tested)  0   Rail Use  (NT)  NT   Assistance Level   0   Comments    Patient was encouraged to perform stair negotiation, however she refused several attempts throughout her stay 2/2 she has a ramp to enter/exit home. Curbs/Ramps  (NT due to safety concern) NT(inclement weather)         Therapeutic Exercises:   Supine-BLEs; HS, QS, GS,  hip abduction/adduction with knees flexed and use of ball for adduction x 15 reps each; 2 sets. ASSESSMENT:  Patient has recently had a breakthrough with improved sit to stand transfers which was her primary deficit for returning home safely. Patient requires moderate verbal cues for set up and during the transfer, however she required decreased assistance to minimal assistance x 4 trials this pm session, and with moderate assistance x 2 trials this AM session. LTGs: Patient met 4/5 STGs and is on target for continued progress with HHPT to address current deficits in strength, mobility, transfers, safety and balance. PLAN:  Pt would benefit from continued skilled physical therapy in order to improve independent functional mobility at supervision level. Interventions may include range of motion (AROM, PROM B LE/trunk), motor function (B LE/trunk strengthening/coordination), activity tolerance (vitals, oxygen saturation levels), bed mobility training, balance activities, gait training (progressive ambulation program), and functional transfer training.      Discharge Recommendations:  Home Physical Therapy  Further Equipment Recommendations for Discharge:  rolling walker and N/A(therapist used patient label to identify patient's personal RW in her room. Activity Tolerance:   AM- good tolerance to session  PM-Tolerates session fair, patient with increased fatigue and increasing c/o pain this PM session after challenging functional independence. Please refer to the flowsheet for vital signs taken during this treatment. After treatment:   [] Patient left in no apparent distress in bed  [x] Patient left in no apparent distress sitting up in chair  [] Patient left in no apparent distress in w/c mobilizing under own power  [] Patient left in no apparent distress dining area  [] Patient left in no apparent distress mobilizing under own power  [x] Call bell left within reach  [x] Nursing notified  [] Caregiver present  [] Bed alarm activated   [] Chair alarm activated      IRF-MATT Completed: YES    Jerson Martins  10/3/2022    Addendum by supervising PT:  Patient recommended to have follow up with PT specializing in vestibular rehab to address left sided BPPV. Patient may require compensatory strategies to manage symptoms (slowly rolling in bed, avoiding rolling to left side or sleeping on left side) until she no longer is having to follow strict spinal precautions for ease of addressing BPPV with outpatient PT.      Hamida Jean, PT, DPT  10/5/2022

## 2022-10-03 NOTE — PROGRESS NOTES
[facility] Hospitalist Group  Progress Note    Patient: Genny Dickson Age: 67 y.o. : 1950 MR#: [id number] SSN: [id number]  Date/Time: 10/3/2022     Subjective:     C/o constipation. No N/V/D. However, she says she will be getting Dulcolax suppository today. No chest or abdominal pain. Back pain is fine. Continues to have right-sided tingling and numbness. Assessment/Plan:   1. Impaired mobility and ADLs secondary to #2  2. Lumbar spinal stenosis without neurological claudication status post anterior lumbar interbody fusion, L4-L5 and L5-S1 1 prone transpoas fusion  3. Diabetes mellitus  4. Hypertension  5. Osteoarthritis  Anemia noted. No overt bleeding  Dyslipidemia    Plan  Continue PT and OT  Continue judicious pain control with current bowel protocol  If Dulcolax does not work today we will give patient enema tomorrow  Continue metoprolol and monitor blood pressure  Continue Lipitor  Resume sliding scale insulin, monitor sugars  Iron deficiency noted. IV Venofer. Monitor blood counts intermittently. Discussed with patient.   Discussed with RN        Case discussed with:  [x]Patient  []Family  [x]Nursing  []Case Management  DVT Prophylaxis:  [x]Lovenox  []Hep SQ  [x]SCDs  []Coumadin   []Eliquis/Xarelto     Objective:   VS: Visit Vitals  /60 (BP 1 Location: Left upper arm, BP Patient Position: Sitting)   Pulse 94   Temp 97.9 °F (36.6 °C)   Resp 18   Wt 83 kg (183 lb)   SpO2 96%   BMI 29.54 kg/m²      Tmax/24hrs: Temp (24hrs), Av.8 °F (37.1 °C), Min:97.9 °F (36.6 °C), Max:99.8 °F (37.7 °C)  IOBRIEF  Intake/Output Summary (Last 24 hours) at 10/3/2022 1159  Last data filed at 10/3/2022 1000  Gross per 24 hour   Intake 1800 ml   Output 0 ml   Net 1800 ml         General:  Awake, alert, follows all commands appropriately  Cardiovascular:  S1S2+, RRR  Pulmonary:  CTA b/l, no wheezes  GI:  Soft, BS+, NT, ND  Extremities: No pitting pedal edema, moves all extremities very well, diminished sensation to touch in right lower extremity. Functional Progress:    PHYSICAL THERAPY    ON ADMISSION MOST RECENT   Wheelchair Mobility/Management  Able to Propel (ft): 75 feet  Functional Level:  (partial assistance for turning, backing up etc)  Curbs/Ramps Assist Required (FIM Score): 0 (Not tested)  Wheelchair Setup Assist Required : 3 (Moderate assistance)  Wheelchair Management: Manages left brake, Manages right brake (using brake extender) Wheelchair Mobility/Management  Able to Propel (ft): 75 feet (using bilat UE/LE to propel)  Functional Level:  (SBA)  Curbs/Ramps Assist Required (FIM Score): 0 (Not tested)  Wheelchair Setup Assist Required : 3 (Moderate assistance)  Wheelchair Management: Manages left brake, Manages right brake     Gait  Amount of Assistance: 4 (Minimal assistance)  Distance (ft): 75 Feet (ft)  Assistive Device: Gait belt, Walker, rolling Gait  Amount of Assistance: 4 (Contact guard assistance)  Distance (ft):  (86 ft)  Assistive Device: Gait belt, Walker, rolling     Balance-Sitting/Standing  Sitting - Static: Good (unsupported)  Sitting - Dynamic: Fair (occasional)  Standing - Static: Fair  Standing - Dynamic : Impaired Balance-Sitting/Standing  Sitting - Static: Good (unsupported)  Sitting - Dynamic: Fair (occasional)  Standing - Static: Fair  Standing - Dynamic : Impaired     Bed/Mat Mobility  Rolling Right : 5 (Stand-by assistance)  Rolling Left : 4 (Minimal assistance) (using bedrail to assist)  Supine to Sit : 3 (Moderate assistance)  Sit to Supine : 2 (Maximal assistance) (using logrolling technique) Bed/Mat Mobility  Rolling Right : 5 (Stand-by assistance)  Rolling Left : 5 (Stand-by assistance)  Supine to Sit : 3 (Moderate assistance)  Sit to Supine : 4 (Minimal assistance)     Transfers  Transfer Type:  Other  Other: stand step with RW  Transfer Assistance : 3 (Moderate assistance )  Sit to Stand Assistance: Maximum assistance (needing lifting and lowering assistance, particularly with fatigue)  Car Transfers: Not tested (NT due to assistance required for getting into standing)  Car Type: N/A Transfers  Transfer Type:  Other  Other: stand step with RW  Transfer Assistance : 4 (Minimal assistance)  Sit to Stand Assistance: Minimal assistance  Car Transfers: Not tested  Car Type: N/A     Steps or Stairs  Steps/Stairs Ambulated (#): 0  Level of Assist : 0 (Not tested)  Rail Use:  (NT) Steps or Stairs  Steps/Stairs Ambulated (#): 2 (4\" step)  Level of Assist : 4 (Minimal assistance)  Rail Use: Both (two rails for one step, then right railing sidestepping up/down for additional bout.)        Functional Progress:    OCCUPATIONAL THERAPY    ON ADMISSION MOST RECENT   Eating  Functional Level: 5   Eating  Functional Level: 5     Grooming  Functional Level: 5   Grooming  Functional Level: 6     Bathing  Functional Level: 3   Bathing  Functional Level: 3     Upper Body Dressing  Functional Level: 4   Upper Body Dressing  Functional Level: 5     Lower Body Dressing  Functional Level: 2   Lower Body Dressing  Functional Level: 2     Toileting  Functional Level: 3   Toileting  Functional Level: 3     Toilet Transfers  Toilet Transfer Score: 3   Toilet Transfers  Toilet Transfer Score: 3     Tub /Shower Transfers  Tub/Shower Transfer Score: 3   Tub/Shower Transfers  Tub/Shower Transfer Score: 3       Legend:   7 - Independent   6 - Modified Independent   5 - Standby Assistance / Supervision / Set-up   4 - Minimum Assistance / Contact Guard Assistance   3 - Moderate Assistance   2 - Maximum Assistance   1 - Total Assistance / Dependent          Medications:   Current Facility-Administered Medications   Medication Dose Route Frequency    polyethylene glycol (MIRALAX) packet 17 g  17 g Oral DAILY    enoxaparin (LOVENOX) injection 40 mg  40 mg SubCUTAneous Q24H    famotidine (PEPCID) tablet 20 mg  20 mg Oral BID    [Held by provider] hydroCHLOROthiazide (HYDRODIURIL) tablet 12.5 mg  12.5 mg Oral DAILY    ondansetron (ZOFRAN ODT) tablet 4 mg  4 mg Oral Q8H PRN    acetaminophen (TYLENOL) tablet 500 mg  500 mg Oral Q6H PRN    metoprolol tartrate (LOPRESSOR) tablet 12.5 mg  12.5 mg Oral Q12H    docusate sodium (COLACE) capsule 100 mg  100 mg Oral BID    magnesium hydroxide (MILK OF MAGNESIA) 400 mg/5 mL oral suspension 30 mL  30 mL Oral DAILY PRN    bisacodyL (DULCOLAX) tablet 10 mg  10 mg Oral Q48H PRN    bisacodyL (DULCOLAX) suppository 10 mg  10 mg Rectal Q48H PRN    hydrALAZINE (APRESOLINE) tablet 25 mg  25 mg Oral Q6H PRN    cyclobenzaprine (FLEXERIL) tablet 5 mg  5 mg Oral TID PRN    gabapentin (NEURONTIN) capsule 300 mg  300 mg Oral TID    therapeutic multivitamin (THERAGRAN) tablet 1 Tablet  1 Tablet Oral DAILY    oxyCODONE IR (ROXICODONE) tablet 5 mg  5 mg Oral Q6H PRN    atorvastatin (LIPITOR) tablet 20 mg  20 mg Oral QHS    insulin lispro (HUMALOG) injection   SubCUTAneous AC&HS    glucose chewable tablet 16 g  4 Tablet Oral PRN    glucagon (GLUCAGEN) injection 1 mg  1 mg IntraMUSCular PRN    dextrose 10% infusion 0-250 mL  0-250 mL IntraVENous PRN       Labs:    Recent Results (from the past 24 hour(s))   GLUCOSE, POC    Collection Time: 10/02/22  4:20 PM   Result Value Ref Range    Glucose (POC) 111 (H) 70 - 110 mg/dL   GLUCOSE, POC    Collection Time: 10/02/22  8:52 PM   Result Value Ref Range    Glucose (POC) 154 (H) 70 - 097 mg/dL   METABOLIC PANEL, BASIC    Collection Time: 10/03/22  5:58 AM   Result Value Ref Range    Sodium 141 136 - 145 mmol/L    Potassium 4.4 3.5 - 5.5 mmol/L    Chloride 105 100 - 111 mmol/L    CO2 30 21 - 32 mmol/L    Anion gap 6 3.0 - 18 mmol/L    Glucose 108 (H) 74 - 99 mg/dL    BUN 13 7.0 - 18 MG/DL    Creatinine 0.80 0.6 - 1.3 MG/DL    BUN/Creatinine ratio 16 12 - 20      GFR est AA >60 >60 ml/min/1.73m2    GFR est non-AA >60 >60 ml/min/1.73m2    Calcium 9.1 8.5 - 10.1 MG/DL   GLUCOSE, POC    Collection Time: 10/03/22  7:32 AM Result Value Ref Range    Glucose (POC) 95 70 - 110 mg/dL   GLUCOSE, POC    Collection Time: 10/03/22 11:31 AM   Result Value Ref Range    Glucose (POC) 113 (H) 70 - 110 mg/dL     Total time to take care of this patient was 25 minutes and more than 50% of time was spent counseling and coordinating care. Signed By: Hernandez Montalvo MD     October 3, 2022      Disclaimer: Sections of this note are dictated using utilizing voice recognition software, which may have resulted in some phonetic based errors in grammar and contents. Even though attempts were made to correct all the mistakes, some may have been missed, and remained in the body of the document. If questions arise, please contact our department.

## 2022-10-03 NOTE — ROUTINE PROCESS
Christin Fatima attended group nursing education on 10/03/22. The topic discussed was diabetes; pathophysiology, risk factors, sign/ symptoms, diagnosis, treatment, rehabilitation, and prevention was discussed. A handout was provided as well as a visual model was used for insulin administration teaching purposes. Opportunity to ask questions was provided.  Patient verbalized understanding the education material.     Magda Bowman RN

## 2022-10-03 NOTE — PROGRESS NOTES
[facility]    Bedside and Verbal shift change report given to Adair Kemp (oncoming nurse) by Mary Merida RN (offgoing nurse). Report included the following information SBAR, Kardex, MAR and Recent Results. Situation:   Code Status: Full Code   Hospital Day: 10   Problem List:   Hospital Problems  Never Reviewed            Codes Class Noted POA    Impaired mobility and ADLs ICD-10-CM: Z74.09, Z78.9  ICD-9-CM: V49.89  9/23/2022 Unknown           Background:   Past Medical History:   Past Medical History:   Diagnosis Date    Arthritis     back and hands    Chronic pain     back    COVID-19     Summer of  2021    Diabetes ([facility] [address]     NIDDM    Hypertension     Nausea & vomiting     Surgery only not colonoscopy        Assessment:  Changes in Assessment throughout shift: No change to previous assessment    Patient has a central line: no Reasons if yes: Insertion date: Last dressing date:  Patient has Aly Cath: no Reasons if yes:     Insertion date:  Shift aly care completed:     Last Vitals:     Vitals:    10/01/22 2030 10/02/22 0730 10/02/22 1618 10/02/22 2053   BP: 123/64 (!) 131/98 127/69 (!) 109/50   Pulse: (!) 104 89 92 96   Resp: 19 18 18 18   Temp: 98.3 °F (36.8 °C) 98.3 °F (36.8 °C) 98.6 °F (37 °C) 99.8 °F (37.7 °C)   SpO2: 96% 100% 98% 98%   Weight:           PAIN    Pain Assessment    Pain Intensity 1: 0 (10/03/22 0415) Pain Intensity 1: 2 (12/29/14 1105)    Pain Location 1: Back Pain Location 1: Abdomen    Pain Intervention(s) 1: Repositioned Pain Intervention(s) 1: Medication (see MAR)  Patient Stated Pain Goal: 0 Patient Stated Pain Goal: 0  Intervention effective: yes  Other actions taken for pain: Repositioned    Skin Assessment  Skin color Skin Color: Appropriate for ethnicity  Condition/Temperature Skin Condition/Temp: Dry, Warm  Integrity Skin Integrity: Incision (comment), Wound (add Wound LDA) (surgical incision x3 dressing C/D/I)  Turgor    Weekly Pressure Ulcer Documentation Pressure  Injury Documentation: No Pressure Injury Noted-Pressure Ulcer Prevention Initiated  Wound Prevention & Protection Methods  Orientation of wound Orientation of Wound Prevention: Posterior  Location of Prevention Location of Wound Prevention: Sacrum/Coccyx  Dressing Present Dressing Present : No  Dressing Status    Wound Offloading Wound Offloading (Prevention Methods): Bed, pressure reduction mattress, Bed, pressure redistribution/air, Pillows, Repositioning, Wheelchair, Walker, Turning, Elevate heels    INTAKE/OUPUT  Date 10/02/22 0700 - 10/03/22 0659 10/03/22 0700 - 10/04/22 0659   Shift 8534-3609 4616-6078 24 Hour Total 4036-3630 6539-7153 24 Hour Total   INTAKE   P.O. 0937 936 4399        P. O. 2694 116 9532      Shift Total(mL/kg) 1200(14.5) 720(8.7) 1920(23.1)      OUTPUT   Urine(mL/kg/hr)  0 0        Urine Voided  0 0        Urine Occurrence(s) 5 x 4 x 9 x      Stool           Stool Occurrence(s) 0 x 0 x 0 x      Shift Total(mL/kg)  0(0) 0(0)      NET [phone]      Weight (kg) 83 83 83 83 83 83       Recommendations:  Patient needs and requests: none at this time    Pending tests/procedures: routine labs     Functional Level/Equipment: Partial (one person) /      Fall Precautions:   Fall risk precautions were reinforced with the patient; she was instructed to call for help prior to getting up. The following fall risk precautions were continued: bed/ chair alarms, door signage, yellow bracelet and socks as well as update of the LiBdegarde Counts tool in the patient's room. Reta Score: 4    HEALS Safety Check    A safety check occurred in the patient's room between off going nurse and oncoming nurse listed above.     The safety check included the below items  Area Items   H  High Alert Medications Verify all high alert medication drips (heparin, PCA, etc.)   E  Equipment Suction is set up for ALL patients (with chloe)  Red plugs utilized for all equipment (IV pumps, etc.)  WOWs wiped down at end of shift.  Room stocked with oxygen, suction, and other unit-specific supplies   A  Alarms Bed alarm is set for fall risk patients  Ensure chair alarm is in place and activated if patient is up in a chair   L  Lines Check IV for any infiltration  Hughes bag is empty if patient has a Hughes   Tubing and IV bags are labeled   S  Safety   Room is clean, patient is clean, and equipment is clean. Hallways are clear from equipment besides carts. Fall bracelet on for fall risk patients  Ensure room is clear and free of clutter  Suction is set up for ALL patients (with chloe)  Hallways are clear from equipment besides carts.    Isolation precautions followed, supplies available outside room, sign posted     Gauri Birch RN

## 2022-10-04 ENCOUNTER — HOME HEALTH ADMISSION (OUTPATIENT)
Dept: HOME HEALTH SERVICES | Facility: HOME HEALTH | Age: 72
End: 2022-10-04
Payer: MEDICARE

## 2022-10-04 VITALS
WEIGHT: 183 LBS | BODY MASS INDEX: 29.54 KG/M2 | TEMPERATURE: 97.1 F | DIASTOLIC BLOOD PRESSURE: 71 MMHG | OXYGEN SATURATION: 98 % | HEART RATE: 89 BPM | RESPIRATION RATE: 18 BRPM | SYSTOLIC BLOOD PRESSURE: 126 MMHG

## 2022-10-04 LAB — GLUCOSE BLD STRIP.AUTO-MCNC: 108 MG/DL (ref 70–110)

## 2022-10-04 PROCEDURE — 97530 THERAPEUTIC ACTIVITIES: CPT

## 2022-10-04 PROCEDURE — BW03ZZZ PLAIN RADIOGRAPHY OF CHEST: ICD-10-PCS | Performed by: EMERGENCY MEDICINE

## 2022-10-04 PROCEDURE — 74011250637 HC RX REV CODE- 250/637: Performed by: STUDENT IN AN ORGANIZED HEALTH CARE EDUCATION/TRAINING PROGRAM

## 2022-10-04 PROCEDURE — 82962 GLUCOSE BLOOD TEST: CPT

## 2022-10-04 PROCEDURE — 74011250637 HC RX REV CODE- 250/637: Performed by: INTERNAL MEDICINE

## 2022-10-04 PROCEDURE — 74011250637 HC RX REV CODE- 250/637: Performed by: HOSPITALIST

## 2022-10-04 PROCEDURE — 99239 HOSP IP/OBS DSCHRG MGMT >30: CPT | Performed by: INTERNAL MEDICINE

## 2022-10-04 RX ORDER — FAMOTIDINE 20 MG/1
20 TABLET, FILM COATED ORAL 2 TIMES DAILY
Qty: 60 TABLET | Refills: 0 | Status: SHIPPED | OUTPATIENT
Start: 2022-10-04 | End: 2022-11-03

## 2022-10-04 RX ORDER — POLYETHYLENE GLYCOL 3350 17 G/17G
17 POWDER, FOR SOLUTION ORAL DAILY
Qty: 5 PACKET | Refills: 0 | Status: SHIPPED | OUTPATIENT
Start: 2022-10-04

## 2022-10-04 RX ORDER — METOPROLOL TARTRATE 25 MG/1
12.5 TABLET, FILM COATED ORAL EVERY 12 HOURS
Qty: 30 TABLET | Refills: 0 | Status: SHIPPED | OUTPATIENT
Start: 2022-10-04 | End: 2022-11-03

## 2022-10-04 RX ORDER — OXYCODONE HYDROCHLORIDE 5 MG/1
5 TABLET ORAL
Qty: 12 TABLET | Refills: 0 | Status: SHIPPED | OUTPATIENT
Start: 2022-10-04 | End: 2022-10-07

## 2022-10-04 RX ORDER — BISACODYL 5 MG
10 TABLET, DELAYED RELEASE (ENTERIC COATED) ORAL
Qty: 10 TABLET | Refills: 0 | Status: SHIPPED | OUTPATIENT
Start: 2022-10-04

## 2022-10-04 RX ORDER — ATORVASTATIN CALCIUM 20 MG/1
20 TABLET, FILM COATED ORAL
Qty: 30 TABLET | Refills: 0 | Status: SHIPPED | OUTPATIENT
Start: 2022-10-04 | End: 2022-11-03

## 2022-10-04 RX ORDER — ACETAMINOPHEN 500 MG
500 TABLET ORAL
Qty: 20 TABLET | Refills: 0 | Status: SHIPPED | OUTPATIENT
Start: 2022-10-04

## 2022-10-04 RX ADMIN — POLYETHYLENE GLYCOL 3350 17 G: 17 POWDER, FOR SOLUTION ORAL at 09:00

## 2022-10-04 RX ADMIN — THERA TABS 1 TABLET: TAB at 09:01

## 2022-10-04 RX ADMIN — GABAPENTIN 300 MG: 300 CAPSULE ORAL at 09:00

## 2022-10-04 RX ADMIN — DOCUSATE SODIUM 100 MG: 100 CAPSULE, LIQUID FILLED ORAL at 09:00

## 2022-10-04 RX ADMIN — METOPROLOL TARTRATE 12.5 MG: 25 TABLET, FILM COATED ORAL at 09:00

## 2022-10-04 RX ADMIN — OXYCODONE HYDROCHLORIDE 5 MG: 5 TABLET ORAL at 09:00

## 2022-10-04 RX ADMIN — OXYCODONE HYDROCHLORIDE 5 MG: 5 TABLET ORAL at 03:43

## 2022-10-04 RX ADMIN — FAMOTIDINE 20 MG: 20 TABLET ORAL at 09:00

## 2022-10-04 NOTE — PROGRESS NOTES
Janis Ospina 67 y.o. female was discharged home on 10/04/22. Patient's armband removed and shredded. Discharge instructions were reviewed with patient and caregiver, and she verbalized understanding. The patient was wheeled out to transportation vehicle by a staff member along with the patient's belongings. Transportation was provided by private vehicle.     Miri Guthrie RN BSN

## 2022-10-04 NOTE — PROGRESS NOTES
Problem: Self Care Deficits Care Plan (Adult)  Goal: *Therapy Goal (Edit Goal, Insert Text)  Description: Occupational Therapy Goals   Long Term Goals  Initiated 2022 and to be accomplished within 3 week(s)  1. Pt will perform self-feeding with [place]. 2. Pt will perform grooming with MOD I standing at sink. 3. Pt will perform UB bathing with MOD I.  4. Pt will perform LB bathing with MOD I.  5. Pt will perform tub/shower transfer with MOD I.   6. Pt will perform UB dressing with MOD I.  7. Pt will perform LB dressing with MOD I.  8. Pt will perform toileting task with MOD I.  9. Pt will perform toilet transfer with MOD I. Short Term Goals   Initiated 2022 and to be accomplished within 7 day(s)  1. Pt will perform grooming task while standing at sink x2 minutes  2. Pt will perform UB bathing with setup assist.  3. Pt will perform LB bathing with MIN A, use of AE prn.  4. Pt will perform tub/shower transfer with MIN A to TTB. 5. Pt will perform LB dressing with MOD A.  6. Pt will perform toileting task with MIN A.  7. Pt will perform toilet transfer with MIN A. Outcome: Progressing Towards Goal   Occupational Therapy TREATMENT    Patient: Elayne Miles   67 y.o. Patient identified with name and : yes    Date: 10/4/2022    First Tx Session  Time In: 80  Time Out[de-identified] 1010      Diagnosis: Impaired mobility and ADLs [Z74.09, Z78.9]   Precautions: Fall, Back, Spinal  Chart, occupational therapy assessment, plan of care, and goals were reviewed. Pain:  Pt reports no pain or discomfort prior to treatment. Pt reports no pain or discomfort post treatment. Intervention Provided: NA      SUBJECTIVE:   Patient stated I'm ready to go home.     OBJECTIVE DATA SUMMARY:     THERAPEUTIC ACTIVITY Daily Assessment    Provided re education on using reacher to kentrell pants using reacher in order to maintain spinal precautions during LB dressing.  Pt used pillow case and reacher to thread B feet into case up to knees with pt requiring extra time to perform task. Pt then doffed case using reacher. Caregiver education performed with supportive daughter. Provided levels of assistance pt currently requires to complete self-care tasks and functional transfers. Provided education on safety within the home. Answered all questions pt and daughter had to the best of my ability. MOBILITY/TRANSFERS Daily Assessment    Sit to stand with daughter present with CGA. ASSESSMENT:  Provided caregiver education to supportive daughter during session. Progression toward goals:  [x]          Improving appropriately and progressing toward goals  []          Improving slowly and progressing toward goals  []          Not making progress toward goals and plan of care will be adjusted     PLAN:  Patient continues to benefit from skilled intervention to address the above impairments. Continue treatment per established plan of care. Discharge Recommendations:  Home Health  Further Equipment Recommendations for Discharge:  bedside commode     Activity Tolerance:  Fair+      Estimated LOS:10/4/2022        Please refer to the flowsheet for vital signs taken during this treatment. After treatment:   [x]  Patient left in no apparent distress sitting up in recliner chair   []  Patient left in no apparent distress in bed  [x]  Call bell left within reach  []  Nursing notified  [x]  Caregiver present (daughter)  []  Bed alarm activated    COMMUNICATION/EDUCATION:   [] Home safety education was provided and the patient/caregiver indicated understanding. [] Patient/family have participated as able in goal setting and plan of care. [x] Patient/family agree to work toward stated goals and plan of care. [] Patient understands intent and goals of therapy, but is neutral about his/her participation. [] Patient is unable to participate in goal setting and plan of care.       TATYANA Henderson

## 2022-10-04 NOTE — PROGRESS NOTES
NP discussed and went over in detail with patient and daughter at bedside prior to admission, current, and discharge medications. NP explained what medications to stop, resume, and continue upon discharge. NP discussed and answered questions regarding follow up care/pcp/specialist appointments, and expectations for discharge process.      Veornica Buchanan NP  10/4/2022

## 2022-10-04 NOTE — PROGRESS NOTES
Problem: Falls - Risk of  Goal: *Absence of Falls  Description: Document [place] Skill Fall Risk and appropriate interventions in the flowsheet. Outcome: Progressing Towards Goal  Note: Fall Risk Interventions:  Mobility Interventions: Bed/chair exit alarm, Communicate number of staff needed for ambulation/transfer    Mentation Interventions: Bed/chair exit alarm, Adequate sleep, hydration, pain control, Door open when patient unattended, Gait belt with transfers/ambulation, Increase mobility, More frequent rounding    Medication Interventions: Assess postural VS orthostatic hypotension, Bed/chair exit alarm, Evaluate medications/consider consulting pharmacy, Patient to call before getting OOB, Utilize gait belt for transfers/ambulation, Teach patient to arise slowly    Elimination Interventions: Bed/chair exit alarm, Call light in reach, Elevated toilet seat, Patient to call for help with toileting needs, Stay With Me (per policy), Toilet paper/wipes in reach, Toileting schedule/hourly rounds    History of Falls Interventions: Bed/chair exit alarm, Consult care management for discharge planning, Door open when patient unattended         Problem: Pain  Goal: *Control of Pain  Outcome: Progressing Towards Goal     Problem: Patient Education: Go to Patient Education Activity  Goal: Patient/Family Education  Outcome: Progressing Towards Goal     Problem: Patient Education: Go to Patient Education Activity  Goal: Patient/Family Education  Outcome: Progressing Towards Goal     Problem: Pressure Injury - Risk of  Goal: *Prevention of pressure injury  Description: Document Rashid Scale and appropriate interventions in the flowsheet.   Outcome: Progressing Towards Goal  Note: Pressure Injury Interventions:  Sensory Interventions: Assess changes in LOC, Chair cushion, Float heels, Check visual cues for pain, Pressure redistribution bed/mattress (bed type)    Moisture Interventions: Absorbent underpads, Apply protective barrier, creams and emollients, Maintain skin hydration (lotion/cream), Moisture barrier    Activity Interventions: Assess need for specialty bed, Chair cushion, Increase time out of bed    Mobility Interventions: Chair cushion, Float heels    Nutrition Interventions: Document food/fluid/supplement intake    Friction and Shear Interventions: Apply protective barrier, creams and emollients, HOB 30 degrees or less                Problem: Patient Education: Go to Patient Education Activity  Goal: Patient/Family Education  Outcome: Progressing Towards Goal

## 2022-10-04 NOTE — PROGRESS NOTES
Problem: Mobility Impaired (Adult and Pediatric)  Goal: *Therapy Goal (Edit Goal, Insert Text)  Description: Physical Therapy Short Term Goals  Initiated 9/24/2022 and re-assessed 10/3/22 and to be accomplished by discharge on day(s) on 10/3/2022  1. Patient will move from supine to sit and sit to supine , scoot up and down, and roll side to side in bed with minimal assistance/contact guard assist.  Goal met 10/3/22  2. Patient will transfer from bed to chair and chair to bed with minimal assistance/contact guard assist using the least restrictive device. Goal partially met 10/3/22  3. Patient will perform sit to stand with moderate assistance . Goal met 10/3/22  4. Patient will ambulate with minimal assistance/contact guard assist for 150 feet with the least restrictive device. Goal met 10/3/22  5. Patient will ascend/descend 4 stairs with 1 handrail(s) with moderate assistance . Goal was addressed and patient is not willing to address 2/2 patient has ramp. 10/3/22    Physical Therapy Long Term Goals  Initiated 9/24/2022 and to be accomplished within 21 day(s) on 10/14/2022  1. Patient will move from supine to sit and sit to supine , scoot up and down, and roll side to side in bed with supervision/set-up. 2.  Patient will transfer from bed to chair and chair to bed with supervision/set-up using the least restrictive device. 3.  Patient will perform sit to stand with supervision/set-up. 4.  Patient will ambulate with supervision/set-up for 150 feet with the least restrictive device. 5.  Patient will ascend/descend 4 stairs with 1 handrail(s) with CG/SBA. Outcome: Progressing Towards Goal   PHYSICAL THERAPY TREATMENT    Patient: Cici Massey (85 y.o. female)  Date: 10/4/2022  Diagnosis: Impaired mobility and ADLs [Z74.09, Z78.9]   Precautions: Fall, Back, Spinal  Chart, physical therapy assessment, plan of care and goals were reviewed.     Time In:1030  Time Out:1100    Patient seen for: AM;Balance activities;Gait training;Family training    Pain:  Patient with 3/10 pain complaints    Patient identified with name and :Yes    SUBJECTIVE:      I am ready to be discharged    OBJECTIVE DATA SUMMARY:    Objective:     GROSS ASSESSMENT Daily Assessment            BED/MAT MOBILITY Daily Assessment            TRANSFERS Daily Assessment              GAIT Daily Assessment    Gait Description (WDL)      Gait Abnormalities      Assistive device      Ambulation assistance - level surface      Distance      Ambulation assistance- uneven surface      Comments Patient ambulated 100 feet with daughter by her side and standing by assistance. BALANCE Daily Assessment     Sitting - Static: Good (unsupported)  Sitting - Dynamic: Fair (occasional)  Standing - Static: Fair  Standing - Dynamic : Impaired        ASSESSMENT:  Patient is seen for family education as she is being discharged home this session. Patient's daughter was present and both were anxious for patient to be discharged home. Patient's daughter was educated on assisting patient when she is performing sit to stand transfers, ambulation and car transfers. Caregiver and patient were asked if they had questions regarding the patient's care and safety. They reported that all their concerns were addressed at this time. Progression toward goals:  [x]      Improving appropriately and progressing toward goals  []      Improving slowly and progressing toward goals  []      Not making progress toward goals and plan of care will be adjusted      PLAN:  Patient continues to benefit from skilled intervention to address the above impairments. Continue treatment per established plan of care. Discharge Recommendations:  Home Physical Therapy  Further Equipment Recommendations for Discharge:  rolling walker and N/A      Estimated Discharge Date:10/4/22      Activity Tolerance:    Tolerates session well  Please refer to the flowsheet for vital signs taken during this treatment.     After treatment:   [] Patient left in no apparent distress in bed  [] Patient left in no apparent distress sitting up in chair  [] Patient left in no apparent distress in w/c mobilizing under own power  [] Patient left in no apparent distress dining area  [] Patient left in no apparent distress mobilizing under own power  [] Call bell left within reach  [] Nursing notified  [] Caregiver present  [] Bed alarm activated   [] Chair alarm activated      Makenzie Duong  10/4/2022

## 2022-10-04 NOTE — DISCHARGE SUMMARY
The patient was seen and examined independently. Please read APC's note for further detail. The plan was discussed with APC. Please refer to this addendum for additional detail. Patient is doing well. Back pain is better. Constipation better. No more fever. She feels comfortable going home. Patient did participate in intense PT/OT during her stay. She will be discharged home on the following medications. Visit Vitals    /71 (BP 1 Location: Left upper arm, BP Patient Position: Supine)   Pulse 89   Temp 97.1 °F (36.2 °C)   Resp 18   Wt 83 kg (183 lb)   SpO2 98%   BMI 29.54 kg/m²     Discharge Diagnosis:     1. Impaired mobility and ADLs secondary to #2  2. Lumbar spinal stenosis without neurological claudication status post anterior lumbar interbody fusion, L4-L5 and L5-S1 1 prone transpoas fusion  3. Diabetes mellitus  4. Hypertension  5. Osteoarthritis  6. Anemia of chronic medical disease  7. Dyslipidemia     Discharge Medications:    Current Discharge Medication List        START taking these medications    Details   atorvastatin (LIPITOR) 20 mg tablet Take 1 Tablet by mouth nightly for 30 days. Qty: 30 Tablet, Refills: 0  Start date: 10/4/2022, End date: 11/3/2022      bisacodyL (DULCOLAX) 5 mg EC tablet Take 2 Tablets by mouth every forty-eight (48) hours as needed for Constipation. Indications: constipation  Qty: 10 Tablet, Refills: 0  Start date: 10/4/2022      metoprolol tartrate (LOPRESSOR) 25 mg tablet Take 0.5 Tablets by mouth every twelve (12) hours for 30 days. Qty: 30 Tablet, Refills: 0  Start date: 10/4/2022, End date: 11/3/2022      polyethylene glycol (MIRALAX) 17 gram packet Take 1 Packet by mouth daily. Qty: 5 Packet, Refills: 0  Start date: 10/4/2022      acetaminophen (TYLENOL) 500 mg tablet Take 1 Tablet by mouth every six (6) hours as needed for Fever.  Indications: pain  Qty: 20 Tablet, Refills: 0  Start date: 10/4/2022      !! OTHER Incentive Spirometer - Use as instructed. Qty: 1 Each, Refills: 0  Start date: 10/4/2022      !! OTHER CBC, BMP, and Mg in 4 days. Results to pcp immediately. DX: Lumbar spinal stenosis without neurological claudication status post anterior lumbar interbody fusion, L4-L5 and L5-S1 1 prone transpoas fusion. Qty: 1 Each, Refills: 0  Start date: 10/4/2022       !! - Potential duplicate medications found. Please discuss with provider. CONTINUE these medications which have CHANGED    Details   famotidine (PEPCID) 20 mg tablet Take 1 Tablet by mouth two (2) times a day for 30 days. Qty: 60 Tablet, Refills: 0  Start date: 10/4/2022, End date: 11/3/2022      oxyCODONE IR (Roxicodone) 5 mg immediate release tablet Take 1 Tablet by mouth every six (6) hours as needed for Pain for up to 3 days. Max Daily Amount: 20 mg.  Qty: 12 Tablet, Refills: 0  Start date: 10/4/2022, End date: 10/7/2022    Associated Diagnoses: Spinal stenosis of lumbar region at multiple levels           CONTINUE these medications which have NOT CHANGED    Details   docusate sodium (Colace) 100 mg capsule Take 100 mg by mouth two (2) times a day.      gabapentin (NEURONTIN) 300 mg capsule Take 300 mg by mouth three (3) times daily. naproxen sodium (NAPROSYN) 220 mg tablet Take 1 Tablet by mouth two (2) times a day. metFORMIN (GLUCOPHAGE) 500 mg tablet Take 500 mg by mouth daily. cyclobenzaprine (FLEXERIL) 5 mg tablet Take 5 mg by mouth three (3) times daily as needed. multivitamin (ONE A DAY) tablet Take 1 Tablet by mouth daily. STOP taking these medications       acetaminophen (TYLENOL) 650 mg TbER Comments:   Reason for Stopping:         triamterene-hydroCHLOROthiazide (DYAZIDE) 37.5-25 mg per capsule Comments:   Reason for Stopping:         simvastatin (ZOCOR) 40 mg tablet Comments:   Reason for Stopping:              Follow-up Appointments   Procedures    FOLLOW UP VISIT Appointment in: Other (Specify) Follow up with pcp in 1 week, follow up with orthopedic surgery in 10 days. Follow up with pcp in 1 week, follow up with orthopedic surgery in 10 days. Standing Status:   Standing     Number of Occurrences:   1     Order Specific Question:   Appointment in     Answer: Other (Specify)     Total time for discharge is more than 35 minutes    Disclaimer: Sections of this note are dictated using utilizing voice recognition software, which may have resulted in some phonetic based errors in grammar and contents. Even though attempts were made to correct all the mistakes, some may have been missed, and remained in the body of the document. If questions arise, please contact our department.

## 2022-10-04 NOTE — DISCHARGE INSTRUCTIONS
------------------------------------------------------------------------------------------------------------    DISCHARGE INSTRUCTIONS    1. Make sure that when you request refills at the pharmacy that the refill requests are sent to your PCP (NOT to the prescriber at the [facility]) to avoid any delays in getting your medication refills. The physician at the [facility] for [address] will not able to order medications or refills after discharge -- Please understand that though we would like to help, it is simply not safe for our physician to order you a medication that we cannot monitor. 2. If any of the prescribed medications require a PRIOR AUTHORIZATION, contact your Primary Care Physician or specialist to EITHER complete prior authorizations and paperwork on your behalf OR prescribe an alternative medication. -- Please understand that though we would like to help, it is simply not safe for our physician to order you a medication that we cannot monitor. 3. If any of your prescriptions medications PRIOR TO ADMISSION TO [facility] [place] [address] needs a refill (and either the dosage, frequency or instructions was not changed), kindly contact your Primary Care Physician for refills.     -------------------------------------------------------------------------------------------------------------------

## 2022-10-04 NOTE — HOME CARE
Received home health referral from [facility] for [facility] for SN,PT,OT. Discharge order noted for today; spoke to patient and her daughter ( Nita) ,Verified demographics,explained [facility] services and answered all questions; Patient has DME: RW, cane and elevated toilet ; [facility] referral processed to [facility] central Intake. JOSE JONES.

## 2022-10-04 NOTE — PROGRESS NOTES
Sw met with pt and daughter to provide community options for private rental of wheelchair. Daughter called [address] [place], [place] [address] and confirmed they do not offer therapy. New FOC given and pt/daughter selected [facility]. Sw placed referral per [facility] and notified liaisons. No further needs are noted at this time.

## 2022-10-04 NOTE — PROGRESS NOTES
Wound Prevention Checklist    Patient: Yury Torres (27 y.o. female)  Date: 10/4/2022  Diagnosis: Impaired mobility and ADLs [Z74.09, Z78.9] <principal problem not specified>    Precautions: Fall, Back, Spinal       []  Heel prevention boots placed on patient    [x]  Patient turns self q2h during shift    []  Lift team ordered    []  Patient on Lizzy bed/Specialty bed    []  Each Wound is documented during shift (Stage, Color, drainage, odor, measurements, and dressings)    [x]  Dual skin check done with Karen Blum RN

## 2022-10-04 NOTE — DISCHARGE SUMMARY
[facility] PHYSICAL REHABILITATION  [address] [place] [address], [address]     INPATIENT REHABILITATION  DISCHARGE SUMMARY    Name: Nanci Salgado MRN: [id number]   Age / Sex: 67 y.o. / female CSN: [id number]   YOB: 1950 Length of Stay: 11 days   Admit Date: 9/23/2022 Discharge Date: 10/4/2022       PRIMARY CARE PHYSICIAN: Nina Wallace NP      DISCHARGE DIAGNOSES:    Primary Rehabilitation Diagnosis  1. Impaired Mobility and ADLs  2. Lumbar spinal stenosis without neurological claudication status post anterior lumbar interbody fusion, L4-L5 and L5-S1 1 prone transpoas fusion. Comorbidities  Patient Active Problem List   Diagnosis Code    Spinal stenosis of lumbar region at multiple levels M48.061    Impaired mobility and ADLs Z74.09, Z78.9       CONSULTS CALLED: None      PROCEDURES DONE: None       BRIEF HISTORY: (from the history and physical completed by Dr. Kelsi Giles)   The patient is a 50-year-old female with who was admitted to [facility] on September 20, 2022 due to a planned surgery for lumbar stenosis. Patient reportedly had significant back pain radiating to leg pain. MRI demonstrated lumbar spondylosis with stenosis at S1 on the left and L3-L4 as well as L4-L5. Patient failed conservative management as an outpatient. Patient was admitted for planned surgery and underwent anterior lumbar interbody fusion lumbar four/five, lumbar five/ sacral one, prone trans psoas fusion lumbar two/three, three/four, segmental instrumentation lumbar two - sacral one. Postoperative course reportedly was unremarkable. She was resumed on home medication for the comorbidities. PT and OT evaluate this patient for  impaired mobility and ADLs. Patient was felt to be a good candidate for acute inpatient rehabilitation.  Upon evaluation by Physical Therapy and Occupational Therapy, the patient was recommended for acute inpatient rehabilitation. The patient was discharged and was subsequently admitted to the [facility] for Physical Rehabilitation for intensive rehabilitation to help recover strength, function and mobility. Patient is currently sitting in a wheelchair. Has had some back pain. Off-and-on tingling and numbness present in both lower extremities. No headaches or dizziness currently. However said dizziness while turning her head earlier today with the physical therapy without any nausea. No visual disturbances currently. No dysphagia. Denies any chest pain or shortness of breath. No PND or orthopnea. No cough. No fever or chills. No nausea or vomiting. She still has constipation. No abdominal pain. COURSE IN THE HOSPITAL: Upon admission to the [facility] for Physical Rehabilitation, the patient underwent physical therapy, occupational therapy and speech therapy. The patient was able to actively participate in the rehabilitation activities and progressed well. On discharge, the patient was able to perform the following activities:    1.  Occupational Therapy    ON ADMISSION ON DISCHARGE   Eating  Functional Level: 5   Eating  Functional Level: 7     Grooming  Functional Level: 5   Grooming  Functional Level: 5     Bathing  Functional Level: 3   Bathing  Functional Level: 4 (UB: 6; LB: 4 (CGA))     Upper Body Dressing  Functional Level: 4   Upper Body Dressing  Functional Level: 6     Lower Body Dressing  Functional Level: 2   Lower Body Dressing  Functional Level: 4     Toileting  Functional Level: 3   Toileting  Functional Level: 4 (CGA)     Toilet Transfers  Toilet Transfer Score: 3   Toilet Transfers  Toilet Transfer Score: 4 (4)     Tub /Shower Transfers  Tub/Shower Transfer Score: 3   Tub/Shower Transfers  Tub/Shower Transfer Score: 4 (CGA)       2. Physical Therapy    ON ADMISSION ON DISCHARGE   Wheelchair Mobility/Management  Able to Propel (ft): 75 feet  Functional Level:  (partial assistance for turning, backing up etc)  Curbs/Ramps Assist Required (FIM Score): 0 (Not tested)  Wheelchair Setup Assist Required : 3 (Moderate assistance)  Wheelchair Management: Manages left brake, Manages right brake (using brake extender) Wheelchair Mobility/Management  Able to Propel (ft): 75 feet (using bilat UE/LE to propel)  Functional Level:  (SBA)  Curbs/Ramps Assist Required (FIM Score): 0 (Not tested)  Wheelchair Setup Assist Required : 3 (Moderate assistance)  Wheelchair Management: Manages left brake, Manages right brake     Gait  Amount of Assistance: 4 (Minimal assistance)  Distance (ft): 75 Feet (ft)  Assistive Device: Gait belt, Walker, rolling Gait  Amount of Assistance: 4 (Contact guard assistance)  Distance (ft):  (86 ft)  Assistive Device: Gait belt, Walker, rolling     Balance-Sitting/Standing  Sitting - Static: Good (unsupported)  Sitting - Dynamic: Fair (occasional)  Standing - Static: Fair  Standing - Dynamic : Impaired Balance-Sitting/Standing  Sitting - Static: Good (unsupported)  Sitting - Dynamic: Fair (occasional)  Standing - Static: Fair  Standing - Dynamic : Impaired     Bed/Mat Mobility  Rolling Right : 5 (Stand-by assistance)  Rolling Left : 4 (Minimal assistance) (using bedrail to assist)  Supine to Sit : 3 (Moderate assistance)  Sit to Supine : 2 (Maximal assistance) (using logrolling technique) Bed/Mat Mobility  Rolling Right : 5 (Stand-by assistance)  Rolling Left : 5 (Stand-by assistance)  Supine to Sit : 5 (Stand-by assistance)  Sit to Supine : 4 (Minimal assistance)     Transfers  Transfer Type: Other  Other: stand step with RW  Transfer Assistance : 3 (Moderate assistance )  Sit to Stand Assistance: Maximum assistance (needing lifting and lowering assistance, particularly with fatigue)  Car Transfers: Not tested (NT due to assistance required for getting into standing)  Car Type: N/A Transfers  Transfer Type:  Other  Other: stand step with RW  Transfer Assistance : 4 (Minimal assistance)  Sit to Stand Assistance: Minimal assistance  Car Transfers: Minimum assistance  Car Type: car simulator (pillow to elevate seat)     Steps or Stairs  Steps/Stairs Ambulated (#): 0  Level of Assist : 0 (Not tested)  Rail Use:  (NT) Steps or Stairs  Steps/Stairs Ambulated (#): 2 (4\" step)  Level of Assist : 4 (Minimal assistance)  Rail Use: Both (two rails for one step, then right railing sidestepping up/down for additional bout.)       3. Speech and Language Pathology    ON ADMISSION ON DISCHARGE   Comprehension (Native Language)  Primary Mode of Comprehension: Auditory  Score: 6 Comprehension (Native Language)  Primary Mode of Comprehension: Auditory  Score: 6     Expression (Native Language)  Primary Mode of Expression: Verbal  Score: 6   Expression (Native Language)  Primary Mode of Expression: Verbal  Score: 6     Social Interaction/Pragmatics  Score: 5 Social Interaction/Pragmatics  Score: 5     Problem Solving  Score: 4   Problem Solving  Score: 4     Memory  Score: 5 Memory  Score: 5       Legend:   7 - Independent   6 - Modified Independent   5 - Standby Assistance / Supervision / Set-up   4 - Minimum Assistance / Contact Guard Assistance   3 - Moderate Assistance   2 - Maximum Assistance   1 - Total Assistance / Dependent       ACUTE MEDICAL ISSUES ADDRESSED IN INPATIENT REHABILITATION FACILITY:     1. Impaired mobility and ADLs secondary to #2  2. Lumbar spinal stenosis without neurological claudication status post anterior lumbar interbody fusion, L4-L5 and L5-S1 1 prone transpoas fusion  3. Diabetes mellitus  4. Hypertension  5. Osteoarthritis  Anemia noted. No overt bleeding  Dyslipidemia     Plan  Continue PT and OT  Continue judicious pain control with current bowel protocol  If Dulcolax does not work today we will give patient enema tomorrow  Continue metoprolol and monitor blood pressure  Continue Lipitor  Resume sliding scale insulin, monitor sugars  Iron deficiency noted.   IV Venofer. Monitor blood counts intermittently. Discussed with patient. Discussed with RN     MEDICATIONS ON DISCHARGE:    Current Discharge Medication List        START taking these medications    Details   atorvastatin (LIPITOR) 20 mg tablet Take 1 Tablet by mouth nightly for 30 days. Qty: 30 Tablet, Refills: 0  Start date: 10/4/2022, End date: 11/3/2022      bisacodyL (DULCOLAX) 5 mg EC tablet Take 2 Tablets by mouth every forty-eight (48) hours as needed for Constipation. Indications: constipation  Qty: 10 Tablet, Refills: 0  Start date: 10/4/2022      metoprolol tartrate (LOPRESSOR) 25 mg tablet Take 0.5 Tablets by mouth every twelve (12) hours for 30 days. Qty: 30 Tablet, Refills: 0  Start date: 10/4/2022, End date: 11/3/2022      polyethylene glycol (MIRALAX) 17 gram packet Take 1 Packet by mouth daily. Qty: 5 Packet, Refills: 0  Start date: 10/4/2022      acetaminophen (TYLENOL) 500 mg tablet Take 1 Tablet by mouth every six (6) hours as needed for Fever. Indications: pain  Qty: 20 Tablet, Refills: 0  Start date: 10/4/2022      !! OTHER Incentive Spirometer - Use as instructed. Qty: 1 Each, Refills: 0  Start date: 10/4/2022      !! OTHER CBC, BMP, and Mg in 4 days. Results to pcp immediately. DX: Lumbar spinal stenosis without neurological claudication status post anterior lumbar interbody fusion, L4-L5 and L5-S1 1 prone transpoas fusion. Qty: 1 Each, Refills: 0  Start date: 10/4/2022       !! - Potential duplicate medications found. Please discuss with provider. CONTINUE these medications which have CHANGED    Details   famotidine (PEPCID) 20 mg tablet Take 1 Tablet by mouth two (2) times a day for 30 days. Qty: 60 Tablet, Refills: 0  Start date: 10/4/2022, End date: 11/3/2022      oxyCODONE IR (Roxicodone) 5 mg immediate release tablet Take 1 Tablet by mouth every six (6) hours as needed for Pain for up to 3 days.  Max Daily Amount: 20 mg.  Qty: 12 Tablet, Refills: 0  Start date: 10/4/2022, End date: 10/7/2022    Associated Diagnoses: Spinal stenosis of lumbar region at multiple levels           CONTINUE these medications which have NOT CHANGED    Details   docusate sodium (Colace) 100 mg capsule Take 100 mg by mouth two (2) times a day.      gabapentin (NEURONTIN) 300 mg capsule Take 300 mg by mouth three (3) times daily. naproxen sodium (NAPROSYN) 220 mg tablet Take 1 Tablet by mouth two (2) times a day. metFORMIN (GLUCOPHAGE) 500 mg tablet Take 500 mg by mouth daily. cyclobenzaprine (FLEXERIL) 5 mg tablet Take 5 mg by mouth three (3) times daily as needed. multivitamin (ONE A DAY) tablet Take 1 Tablet by mouth daily. STOP taking these medications       acetaminophen (TYLENOL) 650 mg TbER Comments:   Reason for Stopping:         triamterene-hydroCHLOROthiazide (DYAZIDE) 37.5-25 mg per capsule Comments:   Reason for Stopping:         simvastatin (ZOCOR) 40 mg tablet Comments:   Reason for Stopping:               DISCHARGE VITAL SIGNS:  Visit Vitals  /68 (BP 1 Location: Left upper arm, BP Patient Position: Lying)   Pulse 94   Temp 98.1 °F (36.7 °C)   Resp 18   Wt 83 kg (183 lb)   SpO2 97%   BMI 29.54 kg/m²       DISCHARGE PHYSICAL EXAMINATION:  GENERAL SURVEY: Patient is awake, alert, oriented x 3, sitting comfortably on the chair, not in acute respiratory distress. HEENT: pink palpebral conjunctivae, anicteric sclerae, no nasoaural discharge, moist oral mucosa  NECK: supple, no jugular venous distention, no palpable lymph nodes  CHEST/LUNGS: symmetrical chest expansion, good air entry, clear breath sounds  HEART: adynamic precordium, good S1 S2, no S3, regular rhythm, no murmurs  ABDOMEN: flat, bowel sounds appreciated, soft, non-tender  EXTREMITIES: pink nailbeds, no edema, full and equal pulses, no calf tenderness   NEUROLOGICAL EXAM: The patient is awake, alert and oriented x3, able to answer questions fairly appropriately, able to follow 1 and 2 step commands. Able to tell time from the wall clock. Cranial nerves II-XII are grossly intact. No gross sensory deficit    CONDITION ON DISCHARGE: Stable. DISPOSITION: Patient clinically improved and was discharged to home with home health physical therapy, occupational therapy, and skilled nursing. The patient is temporarily homebound secondary to functional deficits due to Lumbar spinal stenosis without neurological claudication status post anterior lumbar interbody fusion, L4-L5 and L5-S1 1 prone transpoas fusion. The patient can ambulate using a standard adult rolling walker. The patient would benefit from continued skilled physical therapy to improve independent functional mobility within the home with use of least restrictive device. The patient would also benefit from continued skilled occupational therapy to improve self-care and functional mobility within the home with use of least restrictive device. Short-term skilled nursing is needed for medication reconciliation and disease education. Medical social worker for needs assessment and community resources. FOLLOW-UP RECOMMENDATIONS:   Follow-up Information       Follow up With Specialties Details Why Contact Info    Edwin Jones NP Nurse Practitioner Follow up on 10/10/2022 Patient has an appointment scheduled with Internal Medicine group on October 10, 2022 @ 10:15am.  Lizandro QUINTANA is no longer with the group, patient will be seen by MACEY QUINTANA. [facility]      Gisela Camargo MD Orthopedic Surgery Follow up on 10/18/2022 Patient has an appointment scheduled with Orthoopedic/Spine Dr. Omero [place] on October 18, 2022 @ 1:45pm. 1 [address]  [phone]            OTHER INSTRUCTIONS:  1. Diet: Adult Diet Regular; 4 carb choices (60 gm/meal)   2. Activity. As tolerated. 3. Safety / fall precautions. 4. Weightbearing status.      TIME SPENT ON DISCHARGE ACTIVITIES: 39 minutes.       Signed:  Miracle Moreira NP    10/4/2022

## 2022-10-04 NOTE — PROGRESS NOTES
[facility]    Bedside and Verbal shift change report given to [facility],, RN (oncoming nurse) by Elizabeth Vergara RN (offgoing nurse). Report included the following information SBAR, Kardex, MAR and Recent Results. Situation:   Code Status: Full Code   Hospital Day: 11   Problem List:   Hospital Problems  Never Reviewed            Codes Class Noted POA    Impaired mobility and ADLs ICD-10-CM: Z74.09, Z78.9  ICD-9-CM: V49.89  9/23/2022 Unknown       Background:   Past Medical History:   Past Medical History:   Diagnosis Date    Arthritis     back and hands    Chronic pain     back    COVID-19     Summer of  2021    Diabetes ([facility] [address]     NIDDM    Hypertension     Nausea & vomiting     Surgery only not colonoscopy        Assessment:  Changes in Assessment throughout shift: No change to previous assessment    Patient has a central line: no Reasons if yes: Insertion date: Last dressing date:  Patient has Aly Cath: no Reasons if yes:     Insertion date:  Shift aly care completed:     Last Vitals:     Vitals:    10/02/22 2053 10/03/22 0730 10/03/22 1623 10/03/22 2029   BP: (!) 109/50 125/60 115/70 110/68   Pulse: 96 94 91 94   Resp: 18 18 18 18   Temp: 99.8 °F (37.7 °C) 97.9 °F (36.6 °C) 97.8 °F (36.6 °C) 98.1 °F (36.7 °C)   SpO2: 98% 96% 99% 97%   Weight:           PAIN    Pain Assessment    Pain Intensity 1: 0 (10/04/22 0412) Pain Intensity 1: 2 (12/29/14 1105)    Pain Location 1: Back Pain Location 1: Abdomen    Pain Intervention(s) 1: Position, Medication (see MAR) Pain Intervention(s) 1: Medication (see MAR)  Patient Stated Pain Goal: 0 Patient Stated Pain Goal: 0  Intervention effective: yes  Other actions taken for pain: Position;Medication (see MAR)    Skin Assessment  Skin color Skin Color: Appropriate for ethnicity  Condition/Temperature Skin Condition/Temp: Warm  Integrity Skin Integrity: Incision (comment)  Turgor    Weekly Pressure Ulcer Documentation  Pressure  Injury Documentation: No Pressure Injury Noted-Pressure Ulcer Prevention Initiated  Wound Prevention & Protection Methods  Orientation of wound Orientation of Wound Prevention: Posterior  Location of Prevention Location of Wound Prevention: Sacrum/Coccyx  Dressing Present Dressing Present : No  Dressing Status    Wound Offloading Wound Offloading (Prevention Methods): Bed, pressure reduction mattress, Elevate heels, Pillows, Walker, Wheelchair    INTAKE/OUPUT  Date 10/03/22 0700 - 10/04/22 0659 10/04/22 0700 - 10/05/22 0659   Shift 0700-1859 1900-0659 24 Hour Total 0700-1859 1900-0659 24 Hour Total   INTAKE   P.O. 780  780        P. O. 780  780      Shift Total(mL/kg) 780(9.4)  780(9.4)      OUTPUT   Urine(mL/kg/hr)           Urine Occurrence(s) 4 x 6 x 10 x      Emesis/NG output           Emesis Occurrence(s)  0 x 0 x      Stool           Stool Occurrence(s) 0 x 1 x 1 x      Shift Total(mL/kg)           780      Weight (kg) 83 83 83 83 83 83         Recommendations:  Patient needs and requests: none at this time    Pending tests/procedures: routine labs     Functional Level/Equipment: Partial (one person) / Bed (comment); Mike Au; Wheelchair    Fall Precautions:   Fall risk precautions were reinforced with the patient; she was instructed to call for help prior to getting up. The following fall risk precautions were continued: bed/ chair alarms, door signage, yellow bracelet and socks as well as update of the [employer] tool in the patient's room. Reta Score: 4    HEALS Safety Check    A safety check occurred in the patient's room between off going nurse and oncoming nurse listed above. The safety check included the below items  Area Items   H  High Alert Medications Verify all high alert medication drips (heparin, PCA, etc.)   E  Equipment Suction is set up for ALL patients (with chloe)  Red plugs utilized for all equipment (IV pumps, etc.)  WOWs wiped down at end of shift.   Room stocked with oxygen, suction, and other unit-specific supplies   A  Alarms Bed alarm is set for fall risk patients  Ensure chair alarm is in place and activated if patient is up in a chair   L  Lines Check IV for any infiltration  Hughes bag is empty if patient has a Hughes   Tubing and IV bags are labeled   S  Safety   Room is clean, patient is clean, and equipment is clean. Hallways are clear from equipment besides carts. Fall bracelet on for fall risk patients  Ensure room is clear and free of clutter  Suction is set up for ALL patients (with chloe)  Hallways are clear from equipment besides carts.    Isolation precautions followed, supplies available outside room, sign posted     Carrie Levin RN

## 2022-10-05 ENCOUNTER — HOME CARE VISIT (OUTPATIENT)
Dept: HOME HEALTH SERVICES | Facility: HOME HEALTH | Age: 72
End: 2022-10-05

## 2022-10-06 ENCOUNTER — HOME CARE VISIT (OUTPATIENT)
Dept: SCHEDULING | Facility: HOME HEALTH | Age: 72
End: 2022-10-06
Payer: MEDICARE

## 2022-10-06 PROCEDURE — G0299 HHS/HOSPICE OF RN EA 15 MIN: HCPCS

## 2022-10-06 PROCEDURE — G0151 HHCP-SERV OF PT,EA 15 MIN: HCPCS

## 2022-10-06 PROCEDURE — 3331090001 HH PPS REVENUE CREDIT

## 2022-10-06 PROCEDURE — 3331090002 HH PPS REVENUE DEBIT

## 2022-10-06 PROCEDURE — 400018 HH-NO PAY CLAIM PROCEDURE

## 2022-10-06 PROCEDURE — 400013 HH SOC

## 2022-10-06 NOTE — HOME HEALTH
Subjective:   Pain: Patient c/o constant achy pain on back with highest pain level of 6/10 and least pain level of 5/10. Patient reported she/he last took 1 tab oxycodone, gabapentin, and naproxen at 6:30am.  Patient manages pain by taking pain medication. ice and rest  Patient's goal: to be able get to the bathroom , shower, and walk around independently and safely.  -  Objective  Balance:  Unsupported sitting: Normal  Static Standing balance: Fair  Dynamic standing balance: NA d/t safety reasons  Tinetti balance and gait test: 6/28 indicative of high fall risks. TUG test: unable. not enough space  ROM: WFL on bilateral LEs. BLE MMT:  R hip flex 3-/5   R hip Abd 3/5   R hip add 3/5   R knee flex 3-/5  R knee ext. 3-/5    R ankle DF 3-/5  L hip flex 3-/5  L hip Abd 3/5  L hip add 3/5  L knee flex 3-/5  L knee ext. 3-/5  L ankle DF 3-/5    FTSTS: 53 seconds on elevated surface  -  Assessment:    Patient is a 68 y/o female with PMHx of Arthritis back and hands Chronic pain back COVID-19 Summer of 2021 Diabetes ([facility] [address] NIDDM Hypertension Nausea & vomiting s/p ANTERIOR LUMBAR INTERBODY FUSION LUMBAR FOUR/FIVE, LUMBAR FIVE/SACRAL ONE:   PRONE TRANSPOAS FUSION LUMBER TWO/THREE, THREE/FOUR, SEGMANTAL INSTRUMENT LUMBAR TWO- SACRAL ONE WITH C-ARM on 9/21/22 and was d/c to home on 10/4/22 from SNF. Patient is now back to two level private residence with access to ramp from back door and stair lift to reach second floor where bedroom and main bathroom are located. Temporarily, patient is staying in  park at the back with  steps to enter/exit without rail. Patient lives with daughter, grandchildren. Daughter is primary cg present during this visit. Discussed with patient and caregiver(s) availability and willingness to provide care. Primary caregiver is available 24/7 and can assist with meal prep and setup, medication management, grocery shopping, household chores, transportation to MD appointment.         Patient has a PLOF of indep with ADLs, bed mobility, transfers, and ambulation rollator both for household and community distances. During this visit, patient presents with decreased bilateral LEs strength, impaired balance and decreased coordination ,  poor motor control, poor functional activity tolerance and decreased safety in functional mobility skills as evidenced by functional tests. FTSTS: 53 seconds, Tinetti and gait test: 6/28. Patient requiring supervision assistance in bed mobility for  LE management and 25% verbal cues for proper techniques, touching  assistance in safe transfers to and from bed, chair and toilet using FWW with 50% verbal cues for proper hand placements and techniques. Patient has decreased  static standing balance at [address] and needs to hold on to [facility] for support to prevent LOB. Patient is limited with ambulation to 50 ft using FWW requiring touching assistance. Impairments in gait include forward lean, downward gaze, decreased  hip/knee flexion during swing phase, unequal step length, decreased rowan, - heel strike, wide AKIRA, ataxic gait     Deferred step management assessment d/t safety reasons. PATIENT EDUCATION PROVIDED THIS VISIT TO INCLUDE:   FALL PREVENTION TECHNIQUES: monitor medication that may alter mental status,  keeping walking pathways clean and clear of clutter and throw rugs, keeping night lights on for ability to see and easily, good visibility for safe transitioning thresholds and proper use and good compliance of using AD. PAIN MANAGEMENT TECHNIQUES: take pain medication as prescribed by MD, application of cold pack  on painful area x 20 mins, positioning and relaxation. PRESSURE ULCER PREVENTION TECHNIQUES:  proper positioning strategies including frequency of repositioning, prevention of shear and friction, appropriate skin hygiene and protection from moisture.   ENERGY CONSERVATION TECHNIQUES:  rest, slow down, pacing, and complete tasks in manageable steps.  SPINAL PRECAUTIONS: no bending. lifting, twisting      PATIENT LEVEL OF UNDERSTANDING OF EDUCATION PROVIDED: Patient verbalized understanding and able to partially teach back information provided. PATIENT RESPONSE TO PROCEDURE PERFORMED:  denies increased pain on back post functional mobility assessment. Skilled PT services are warranted to educate patient on fall prevention, energy conservation techniques, pressure ulcer prevention and HEP, cristina LEs strengthening, balance and coordination,  motor control, activity tolerance training. Therapeutic treatment will also focus safety on functional mobility skills and address gait impairments to decrease risk for falls, decrease risk of rehospitalization  and increase independence in performing ADLs. -  Plan: Patient is seen for initial PT eval today with POC established. Discussed to patient POC and visit frequency of 1w1, 3w2, 2w1. Patient verbalized agreement. Patient to receive education on pain management techniques, graded therapeutic exercises, balance and training, bed mobility training, transfers training, HEP ed, education on energy conservation techniques, fall prevention techniques ed, pressure ulcer prevention techniques, gait/steps management training and d/c planning.

## 2022-10-06 NOTE — HOME HEALTH
Skilled services/Home bound verification: Yes     Skilled Reason for admission/summary of clinical condition:  s/p lumbar sugery  . This patient is homebound for the following reasons Requires considerable and taxing effort to leave the home  and Only leaves the home for medical reasons or [employer] services and are infrequent and of short duration for other reasons . Caregiver: relative. Daughter   Caregiver assists with adl's , meals , meds and transportation to apts. Medications reconciled and all medications are available in the home this visit. The following education was provided regarding medications oxyCODONE IR (Roxicodone) 5 mg immediate release tablet () Take 1 Tablet by mouth every six (6) hours as needed for Pain for up to 3 days. : tx for moderate to severe pain with side effects constipation , sedation , dizziness , hypotension itching , rash , unsteady gait , suppressed resp  Medications  are in home  at this time. High risk medication teaching regarding anticoagulants, hyperglycemic agents or opiod narcotics performed (specify) oxyCODONE IR (Roxicodone) 5 mg immediate release tablet () Take 1 Tablet by mouth every six (6) hours as needed for Pain for up to 3 days. ,    Mamta Amin NP  notified of any discrepancies/look a like medications/medication interactions n/a.      Home health supplies by type and quantity ordered/delivered this visit include: op sitevisible , telfa left supplies ordered     Patient education provided this visit to include: admission booklet reviewed with medications reviewed , PATIENT TO PERFORM REG SKIN INSPECTIONS WITH HYGIENE AND REPORT ANY LESIONS CUTS , GLUCOSE MONITORING WITH RECORDING LOG FOR DM CONTROL , NUTRITION WITH DIABETIC DIET , HYPERGLYCEMIA MEDICATIONS AS RX FOR CONTROLDM TEACHING WITH DAILY MONITORING OF GLUCOSE  WITH RECORDING  REPORTING ABNORMALS TO PCP , NUTRITION WITH LOW SUGAR , S/S OF HYPOGLYCEMIA PATIENT MAY HAVE INCREASE CONFUSION LETHARGRY , DIZZINESS ,  SLURRED SPEECH , SWEATING , HUNGER FAINTING , SHAKINESS , N/V, TX EATING FOODS HIGH IN SIMPLE SUGARS DEXTROSE , GLUCAGON FOLLOWED BY MEAL   HYPERGLYCEMIA S/S HIGH BLD SUGAR , INCREASED THIRST , POOR WOUND HEALING , HUNGER , FREQUENT URINATION , HEADACHES , TIREDNESS , BLURRED VISION , TROUBLE THINKING/ CONFUSION  , DRY MOUTH , COMA TX WITH MEDICATIONS, LOW SUGAR DIET , WEIGHT CONTROL , MONITORING WITH CONTROL. , fall prevention with use of walker ,, infection prevention with teaching s/s redness , fever increase pain , pusof surgical sites     Patient level of understanding of education provided: patient fair with need for reinforcement     Sharps Education Provided: N/A patient does not test glucose   Patient response to procedure performed:  fair to int assesment     Home exercise program/Homework provided: deep breathing with use of ICS every 1 hr w.a for 10 times , patient to get up and move walk hourly while awake    Pt/Caregiver instructed on plan of care and are agreeable to plan of care at this time. Physician Portillo Love NP  notified of patient admission to home health and plan of care including anticipated frequency of 2wk1, 1wk2, 2prn  and treatments/interventions/modalities of PT/OT. Discharge planning discussed with patient and caregiver. Discharge planning as follows: discharge when goals are med and patient is stable and reached potential .  Pt/Caregiver did verbalize understanding of discharge planning. Next MD appointment  TBD  Patient/caregiver encouraged/instructed to keep appointment as lack of follow through with physician appointment could result in discontinuation of home care services for non-compliance.

## 2022-10-06 NOTE — Clinical Note
Therapy Functional Score Assessment  Question   Score  Response codes - delete these before sending message   Grooming        3   Upper Dressing        3     Lower Dressing         3   Bathing         5   Toilet Transfer  4   Transfer     3   Ambulation       5             Dyspnea             2           Pain Interfering with activity 4  Estimated therapy visits: 9

## 2022-10-06 NOTE — CASE COMMUNICATION
Attempted to schedule HHPT admission visit but patient and cg requested to reschedule visit to Thursday 10/6/22. Please call #[phone] with any questions. Thank you! Malini Granda

## 2022-10-06 NOTE — Clinical Note
10/8/22 observed  surgical wounds  , SN wound addendums 10/6/22  revised / accordingly to newly epithelialized. Warm

## 2022-10-07 ENCOUNTER — HOME CARE VISIT (OUTPATIENT)
Dept: HOME HEALTH SERVICES | Facility: HOME HEALTH | Age: 72
End: 2022-10-07
Payer: MEDICARE

## 2022-10-07 PROCEDURE — 3331090001 HH PPS REVENUE CREDIT

## 2022-10-07 PROCEDURE — 3331090002 HH PPS REVENUE DEBIT

## 2022-10-08 ENCOUNTER — HOME CARE VISIT (OUTPATIENT)
Dept: SCHEDULING | Facility: HOME HEALTH | Age: 72
End: 2022-10-08
Payer: MEDICARE

## 2022-10-08 ENCOUNTER — HOME CARE VISIT (OUTPATIENT)
Dept: HOME HEALTH SERVICES | Facility: HOME HEALTH | Age: 72
End: 2022-10-08
Payer: MEDICARE

## 2022-10-08 ENCOUNTER — HOSPITAL ENCOUNTER (OUTPATIENT)
Dept: LAB | Age: 72
Discharge: HOME | End: 2022-10-08
Payer: MEDICARE

## 2022-10-08 LAB
ANION GAP SERPL CALC-SCNC: 8 MMOL/L (ref 3–18)
BASOPHILS # BLD: 0.1 K/UL (ref 0–0.1)
BASOPHILS NFR BLD: 1 % (ref 0–2)
BUN SERPL-MCNC: 11 MG/DL (ref 7–18)
BUN/CREAT SERPL: 12 (ref 12–20)
CALCIUM SERPL-MCNC: 8.5 MG/DL (ref 8.5–10.1)
CHLORIDE SERPL-SCNC: 108 MMOL/L (ref 100–111)
CO2 SERPL-SCNC: 28 MMOL/L (ref 21–32)
CREAT SERPL-MCNC: 0.91 MG/DL (ref 0.6–1.3)
DIFFERENTIAL METHOD BLD: ABNORMAL
EOSINOPHIL # BLD: 0.2 K/UL (ref 0–0.4)
EOSINOPHIL NFR BLD: 3 % (ref 0–5)
ERYTHROCYTE [DISTWIDTH] IN BLOOD BY AUTOMATED COUNT: 15.1 % (ref 11.6–14.5)
GLUCOSE SERPL-MCNC: 141 MG/DL (ref 74–99)
HCT VFR BLD AUTO: 29 % (ref 35–45)
HGB BLD-MCNC: 8.6 G/DL (ref 12–16)
IMM GRANULOCYTES # BLD AUTO: 0 K/UL (ref 0–0.04)
IMM GRANULOCYTES NFR BLD AUTO: 0 % (ref 0–0.5)
LYMPHOCYTES # BLD: 1.1 K/UL (ref 0.9–3.6)
LYMPHOCYTES NFR BLD: 18 % (ref 21–52)
MAGNESIUM SERPL-MCNC: 2 MG/DL (ref 1.6–2.6)
MCH RBC QN AUTO: 27.3 PG (ref 24–34)
MCHC RBC AUTO-ENTMCNC: 29.7 G/DL (ref 31–37)
MCV RBC AUTO: 92.1 FL (ref 78–100)
MONOCYTES # BLD: 0.5 K/UL (ref 0.05–1.2)
MONOCYTES NFR BLD: 8 % (ref 3–10)
NEUTS SEG # BLD: 4.4 K/UL (ref 1.8–8)
NEUTS SEG NFR BLD: 70 % (ref 40–73)
NRBC # BLD: 0 K/UL (ref 0–0.01)
NRBC BLD-RTO: 0 PER 100 WBC
PLATELET # BLD AUTO: 409 K/UL (ref 135–420)
PMV BLD AUTO: 9.7 FL (ref 9.2–11.8)
POTASSIUM SERPL-SCNC: 4.1 MMOL/L (ref 3.5–5.5)
RBC # BLD AUTO: 3.15 M/UL (ref 4.2–5.3)
SODIUM SERPL-SCNC: 144 MMOL/L (ref 136–145)
WBC # BLD AUTO: 6.3 K/UL (ref 4.6–13.2)

## 2022-10-08 PROCEDURE — 80048 BASIC METABOLIC PNL TOTAL CA: CPT

## 2022-10-08 PROCEDURE — G0299 HHS/HOSPICE OF RN EA 15 MIN: HCPCS

## 2022-10-08 PROCEDURE — 3331090002 HH PPS REVENUE DEBIT

## 2022-10-08 PROCEDURE — 85025 COMPLETE CBC W/AUTO DIFF WBC: CPT

## 2022-10-08 PROCEDURE — 3331090001 HH PPS REVENUE CREDIT

## 2022-10-08 PROCEDURE — 83735 ASSAY OF MAGNESIUM: CPT

## 2022-10-09 ENCOUNTER — HOME CARE VISIT (OUTPATIENT)
Dept: HOME HEALTH SERVICES | Facility: HOME HEALTH | Age: 72
End: 2022-10-09
Payer: MEDICARE

## 2022-10-09 VITALS
OXYGEN SATURATION: 97 % | RESPIRATION RATE: 18 BRPM | HEART RATE: 86 BPM | DIASTOLIC BLOOD PRESSURE: 70 MMHG | SYSTOLIC BLOOD PRESSURE: 128 MMHG | TEMPERATURE: 98.2 F

## 2022-10-09 PROCEDURE — 3331090001 HH PPS REVENUE CREDIT

## 2022-10-09 PROCEDURE — 3331090002 HH PPS REVENUE DEBIT

## 2022-10-09 NOTE — HOME HEALTH
Skilled reason for visit: wound care, labs    Caregiver involvement: IADL's and ADL's    Medications reviewed and all medications are available in the home this visit. The following education was provided regarding medications: gabapentin and metoprolol reviewed and educated on to include: name, dose, route, frequency, side effects and effectiveness. MD notified of any discrepancies/look a-like medications/medication interactions: none noted  Medications are effective at this time. Home health supplies by type and quantity ordered/delivered this visit include: collagen with silver, bordered gauze. Patient education provided this visit: see interventions    Sharps education provided: Clinician instructed patient/CG on proper disposal of sharps: Containers should be made of hard plastic, be puncture-resistant and leakproof,   such as a laundry detergent or bleach bottle.  When the container is ¾ full, it should be sealed with tape and labeled   DO NOT RECYCLE prior to discarding in the regular trash.      Patient level of understanding of education provided: see interventions    Skilled Care Performed this visit: assessment, vitals, med rec, wound care, labs drawn, stick x 2, taken to [facility]    Patient response to procedure performed:  tolerated well    Agency Progress toward goals: progressing towards agency goals    Patient's Progress towards personal goals: progressing towards personal goals    Home exercise program: Report any s/sx of abnormal bleeding to MD immediately/seek medical treatment for any: black tary stools, dark/tea/blood, hemtoma, dizziness, frequent gum/nose bleeds, unusal brusing, or prolong bleeding. Metformin should be taken with meals to help reduce stomach or bowel side effects that may occur during the first few weeks of treatment. Swallow the tablet or extended-release tablet whole with a full glass of water. Do not crush, break, or chew it.   Patient educated on fall precautions to include Use good lighting in all rooms, make sure you use nightlights, Place frequently used items in easy-to-reach places, Set up furniture so that there are clear paths around it, Remove throw rugs and other tripping hazards from the floors, Avoid walking on wet floors, Keep moving. Physical activity can go a long way toward fall prevention, Wear sensible shoes with backs on them, Use assistive devices. Continued need for the following skills: Nursing    Plan for next visit: assessment, vitals, med rec, wound care    Patient and/or caregiver notified and agrees to changes in the Plan of Care YES/NO/NA: N/A      The following discharge planning was discussed with the pt/caregiver: discharge when goals met.

## 2022-10-10 ENCOUNTER — HOME CARE VISIT (OUTPATIENT)
Dept: HOME HEALTH SERVICES | Facility: HOME HEALTH | Age: 72
End: 2022-10-10
Payer: MEDICARE

## 2022-10-10 VITALS
SYSTOLIC BLOOD PRESSURE: 118 MMHG | RESPIRATION RATE: 18 BRPM | TEMPERATURE: 98.4 F | DIASTOLIC BLOOD PRESSURE: 70 MMHG | HEART RATE: 94 BPM | OXYGEN SATURATION: 99 %

## 2022-10-10 PROCEDURE — 3331090002 HH PPS REVENUE DEBIT

## 2022-10-10 PROCEDURE — 3331090001 HH PPS REVENUE CREDIT

## 2022-10-10 NOTE — CASE COMMUNICATION
OT called pt 10.9.22 to schedule visit for 10.10.22 however pt daughter reports pt has MD appointment and daughter has an appointment therefore pt was not available for visit. OT scheduled visit for 10.12.22. pt daughter verbalized understanding.

## 2022-10-11 PROCEDURE — 3331090002 HH PPS REVENUE DEBIT

## 2022-10-11 PROCEDURE — 3331090001 HH PPS REVENUE CREDIT

## 2022-10-12 ENCOUNTER — HOME CARE VISIT (OUTPATIENT)
Dept: SCHEDULING | Facility: HOME HEALTH | Age: 72
End: 2022-10-12
Payer: MEDICARE

## 2022-10-12 VITALS
OXYGEN SATURATION: 100 % | HEART RATE: 76 BPM | TEMPERATURE: 97.2 F | DIASTOLIC BLOOD PRESSURE: 74 MMHG | SYSTOLIC BLOOD PRESSURE: 145 MMHG | RESPIRATION RATE: 18 BRPM

## 2022-10-12 PROCEDURE — 3331090002 HH PPS REVENUE DEBIT

## 2022-10-12 PROCEDURE — 3331090001 HH PPS REVENUE CREDIT

## 2022-10-12 PROCEDURE — G0152 HHCP-SERV OF OT,EA 15 MIN: HCPCS

## 2022-10-12 PROCEDURE — G0300 HHS/HOSPICE OF LPN EA 15 MIN: HCPCS

## 2022-10-12 PROCEDURE — G0157 HHC PT ASSISTANT EA 15: HCPCS

## 2022-10-12 NOTE — HOME HEALTH
SUBJECTIVE: Patient notes she has been using her walker to get around. She gets up at least 2-3x a night to use the bathroom. CAREGIVER INVOLVEMENT/ASSISTANCE NEEDED FOR: Patient resides with daughter in main house who assist with ADL's and transfers as needed. HOME HEALTH SUPPLIES BY TYPE AND QUANTITY ORDERED/DELIVERED THIS VISIT INCLUDE: none   OBJECTIVE: See interventions. PATIENT RESPONSE TO TREATMENT: No increase in pain throughout activities. PATIENT LEVEL OF UNDERSTANDING OF EDUCATION PROVIDED: Patient verbalized understanding on signs/sx of infection, use of AD at all times and safety with transfers and ambulation, L-spine precautions and MD protocol, ambulating frequently throughout day. Patient requires reinforcement on proper technique with HEP and posture during gait training. ASSESSMENT OF PROGRESS TOWARD GOALS: Patient is progressing towards goals. Patient ambulated short distances throughout home with walker and supervision due to weakness and instability with gait. Patient required verbal cueing for posture. Patient demonstrated thera ex during session with no increase in pain observed or reported. Patient demonstrated proper technique during transfers in/out of bed. CONTINUED NEED FOR THE FOLLOWING SKILLS: Continuation of PT to further improve patient balance, functional mobility and strength to decrease pts risk for falls. PLAN FOR NEXT VISIT: Attempt stair training next visit. THE FOLLOWING DISCHARGE PLANNING WAS DISCUSSED WITH THE PATIENT/CAREGIVER: D/C from HHPT when goals are met or max potential benefit achieved.

## 2022-10-13 VITALS
DIASTOLIC BLOOD PRESSURE: 86 MMHG | HEART RATE: 80 BPM | OXYGEN SATURATION: 99 % | SYSTOLIC BLOOD PRESSURE: 145 MMHG | TEMPERATURE: 98.2 F | RESPIRATION RATE: 16 BRPM

## 2022-10-13 PROCEDURE — 3331090001 HH PPS REVENUE CREDIT

## 2022-10-13 PROCEDURE — 3331090002 HH PPS REVENUE DEBIT

## 2022-10-13 NOTE — HOME HEALTH
Clinical Condition per EPIC:  \"BRIEF HISTORY: (from the history and physical completed by Dr. Angelica Hewitt)   The patient is a 69-year-old female with who was admitted to [facility] on September 20, 2022 due to a planned surgery for lumbar stenosis. Patient reportedly had significant back pain radiating to leg pain. MRI demonstrated lumbar spondylosis with stenosis at S1 on the left and L3-L4 as well as L4-L5. Patient failed conservative management as an outpatient. Patient was admitted for planned surgery and underwent anterior lumbar interbody fusion lumbar four/five, lumbar five/ sacral one, prone trans psoas fusion lumbar two/three, three/four, segmental instrumentation lumbar two - sacral one. Postoperative course reportedly was unremarkable. She was resumed on home medication for the comorbidities. PT and OT evaluate this patient for  impaired mobility and ADLs. Patient was felt to be a good candidate for acute inpatient rehabilitation. Upon evaluation by Physical Therapy and Occupational Therapy, the patient was recommended for acute inpatient rehabilitation. The patient was discharged and was subsequently admitted to the [address] [place] [address] for Physical Rehabilitation for intensive rehabilitation to help recover strength, function and mobility. Patient is currently sitting in a wheelchair. Has had some back pain. Off-and-on tingling and numbness present in both lower extremities. No headaches or dizziness currently. However said dizziness while turning her head earlier today with the physical therapy without any nausea. No visual disturbances currently. No dysphagia. Denies any chest pain or shortness of breath. No PND or orthopnea. No cough. No fever or chills. No nausea or vomiting. She still has constipation. No abdominal pain.   DISPOSITION: Patient clinically improved and was discharged to home with home health physical therapy, occupational therapy, and skilled nursing. The patient is temporarily homebound secondary to functional deficits due to Lumbar spinal stenosis without neurological claudication status post anterior lumbar interbody fusion, L4-L5 and L5-S1 1 prone transpoas fusion. The patient can ambulate using a standard adult rolling walker. The patient would benefit from continued skilled physical therapy to improve independent functional mobility within the home with use of least restrictive device. The patient would also benefit from continued skilled occupational therapy to improve self-care and functional mobility within the home with use of least restrictive device. Short-term skilled nursing is needed for medication reconciliation and disease education. Medical social worker for needs assessment and community resources. Discharge Diagnosis:     1. Impaired mobility and ADLs secondary to #2  2. Lumbar spinal stenosis without neurological claudication status post anterior lumbar interbody fusion, L4-L5 and L5-S1 1 prone transpoas fusion  3. Diabetes mellitus  4. Hypertension  5. Osteoarthritis  6. Anemia of chronic medical disease  7. Dyslipidemia    List of Comorbidities:   Arthritis       back and hands   o Chronic pain       back   o COVID-19       Summer of  2021   o Diabetes ([facility] [address]       NIDDM   o Hypertension     o Nausea & vomiting       Surgery on ly not colonoscopy\"  . SUBJECTIVE:  Pt stated \"I'm doing ok. I'm figuring out how to do things. \" pt sitting chair upon OT arrival. pt agreed tx. pt living in small camper in backyard of her house. CAREGIVER INVOLVEMENT: pt daughter provides A heavy IADL tasks, shopping, and transportation. pt I medications. MEDICATION RECONCILIATION: OT reconcilled medications with pt with no changes   DME ORDERED/RECOMMENEDED: NA, pt has FWW, BSC, reacher, sock aid, and long handled bath sponge.   .  OBJECTIVE:  BATHING: pt demonstrated bathing via sponge bath I UB sitting, S LB bathing sitting with use weight shift technique bringing LE onto opposite LE, and SBA while standing to perform leia area bathing. pt has BSC frame in step in shower when medically cleared to perform bathing via shower. pt has AE long handled bath sponge. TOILETING: pt requires SBA for safety while standing to perform clothing management. UB DRESSING: SBA   LB DRESSING: min A. pt has reacher and sock aid due to spinal precautions. GROOMING: SBA for safety while standing. FEEDING: I  .  pt reports Modified Cristian RPE 5/10 after performing ambulation, transfers, and ADL assessment. .  OT instructed/demonstrated pt the following with good understanding:    - energy conservation while performing bathing, dressing, and setup such as set clothing out night before, gather items required to perform task in one trip, sit while performing tasks, take rest breaks as needed, perform shower/bathing on days with no other appointments or activities scheduled, etc.     - pt is to perform bathing/dressing tasks sitting, when possible, for safety/fall prevention.     - while sitting perform weight shift technique bringing LE contra laterally onto opposite LE while performing LB bathing/dressing for safety and fall prevention. pt demonstrated I technique. - pt instructed/demonstrated one handed technique while standing to perform functional tasks with use rollator walker/grab bar for increased stability, fall prevention, and safety while standing.    -           spinal precautions of no bending, lifting, or twisting. pt demonstrated/verbalized good understanding. -ADL training performed for improved body mechanics, safety, and technique for reduced risk of falls and improved functional level and participation of tasks. [place] IADL: NT this day. [place] BALANCE:  STATIC STANDING: fair-, pt requires UE support FWW maintain symmetrical standing   DYNAMIC STANDING: fair-/poor+  .   AMBULATION: pt ambulates FWW SBA min v/c walker placement for safety/fall prevention. pt with good follow through. .  EOB/BED TRANSFER: supine to/from sit SBA. pt demonstrated good log rolling technique to maintain precautions. pt sit/stand EOB SBA. CHAIR: SBA   TOILET: SBA min v/c hand placement maintain precautions. STEP IN SHOWER use BSC frame: close SBA min v/c and demonstration proper placement and use FWW with side stepping method. pt demonstrated fair understanding requiring v/c reinstruction for technique. .  -gait and transfer training performed for improved body mechanics and technique for fall prevention and safety while performing ADL/IADL tasks. [place] PATIENT RESPONSE TO TREATMENT:  pt reports no increased pain or discomfort with activity throughout tx. PATIENT EDUCATION PROVIDED THIS VISIT: UB HEP, OT role, energy conservation, fall prevention/safety training ADL/IADL tasks, transfer training   . PATIENT LEVEL OF UNDERSTANDING OF EDUCATION PROVIDED: pt verbalized good understanding energy conservation, UB HEP, and spinal precautions. REHAB POTENTIAL: good for stated goals   HOME EXERCISE PROGRAM: Written HEP of the following issued. DENT will instruct/re-instruct pt next treatment. UB HEP includes the following: BUE shoulder press, shoulder flexion, shoulder abduction, shoulder extension, chest press, elbow flexion/extension x 10, x 2 per day. pt aware DENT will update HEP throughout POC. UB HEP performed for pt increased endurance and strength to perform ADL/IADL tasks and ambulation/transfers to perform tasks with improved safety, increased functional level, and for fall prevention.    CONTINUED NEED FOR THE FOLLOWING SKILLS: HH OT is medically necessary to address pain, decreased functional strength, increased swelling, impaired bed mobility to perform ADL tasks, decreased independence and safety with functional transfers, decreased independence and safety performing ADL/IADL tasks, decreased activity and standing tolerance, decreased functional endurance, and impaired balance in order to improve functional independence, obtain set goals, reduce risk of falls, reduce pain, improve quality of life, and return to PLOF. ASSESSMENT: pt presents with decreased endurance, decreased functional strength, decreased safety transfers, increased A with transfers, decreased safety/participation ADL/IADL tasks, decreased balance, increased burden of care, and no UB HEP. PLAN: pt will be seen to address ther ex: establish and upgrade HEP, ther act: activity and standing tolerance training, endurance training, balance training, safety training, energy conservation training, transfer training, ADL/IADL training, and pt/caregiver education. DISCHARGE PLANNING DISCUSSED WITH PT/CAREGIVER: Anticipate D/C skilled OT week of 10.31.22 when goals met or when pt obtained maximum benefit. pt frequency 1w2, 2w2. discharge to self and family care under MD supervision once all goals have been met or patient has reached max potential. pt and caregiver verbalized understanding and agree POC. Jayson Napoleon

## 2022-10-14 ENCOUNTER — HOME CARE VISIT (OUTPATIENT)
Dept: SCHEDULING | Facility: HOME HEALTH | Age: 72
End: 2022-10-14
Payer: MEDICARE

## 2022-10-14 PROCEDURE — 3331090001 HH PPS REVENUE CREDIT

## 2022-10-14 PROCEDURE — 3331090002 HH PPS REVENUE DEBIT

## 2022-10-14 PROCEDURE — G0157 HHC PT ASSISTANT EA 15: HCPCS

## 2022-10-14 NOTE — CASE COMMUNICATION
dx: s/p  Lumbar spinal stenosis without neurological claudication status post anterior lumbar interbody fusion, L4-L5 and L5-S1 1 prone transpoas fusion, freq: 1w2, 2w2, zip: [phone] [id number], focus: increased safety transfers, ambulation, ADL tasks, maintain spinal precautions while performing all functional mobility and tasks, establish and upgrade UB HEP. pt is staying in [facility] behind house. thank you.

## 2022-10-15 VITALS
SYSTOLIC BLOOD PRESSURE: 146 MMHG | RESPIRATION RATE: 18 BRPM | TEMPERATURE: 97.5 F | OXYGEN SATURATION: 98 % | DIASTOLIC BLOOD PRESSURE: 86 MMHG | HEART RATE: 89 BPM

## 2022-10-15 PROCEDURE — 3331090001 HH PPS REVENUE CREDIT

## 2022-10-15 PROCEDURE — 3331090002 HH PPS REVENUE DEBIT

## 2022-10-16 PROCEDURE — 3331090002 HH PPS REVENUE DEBIT

## 2022-10-16 PROCEDURE — 3331090001 HH PPS REVENUE CREDIT

## 2022-10-16 NOTE — HOME HEALTH
SUBJECTIVE: Patient notes she has increase soreness in back when transitioning in/out of bed. CAREGIVER INVOLVEMENT/ASSISTANCE NEEDED FOR: Patient resides with daughter in main house who assist with ADL's and transfers as needed. HOME HEALTH SUPPLIES BY TYPE AND QUANTITY ORDERED/DELIVERED THIS VISIT INCLUDE: none   OBJECTIVE: See interventions. PATIENT RESPONSE TO TREATMENT: Patient with occasional increase in l-spine pain during activities requiring rest break  PATIENT LEVEL OF UNDERSTANDING OF EDUCATION PROVIDED: Patient verbalized understanding on signs/sx of infection, use of AD at all times and safety with transfers and ambulation, L-spine precautions and MD protocol, ambulating frequently throughout day. Patient requires reinforcement on proper technique with HEP, fall prevention techniques, pain management and posture during gait training. ASSESSMENT OF PROGRESS TOWARD GOALS: Patient is progressing towards goals. Patient ambulated short distances throughout home with walker and supervision due to weakness and instability with gait. Patient demonstrated thera ex during session with occasional increase in L-spine pain requiring rest break. Patient transferred sit<->stand 5x with use of bilateral UE's ~35 seconds. CONTINUED NEED FOR THE FOLLOWING SKILLS: Continuation of PT to further improve patient balance, functional mobility and strength to decrease pts risk for falls. PLAN FOR NEXT VISIT: Attempt stair training and outdoor gait training with walker next visit. THE FOLLOWING DISCHARGE PLANNING WAS DISCUSSED WITH THE PATIENT/CAREGIVER: D/C from HHPT when goals are met or max potential benefit achieved.

## 2022-10-17 ENCOUNTER — HOME CARE VISIT (OUTPATIENT)
Dept: SCHEDULING | Facility: HOME HEALTH | Age: 72
End: 2022-10-17
Payer: MEDICARE

## 2022-10-17 PROCEDURE — 3331090001 HH PPS REVENUE CREDIT

## 2022-10-17 PROCEDURE — G0151 HHCP-SERV OF PT,EA 15 MIN: HCPCS

## 2022-10-17 PROCEDURE — 3331090002 HH PPS REVENUE DEBIT

## 2022-10-17 NOTE — HOME HEALTH
Skilled reason for visit: s/p Lumbar stenos, medication management     Caregiver involvement: Patient independent in the home daugter assist with transportation and meals     Medications reviewed and all medications are available in the home this visit. The following education was provided regarding medications:  tylenol, use for pain relief, importance of not exceeding 3000mg daily and potenital for nausea. .    MD notified of any discrepancies/look a-like medications/medication interactions: n/a  Medications are effective at this time. Home health supplies by type and quantity ordered/delivered this visit include: n/a    Patient education provided this visit:  Instructed on s/s of infecttion  including redness, odor to drainage, warmth at site, increased pain, temp > 101.     Sharps education provided: n/a    Patient level of understanding of education provided: Patient verbalized complete understanding of education provided     Skilled Care Performed this visit: wound care, education     Patient response to procedure performed:  tolerated well     Agency Progress toward goals:  Monitor incision site     Patient's Progress towards personal goals: prevent infection     Home exercise program: Monitor for ss of infection     Continued need for the following skills: Nursing,PT,OT    Plan for next visit: incision check    Patient and/or caregiver notified and agrees to changes in the Plan of Care YES/NO/NA: YES      The following discharge planning was discussed with the pt/caregiver: dc when incision healed, pain controlled and patient independent with medication regimen

## 2022-10-17 NOTE — HOME HEALTH
At reassessment, pt. presents with the following clinical findings:   SUBJECTIVE: Patient denies fall since evaluation. Patient reported she can now use the bathroom with less fear of falling. She continues to use her FWW at the time. Patient c/o constant achy  pain on lower back  with steady pain level of 6/10 for the past 24 hours Patient manages pain by taking pain medication as prescribed by MD, application of ice packs x 20 mins,  positioning and relaxation. CAREGIVER ASSISTANCE: Primary caregiver daughter is available daily 24/7 assisting with adl's meals , medications      MEDICATIONS RECONCILED AND UPDATED: no changes in medications at this time     MMT: presents with : R hip flex 3+/5   R hip Abd 3+/5   R hip add 3+/5  R knee flex 3+/5  R knee ext. 3+/5  R ankle DF 3+/5  L hip flex 3+/5  L hip Abd 3+/5  L hip add 3+/5  L knee flex 3+/5  L knee ext. 3+/5  L ankle DF 3+/5  FTSTS: 23 seconds with cristina UE push up. compared to : R hip flex 3-/5   R hip Abd 3/5   R hip add 3/5   R knee flex 3-/5  R knee ext. 3-/5   R ankle DF 3-/5  L hip flex 3-/5  L hip Abd 3/5  L hip add 3/5  L knee flex 3-/5  L knee ext. 3-/5  L ankle DF 3-/5   FTSTS: 53 seconds on elevated surface  during initial evaluation. SEMMES-BLAIRE MONOFILAMENT TEST (Test sites: plantar surface of great toe, 4th toe, 1st, 3rd, and 5th met heads):  R Foot 4/5  L Foot 4/5  Total 8/10 (Score of 6 or less indicates loss of protective sensation)    BALANCE: 9/28 on Tinetti balance and gait test  compared to 6/28 during initial evaluation. BED MOBILITY: indep (log rolling) compared to supervision assistance in bed mobility for LE management and 25% verbal cues for proper techniques during initial evaluation.      TRANSFERS: supervision/touching assistance to safely transfers to and from bed, chair, and toilet with intermittent cues for proper hand placements compared to touching assistance in safe transfers to and from bed, chair and toilet using FWW with 50% verbal cues for proper hand placements and techniques during initial evaluation. GAIT: ambulated 75ft using FWW on level surfaces with supervision assistance. Gait impairment includes decreased hip/knee flexion during swing phase, inadequate foot clearance, narrow AKIRA, ataxic gait compared to 50 ft using FWW requiring touching assistance. Impairments in gait include forward lean, downward gaze, decreased hip/knee flexion during swing phase, unequal step length, decreased rowan, - heel strike, wide AKIRA, ataxic gait during initial evaluation. STEPS: NA due to weather. PATIENT EDUCATION PROVIDED THIS VISIT TO INCLUDE: FALL PREVENTION TECHNIQUES: monitor medication that may alter mental status, keeping walking pathways clean and clear of clutter and throw rugs. PAIN MANAGEMENT TECHNIQUES: take pain medication as prescribed by MD, application of cold pack for soreness and achy type of pain /hot packs for achy, stiffness type of pain on painful area x 20 mins, positioning and relaxation. PRESSURE ULCER PREVENTION TECHNIQUES: proper positioning strategies including frequency of repositioning, prevention of shear and friction, appropriate skin hygiene and protection from moisture. ENERGY CONSERVATION TECHNIQUES: rest, slow down, pacing, and complete tasks in manageable steps. SWELLING MANAGEMENT: elevate LEs, ankle pumps  PATIENT LEVEL OF UNDERSTANDING OF EDUCATION PROVIDED: Patient  verbalized understanding. PATIENT RESPONSE TO PROCEDURE PERFORMED:  Patient reported decreased sensation on her feet. She scored 8/10 on SEMMES-BLAIRE MONOFILAMENT TEST indicative of presence of  protective sensation. HEP: 5x sit to stand with bilateral UE push up.   2. Supported standing heel raises, toe raises, leg curls , hip abduction x 10 reps  3: unsupported weighting side to side x minute, supported weight shifting forward/backward (1 minute with L foot forward, 1 minute with R foot forward), unsupported weight shifting side to side x 1 minute. Followed by ice packs on back  x 20 mins. ASSESSMENT: Patient  demonstrated improvement in bed mobility, transfers and gait as evidenced by functional mobility tests and increased level of active participation in bed mobility. PLAN: Patient will benefit from continued skilled HHPT services for 2w2, 1w2 to further work on cristina LEs strengthening, balance and coordination and safety in functional mobility skills. DISCHARGE PLANNING DISCUSSED: Care coordination done with PTA regarding progress made towards goal, POC and d/c plans. D/c to self, family under MD supervision when all goals are met. Patient verbalized agreement.

## 2022-10-18 ENCOUNTER — HOME CARE VISIT (OUTPATIENT)
Dept: SCHEDULING | Facility: HOME HEALTH | Age: 72
End: 2022-10-18
Payer: MEDICARE

## 2022-10-18 PROCEDURE — 3331090002 HH PPS REVENUE DEBIT

## 2022-10-18 PROCEDURE — 3331090001 HH PPS REVENUE CREDIT

## 2022-10-18 PROCEDURE — G0158 HHC OT ASSISTANT EA 15: HCPCS

## 2022-10-19 ENCOUNTER — HOME CARE VISIT (OUTPATIENT)
Dept: SCHEDULING | Facility: HOME HEALTH | Age: 72
End: 2022-10-19
Payer: MEDICARE

## 2022-10-19 VITALS
SYSTOLIC BLOOD PRESSURE: 134 MMHG | OXYGEN SATURATION: 98 % | DIASTOLIC BLOOD PRESSURE: 87 MMHG | HEART RATE: 87 BPM | TEMPERATURE: 98 F | RESPIRATION RATE: 17 BRPM

## 2022-10-19 VITALS
OXYGEN SATURATION: 99 % | DIASTOLIC BLOOD PRESSURE: 84 MMHG | SYSTOLIC BLOOD PRESSURE: 126 MMHG | TEMPERATURE: 97.7 F | RESPIRATION RATE: 17 BRPM | HEART RATE: 79 BPM

## 2022-10-19 VITALS
HEART RATE: 87 BPM | DIASTOLIC BLOOD PRESSURE: 86 MMHG | OXYGEN SATURATION: 100 % | TEMPERATURE: 97.5 F | RESPIRATION RATE: 18 BRPM | SYSTOLIC BLOOD PRESSURE: 134 MMHG

## 2022-10-19 VITALS
OXYGEN SATURATION: 99 % | DIASTOLIC BLOOD PRESSURE: 70 MMHG | SYSTOLIC BLOOD PRESSURE: 133 MMHG | HEART RATE: 89 BPM | RESPIRATION RATE: 16 BRPM | TEMPERATURE: 98.5 F

## 2022-10-19 PROCEDURE — 3331090002 HH PPS REVENUE DEBIT

## 2022-10-19 PROCEDURE — 3331090001 HH PPS REVENUE CREDIT

## 2022-10-19 PROCEDURE — G0157 HHC PT ASSISTANT EA 15: HCPCS

## 2022-10-19 PROCEDURE — G0299 HHS/HOSPICE OF RN EA 15 MIN: HCPCS

## 2022-10-19 NOTE — Clinical Note
thank you   ----- Message -----  From: Iva Mackey RN  Sent: 10/19/2022  11:20 PM EDT  To: Nate Ndiaye OTR/L, *      Patient admitted to home health services for s/p lumar spinal stenosis w/o neuro claudication, s/p anterior lumbar interbody fusion L4-5 and L5-S1. Patient seen by nursing for incision care, disease process management/education, pain management, med management, fall and safety fall precautions, cardiac and diabetic diet. Patient's incisions are well approximated, no drainage, no redness, no warmth. Patient verbalized understanding to all the above. Patient being discharged from nursing services due to goals being met and no further skilled need. Patient will continue to have therapy services.

## 2022-10-19 NOTE — Clinical Note
Patient admitted to home health services for s/p lumar spinal stenosis w/o neuro claudication, s/p anterior lumbar interbody fusion L4-5 and L5-S1. Patient seen by nursing for incision care, disease process management/education, pain management, med management, fall and safety fall precautions, cardiac and diabetic diet. Patient's incisions are well approximated, no drainage, no redness, no warmth. Patient verbalized understanding to all the above. Patient being discharged from nursing services due to goals being met and no further skilled need. Patient will continue to have therapy services.

## 2022-10-19 NOTE — Clinical Note
Patient fell yesterday 10/18/22 when trying to get down stairs to go to MD appointment. Her right knee gave way and she fell onto her buttocks at the bottom of the steps. Her legs bent backwards but she managed to get legs around and use stairs/walker to get into standing position. Daughter was present at time of fall and she was holding onto the railing with daughter holding walker at bottom of the steps. Dr. Tra Piper was made aware at the MD appointment yesterday of pts fall. No injuries were reported. PFA to be performed on 10/21/22.

## 2022-10-19 NOTE — Clinical Note
thank you   ----- Message -----  From: Poonam Iyer PTA  Sent: 10/20/2022  11:57 PM EDT  To: Jeffy Vera, OTR/L, *      Patient fell yesterday 10/18/22 when trying to get down stairs to go to MD appointment. Her right knee gave way and she fell onto her buttocks at the bottom of the steps. Her legs bent backwards but she managed to get legs around and use stairs/walker to get into standing position. Daughter was present at time of fall and she was holding onto the railing with daughter holding walker at bottom of the steps. Dr. Gloria Mejia was made aware at the MD appointment yesterday of pts fall. No injuries were reported. PFA to be performed on 10/21/22.

## 2022-10-20 PROCEDURE — 3331090001 HH PPS REVENUE CREDIT

## 2022-10-20 PROCEDURE — 3331090002 HH PPS REVENUE DEBIT

## 2022-10-20 NOTE — HOME HEALTH
Patient admitted to home health services for s/p lumar spinal stenosis w/o neuro claudication, s/p anterior lumbar interbody fusion L4-5 and L5-S1. Patient seen by nursing for incision care, disease process management/education, pain management, med management, fall and safety fall precautions, cardiac and diabetic diet. Patient's incisions are well approximated, no drainage, no redness, no warmth. Patient verbalized understanding to all the above. Patient being discharged from nursing services due to goals being met and no further skilled need. Patient will continue to have therapy services. SN instructed patient to follow up with MD as ordered. If you have to miss your appointments reschedule them. SN instructed patient on importance of compliance with medication regimen, try to avoid missing doses, if you do miss a dose do not double up but instead take at your next scheduled time. SN instructed on importance of infection control and handwashing. SN instructed on when to call the Dr.: temp >100.4, any pain not relieved with medications, any slight shortness of breath, chest discomfort, falls, or questions and concerns. SN instructed to continue diabetic and cardiac diet. SN instructed to continue exercises as prescribed by therapy but don't over do it.

## 2022-10-21 ENCOUNTER — HOME CARE VISIT (OUTPATIENT)
Dept: SCHEDULING | Facility: HOME HEALTH | Age: 72
End: 2022-10-21
Payer: MEDICARE

## 2022-10-21 VITALS
DIASTOLIC BLOOD PRESSURE: 82 MMHG | OXYGEN SATURATION: 98 % | HEART RATE: 81 BPM | RESPIRATION RATE: 18 BRPM | SYSTOLIC BLOOD PRESSURE: 141 MMHG | TEMPERATURE: 97.5 F

## 2022-10-21 PROCEDURE — 3331090001 HH PPS REVENUE CREDIT

## 2022-10-21 PROCEDURE — G0151 HHCP-SERV OF PT,EA 15 MIN: HCPCS

## 2022-10-21 PROCEDURE — 3331090002 HH PPS REVENUE DEBIT

## 2022-10-21 NOTE — HOME HEALTH
SUBJECTIVE: Patient fell yesterday 10/18/22 when trying to get down stairs to go to MD appointment. Her right knee gave way and she fell onto her buttocks at the bottom of the steps. Her legs bent backwards but she managed to get legs around and use stairs/walker to get into standing position. Daughter was present at time of fall and she was holding onto the railing with daughter holding walker at bottom of the steps. Dr. Angel Kate was made aware at the MD appointment yesterday of pts fall. No injuries were reported. CAREGIVER INVOLVEMENT/ASSISTANCE NEEDED FOR: Patient resides with daughter in main house who assist with ADL's and transfers as needed. HOME HEALTH SUPPLIES BY TYPE AND QUANTITY ORDERED/DELIVERED THIS VISIT INCLUDE: none   OBJECTIVE: See interventions. PATIENT RESPONSE TO TREATMENT: Patient with no increase in pain during activities. PATIENT LEVEL OF UNDERSTANDING OF EDUCATION PROVIDED: Patient verbalized understanding on signs/sx of infection, use of AD at all times and safety with transfers and ambulation, L-spine precautions and MD protocol. Patient requires reinforcement on stair training and increasing activity throughout day. ASSESSMENT OF PROGRESS TOWARD GOALS: Patient is progressing towards goals. Patient ambulated on indoor/outdoor surfaces with walker and supervision due to weakness and instability with gait. Patient navigated up/down 2 steps to enter/exit home with use of handle and walker with mod A due to weakness and unsteadiness. Patient demonstrated thera ex during session with no increase in pain. CONTINUED NEED FOR THE FOLLOWING SKILLS: Continuation of PT to further improve patient balance, functional mobility and strength to decrease pts risk for falls. PLAN FOR NEXT VISIT: PFA next visit. THE FOLLOWING DISCHARGE PLANNING WAS DISCUSSED WITH THE PATIENT/CAREGIVER: D/C from HHPT when goals are met or max potential benefit achieved.

## 2022-10-22 PROCEDURE — 3331090001 HH PPS REVENUE CREDIT

## 2022-10-22 PROCEDURE — 3331090002 HH PPS REVENUE DEBIT

## 2022-10-23 PROCEDURE — 3331090001 HH PPS REVENUE CREDIT

## 2022-10-23 PROCEDURE — 3331090002 HH PPS REVENUE DEBIT

## 2022-10-24 ENCOUNTER — HOME CARE VISIT (OUTPATIENT)
Dept: SCHEDULING | Facility: HOME HEALTH | Age: 72
End: 2022-10-24
Payer: MEDICARE

## 2022-10-24 VITALS
OXYGEN SATURATION: 99 % | TEMPERATURE: 97.5 F | DIASTOLIC BLOOD PRESSURE: 88 MMHG | RESPIRATION RATE: 18 BRPM | SYSTOLIC BLOOD PRESSURE: 142 MMHG | HEART RATE: 75 BPM

## 2022-10-24 PROCEDURE — G0157 HHC PT ASSISTANT EA 15: HCPCS

## 2022-10-24 PROCEDURE — 3331090002 HH PPS REVENUE DEBIT

## 2022-10-24 PROCEDURE — G0158 HHC OT ASSISTANT EA 15: HCPCS

## 2022-10-24 PROCEDURE — 3331090001 HH PPS REVENUE CREDIT

## 2022-10-25 VITALS
TEMPERATURE: 98 F | HEART RATE: 88 BPM | OXYGEN SATURATION: 98 % | SYSTOLIC BLOOD PRESSURE: 128 MMHG | DIASTOLIC BLOOD PRESSURE: 84 MMHG | RESPIRATION RATE: 17 BRPM

## 2022-10-25 PROCEDURE — 3331090002 HH PPS REVENUE DEBIT

## 2022-10-25 PROCEDURE — 3331090001 HH PPS REVENUE CREDIT

## 2022-10-25 NOTE — HOME HEALTH
SUBJECTIVE: Patient reports no pain on this date. Patient stated she has been compliant with BUE HEP provided by DENT And follows the directions regularly. [facility] CAREGIVER INVOLVEMENT/ASSISTANCE NEEDED FOR: The pt daughter helps with all I/ADLS due to her inabilty to do so. [facility] BY TYPE AND QUANTITY ORDERED/DELIVERED THIS VISIT INCLUDE: NONE     . OBJECTIVE:  See interventions. .    Patient education provided this visit:  DENT provided extended eduction on energy conservation techniques,and Purse-lip breathing techniques. .         Patient level of understanding of education provided: Patientin agreeance to education provided an was able to perform education provided with 85% while demostating IADLS tasks. .    RESPONSE TO TREATMENT: Pt reported an 4/10 on numeric scale after performing I/ADLS use of safety awarness and energy conservation techiques. Pt anble to demostrated making the bed within 3/3 trails. .     ASSESSMENT OF PROGRESS TOWARD GOALS: Pt presents increased activity tolerance while demostrating stimulated ADLS with SUP, tolieitng with use of DME with SUP and IADLS making the bed with SBA. DENT often provided Min verbal cuing provided for proper trunk alignment for proper breathing when displaying energy conservation tehcniques to improve patient functional balance while converying functional tasks in the home settin It is advised that client continues to adhere to HEP addressing BUE exercies for continued strength and endurance, continually it is also recommended that the patient continue to use HEP adressing energy conservation for prevention of fatigue while promoting independence. Patient RPE score on modfied phoebe scale was 6/10 on this date.    .    CONTINUED NEED FOR THE FOLLOWING SKILLS[facility] is [facility] necessary to address pain, decreased functional strength, decreased independence and safety with functional transfers, decreased independence and safety performing ADL/IADL tasks, decreased activity and standing tolerance, decreased functional endurance, and impaired balance in order to improve functional independence, obtain set goals, reduce risk of falls, reduce pain, improve quality of life, and return to PLOF. [place] PLAN FOR NEXT VISIT: Suzy Garcia will address functional transfers and BUE gross exercises    .     THE FOLLOWING DISCHARGE PLANNING WAS DISCUSSED WITH THE PATIENT/CAREGIVER: Tenative d/c 2w2

## 2022-10-25 NOTE — HOME HEALTH
SUBJECTIVE: Patient notes she has increased sensation in right LE compared to left LE. She has been working on [employer] daily. No falls were reported by pt. CAREGIVER INVOLVEMENT/ASSISTANCE NEEDED FOR: Patient resides with daughter in main house who assist with ADL's and transfers as needed. HOME HEALTH SUPPLIES BY TYPE AND QUANTITY ORDERED/DELIVERED THIS VISIT INCLUDE: none   OBJECTIVE: See interventions. PATIENT RESPONSE TO TREATMENT: Patient with no increase in pain during activities. PATIENT LEVEL OF UNDERSTANDING OF EDUCATION PROVIDED: Patient verbalized understanding on signs/sx of infection, use of AD at all times and safety with transfers and ambulation, L-spine precautions and MD protocol. Patient requires reinforcement on stair training and balance activities. ASSESSMENT OF PROGRESS TOWARD GOALS: Patient is progressing towards goals. Patient ambulated on indoor/outdoor surfaces with walker and supervision due to weakness and instability with gait. Patient navigated up/down 2 steps to enter/exit home with use of handle and walker with mod A due to weakness and unsteadiness. Patient needs reinforcement on standing balance activities. CONTINUED NEED FOR THE FOLLOWING SKILLS: Continuation of PT to further improve patient balance, functional mobility and strength to decrease pts risk for falls. PLAN FOR NEXT VISIT:  Progress with gait training and standing balance activities next visit. THE FOLLOWING DISCHARGE PLANNING WAS DISCUSSED WITH THE PATIENT/CAREGIVER: D/C from HHPT when goals are met or max potential benefit achieved.

## 2022-10-26 ENCOUNTER — HOME CARE VISIT (OUTPATIENT)
Dept: SCHEDULING | Facility: HOME HEALTH | Age: 72
End: 2022-10-26
Payer: MEDICARE

## 2022-10-26 PROCEDURE — G0157 HHC PT ASSISTANT EA 15: HCPCS

## 2022-10-26 PROCEDURE — 3331090002 HH PPS REVENUE DEBIT

## 2022-10-26 PROCEDURE — 3331090001 HH PPS REVENUE CREDIT

## 2022-10-27 VITALS
TEMPERATURE: 97.8 F | DIASTOLIC BLOOD PRESSURE: 77 MMHG | RESPIRATION RATE: 18 BRPM | SYSTOLIC BLOOD PRESSURE: 143 MMHG | HEART RATE: 92 BPM

## 2022-10-27 PROCEDURE — 3331090002 HH PPS REVENUE DEBIT

## 2022-10-27 PROCEDURE — 3331090001 HH PPS REVENUE CREDIT

## 2022-10-28 ENCOUNTER — HOME CARE VISIT (OUTPATIENT)
Dept: SCHEDULING | Facility: HOME HEALTH | Age: 72
End: 2022-10-28
Payer: MEDICARE

## 2022-10-28 PROCEDURE — 3331090001 HH PPS REVENUE CREDIT

## 2022-10-28 PROCEDURE — G0158 HHC OT ASSISTANT EA 15: HCPCS

## 2022-10-28 PROCEDURE — 3331090002 HH PPS REVENUE DEBIT

## 2022-10-28 NOTE — HOME HEALTH
SUBJECTIVE: Patient reports she had new ramp and is ready to use it. CAREGIVER INVOLVEMENT/ASSISTANCE NEEDED FOR: Patient resides with daughter in main house who assist with ADL's and transfers as needed. HOME HEALTH SUPPLIES BY TYPE AND QUANTITY ORDERED/DELIVERED THIS VISIT INCLUDE: none   OBJECTIVE: See interventions. PATIENT RESPONSE TO TREATMENT: Patient with no increase in pain during activities. PATIENT LEVEL OF UNDERSTANDING OF EDUCATION PROVIDED: Patient verbalized understanding use of AD at all times and safety with transfers and ambulation, L-spine precautions and MD protocol. Patient requires reinforcement on stair training and standing balance activities. ASSESSMENT OF PROGRESS TOWARD GOALS: Patient is progressing towards goals. Patient has improved with ambulation distance with walker on outdoor surfaces and quadcane on indoor surfaces, however, requires verbal cueing to stay within walker and posture. Patient demonstrated ability to navigate up/down ramp to enter/exit home with CGA and verbal cueing for proper technique. Patient is independent with HEP. CONTINUED NEED FOR THE FOLLOWING SKILLS: Continuation of PT to further improve patient balance, functional mobility and strength to decrease pts risk for falls. PLAN FOR NEXT VISIT: Progress with gait training with cane next visit. THE FOLLOWING DISCHARGE PLANNING WAS DISCUSSED WITH THE PATIENT/CAREGIVER: D/C from HHPT when goals are met or max potential benefit achieved.

## 2022-10-29 PROCEDURE — 3331090001 HH PPS REVENUE CREDIT

## 2022-10-29 PROCEDURE — 3331090002 HH PPS REVENUE DEBIT

## 2022-10-29 NOTE — HOME HEALTH
SUBJECTIVE: Patient reports no pain on this date. Patient stated she has been compliant with BUE HEP provided by DENT And follows the directions regularly. Rodney Maldonado CAREGIVER INVOLVEMENT/ASSISTANCE NEEDED FOR: The pt daughter helps with all I/ADLS due to her inabilty to do so. Rodney Maldonado HOME HEALTH SUPPLIES BY TYPE AND QUANTITY ORDERED/DELIVERED THIS VISIT INCLUDE: NONE          . OBJECTIVE:  See interventions. .         Patient education provided this visit:  DENT provided extended eduction on energy conservation techniques,and Purse-lip breathing techniques. .                   Patient level of understanding of education provided: Patientin agreeance to education provided an was able to perform education provided with 85% while demostating IADLS tasks. .         RESPONSE TO TREATMENT: Pt reported an 4/10 on numeric scale after performing BADLS use of safety awarness and energy conservation techiques. Pt able to demostrate ADLS within 3/3 trails. .          ASSESSMENT OF PROGRESS TOWARD GOALS: Pt presents increased activity tolerance while demostrating stimulated ADLS with SUP, tolieitng with use of DME with SUP and IADLS making the bed with SBA. DENT often provided Min verbal cuing provided for proper trunk alignment for proper breathing when displaying energy conservation tehcniques to improve patient functional balance while converying functional tasks in the home settin It is advised that client continues to adhere to HEP addressing BUE exercies for continued strength and endurance, continually it is also recommended that the patient continue to use HEP adressing energy conservation for prevention of fatigue while promoting independence. Patient RPE score on modfied phoebe scale was 6/10 on this date.      .         CONTINUED NEED FOR THE FOLLOWING SKILLS: [facility] OT is medically necessary to address pain, decreased functional strength, decreased independence and safety with functional transfers, decreased independence and safety performing ADL/IADL tasks, decreased activity and standing tolerance, decreased functional endurance, and impaired balance in order to improve functional independence, obtain set goals, reduce risk of falls, reduce pain, improve quality of life, and return to PLOF. Elodia Copping PLAN FOR NEXT VISIT: Sandra Varner will address ADL and energy conservation techniques.          .         THE FOLLOWING DISCHARGE PLANNING WAS DISCUSSED WITH THE PATIENT/CAREGIVER: Tenative d/c 2w2

## 2022-10-30 PROCEDURE — 3331090002 HH PPS REVENUE DEBIT

## 2022-10-30 PROCEDURE — 3331090001 HH PPS REVENUE CREDIT

## 2022-10-31 ENCOUNTER — HOME CARE VISIT (OUTPATIENT)
Dept: SCHEDULING | Facility: HOME HEALTH | Age: 72
End: 2022-10-31
Payer: MEDICARE

## 2022-10-31 PROCEDURE — 3331090002 HH PPS REVENUE DEBIT

## 2022-10-31 PROCEDURE — G0157 HHC PT ASSISTANT EA 15: HCPCS

## 2022-10-31 PROCEDURE — 3331090001 HH PPS REVENUE CREDIT

## 2022-11-01 ENCOUNTER — HOME CARE VISIT (OUTPATIENT)
Dept: SCHEDULING | Facility: HOME HEALTH | Age: 72
End: 2022-11-01
Payer: MEDICARE

## 2022-11-01 VITALS
TEMPERATURE: 97.5 F | SYSTOLIC BLOOD PRESSURE: 149 MMHG | DIASTOLIC BLOOD PRESSURE: 88 MMHG | RESPIRATION RATE: 18 BRPM | OXYGEN SATURATION: 99 % | HEART RATE: 81 BPM

## 2022-11-01 PROCEDURE — G0158 HHC OT ASSISTANT EA 15: HCPCS

## 2022-11-01 PROCEDURE — 3331090001 HH PPS REVENUE CREDIT

## 2022-11-01 PROCEDURE — 3331090002 HH PPS REVENUE DEBIT

## 2022-11-02 ENCOUNTER — HOME CARE VISIT (OUTPATIENT)
Dept: SCHEDULING | Facility: HOME HEALTH | Age: 72
End: 2022-11-02
Payer: MEDICARE

## 2022-11-02 VITALS
OXYGEN SATURATION: 98 % | HEART RATE: 87 BPM | RESPIRATION RATE: 17 BRPM | DIASTOLIC BLOOD PRESSURE: 88 MMHG | SYSTOLIC BLOOD PRESSURE: 130 MMHG | TEMPERATURE: 98 F

## 2022-11-02 PROCEDURE — G0157 HHC PT ASSISTANT EA 15: HCPCS

## 2022-11-02 PROCEDURE — 3331090001 HH PPS REVENUE CREDIT

## 2022-11-02 PROCEDURE — 3331090002 HH PPS REVENUE DEBIT

## 2022-11-02 NOTE — Clinical Note
thank you   ----- Message -----  From: Chris August PTA  Sent: 11/3/2022   5:28 PM EDT  To: Ayala Suarez OTR/L, *      OTHER: Attempted to contact Dr Fior Larry (PCP) in regards to elevated BP, however, out of the office. Will attempt again tomorrow. Patient taking BP medication during PT session. Patient advised to continue to monitor BP and if no changes or worsening in sx to seek medical support or contact PCP immediately.  Patient verbalized understanding with teach back instructions to therapist.

## 2022-11-02 NOTE — Clinical Note
OTHER: Attempted to contact Dr Alpesh Anaya (PCP) in regards to elevated BP, however, out of the office. Will attempt again tomorrow. Patient taking BP medication during PT session. Patient advised to continue to monitor BP and if no changes or worsening in sx to seek medical support or contact PCP immediately.  Patient verbalized understanding with teach back instructions to therapist.

## 2022-11-02 NOTE — HOME HEALTH
SUBJECTIVE: Patient reports cecille in her abdomen on this date an 6/10 on the pain numeric scale. Pt reports having to go the rest room regulary thoughout the night. Patient stated she has been having diffcuilty using her hands and has decreased hand and eye coordination. Abe Samuel CAREGIVER INVOLVEMENT/ASSISTANCE NEEDED FOR: The pt daughter helps with all I/ADLS due to her inabilty to do so. Abe Samuel HOME HEALTH SUPPLIES BY TYPE AND QUANTITY ORDERED/DELIVERED THIS VISIT INCLUDE: NONE     . OBJECTIVE:  See interventions. .       Patient education provided this visit:  Hernan Abdalla provided education on the importanct of bringing her BSC in the room from the rest-room to decreased trips to restroom at night due to her quick to fatigue throughout the night when performing functional mobilty to the bed-room. .          Patient level of understanding of education provided: Patientin agreeance to education provided an was able to covwy education provided with 100% accuracy. .            RESPONSE TO TREATMENT: Pt reported an 2/10 on numeric scale after performing [address] techniques with in-hand coordination us within 3/3 trails. Pt stated he pain in her abdomen had decreased as well during the treatment. .          ASSESSMENT OF PROGRESS TOWARD GOALS: Pt in agreeable to education provided on the importance of moving her BSC at night due to urgency to void. Pt displayed increased acitivty tolelrance while performing [address] exercises. It is advised that client continues to adhere to HEP addressing BUE exercies for continued strength and endurance, continually it is also recommended that the patient continue to use HEP adressing energy conservation for prevention of fatigue while promoting independence. Patient RPE score on modfied phoebe scale was 3/10 on this date.           .                   CONTINUED NEED FOR THE FOLLOWING SKILLS: [facility] is medically necessary to address pain, decreased functional strength, decreased independence and safety with functional transfers, decreased independence and safety performing ADL/IADL tasks, decreased activity and standing tolerance, decreased functional endurance, and impaired balance in order to improve functional independence, obtain set goals, reduce risk of falls, reduce pain, improve quality of life, and return to PLOF         PLAN FOR NEXT VISIT: OT Discharge      .          THE FOLLOWING DISCHARGE PLANNING WAS DISCUSSED WITH THE PATIENT/CAREGIVER: Tenative d/c 2w1

## 2022-11-02 NOTE — Clinical Note
New medication as of 11/1/22:     Triameterne 37.5mg/HCTZ 25mg caps. Take 1 tablet by mouth everyday in the morning. Qty 90. Dr. Gandhi Safe. Simvastatin 40mg tablet. Take 1 tablet by mouth every day in the evening. Qty 90. Dr. Gandhi Safe.     Discontinue the listed medication as of 11/1/22: Metoprolol tartrate (LOPRESSOR) 25 mg tablet and Atorvastatin (LIPITOR) 20 mg tablet

## 2022-11-02 NOTE — HOME HEALTH
SUBJECTIVE: Patient reports she has been working on her walking throughout home. She does not feel as stable using the cane. CAREGIVER INVOLVEMENT/ASSISTANCE NEEDED FOR: Patient resides with daughter in main house who assist with ADL's and transfers as needed. HOME HEALTH SUPPLIES BY TYPE AND QUANTITY ORDERED/DELIVERED THIS VISIT INCLUDE: none   OBJECTIVE: See interventions. PATIENT RESPONSE TO TREATMENT: Patient with no increase in pain during activities. PATIENT LEVEL OF UNDERSTANDING OF EDUCATION PROVIDED: Patient verbalized understanding use of AD at all times and safety with transfers and ambulation, L-spine precautions and MD protocol. Patient requires reinforcement on proper technique with ramp navigation, increasing ambulation distance and use of cane. ASSESSMENT OF PROGRESS TOWARD GOALS: Patient is progressing towards goals. Patient with increase distance and pace during gait training on indoor/outdoor surfaces with walker with less cues for posture and to stay within walker. Patient requires assistance during ramp navigation with use of bilateral hand rail while side stepping. Patient is independent with HEP. CONTINUED NEED FOR THE FOLLOWING SKILLS: Continuation of PT to further improve patient balance, functional mobility and strength to decrease pts risk for falls. PLAN FOR NEXT VISIT: Standing balance activities next visit. THE FOLLOWING DISCHARGE PLANNING WAS DISCUSSED WITH THE PATIENT/CAREGIVER: D/C from HHPT when goals are met or max potential benefit achieved.

## 2022-11-03 ENCOUNTER — HOME CARE VISIT (OUTPATIENT)
Dept: SCHEDULING | Facility: HOME HEALTH | Age: 72
End: 2022-11-03
Payer: MEDICARE

## 2022-11-03 VITALS
DIASTOLIC BLOOD PRESSURE: 93 MMHG | SYSTOLIC BLOOD PRESSURE: 158 MMHG | OXYGEN SATURATION: 97 % | TEMPERATURE: 97.3 F | HEART RATE: 90 BPM | RESPIRATION RATE: 18 BRPM

## 2022-11-03 VITALS
HEART RATE: 87 BPM | DIASTOLIC BLOOD PRESSURE: 87 MMHG | TEMPERATURE: 98 F | OXYGEN SATURATION: 95 % | SYSTOLIC BLOOD PRESSURE: 120 MMHG | RESPIRATION RATE: 17 BRPM

## 2022-11-03 PROCEDURE — 3331090002 HH PPS REVENUE DEBIT

## 2022-11-03 PROCEDURE — G0152 HHCP-SERV OF OT,EA 15 MIN: HCPCS

## 2022-11-03 PROCEDURE — 3331090001 HH PPS REVENUE CREDIT

## 2022-11-03 NOTE — HOME HEALTH
SUBJECTIVE: Patient reports with new BP medication which she started yesterday. She has not yet taken her BP medication this am but is eating and beginning to take medication during PT visit. CAREGIVER INVOLVEMENT/ASSISTANCE NEEDED FOR: Patient resides with daughter in main house who assist with ADL's and transfers as needed. HOME HEALTH SUPPLIES BY TYPE AND QUANTITY ORDERED/DELIVERED THIS VISIT INCLUDE: none   OBJECTIVE: See interventions. PATIENT RESPONSE TO TREATMENT: Patient with no increase in pain during activities. PATIENT LEVEL OF UNDERSTANDING OF EDUCATION PROVIDED: Patient verbalized understanding use of AD at all times and safety with transfers and ambulation, L-spine precautions and MD protocol. Patient requires reinforcement to monitor BP throughout day and signs/sx of elevated BP and advised to seek medical support or contact MD if no changes or worsening in sx, proper technique with ramp navigation and increasing ambulation distance. ASSESSMENT OF PROGRESS TOWARD GOALS: Patient is progressing towards goals. Patient ambulated increase distances on indoor/outdoor surfaces with quad cane and SBA due to weakness and instability with gait. Patient with elevated BP during session, however, just taking BP medication during PT session. No signs/sx of elevated BP observed or reported. Patient navigated up/down ramp to enter/exit home with less assistance and cueing for technique. CONTINUED NEED FOR THE FOLLOWING SKILLS: Continuation of PT to further improve patient balance, functional mobility and strength to decrease pts risk for falls. PLAN FOR NEXT VISIT: Standing balance activities and gait training with cane next visit. THE FOLLOWING DISCHARGE PLANNING WAS DISCUSSED WITH THE PATIENT/CAREGIVER: D/C from HHPT when goals are met or max potential benefit achieved. OTHER: Attempted to contact Dr Jose Rascon (PCP) in regards to elevated BP, however, out of the office. Will attempt again tomorrow. Patient taking BP medication during PT session. Patient advised to continue to monitor BP and if no changes or worsening in sx to seek medical support or contact PCP immediately.  Patient verbalized understanding with teach back instructions to therapist.

## 2022-11-03 NOTE — Clinical Note
Mrs. Elisa Lopez was seen by skilled OT for 6 visits s/p recovery from diagnosis of an Encounter for other orthopedic aftercare [Z47.89]. Skilled OT goals were to maximize safety and participation with self care, balance retraining and functional mobility in home setting. Patient  has been educated on energy conservation strategies to reduce SOB and level of exertion with I/ADL tasks. Education has also been provided to the pt for Purse-lip breathing techniques to prevent exertion when performing daily tasks I/ADLS. Patient has met goal on OT POC addresisng energy conservation techniques. Patient able to perform BUE gross and fine HEP, including upgrades, with I for increased functional endurance with MOD I. Patient has met goal on OT POC addressing energy conservation techniques. Pt able to demonstrate/verbalize I energy conservation bathing, dressing, and setup with MOD I, Pt able to perform UB HEP without  rest break for increased endurance to perform functional tasks with MOD I, Pt able to demonstrate improved activity tolerance as evidence by performing functional tasks standing for 5 mins 40 secs without rest while performig IADLS ( Making Bed) without rest with RPE on modified phoebe scale was 2/10. Patient has met goal on OT POC addressing functional mobilty and function. Pt able to perform functional transfers from [address] [place] [address], and step-in-shower /shower transfer with seat with use of grab bar with MOD I with good safety and technique. Patient has met goal on OT POC addressing balance retraining. Pt static and dynamic balance is currently Fair. Pt was able to stand unsupported without BUE support, while standing unsupported, weight shifting, and reaching ipsilaterally, w/o  LOB when crossing midline, while standing for an duration of 5 mins and 30 secs.  bathing via sitting BSC frame in step in shower use AE PRN and UB/LB dressing use AE and LRAD PRN with I/MOD I demonstrating I spinal precautions and good safety techniques Therapist suggests continued use of HEP for continued BUE strength and endurance to perform house-hold tasks in home-setting. Patient has reached maximal potential wish skilled OT at this time. No further skilled OT is indicated at this time. MD office notified.  Thank you for your Referral!!!

## 2022-11-03 NOTE — Clinical Note
good summary! thank you!  ----- Message -----  From: Anna Cesar  Sent: 11/3/2022   8:01 PM EDT  To: Keny Chacko, OTR/L, Silver Quinteros, PTA, *      Mrs. Elisa Lopez was seen by skilled OT for 6 visits s/p recovery from diagnosis of an Encounter for other orthopedic aftercare [Z47.89]. Skilled OT goals were to maximize safety and participation with self care, balance retraining and functional mobility in home setting. Patient  has been educated on energy conservation strategies to reduce SOB and level of exertion with I/ADL tasks. Education has also been provided to the pt for Purse-lip breathing techniques to prevent exertion when performing daily tasks I/ADLS. Patient has met goal on OT POC addresisng energy conservation techniques. Patient able to perform BUE gross and fine HEP, including upgrades, with I for increased functional endurance with MOD I. Patient has met goal on OT POC addressing energy conservation techniques. Pt able to demonstrate/verbalize I energy conservation bathing, dressing, and setup with MOD I, Pt able to perform UB HEP without  rest break for increased endurance to perform functional tasks with MOD I, Pt able to demonstrate improved activity tolerance as evidence by performing functional tasks standing for 5 mins 40 secs without rest while performig IADLS ( Making Bed) without rest with RPE on modified phoebe scale was 2/10. Patient has met goal on OT POC addressing functional mobilty and function. Pt able to perform functional transfers from [address] [place] [address], and step-in-shower /shower transfer with seat with use of grab bar with MOD I with good safety and technique. Patient has met goal on OT POC addressing balance retraining. Pt static and dynamic balance is currently Fair. Pt was able to stand unsupported without BUE support, while standing unsupported, weight shifting, and reaching ipsilaterally, w/o  LOB when crossing midline, while standing for an duration of 5 mins and 30 secs. bathing via sitting BSC frame in step in shower use AE PRN and UB/LB dressing use AE and LRAD PRN with I/MOD I demonstrating I spinal precautions and good safety techniques Therapist suggests continued use of HEP for continued BUE strength and endurance to perform house-hold tasks in home-setting. Patient has reached maximal potential wish skilled OT at this time. No further skilled OT is indicated at this time. MD office notified.  Thank you for your Referral!!!

## 2022-11-04 ENCOUNTER — HOME CARE VISIT (OUTPATIENT)
Dept: HOME HEALTH SERVICES | Facility: HOME HEALTH | Age: 72
End: 2022-11-04
Payer: MEDICARE

## 2022-11-04 PROCEDURE — 3331090001 HH PPS REVENUE CREDIT

## 2022-11-04 PROCEDURE — 3331090002 HH PPS REVENUE DEBIT

## 2022-11-04 NOTE — HOME HEALTH
Caregiver involvement:  currently lives  in an [facility] by herself  . The patients family provides daily emotional support and assistance with self care and mobility. Medications reconciled and all medications are available in the home this visit. The following education was provided regarding medications, medication interactions, and look a like medications: Continue as directed by MD.  Medications  are effective at this time. Home health supplies by type and quantity ordered/delivered this visit include: n/a    Patient education provided this visit:  Educated on importance of performing BUE exercise daily for continued strength and endurance. Educated client on the of continue use of energy conservation and purse-lip breathing to prevent exertion when engaging in daily tasks living . Progress toward goals: Patient has met goal on OT POC addresisng energy conservation techniques. Patient able to perform BUE gross and fine HEP, including upgrades, with I for increased functional endurance with MOD I. Patient has met goal on OT POC addressing energy conservation techniques. Pt able to demonstrate/verbalize I energy conservation bathing, dressing, and setup with MOD I, Pt able to perform UB HEP without  rest break for increased endurance to perform functional tasks with MOD I, Pt able to demonstrate improved activity tolerance as evidence by performing functional tasks standing for 5 mins 40 secs without rest while performig IADLS ( Making Bed) without rest with RPE on modified phoebe scale was 2/10. Patient has met goal on OT POC addressing functional mobilty and function. Pt able to perform functional transfers from [address] [place] [address], and step-in-shower /shower transfer with seat with use of grab bar with MOD I with good safety and technique. Patient has met goal on OT POC addressing balance retraining. Pt static and dynamic balance is currently Fair.  Pt was able to stand unsupported without BUE support, while standing unsupported, weight shifting, and reaching ipsilaterally, w/o  LOB when crossing midline, while standing for an duration of 5 mins and 30 secs. bathing via sitting BSC frame in step in shower use AE PRN and UB/LB dressing use AE and LRAD PRN with I/MOD I demonstrating I spinal precautions and good safety techniques              Home exercise program: Continue with BUE HEP addressing AROM. Pt is to perform HEP that consist of AROM exercises such as Straight arm swings, Shoulder Shrugs, Shoulder rotation backwards and forwards, Flexion, extension)  to increase BUE strength,endurance,ROM and flexion to perform adls and iadls. HEP is to be performed 2-3x a day with rest breaks as needed, Completing each exercises 15 times each daily. while seated    Continued need for the following skills: Nursing and Physical Therapy    The following discharge planning was discussed with the pt/caregiver: [facility]. Pt has reached maximal potential with self care and functional mobility in her home setting.   Caregiver/spouse

## 2022-11-05 PROCEDURE — 3331090001 HH PPS REVENUE CREDIT

## 2022-11-05 PROCEDURE — 3331090002 HH PPS REVENUE DEBIT

## 2022-11-06 ENCOUNTER — HOME CARE VISIT (OUTPATIENT)
Dept: HOME HEALTH SERVICES | Facility: HOME HEALTH | Age: 72
End: 2022-11-06
Payer: MEDICARE

## 2022-11-06 VITALS
HEART RATE: 87 BPM | DIASTOLIC BLOOD PRESSURE: 79 MMHG | TEMPERATURE: 97.8 F | OXYGEN SATURATION: 99 % | SYSTOLIC BLOOD PRESSURE: 129 MMHG | RESPIRATION RATE: 18 BRPM

## 2022-11-06 PROCEDURE — 3331090002 HH PPS REVENUE DEBIT

## 2022-11-06 PROCEDURE — 400013 HH SOC

## 2022-11-06 PROCEDURE — 3331090001 HH PPS REVENUE CREDIT

## 2022-11-06 PROCEDURE — G0157 HHC PT ASSISTANT EA 15: HCPCS

## 2022-11-07 PROCEDURE — 3331090002 HH PPS REVENUE DEBIT

## 2022-11-07 PROCEDURE — 3331090001 HH PPS REVENUE CREDIT

## 2022-11-07 NOTE — HOME HEALTH
SUBJECTIVE: Patient reports she has increase pain when lying down at night. CAREGIVER INVOLVEMENT/ASSISTANCE NEEDED FOR: Patient resides with daughter in main house who assist with ADL's and transfers as needed. HOME HEALTH SUPPLIES BY TYPE AND QUANTITY ORDERED/DELIVERED THIS VISIT INCLUDE: none   OBJECTIVE: See interventions. PATIENT RESPONSE TO TREATMENT: Patient with no increase in pain during activities. PATIENT LEVEL OF UNDERSTANDING OF EDUCATION PROVIDED: Patient verbalized understanding use of AD at all times and safety with transfers and ambulation, L-spine precautions and MD protocol. ASSESSMENT OF PROGRESS TOWARD GOALS: Patient is progressing towards goals. Patient ambulated increase distances on indoor/outdoor surfaces with quad cane and walker with supervision/SBA due to weakness and instability with gait. Patient navigated up/down ramp to enter/exit home with less assistance and cueing for technique. Patient is independent with HEP. CONTINUED NEED FOR THE FOLLOWING SKILLS: Continuation of PT to further improve patient balance, functional mobility and strength to decrease pts risk for falls. PLAN FOR NEXT VISIT: Progress with quadcane gait training. THE FOLLOWING DISCHARGE PLANNING WAS DISCUSSED WITH THE PATIENT/CAREGIVER: D/C from HHPT when goals are met or max potential benefit achieved.

## 2022-11-08 ENCOUNTER — HOSPITAL ENCOUNTER (OUTPATIENT)
Dept: GENERAL RADIOLOGY | Age: 72
Discharge: HOME | End: 2022-11-08
Payer: MEDICARE

## 2022-11-08 DIAGNOSIS — M48.061 SPINAL STENOSIS OF LUMBAR REGION: ICD-10-CM

## 2022-11-08 PROCEDURE — 3331090001 HH PPS REVENUE CREDIT

## 2022-11-08 PROCEDURE — 3331090002 HH PPS REVENUE DEBIT

## 2022-11-08 PROCEDURE — 72110 X-RAY EXAM L-2 SPINE 4/>VWS: CPT

## 2022-11-09 ENCOUNTER — HOME CARE VISIT (OUTPATIENT)
Dept: SCHEDULING | Facility: HOME HEALTH | Age: 72
End: 2022-11-09
Payer: MEDICARE

## 2022-11-09 VITALS
TEMPERATURE: 97.3 F | HEART RATE: 93 BPM | SYSTOLIC BLOOD PRESSURE: 116 MMHG | DIASTOLIC BLOOD PRESSURE: 76 MMHG | RESPIRATION RATE: 18 BRPM

## 2022-11-09 PROCEDURE — 3331090001 HH PPS REVENUE CREDIT

## 2022-11-09 PROCEDURE — 3331090002 HH PPS REVENUE DEBIT

## 2022-11-09 PROCEDURE — G0157 HHC PT ASSISTANT EA 15: HCPCS

## 2022-11-09 NOTE — HOME HEALTH
SUBJECTIVE: Patient reports increase pain in left LE. She cannot sleep more than 2 hours at a time due to discomfort. She was able to get out yesterday to vote and x-ray with assistance from daughter. Follow up with Dr. Gio Paige on 11/15/22. CAREGIVER INVOLVEMENT/ASSISTANCE NEEDED FOR: Patient resides with daughter in main house and assist with ADL's and transfers as needed. HOME HEALTH SUPPLIES BY TYPE AND QUANTITY ORDERED/DELIVERED THIS VISIT INCLUDE: none   OBJECTIVE: See interventions. PATIENT RESPONSE TO TREATMENT: Patient with fatigue during gait training with quad cane requiting rest break. PATIENT LEVEL OF UNDERSTANDING OF EDUCATION PROVIDED: Patient verbalized understanding use of AD at all times and safety with transfers and ambulation, L-spine precautions and MD protocol. Patient required reinforcement of gait training with AD, proper technique with standing thera ex, increasing gait training throughout day. ASSESSMENT OF PROGRESS TOWARD GOALS: Patient is progressing towards goals. Patient able to ambulate on outdoor surfaces with decreased verbal cueing for posture and positioning in walker. Patient with ~25% verbal cues for technique during thera ex. Patient navigated up/down ramp with less assistance from therapist. Patient with no increase pain during ambulation, however, pain in right UE during standing activities 6/10. CONTINUED NEED FOR THE FOLLOWING SKILLS: Continuation of PT to further improve patient balance, functional mobility and strength to decrease pts risk for falls. PLAN FOR NEXT VISIT: Progress with increasing gait training distance on outdoor surfaces and standing balance activities to reduce fall risk.   THE FOLLOWING DISCHARGE PLANNING WAS DISCUSSED WITH THE PATIENT/CAREGIVER: D/C from HHPT in 2 visits with possible transition to Outpatient PT.

## 2022-11-16 ENCOUNTER — HOME CARE VISIT (OUTPATIENT)
Dept: SCHEDULING | Facility: HOME HEALTH | Age: 72
End: 2022-11-16
Payer: MEDICARE

## 2022-11-16 PROCEDURE — G0151 HHCP-SERV OF PT,EA 15 MIN: HCPCS

## 2022-11-16 NOTE — Clinical Note
Patient was seen for [address] [address]  discharge visit. Pt had education on pain management techniques, graded therapeutic exercises, balance training, bed mobility training, transfers training, HEP ed, gait/steps management training and d/c planning services. Patient has met goals and is ready for DC. Patient able to teach back HEP and medications. No issues noted at discharge. Patient is aware to follow up with PCP and other MD's. Please call #[phone] with any questions. Thank you! Estefanía Romeo

## 2022-11-21 VITALS
OXYGEN SATURATION: 97 % | TEMPERATURE: 97.7 F | HEART RATE: 83 BPM | SYSTOLIC BLOOD PRESSURE: 136 MMHG | DIASTOLIC BLOOD PRESSURE: 83 MMHG

## 2022-11-21 NOTE — HOME HEALTH
SUBJECTIVE: Patient denies fall since evaluation. Patient reported she can now use her quad cane all the time navigating inside her trailer. CAREGIVER ASSISTANCE: No cg present during this visit     MEDICATIONS RECONCILED AND UPDATED: no changes in medications at this time     MMT: presents with : R hip flex 4+/5   R hip Abd 4+/5   R hip add 4+/5  R knee flex 4+/5  R knee ext. 4+/5  R ankle DF 4+/5  L hip flex 4-/5  L hip Abd 4-/5  L hip add 4-/5  L knee flex 4-/5  L knee ext. 4-/5  L ankle DF 4-/5  FTSTS: 15 seconds with cristina UE push up  compared to R hip flex 3+/5   R hip Abd 3+/5   R hip add 3+/5  R knee flex 3+/5  R knee ext. 3+/5  R ankle DF 3+/5  L hip flex 3+/5  L hip Abd 3+/5  L hip add 3+/5  L knee flex 3+/5  L knee ext. 3+/5  L ankle DF 3+/5  FTSTS: 23 seconds with cristina UE push up during reassessment visit. BALANCE: 22/28 on Tinetti balance and gait test  compared to 9/28 during reassessment visit. BED MOBILITY: indep (log rolling)  without cues     TRANSFERS: independent using quad cane compared to supervision/touching assistance to safely transfers to and from bed, chair, and toilet with intermittent cues for proper hand placements during reassessment visit. GAIT: independent in household ambulation 40ft with 2 turns using quad cane and able to ambulate 150+ft using FWW indep with decreased rowan compared to 75ft using FWW on level surfaces with supervision assistance. Gait impairment includes decreased hip/knee flexion during swing phase, inadequate foot clearance, narrow AKIRA, ataxic gait  during reassessment visit. GV: 0.3 m/s indicated of household walker. PATIENT EDUCATION PROVIDED THIS VISIT TO INCLUDE: FALL PREVENTION TECHNIQUES: monitor medication that may alter mental status, keeping walking pathways clean and clear of clutter and throw rugs.   PAIN MANAGEMENT TECHNIQUES: take pain medication as prescribed by MD, application of cold pack for soreness and achy type of pain /hot packs for achy, stiffness type of pain on painful area x 20 mins, positioning and relaxation. PRESSURE ULCER PREVENTION TECHNIQUES: proper positioning strategies including frequency of repositioning, prevention of shear and friction, appropriate skin hygiene and protection from moisture. ENERGY CONSERVATION TECHNIQUES: rest, slow down, pacing, and complete tasks in manageable steps. SWELLING MANAGEMENT: elevate LEs, ankle pumps  PATIENT LEVEL OF UNDERSTANDING OF EDUCATION PROVIDED: Patient  verbalized understanding. PATIENT RESPONSE TO PROCEDURE PERFORMED: Patient is now independent in her daily activities inside her trailer using quad cane. She continues to require supervision assistance for showering due to fear of falling and decreased balance. ASSESSMENT: Patient met goals and demonstrated improvement in bed mobility, transfers and gait as evidenced by functional mobility tests and increased level of active participation in bed mobility, transfers, and gait. Patient able to safely ascend and descend ramp to enter/exit trailer without cuing. Pt instructed to continue to perform HEP and recommend using AD for safe transfers and ambulation to prevent falls and joint protection. Pt verbalized understanding. Patient/caregiver instructed to contact MD if medical issue arises. Pt/cg verbalized understanding. Patient to start outpatient therapy on 11/23/22.

## 2022-11-23 ENCOUNTER — APPOINTMENT (OUTPATIENT)
Dept: PHYSICAL THERAPY | Age: 72
End: 2022-11-23

## 2022-11-23 ENCOUNTER — TELEPHONE (OUTPATIENT)
Dept: PHYSICAL THERAPY | Age: 72
End: 2022-11-23

## 2022-11-30 ENCOUNTER — TRANSCRIBE ORDER (OUTPATIENT)
Dept: SCHEDULING | Age: 72
End: 2022-11-30

## 2022-11-30 DIAGNOSIS — Z12.31 VISIT FOR SCREENING MAMMOGRAM: Primary | ICD-10-CM

## 2022-12-02 ENCOUNTER — HOSPITAL ENCOUNTER (OUTPATIENT)
Dept: MAMMOGRAPHY | Age: 72
Discharge: HOME | End: 2022-12-02
Attending: INTERNAL MEDICINE
Payer: MEDICARE

## 2022-12-02 DIAGNOSIS — Z12.31 VISIT FOR SCREENING MAMMOGRAM: ICD-10-CM

## 2022-12-02 PROCEDURE — 77063 BREAST TOMOSYNTHESIS BI: CPT

## 2022-12-09 ENCOUNTER — HOSPITAL ENCOUNTER (OUTPATIENT)
Dept: PHYSICAL THERAPY | Age: 72
Discharge: HOME | End: 2022-12-09
Payer: MEDICARE

## 2022-12-09 PROCEDURE — 97162 PT EVAL MOD COMPLEX 30 MIN: CPT

## 2022-12-09 PROCEDURE — 97530 THERAPEUTIC ACTIVITIES: CPT

## 2022-12-09 NOTE — PROGRESS NOTES
PT DAILY TREATMENT NOTE/ EVAL    Patient Name: Janis Ospina  Date:2022  : 1950  [x]  Patient  Verified  Payor: Hiro Michaels / Plan: [place] & B / Product Type: Medicare /    In time:1132 am  Out time:1207 pm  Total Treatment Time (min): 35  Visit #: 1 of     Medicare/BCBS Only   Total Timed Codes (min):  10 1:1 Treatment Time:  35     Treatment Area: Lumbar back pain [M54.50]    SUBJECTIVE  Pain Level (0-10 scale): 5  []constant []intermittent []improving []worsening []no change since onset    Any medication changes, allergies to medications, adverse drug reactions, diagnosis change, or new procedure performed?: See summary sheet. Subjective functional status/changes:     SEE POC    OBJECTIVE/EXAMINATION  SEE POC      25 min [x]Eval                  []Re-Eval       10 min Therapeutic Activity:  Patient/ Caregiver education and instruction: Diagnosis, prognosis, exercises, cold pack application 57-45 minutes with appropriate layering, PT POC   Rationale: to improve the patients ability to adhere to HEP and therapy sessions for increased compliance when working toward therapy goals.           With   [] TE   [x] TA   [] neuro   [] other: Patient Education: [] Review HEP    [] Progressed/Changed HEP based on:   [] positioning   [] body mechanics   [] transfers   [] heat/ice application    [x] other: See above      Other Objective/Functional Measures: SEE POC      Other tests/comments: SEE POC       Pain Level (0-10 scale) post treatment: 5    ASSESSMENT/Changes in Function: SEE POC    Patient will benefit from skilled PT services to modify and progress therapeutic interventions, address functional mobility deficits, address ROM deficits, address strength deficits, analyze and address soft tissue restrictions, analyze and cue movement patterns, analyze and modify body mechanics/ergonomics, assess and modify postural abnormalities, address imbalance/dizziness and instruct in home and community integration to attain goals and maximize pt's functional status. [x]  See Plan of Care  []  See progress note/recertification  []  See Discharge Summary         Progress towards goals / Updated goals:  Goals established at time of evaluation     PLAN  []  Upgrade activities as tolerated     [x]  Continue plan of care  []  Update interventions per flow sheet       []  Discharge due to:_  [x]  Other: Pt will benefit from skilled OP PT 2-3 x a week for 6 weeks in order to address impairments and maximize functional status.      Brittany Norwood, PT 12/9/2022 1:25 PM     Future Appointments   Date Time Provider Kelley Flores   12/12/2022 12:30 PM Juan Guevara [place] [facility]   12/14/2022  1:30 PM Albaro Clarke, PT [facility]   12/21/2022 12:30 PM Jimena Kaur, PTA [facility]   12/23/2022  1:30 PM Jimena Kaur, PTA [facility]   12/28/2022 12:00 PM Albaro Clarke, PT [facility]   12/30/2022 12:00 PM Fernando Garcia, PTA [facility]   1/4/2023  1:30 PM Albaro Clarke, PT [facility]

## 2022-12-09 NOTE — PROGRESS NOTES
[facility]  [address]  [place], [address]  [phone] fax    Plan of Care/ Statement of Necessity for Physical Therapy Services    Patient name: Kira Alegre Start of Care: 2022   Referral source: Shakira Mcconnell MD : 1950    Medical Diagnosis: Lumbar back pain [M54.50]  Payor: [place] MEDICARE / Plan: [place] MEDICARE PART A & B / Product Type: Medicare /  Onset Date:  Date of surgery: 22    Treatment Diagnosis: low back pain   Prior Hospitalization: see medical history Provider#: [id number]   Medications: Verified on Patient summary List    Comorbidities: diabetes, Arthritis, HBP   Prior Level of Function: Ambulating with rollator. Resides in camper-No stairs to negotiate. Daughter helping patient prior due to history of falls. The Plan of Care and following information is based on the information from the initial evaluation. Assessment / key information:  Patient is a 67 y.o. yo female who presents to [facility] with diagnosis of Lumbar back pain [M54.50]. Patient reports chief complaint of low back pain, Burning sensation on left upper thigh, right LE weakness. Pt reports she underwent decompression and fusion on 22. Pain level is a 5(Current) and ranges from 5/10 to 8/10 which increases with \"lying down\", decreases with \"ice pack\". Pt reports she was in inpatient rehab for 11 days. Denies recent falls (\"none since I've had the surgery\"). Denies red flags. Pt reports next MD follow up 22. Pt reports MRI performed before surgery. Upon objective evaluation, the patient demonstrates the following impairments: impaired gait/balance, decreased LE strength bilaterally (right greater than left), pt is at risk of falls, decreased sensation on right LE, TTP along right lumbar paraspinals.   Functional impairments include Daughter assists patient with cooking, independent with bathing and dressing, pain with reaching with UE, pt reports she is not currently driving, ambulating with use of front wheel walker. Patient would benefit from Skilled PT to address these deficits for increased functional mobility and quality of life. Today's assessment is significant for the following functional and objective measures:   Gait Ambulating with use of front wheel walker- decreased speed. Incisions: Healed- bilateral lumbar, left waist/QL (healed) - pt reports abdominal incision that is healed   Strength:   Right (/5) Left (/5)   Hip     Flexion 2+ 3-             Abduction 2  3-             Adduction NT NT             Extension NT NT             ER 3- 3+             IR 2+ 3   Knee   Extension 3 4-              Flexion (tested in sitting) 2+ 3+    Ankle   Dorsiflexion 2+ 3+     Bridge:NT   Other tests and functional measures:   TU seconds with front wheel walker with supervision assist;   sit to stand test: 5 repetitions in 30 seconds with bilat UE support at arm rests     Palpation:  [] Min  [x] Mod  [] Severe    Location:right lumbar paraspinals     Sensation to light touch: pt reports decrease sensation on right lower leg. Other: patellar reflexes: 2+ bilat     Justification for Eval Code Complexity:  Patient History : mod  Examination see exam   Clinical Presentation: mod  Clinical Decision Making : FOTO : 40 /100         Evaluation Complexity History MEDIUM  Complexity : 1-2 comorbidities / personal factors will impact the outcome/ POC ; Examination MEDIUM Complexity : 3 Standardized tests and measures addressing body structure, function, activity limitation and / or participation in recreation  ;Presentation MEDIUM Complexity : Evolving with changing characteristics  ; Clinical Decision Making MEDIUM Complexity : FOTO score of 26-74  Overall Complexity Rating: MEDIUM  Problem List: pain affecting function, decrease ROM, decrease strength, impaired gait/ balance, decrease ADL/ functional abilitiies, decrease activity tolerance, decrease flexibility/ joint mobility, and decrease transfer abilities   Treatment Plan may include any combination of the following: Therapeutic exercise, Neuromuscular reeducation, Manual therapy, Therapeutic activity, Self care/home management, Electric stim unattended , Aquatic therapy, Gait training, and Other: cold pack and hot pack modalities as needed   Patient / Family readiness to learn indicated by: trying to perform skills  Persons(s) to be included in education: patient (P)  Barriers to Learning/Limitations: None  Actions taken: n/a  Patient Goal (s): walk without use of walker  Patient Self Reported Health Status: fair  Rehabilitation Potential: good      Short Term Goals: To be accomplished in 1  weeks:  1) Patient will be independent with performing daily initial HEP. Status at last certification/assessment: established and reviewed initial HEP  Long Term Goals: To be accomplished in 6 weeks:  1) Patient  will be independent  with comprehensive updated HEP to continue to manage care independently after discharge. Status at last certification/assessment: established and reviewed initial HEP  2) Patient will increase right LE grossly to at least 3+/5  to increase ability to perform functional transfers and ambulation . Status at last certification/assessment:   Strength:   Right (/5) Left (/5)   Hip     Flexion 2+ 3-             Abduction 2  3-             Adduction NT NT             Extension NT NT             ER 3- 3+             IR 2+ 3   Knee   Extension 3 4-              Flexion (tested in sitting) 2+ 3+    Ankle   Dorsiflexion 2+ 3+     3) Increase FOTO to 56 indicating improved function and quality of life. Status at last certification/assessment: 36  4)Patient will report 75% improvement in function to improve quality of life.   Status at last certification/assessment: 0%  5)Patient will be able to perform TUG in 20 or less with least assistive restrictive device in order to perform safe ambulation in the community and at home with decreased fall risk  Status at last note/certification: TU seconds with front wheel walker with supervision assist     Frequency / Duration: Patient to be seen 2-3 times per week for 6 weeks. All LTG as above will be assessed and updated every 10 visits or 30 days and progressed as needed     Patient/ Caregiver education and instruction: Diagnosis, prognosis, exercises, cold pack application 26-53 minutes with appropriate layering, PT POC     [x]  Plan of care has been reviewed with PTA    Certification Period: 22 - 23  Shirley Bah, PT 2022 1:20 PM     ________________________________________________________________________    I certify that the above Therapy Services are being furnished while the patient is under my care. I agree with the treatment plan and certify that this therapy is necessary.     [de-identified] Signature:____________________  Date:____________Time: _________     Rick Mcguire MD  Please sign and return to [facility] [address] [place] [address] & [facility] [address]  [address] [facility] [place] [address], [facility] [address]  [phone] fax

## 2022-12-11 ENCOUNTER — TRANSCRIBE ORDER (OUTPATIENT)
Dept: SCHEDULING | Age: 72
End: 2022-12-11

## 2022-12-11 DIAGNOSIS — R92.8 OTHER ABNORMAL AND INCONCLUSIVE FINDINGS ON DIAGNOSTIC IMAGING OF BREAST: Primary | ICD-10-CM

## 2022-12-12 ENCOUNTER — HOSPITAL ENCOUNTER (OUTPATIENT)
Dept: PHYSICAL THERAPY | Age: 72
Discharge: HOME | End: 2022-12-12
Payer: MEDICARE

## 2022-12-12 ENCOUNTER — TRANSCRIBE ORDER (OUTPATIENT)
Dept: SCHEDULING | Age: 72
End: 2022-12-12

## 2022-12-12 DIAGNOSIS — R92.8 ABNORMAL MAMMOGRAM: Primary | ICD-10-CM

## 2022-12-12 PROCEDURE — 97112 NEUROMUSCULAR REEDUCATION: CPT

## 2022-12-12 PROCEDURE — 97110 THERAPEUTIC EXERCISES: CPT

## 2022-12-12 NOTE — PROGRESS NOTES
PT DAILY TREATMENT NOTE     Patient Name: Zarina Pascual  Date:2022  : 1950  [x]  Patient  Verified  Payor: [place] MEDICARE / Plan: VA MEDICARE PART A & B / Product Type: Medicare /    In time:12:30  Out time:1:11  Total Treatment Time (min): 41  Visit #: 2 of     Medicare/BCBS Only   Total Timed Codes (min):  31 1:1 Treatment Time:  29       Treatment Area: Lumbar back pain [M54.50]    SUBJECTIVE  Pain Level (0-10 scale): 6  Any medication changes, allergies to medications, adverse drug reactions, diagnosis change, or new procedure performed?: [x] No    [] Yes (see summary sheet for update)  Subjective functional status/changes:   [] No changes reported  Pt states she is feeling ok, she has been doing the home exercises    OBJECTIVE    Modality rationale: decrease pain to improve the patients ability to perform daily tasks   Min Type Additional Details    [] Estim:  []Unatt       []IFC  []Premod                        []Other:  []w/ice   []w/heat  Position:  Location:    [] Estim: []Att    []TENS instruct  []NMES                    []Other:  []w/US   []w/ice   []w/heat  Position:  Location:    []  Traction: [] Cervical       []Lumbar                       [] Prone          []Supine                       []Intermittent   []Continuous Lbs:  [] before manual  [] after manual    []  Ultrasound: []Continuous   [] Pulsed                           []1MHz   []3MHz W/cm2:  Location:    []  Iontophoresis with dexamethasone         Location: [] Take home patch   [] In clinic   10 [x]  Ice     []  heat  []  Ice massage  []  Laser   []  Anodyne Position: supine with wedge  Location: left thigh and low back    []  Laser with stim  []  Other:  Position:  Location:    []  Vasopneumatic Device    []  Right     []  Left  Pre-treatment girth:  Post-treatment girth:  Measured at (location):  Pressure:       [] lo [] med [] hi   Temperature: [] lo [] med [] hi   [] Skin assessment post-treatment:  []intact []redness- no adverse reaction    []redness - adverse reaction:     16 min Therapeutic Exercise:  [x] See flow sheet :   Rationale: increase ROM and increase strength to improve the patients ability to perform ADLs    15 min Neuromuscular Re-education:  [x]  See flow sheet :   Rationale: increase strength, improve coordination, improve balance, and increase proprioception  to improve the patients ability to perform functional tasks           With   [] TE   [] TA   [] neuro   [] other: Patient Education: [x] Review HEP    [] Progressed/Changed HEP based on:   [] positioning   [] body mechanics   [] transfers   [] heat/ice application    [] other:      Other Objective/Functional Measures:   First visit after Eval     Pain Level (0-10 scale) post treatment: 5    ASSESSMENT/Changes in Function: Initiated treatment program per flow sheet. Pt is challenged with TA activation and req's vc's to perform correctly and to maintain activation with therex. While performing Rhomb, right hand/wrist became tight which pt reports happening before surgery. Decr'd pain following treatment. Pt req's extra time for bed mobility. Patient will continue to benefit from skilled PT services to modify and progress therapeutic interventions, address functional mobility deficits, address ROM deficits, address strength deficits, analyze and address soft tissue restrictions, analyze and cue movement patterns, analyze and modify body mechanics/ergonomics, assess and modify postural abnormalities, address imbalance/dizziness, and instruct in home and community integration to attain remaining goals. []  See Plan of Care  []  See progress note/recertification  []  See Discharge Summary         Progress towards goals / Updated goals:  Short Term Goals: To be accomplished in 1  weeks:  1) Patient will be independent with performing daily initial HEP.     Status at last certification/assessment: established and reviewed initial HEP   Current: Met, pt reports compliance 2022  Long Term Goals: To be accomplished in 6 weeks:  1) Patient  will be independent  with comprehensive updated HEP to continue to manage care independently after discharge. Status at last certification/assessment: established and reviewed initial HEP  2) Patient will increase right LE grossly to at least 3+/5  to increase ability to perform functional transfers and ambulation . Status at last certification/assessment:   Strength:    Right (/5) Left (/5)   Hip     Flexion 2+ 3-             Abduction 2  3-             Adduction NT NT             Extension NT NT             ER 3- 3+             IR 2+ 3   Knee   Extension 3 4-              Flexion (tested in sitting) 2+ 3+    Ankle   Dorsiflexion 2+ 3+      3) Increase FOTO to 56 indicating improved function and quality of life. Status at last certification/assessment: 36  4)Patient will report 75% improvement in function to improve quality of life.   Status at last certification/assessment: 0%  5)Patient will be able to perform TUG in 20 or less with least assistive restrictive device in order to perform safe ambulation in the community and at home with decreased fall risk  Status at last note/certification: TU seconds with front wheel walker with supervision assist     PLAN  []  Upgrade activities as tolerated     [x]  Continue plan of care  []  Update interventions per flow sheet       []  Discharge due to:_  []  Other:_      [place] Rolanda Figueroa PTA 2022  12:25 PM    Future Appointments   Date Time Provider Kelley Flores   2022 12:30 PM Bharat Huddleston, PTA [facility]   2022  1:30 PM Dorene Baltazar, LAURA [facility]   2022 12:30 PM Christiano Dos Santos PTA [facility]   2022  1:30 PM Christiano Dos Santos, PTA [facility]   2022 12:00 PM Dorene Baltazar, PT [facility]   2022 12:00 PM Katty Adams, PTA [facility]   2023  1:30 PM Dorene Baltazar PT [facility]   1/5/2023  2:00 PM [facility] DONALD RM 3 3D [facility]   1/5/2023  3:15 PM HBV DONALD US RM 1 [facility]

## 2022-12-13 ENCOUNTER — APPOINTMENT (OUTPATIENT)
Dept: CT IMAGING | Age: 72
DRG: 472 | End: 2022-12-13
Attending: EMERGENCY MEDICINE
Payer: MEDICARE

## 2022-12-13 ENCOUNTER — HOSPITAL ENCOUNTER (INPATIENT)
Age: 72
LOS: 8 days | Discharge: HOME HEALTH | DRG: 472 | End: 2022-12-21
Attending: EMERGENCY MEDICINE | Admitting: INTERNAL MEDICINE
Payer: MEDICARE

## 2022-12-13 ENCOUNTER — APPOINTMENT (OUTPATIENT)
Dept: MRI IMAGING | Age: 72
DRG: 472 | End: 2022-12-13
Payer: MEDICARE

## 2022-12-13 ENCOUNTER — APPOINTMENT (OUTPATIENT)
Dept: CT IMAGING | Age: 72
DRG: 472 | End: 2022-12-13
Payer: MEDICARE

## 2022-12-13 ENCOUNTER — APPOINTMENT (OUTPATIENT)
Dept: NON INVASIVE DIAGNOSTICS | Age: 72
DRG: 472 | End: 2022-12-13
Attending: INTERNAL MEDICINE
Payer: MEDICARE

## 2022-12-13 ENCOUNTER — APPOINTMENT (OUTPATIENT)
Dept: GENERAL RADIOLOGY | Age: 72
DRG: 472 | End: 2022-12-13
Payer: MEDICARE

## 2022-12-13 ENCOUNTER — TELEPHONE (OUTPATIENT)
Dept: PHYSICAL THERAPY | Age: 72
End: 2022-12-13

## 2022-12-13 DIAGNOSIS — Z98.890 S/P LAMINECTOMY: ICD-10-CM

## 2022-12-13 DIAGNOSIS — R29.898 WEAKNESS OF RIGHT UPPER EXTREMITY: ICD-10-CM

## 2022-12-13 DIAGNOSIS — R29.898 WEAKNESS OF RIGHT LOWER EXTREMITY: Primary | ICD-10-CM
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
## 2022-12-13 PROBLEM — I63.9 STROKE (CEREBRUM) (HCC): Status: ACTIVE | Noted: 2022-12-13

## 2022-12-13 PROBLEM — R53.1 RIGHT SIDED WEAKNESS: Status: ACTIVE | Noted: 2022-12-13

## 2022-12-13 LAB
ANION GAP SERPL CALC-SCNC: 4 MMOL/L (ref 3–18)
BASOPHILS # BLD: 0 K/UL (ref 0–0.1)
BASOPHILS NFR BLD: 1 % (ref 0–2)
BUN SERPL-MCNC: 17 MG/DL (ref 7–18)
BUN/CREAT SERPL: 18 (ref 12–20)
CALCIUM SERPL-MCNC: 9.8 MG/DL (ref 8.5–10.1)
CHLORIDE SERPL-SCNC: 104 MMOL/L (ref 100–111)
CO2 SERPL-SCNC: 34 MMOL/L (ref 21–32)
CREAT SERPL-MCNC: 0.94 MG/DL (ref 0.6–1.3)
DIFFERENTIAL METHOD BLD: ABNORMAL
ECHO AO ASC DIAM: 2.8 CM
ECHO AO ASCENDING AORTA INDEX: 1.47 CM/M2
ECHO AO ROOT DIAM: 3 CM
ECHO AO ROOT INDEX: 1.58 CM/M2
ECHO AV AREA PEAK VELOCITY: 3.1 CM2
ECHO AV AREA VTI: 2.7 CM2
ECHO AV AREA/BSA PEAK VELOCITY: 1.6 CM2/M2
ECHO AV AREA/BSA VTI: 1.4 CM2/M2
ECHO AV MEAN GRADIENT: 3 MMHG
ECHO AV MEAN VELOCITY: 0.8 M/S
ECHO AV PEAK GRADIENT: 5 MMHG
ECHO AV PEAK VELOCITY: 1.1 M/S
ECHO AV VELOCITY RATIO: 1
ECHO AV VTI: 21.3 CM
ECHO LA VOL 2C: 36 ML (ref 22–52)
ECHO LA VOL 4C: 46 ML (ref 22–52)
ECHO LA VOLUME AREA LENGTH: 45 ML
ECHO LA VOLUME INDEX A2C: 19 ML/M2 (ref 16–34)
ECHO LA VOLUME INDEX A4C: 24 ML/M2 (ref 16–34)
ECHO LA VOLUME INDEX AREA LENGTH: 24 ML/M2 (ref 16–34)
ECHO LV E' LATERAL VELOCITY: 11 CM/S
ECHO LV E' SEPTAL VELOCITY: 10 CM/S
ECHO LV EJECTION FRACTION A2C: 57 %
ECHO LV EJECTION FRACTION A4C: 61 %
ECHO LV FRACTIONAL SHORTENING: 31 % (ref 28–44)
ECHO LV INTERNAL DIMENSION DIASTOLE INDEX: 1.89 CM/M2
ECHO LV INTERNAL DIMENSION DIASTOLIC: 3.6 CM (ref 3.9–5.3)
ECHO LV INTERNAL DIMENSION SYSTOLIC INDEX: 1.32 CM/M2
ECHO LV INTERNAL DIMENSION SYSTOLIC: 2.5 CM
ECHO LV IVSD: 1 CM (ref 0.6–0.9)
ECHO LV MASS 2D: 107.9 G (ref 67–162)
ECHO LV MASS INDEX 2D: 56.8 G/M2 (ref 43–95)
ECHO LV POSTERIOR WALL DIASTOLIC: 1 CM (ref 0.6–0.9)
ECHO LV RELATIVE WALL THICKNESS RATIO: 0.56
ECHO LVOT AREA: 3.1 CM2
ECHO LVOT AV VTI INDEX: 0.88
ECHO LVOT CARDIAC OUTPUT: 2.8 LITER/MINUTE
ECHO LVOT CARDIAC OUTPUT: 2.8 LITER/MINUTE
ECHO LVOT CARDIAC OUTPUT: 2.9 LITER/MINUTE
ECHO LVOT CARDIAC OUTPUT: 2.9 LITER/MINUTE
ECHO LVOT CARDIAC OUTPUT: 3.2 LITER/MINUTE
ECHO LVOT CARDIAC OUTPUT: 3.2 LITER/MINUTE
ECHO LVOT DIAM: 2 CM
ECHO LVOT MEAN GRADIENT: 2 MMHG
ECHO LVOT PEAK GRADIENT: 5 MMHG
ECHO LVOT PEAK VELOCITY: 1.1 M/S
ECHO LVOT STROKE VOLUME INDEX: 31.1 ML/M2
ECHO LVOT SV: 59 ML
ECHO LVOT VTI: 18.8 CM
ECHO MV A VELOCITY: 0.98 M/S
ECHO MV E DECELERATION TIME (DT): 113.2 MS
ECHO MV E VELOCITY: 0.56 M/S
ECHO MV E/A RATIO: 0.57
ECHO MV E/E' LATERAL: 5.09
ECHO MV E/E' RATIO (AVERAGED): 5.35
ECHO MV E/E' SEPTAL: 5.6
ECHO RA AREA 4C: 8.4 CM2
ECHO RV FREE WALL PEAK S': 11 CM/S
ECHO RV LONGITUDINAL DIMENSION: 2.8 CM
ECHO RV TAPSE: 1.9 CM (ref 1.7–?)
EOSINOPHIL # BLD: 0.1 K/UL (ref 0–0.4)
EOSINOPHIL NFR BLD: 2 % (ref 0–5)
ERYTHROCYTE [DISTWIDTH] IN BLOOD BY AUTOMATED COUNT: 14.6 % (ref 11.6–14.5)
GLUCOSE BLD STRIP.AUTO-MCNC: 103 MG/DL (ref 70–110)
GLUCOSE BLD STRIP.AUTO-MCNC: 112 MG/DL (ref 70–110)
GLUCOSE BLD STRIP.AUTO-MCNC: 84 MG/DL (ref 70–110)
GLUCOSE BLD STRIP.AUTO-MCNC: 96 MG/DL (ref 70–110)
GLUCOSE SERPL-MCNC: 113 MG/DL (ref 74–99)
HCT VFR BLD AUTO: 36.5 % (ref 35–45)
HGB BLD-MCNC: 11.3 G/DL (ref 12–16)
IMM GRANULOCYTES # BLD AUTO: 0 K/UL (ref 0–0.04)
IMM GRANULOCYTES NFR BLD AUTO: 0 % (ref 0–0.5)
INR PPP: 0.9 (ref 0.8–1.2)
LYMPHOCYTES # BLD: 1.3 K/UL (ref 0.9–3.6)
LYMPHOCYTES NFR BLD: 24 % (ref 21–52)
MCH RBC QN AUTO: 27.2 PG (ref 24–34)
MCHC RBC AUTO-ENTMCNC: 31 G/DL (ref 31–37)
MCV RBC AUTO: 88 FL (ref 78–100)
MONOCYTES # BLD: 0.5 K/UL (ref 0.05–1.2)
MONOCYTES NFR BLD: 10 % (ref 3–10)
NEUTS SEG # BLD: 3.3 K/UL (ref 1.8–8)
NEUTS SEG NFR BLD: 63 % (ref 40–73)
NRBC # BLD: 0 K/UL (ref 0–0.01)
NRBC BLD-RTO: 0 PER 100 WBC
PLATELET # BLD AUTO: 238 K/UL (ref 135–420)
PMV BLD AUTO: 10.8 FL (ref 9.2–11.8)
POTASSIUM SERPL-SCNC: 3.7 MMOL/L (ref 3.5–5.5)
PROTHROMBIN TIME: 12.9 SEC (ref 11.5–15.2)
RBC # BLD AUTO: 4.15 M/UL (ref 4.2–5.3)
SODIUM SERPL-SCNC: 142 MMOL/L (ref 136–145)
WBC # BLD AUTO: 5.2 K/UL (ref 4.6–13.2)

## 2022-12-13 PROCEDURE — 74011250636 HC RX REV CODE- 250/636: Performed by: INTERNAL MEDICINE

## 2022-12-13 PROCEDURE — 0RG20A0 FUSION OF 2 OR MORE CERVICAL VERTEBRAL JOINTS WITH INTERBODY FUSION DEVICE, ANTERIOR APPROACH, ANTERIOR COLUMN, OPEN APPROACH: ICD-10-PCS | Performed by: ORTHOPAEDIC SURGERY

## 2022-12-13 PROCEDURE — 99222 1ST HOSP IP/OBS MODERATE 55: CPT | Performed by: INTERNAL MEDICINE

## 2022-12-13 PROCEDURE — 85610 PROTHROMBIN TIME: CPT

## 2022-12-13 PROCEDURE — 65270000046 HC RM TELEMETRY

## 2022-12-13 PROCEDURE — 74011250637 HC RX REV CODE- 250/637: Performed by: INTERNAL MEDICINE

## 2022-12-13 PROCEDURE — 97166 OT EVAL MOD COMPLEX 45 MIN: CPT

## 2022-12-13 PROCEDURE — 74011000636 HC RX REV CODE- 636: Performed by: EMERGENCY MEDICINE

## 2022-12-13 PROCEDURE — 00NW0ZZ RELEASE CERVICAL SPINAL CORD, OPEN APPROACH: ICD-10-PCS | Performed by: ORTHOPAEDIC SURGERY

## 2022-12-13 PROCEDURE — 94762 N-INVAS EAR/PLS OXIMTRY CONT: CPT

## 2022-12-13 PROCEDURE — 74011250636 HC RX REV CODE- 250/636

## 2022-12-13 PROCEDURE — 70496 CT ANGIOGRAPHY HEAD: CPT

## 2022-12-13 PROCEDURE — 72125 CT NECK SPINE W/O DYE: CPT

## 2022-12-13 PROCEDURE — 99285 EMERGENCY DEPT VISIT HI MDM: CPT

## 2022-12-13 PROCEDURE — 70450 CT HEAD/BRAIN W/O DYE: CPT

## 2022-12-13 PROCEDURE — 73502 X-RAY EXAM HIP UNI 2-3 VIEWS: CPT

## 2022-12-13 PROCEDURE — 85025 COMPLETE CBC W/AUTO DIFF WBC: CPT

## 2022-12-13 PROCEDURE — 97535 SELF CARE MNGMENT TRAINING: CPT

## 2022-12-13 PROCEDURE — 93005 ELECTROCARDIOGRAM TRACING: CPT

## 2022-12-13 PROCEDURE — 0RT30ZZ RESECTION OF CERVICAL VERTEBRAL DISC, OPEN APPROACH: ICD-10-PCS | Performed by: ORTHOPAEDIC SURGERY

## 2022-12-13 PROCEDURE — 82962 GLUCOSE BLOOD TEST: CPT

## 2022-12-13 PROCEDURE — 99221 1ST HOSP IP/OBS SF/LOW 40: CPT | Performed by: STUDENT IN AN ORGANIZED HEALTH CARE EDUCATION/TRAINING PROGRAM

## 2022-12-13 PROCEDURE — 80048 BASIC METABOLIC PNL TOTAL CA: CPT

## 2022-12-13 PROCEDURE — 74011000250 HC RX REV CODE- 250: Performed by: INTERNAL MEDICINE

## 2022-12-13 PROCEDURE — 93306 TTE W/DOPPLER COMPLETE: CPT

## 2022-12-13 PROCEDURE — 2709999900 HC NON-CHARGEABLE SUPPLY

## 2022-12-13 PROCEDURE — 72131 CT LUMBAR SPINE W/O DYE: CPT

## 2022-12-13 RX ORDER — SODIUM CHLORIDE 9 MG/ML
10 INJECTION INTRAMUSCULAR; INTRAVENOUS; SUBCUTANEOUS
Status: COMPLETED | OUTPATIENT
Start: 2022-12-13 | End: 2022-12-13

## 2022-12-13 RX ORDER — HEPARIN SODIUM 5000 [USP'U]/ML
5000 INJECTION, SOLUTION INTRAVENOUS; SUBCUTANEOUS EVERY 8 HOURS
Status: DISCONTINUED | OUTPATIENT
Start: 2022-12-13 | End: 2022-12-21 | Stop reason: HOSPADM

## 2022-12-13 RX ORDER — ATORVASTATIN CALCIUM 40 MG/1
80 TABLET, FILM COATED ORAL
Status: DISCONTINUED | OUTPATIENT
Start: 2022-12-13 | End: 2022-12-21 | Stop reason: HOSPADM

## 2022-12-13 RX ORDER — ACETAMINOPHEN 500 MG
500 TABLET ORAL
Status: DISCONTINUED | OUTPATIENT
Start: 2022-12-13 | End: 2022-12-21 | Stop reason: HOSPADM

## 2022-12-13 RX ORDER — INSULIN LISPRO 100 [IU]/ML
INJECTION, SOLUTION INTRAVENOUS; SUBCUTANEOUS
Status: DISCONTINUED | OUTPATIENT
Start: 2022-12-13 | End: 2022-12-21 | Stop reason: HOSPADM

## 2022-12-13 RX ORDER — CYCLOBENZAPRINE HCL 5 MG
5 TABLET ORAL
Status: DISCONTINUED | OUTPATIENT
Start: 2022-12-13 | End: 2022-12-21 | Stop reason: HOSPADM

## 2022-12-13 RX ORDER — MAGNESIUM SULFATE 100 %
16 CRYSTALS MISCELLANEOUS AS NEEDED
Status: DISCONTINUED | OUTPATIENT
Start: 2022-12-13 | End: 2022-12-21 | Stop reason: HOSPADM

## 2022-12-13 RX ORDER — GABAPENTIN 100 MG/1
200 CAPSULE ORAL 3 TIMES DAILY
Status: DISCONTINUED | OUTPATIENT
Start: 2022-12-13 | End: 2022-12-21 | Stop reason: HOSPADM

## 2022-12-13 RX ORDER — THERA TABS 400 MCG
1 TAB ORAL DAILY
Status: DISCONTINUED | OUTPATIENT
Start: 2022-12-14 | End: 2022-12-21 | Stop reason: HOSPADM

## 2022-12-13 RX ORDER — ONDANSETRON 2 MG/ML
4 INJECTION INTRAMUSCULAR; INTRAVENOUS
Status: DISCONTINUED | OUTPATIENT
Start: 2022-12-13 | End: 2022-12-20 | Stop reason: SDUPTHER

## 2022-12-13 RX ORDER — ASPIRIN 325 MG
325 TABLET ORAL
Status: DISCONTINUED | OUTPATIENT
Start: 2022-12-13 | End: 2022-12-13

## 2022-12-13 RX ORDER — SODIUM CHLORIDE 9 MG/ML
1000 INJECTION, SOLUTION INTRAVENOUS ONCE
Status: COMPLETED | OUTPATIENT
Start: 2022-12-13 | End: 2022-12-13

## 2022-12-13 RX ORDER — LABETALOL HCL 20 MG/4 ML
5 SYRINGE (ML) INTRAVENOUS
Status: DISCONTINUED | OUTPATIENT
Start: 2022-12-13 | End: 2022-12-21 | Stop reason: HOSPADM

## 2022-12-13 RX ORDER — SODIUM CHLORIDE 9 MG/ML
100 INJECTION, SOLUTION INTRAVENOUS CONTINUOUS
Status: DISCONTINUED | OUTPATIENT
Start: 2022-12-13 | End: 2022-12-15

## 2022-12-13 RX ORDER — BISACODYL 5 MG
10 TABLET, DELAYED RELEASE (ENTERIC COATED) ORAL
Status: DISCONTINUED | OUTPATIENT
Start: 2022-12-13 | End: 2022-12-21 | Stop reason: HOSPADM

## 2022-12-13 RX ORDER — DEXTROSE MONOHYDRATE 100 MG/ML
0-250 INJECTION, SOLUTION INTRAVENOUS AS NEEDED
Status: DISCONTINUED | OUTPATIENT
Start: 2022-12-13 | End: 2022-12-21 | Stop reason: HOSPADM

## 2022-12-13 RX ORDER — GUAIFENESIN 100 MG/5ML
81 LIQUID (ML) ORAL DAILY
Status: DISCONTINUED | OUTPATIENT
Start: 2022-12-13 | End: 2022-12-13

## 2022-12-13 RX ORDER — DOCUSATE SODIUM 100 MG/1
100 CAPSULE, LIQUID FILLED ORAL 2 TIMES DAILY
Status: DISCONTINUED | OUTPATIENT
Start: 2022-12-13 | End: 2022-12-21 | Stop reason: HOSPADM

## 2022-12-13 RX ORDER — ASPIRIN 325 MG
325 TABLET ORAL DAILY
Status: DISCONTINUED | OUTPATIENT
Start: 2022-12-14 | End: 2022-12-21 | Stop reason: HOSPADM

## 2022-12-13 RX ORDER — GABAPENTIN 300 MG/1
300 CAPSULE ORAL 3 TIMES DAILY
Status: DISCONTINUED | OUTPATIENT
Start: 2022-12-13 | End: 2022-12-13

## 2022-12-13 RX ADMIN — HEPARIN SODIUM 5000 UNITS: 5000 INJECTION INTRAVENOUS; SUBCUTANEOUS at 21:44

## 2022-12-13 RX ADMIN — HEPARIN SODIUM 5000 UNITS: 5000 INJECTION INTRAVENOUS; SUBCUTANEOUS at 15:13

## 2022-12-13 RX ADMIN — ATORVASTATIN CALCIUM 80 MG: 40 TABLET, FILM COATED ORAL at 21:44

## 2022-12-13 RX ADMIN — GABAPENTIN 200 MG: 100 CAPSULE ORAL at 17:04

## 2022-12-13 RX ADMIN — SODIUM CHLORIDE 1000 ML: 9 INJECTION, SOLUTION INTRAVENOUS at 10:10

## 2022-12-13 RX ADMIN — SODIUM CHLORIDE 100 ML/HR: 9 INJECTION, SOLUTION INTRAVENOUS at 15:17

## 2022-12-13 RX ADMIN — SODIUM CHLORIDE 10 ML: 9 INJECTION INTRAMUSCULAR; INTRAVENOUS; SUBCUTANEOUS at 12:36

## 2022-12-13 RX ADMIN — GABAPENTIN 200 MG: 100 CAPSULE ORAL at 21:43

## 2022-12-13 RX ADMIN — DOCUSATE SODIUM 100 MG: 100 CAPSULE, LIQUID FILLED ORAL at 18:39

## 2022-12-13 RX ADMIN — IOPAMIDOL 67 ML: 755 INJECTION, SOLUTION INTRAVENOUS at 08:17

## 2022-12-13 NOTE — ED NOTES
Pt resting at this time. Food provided per pts request. Call bell is within reach.  Will continue to monitor

## 2022-12-13 NOTE — PROGRESS NOTES
12/13/2022 01:39 pm Patient will need her hair washed prior to the STAT MRI of the Brain, the hairspray that she uses has a metallic component that is interfering with the MRI image quality. Dr Jocelyn Farmer and patients IVET De Jesus is aware.

## 2022-12-13 NOTE — ED PROVIDER NOTES
EMERGENCY DEPARTMENT HISTORY AND PHYSICAL EXAM    8:06 AM    Date: 12/13/2022  Patient Name: Genny Dickson    History of Presenting Illness     Chief Complaint   Patient presents with    Fall       History Provided By: Patient and EMS    Fall  Associated symptoms include numbness. Pertinent negatives include no fever, no abdominal pain, no nausea, no vomiting and no headaches. 68 yo female with PMHx of HTN, HLD, DM who presents to the ED with c/o fall. Per EMS, patient fell on way to bathroom this morning at [id number], unable to get up for 1.5 hours until daughter found her and called EMS. Patient initially without complaints for EMS, stating she came in for evaluation at her daughter's request. VSS, BG WNL for EMS. On arrival, patient endorsing mild right hip pain. States she was ambulating to bathroom at 0430 when her leg became weak, numb and gave out. Has h/o similar given chronic back issues but reports today's symptoms worse than usual, therefore caused her to fall. Struck her right shoulder & right hip. Denies head or neck trauma. No headache, visual changes, speech changes. No other pain. Patient then noted that her right hand was starting to go numb while in ED bed. Last known normal: [place]. No h/o stroke. Not on bloodthinners. Code stroke activated. BG 84.      PCP: Catrachito Swartz MD    Current Facility-Administered Medications   Medication Dose Route Frequency Provider Last Rate Last Admin    0.9% sodium chloride infusion  100 mL/hr IntraVENous CONTINUOUS Destinee Lamas MD        bisacodyL (DULCOLAX) tablet 10 mg  10 mg Oral Q48H PRN Destinee Lamas MD        acetaminophen (TYLENOL) tablet 500 mg  500 mg Oral Q6H PRN Destinee Lamas MD        cyclobenzaprine (FLEXERIL) tablet 5 mg  5 mg Oral TID PRN Destinee Lamas MD        docusate sodium (COLACE) capsule 100 mg  100 mg Oral BID Destinee Lamas MD        [START ON 12/14/2022] therapeutic multivitamin [facility]) tablet 1 Tablet  1 Tablet Oral DAILY Myrna Jones MD        insulin lispro (HUMALOG) injection   SubCUTAneous AC&HS Myrna Jones MD        glucose chewable tablet 16 g  16 g Oral PRN Myrna Jones MD        glucagon (GLUCAGEN) injection 1 mg  1 mg IntraMUSCular PRN Myrna Jones MD        dextrose 10% infusion 0-250 mL  0-250 mL IntraVENous PRN Myrna Jones MD        ondansetron [facility]) injection 4 mg  4 mg IntraVENous Q6H PRN Myrna Jones MD        [START ON 12/14/2022] aspirin tablet 325 mg  325 mg Oral DAILY Myrna Jones MD        atorvastatin (LIPITOR) tablet 80 mg  80 mg Oral QHS Myrna Jones MD        labetaloL (NORMODYNE;TRANDATE) 20 mg/4 mL (5 mg/mL) injection 5 mg  5 mg IntraVENous Q10MIN PRN Myrna Jones MD        heparin (porcine) injection 5,000 Units  5,000 Units SubCUTAneous Q8H Daisy Fernández MD        gabapentin (NEURONTIN) capsule 200 mg  200 mg Oral TID Myrna Jones MD         Current Outpatient Medications   Medication Sig Dispense Refill    bisacodyL (DULCOLAX) 5 mg EC tablet Take 2 Tablets by mouth every forty-eight (48) hours as needed for Constipation. Indications: constipation 10 Tablet 0    polyethylene glycol (MIRALAX) 17 gram packet Take 1 Packet by mouth daily. (Patient taking differently: Take 1 Packet by mouth daily as needed for Constipation.) 5 Packet 0    acetaminophen (TYLENOL) 500 mg tablet Take 1 Tablet by mouth every six (6) hours as needed for Fever. Indications: pain 20 Tablet 0    OTHER Incentive Spirometer - Use as instructed. 1 Each 0    OTHER CBC, BMP, and Mg in 4 days. Results to pcp immediately. DX: Lumbar spinal stenosis without neurological claudication status post anterior lumbar interbody fusion, L4-L5 and L5-S1 1 prone transpoas fusion. 1 Each 0    docusate sodium (Colace) 100 mg capsule Take 100 mg by mouth two (2) times a day.      gabapentin (NEURONTIN) 300 mg capsule Take 300 mg by mouth three (3) times daily. naproxen sodium (NAPROSYN) 220 mg tablet Take 1 Tablet by mouth two (2) times a day. metFORMIN (GLUCOPHAGE) 500 mg tablet Take 500 mg by mouth daily. cyclobenzaprine (FLEXERIL) 5 mg tablet Take 5 mg by mouth three (3) times daily as needed for Muscle Spasm(s). multivitamin (ONE A DAY) tablet Take 1 Tablet by mouth daily. Past History     Past Medical History:  Past Medical History:   Diagnosis Date    Arthritis     back and hands    Chronic pain     back    COVID-19     Summer of  2021    Diabetes ([address]     NIDDM    HLD (hyperlipidemia)     Hypertension     Nausea & vomiting     Surgery only not colonoscopy       Past Surgical History:  Past Surgical History:   Procedure Laterality Date    COLONOSCOPY N/A 6/24/2022    COLONOSCOPY performed by Raine Vail MD at [facility]    HX COLONOSCOPY      HX TUBAL LIGATION         Family History:  Family History   Problem Relation Age of Onset    Breast Cancer Mother 80       Social History:  Social History     Tobacco Use    Smoking status: Never    Smokeless tobacco: Never   Vaping Use    Vaping Use: Never used   Substance Use Topics    Alcohol use: Never    Drug use: Never       Allergies: Allergies   Allergen Reactions    Codeine Nausea and Vomiting    Penicillin G Swelling       Review of Systems     Review of Systems   Constitutional:  Negative for chills and fever. HENT: Negative. Eyes:  Negative for visual disturbance. Respiratory:  Negative for cough and shortness of breath. Cardiovascular:  Negative for chest pain, palpitations and leg swelling. Gastrointestinal:  Negative for abdominal pain, diarrhea, nausea and vomiting. Musculoskeletal:  Positive for arthralgias, back pain and gait problem. Negative for neck pain and neck stiffness. Skin:  Negative for wound. Neurological:  Positive for weakness and numbness. Negative for dizziness, syncope, speech difficulty, light-headedness and headaches.    All other systems reviewed and are negative. Physical Exam   Visit Vitals  BP (!) 138/120   Pulse 87   Resp 20   Ht 5' 6\" (1.676 m)   Wt 80.7 kg (178 lb)   SpO2 100%   BMI 28.73 kg/m²       Physical Exam  Vitals and nursing note reviewed. Constitutional:       General: She is not in acute distress. Appearance: She is not diaphoretic. HENT:      Head: Normocephalic and atraumatic. Nose: No rhinorrhea. Mouth/Throat:      Mouth: Mucous membranes are moist.      Pharynx: Oropharynx is clear. Eyes:      General: No visual field deficit or scleral icterus. Conjunctiva/sclera: Conjunctivae normal.      Pupils: Pupils are equal, round, and reactive to light. Cardiovascular:      Rate and Rhythm: Normal rate and regular rhythm. Pulmonary:      Effort: Pulmonary effort is normal. No respiratory distress. Breath sounds: Normal breath sounds. Abdominal:      General: There is no distension. Palpations: Abdomen is soft. Tenderness: There is no abdominal tenderness. Musculoskeletal:         General: Normal range of motion. Cervical back: Normal range of motion and neck supple. Right lower leg: No edema. Left lower leg: No edema. Lymphadenopathy:      Cervical: No cervical adenopathy. Skin:     General: Skin is warm and dry. Capillary Refill: Capillary refill takes less than 2 seconds. Neurological:      Mental Status: She is alert and oriented to person, place, and time. GCS: GCS eye subscore is 4. GCS verbal subscore is 5. GCS motor subscore is 6. Cranial Nerves: No dysarthria or facial asymmetry. Sensory: Sensory deficit present. Motor: Weakness present. No abnormal muscle tone or pronator drift. Coordination: Heel to Shin Test normal.      Comments: Decreased sensation to light touch RUE, RLE, & R face. Otherwise CN intact. 3/5 strength RLE, 4/5 strength RUE.   4/5 strength LLE, 5/5 strength LUE.    NIHSS 2 for decreased sensation and weakness noted to right extremities. Psychiatric:         Mood and Affect: Mood normal.         Behavior: Behavior normal.     Diagnostic Study Results     Labs -  Recent Results (from the past 12 hour(s))   CBC WITH AUTOMATED DIFF    Collection Time: 12/13/22  7:50 AM   Result Value Ref Range    WBC 5.2 4.6 - 13.2 K/uL    RBC 4.15 (L) 4.20 - 5.30 M/uL    HGB 11.3 (L) 12.0 - 16.0 g/dL    HCT 36.5 35.0 - 45.0 %    MCV 88.0 78.0 - 100.0 FL    MCH 27.2 24.0 - 34.0 PG    MCHC 31.0 31.0 - 37.0 g/dL    RDW 14.6 (H) 11.6 - 14.5 %    PLATELET 217 976 - 469 K/uL    MPV 10.8 9.2 - 11.8 FL    NRBC 0.0 0  WBC    ABSOLUTE NRBC 0.00 0.00 - 0.01 K/uL    NEUTROPHILS 63 40 - 73 %    LYMPHOCYTES 24 21 - 52 %    MONOCYTES 10 3 - 10 %    EOSINOPHILS 2 0 - 5 %    BASOPHILS 1 0 - 2 %    IMMATURE GRANULOCYTES 0 0.0 - 0.5 %    ABS. NEUTROPHILS 3.3 1.8 - 8.0 K/UL    ABS. LYMPHOCYTES 1.3 0.9 - 3.6 K/UL    ABS. MONOCYTES 0.5 0.05 - 1.2 K/UL    ABS. EOSINOPHILS 0.1 0.0 - 0.4 K/UL    ABS. BASOPHILS 0.0 0.0 - 0.1 K/UL    ABS. IMM.  GRANS. 0.0 0.00 - 0.04 K/UL    DF AUTOMATED     METABOLIC PANEL, BASIC    Collection Time: 12/13/22  7:50 AM   Result Value Ref Range    Sodium 142 136 - 145 mmol/L    Potassium 3.7 3.5 - 5.5 mmol/L    Chloride 104 100 - 111 mmol/L    CO2 34 (H) 21 - 32 mmol/L    Anion gap 4 3.0 - 18 mmol/L    Glucose 113 (H) 74 - 99 mg/dL    BUN 17 7.0 - 18 MG/DL    Creatinine 0.94 0.6 - 1.3 MG/DL    BUN/Creatinine ratio 18 12 - 20      eGFR >60 >60 ml/min/1.73m2    Calcium 9.8 8.5 - 10.1 MG/DL   PROTHROMBIN TIME + INR    Collection Time: 12/13/22  7:50 AM   Result Value Ref Range    Prothrombin time 12.9 11.5 - 15.2 sec    INR 0.9 0.8 - 1.2     GLUCOSE, POC    Collection Time: 12/13/22  8:03 AM   Result Value Ref Range    Glucose (POC) 84 70 - 110 mg/dL   ECHO ADULT COMPLETE    Collection Time: 12/13/22 12:35 PM   Result Value Ref Range    IVSd 1.0 (A) 0.6 - 0.9 cm    LVIDd 3.6 (A) 3.9 - 5.3 cm    LVIDs 2.5 cm    LVOT Diameter 2.0 cm    LVPWd 1.0 (A) 0.6 - 0.9 cm    LVOT Cardiac Output 2.8 liter/minute    LVOT Cardiac Output 2.8 liter/minute    LVOT Cardiac Output 3.2 liter/minute    LVOT Cardiac Output 3.2 liter/minute    LVOT Cardiac Output 2.9 liter/minute    LVOT Cardiac Output 2.9 liter/minute    LV Ejection Fraction A2C 57 %    LV Ejection Fraction A4C 61 %    LVOT Peak Gradient 5 mmHg    LVOT Mean Gradient 2 mmHg    LVOT SV 59.0 ml    LVOT Peak Velocity 1.1 m/s    LVOT VTI 18.8 cm    RV Longitudinal Dimension 2.8 cm    LA Volume A/L 45 mL    LA Volume 2C 36 22 - 52 mL    LA Volume 4C 46 22 - 52 mL    AV Area by Peak Velocity 3.1 cm2    AV Area by VTI 2.7 cm2    AV Peak Gradient 5 mmHg    AV Mean Gradient 3 mmHg    AV Peak Velocity 1.1 m/s    AV Mean Velocity 0.8 m/s    AV VTI 21.3 cm    MV A Velocity 0.98 m/s    MV E Wave Deceleration Time 113.2 ms    MV E Velocity 0.56 m/s    LV E' Lateral Velocity 11 cm/s    Ascending Aorta 2.8 cm    Aortic Root 3.0 cm    Fractional Shortening 2D 31 28 - 44 %    LVIDd Index 1.89 cm/m2    LVIDs Index 1.32 cm/m2    LV RWT Ratio 0.56     LV Mass 2D 107.9 67 - 162 g    LV Mass 2D Index 56.8 43 - 95 g/m2    MV E/A 0.57     E/E' Lateral 5.09     LA Volume Index A/L 24 16 - 34 mL/m2    LVOT Stroke Volume Index 31.1 mL/m2    LVOT Area 3.1 cm2    LA Volume Index 2C 19 16 - 34 mL/m2    LA Volume Index 4C 24 16 - 34 mL/m2    Ao Root Index 1.58 cm/m2    Ascending Aorta Index 1.47 cm/m2    AV Velocity Ratio 1.00     LVOT:AV VTI Index 0.88     ISRA/BSA VTI 1.4 cm2/m2    ISRA/BSA Peak Velocity 1.6 cm2/m2    E/E' Ratio (Averaged) 5.35     LV E' Septal Velocity 10 cm/s    E/E' Septal 5.60     TAPSE 1.9 1.7 cm    RA Area 4C 8.4 cm2    RV Free Wall Peak S' 11 cm/s       Radiologic Studies -   XR HIP RT W OR WO PELV 2-3 VWS   Final Result   No acute abnormalities      CTA HEAD NECK W CONT   Final Result      1. No large vessel occlusion. 2.  Moderate RVA origin stenosis.  Cervical carotid and vertebral arteries are   otherwise nonstenotic. 3.  Largest intracranial cerebral arteries are without significant stenosis. 4.  Grade 1 anterolisthesis of C4 on C5 with multilevel disc degeneration and   facet joint arthropathy but no acute cervical spine fracture. 5.  Possible acute on chronic left maxillary sinusitis. Dr. Chau Bernard was notified of the finding of no large vessel occlusion at 8:42   AM.         CT HEAD WO CONT   Final Result   Mild to moderately motion degraded study. No acute intracranial process detected. Note that MRI is more sensitive for   ischemia in the first 24 to 48 hours. Air-fluid level in the left maxillary sinus, may indicate acute sinusitis. These findings were discussed with Fransico Marks by Dontae Casper M.D.   on 12/13/2022 8:28 AM.      MRI BRAIN WO CONT    (Results Pending)   CT SPINE CERV WO CONT    (Results Pending)   CT SPINE LUMB WO CONT    (Results Pending)       Medical Decision Making   I am the first provider for this patient. I reviewed the vital signs, available nursing notes, past medical history, past surgical history, family history and social history. Vital Signs-Reviewed the patient's vital signs. EKG: NSR. Possible LAD. Intervals WNL. Qtc 437. No acute ischemic changes. Records Reviewed: Nursing Notes and Old Medical Records (Time of Review: 8:06 AM)    ED Course: Progress Notes, Reevaluation, and Consults:    ED Course as of 12/13/22 1400   Tue Dec 13, 2022   8871 Per CT: no bleed, no obvious stroke on CT head WO.  [SD]   0599 Per Dr. Chau Bernard, spoke with CT- CTA without LVO, significant ICA stenosis. Non critical RVA origin stenosis. [SD]      ED Course User Index  [SD] Teetee Gonzalez MD       Provider Notes (Medical Decision Making):   MDM  25-year-old female with history of hypertension, hyperlipidemia, diabetes who presents to the emergency department with complaint of fall.   On further evaluation, patient noting worsened right lower extremity weakness and numbness as of 0430 this morning. Last known well was midnight. Hypertensive on arrival.  Otherwise vitals within normal limits. Physical exam notable for decreased sensation to light touch of right upper & lower extremities and face, as well as weakness of RUE/RLE. Concerning for stroke given new neurologic symptoms. Also consider metabolic abnormality vs worsening of chronic deficits. Code stroke called. BG 84.  CT head negative. CTA without LVO or critical stenosis. Teleneurology consulted with initial code stroke called - concern for acute stroke; given aspirin, permissive hypertension, and admission for further stroke work-up including MRI brain. Critical Care  Performed by: Waqar Roland MD  Authorized by: Waqar Roland MD     Critical care provider statement:     Critical care time (minutes):  31    Critical care start time:  12/13/2022 8:00 AM    Critical care end time:  12/13/2022 8:31 AM    Critical care time was exclusive of:  Separately billable procedures and treating other patients    Critical care was necessary to treat or prevent imminent or life-threatening deterioration of the following conditions:  CNS failure or compromise    Critical care was time spent personally by me on the following activities:  Discussions with consultants, examination of patient, obtaining history from patient or surrogate, ordering and performing treatments and interventions, re-evaluation of patient's condition, ordering and review of radiographic studies and ordering and review of laboratory studies    I assumed direction of critical care for this patient from another provider in my specialty: no      Care discussed with: admitting provider        Diagnosis     Clinical Impression:   1. Weakness of right lower extremity    2.  Weakness of right upper extremity      Disposition: Admission    Follow-up Information    None        Patient's Medications   Start Taking    No medications on file   Continue Taking    ACETAMINOPHEN (TYLENOL) 500 MG TABLET    Take 1 Tablet by mouth every six (6) hours as needed for Fever. Indications: pain    BISACODYL (DULCOLAX) 5 MG EC TABLET    Take 2 Tablets by mouth every forty-eight (48) hours as needed for Constipation. Indications: constipation    CYCLOBENZAPRINE (FLEXERIL) 5 MG TABLET    Take 5 mg by mouth three (3) times daily as needed for Muscle Spasm(s). DOCUSATE SODIUM (COLACE) 100 MG CAPSULE    Take 100 mg by mouth two (2) times a day. GABAPENTIN (NEURONTIN) 300 MG CAPSULE    Take 300 mg by mouth three (3) times daily. METFORMIN (GLUCOPHAGE) 500 MG TABLET    Take 500 mg by mouth daily. MULTIVITAMIN (ONE A DAY) TABLET    Take 1 Tablet by mouth daily. NAPROXEN SODIUM (NAPROSYN) 220 MG TABLET    Take 1 Tablet by mouth two (2) times a day. OTHER    Incentive Spirometer - Use as instructed. OTHER    CBC, BMP, and Mg in 4 days. Results to pcp immediately. DX: Lumbar spinal stenosis without neurological claudication status post anterior lumbar interbody fusion, L4-L5 and L5-S1 1 prone transpoas fusion. POLYETHYLENE GLYCOL (MIRALAX) 17 GRAM PACKET    Take 1 Packet by mouth daily. These Medications have changed    No medications on file   Stop Taking    No medications on file     Disclaimer: Sections of this note are dictated using utilizing voice recognition software. Minor typographical errors may be present. If questions arise, please do not hesitate to contact me or call our department.

## 2022-12-13 NOTE — H&P
History and Physical    Chief complaint: Fall and right-sided weakness around 3:30 AM    HPI:     Onur Linda is a 67 y.o.  female who presented to the emergency room for further evaluation of fall and right-sided weakness started around 3:30 AM.  Patient has history of right leg numbness secondary to recent lumbar surgery with decreased mobility. She fell to the ground around 3.30 this morning. Because patient having right hand numbness on clinical examination performed by ED provider, Code S was called. Patient had CT head and CT head done both were negative. Tele-Neurologist was consulted and tPA was not given as patient was out of timing window. Patient has had some back pain currently. But nothing new. Patient also noticed numbness of the right hand without any neck pain. Denies any headaches or dizziness. No visual disturbances. No dysphagia. Denies any chest pain or shortness of breath or cough. No fever or chills. No abdominal pain or nausea or vomiting. She also told me that her primary care provider stop her metoprolol and put her on triamterene. Denies smoking cigarettes or drinking any alcohol. Past Medical History:   Diagnosis Date    Arthritis     back and hands    Chronic pain     back    COVID-19     Summer of  2021    Diabetes [facility])     NIDDM    HLD (hyperlipidemia)     Hypertension     Nausea & vomiting     Surgery only not colonoscopy      Past Surgical History:   Procedure Laterality Date    COLONOSCOPY N/A 6/24/2022    COLONOSCOPY performed by Mala Manzo MD at [facility]COPY    HX COLONOSCOPY      HX TUBAL LIGATION       Family History   Problem Relation Age of Onset    Breast Cancer Mother 80      Social History     Tobacco Use    Smoking status: Never    Smokeless tobacco: Never   Substance Use Topics    Alcohol use: Never       Prior to Admission medications    Medication Sig Start Date End Date Taking?  Authorizing Provider   bisacodyL (DULCOLAX) 5 mg EC tablet Take 2 Tablets by mouth every forty-eight (48) hours as needed for Constipation. Indications: constipation 10/4/22   Maribell Odom I NP   polyethylene glycol (MIRALAX) 17 gram packet Take 1 Packet by mouth daily. Patient taking differently: Take 1 Packet by mouth daily as needed for Constipation. 10/4/22   Maribell Odom I NP   acetaminophen (TYLENOL) 500 mg tablet Take 1 Tablet by mouth every six (6) hours as needed for Fever. Indications: pain 10/4/22   Maribell Odom NP   OTHER Incentive Spirometer - Use as instructed. 10/4/22   Maribell Odom I, NP   OTHER CBC, BMP, and Mg in 4 days. Results to pcp immediately. DX: Lumbar spinal stenosis without neurological claudication status post anterior lumbar interbody fusion, L4-L5 and L5-S1 1 prone transpoas fusion. 10/4/22   Maribell Odom I, NP   docusate sodium (Colace) 100 mg capsule Take 100 mg by mouth two (2) times a day. Provider, Historical   gabapentin (NEURONTIN) 300 mg capsule Take 300 mg by mouth three (3) times daily. Provider, Historical   naproxen sodium (NAPROSYN) 220 mg tablet Take 1 Tablet by mouth two (2) times a day. Provider, Historical   metFORMIN (GLUCOPHAGE) 500 mg tablet Take 500 mg by mouth daily. Provider, Historical   cyclobenzaprine (FLEXERIL) 5 mg tablet Take 5 mg by mouth three (3) times daily as needed for Muscle Spasm(s). Provider, Historical   multivitamin (ONE A DAY) tablet Take 1 Tablet by mouth daily. Provider, Historical     Allergies   Allergen Reactions    Codeine Nausea and Vomiting    Penicillin G Swelling        Review of Systems:  Review of Systems-  GENERAL: No fever, chills, malaise, weight changes  HEENT: No change in vision, no earache, tinnitus, sore throat or sinus congestion. NECK: No pain or stiffness. PULMONARY: No shortness of breath, cough or wheeze.    Cardiovascular: no pnd or orthopnea, no CP  GASTROINTESTINAL: No abdominal pain, nausea, vomiting or diarrhea, melena or bright red blood per rectum. GENITOURINARY: No urinary frequency, urgency, hesitancy or dysuria. MUSCULOSKELETAL: No joint or muscle pain, no recent trauma. Back pain present. DERMATOLOGIC: No rash, no itching, no lesions. ENDOCRINE: No polyuria, polydipsia, no heat or cold intolerance. No recent change in weight. HEMATOLOGICAL: No anemia or easy bruising or bleeding. NEUROLOGIC: No headaches or seizures. Right-sided weakness present especially numbness in upper extremity. Objective: Intake and Output:    No intake/output data recorded. No intake/output data recorded. Physical Exam:     BP (!) 138/120   Pulse 87   Resp 20   Ht 5' 6\" (1.676 m)   Wt 80.7 kg (178 lb)   SpO2 100%   BMI 28.73 kg/m²       General appearance - alert, well appearing, and in no distress  Eyes - sclera anicteric, no pallor  Nose - no obvious nasal discharge. Neck - supple, no JVD, trachea is midline  Chest -clear air entry noted in bases, no wheezes currently  Heart - S1 and S2 normal  Abdomen - soft, nontender, nondistended, Bowel sounds present  Neurological - alert, oriented, normal speech, motor strength 3-4 out of 5 in right lower extremity, 5 out of 5 left lower extremity, 5-5 in both upper extremities, sensation to touch normal in both upper extremity but diminished in right lower extremity. No tremors noted currently.   Musculoskeletal - no joint tenderness or erythema of knees bilaterally  Extremities - no pedal edema noted  Psych -no agitation or hallucination noted currently    Data Review:   Recent Results (from the past 24 hour(s))   CBC WITH AUTOMATED DIFF    Collection Time: 12/13/22  7:50 AM   Result Value Ref Range    WBC 5.2 4.6 - 13.2 K/uL    RBC 4.15 (L) 4.20 - 5.30 M/uL    HGB 11.3 (L) 12.0 - 16.0 g/dL    HCT 36.5 35.0 - 45.0 %    MCV 88.0 78.0 - 100.0 FL    MCH 27.2 24.0 - 34.0 PG    MCHC 31.0 31.0 - 37.0 g/dL    RDW 14.6 (H) 11.6 - 14.5 % PLATELET 327 057 - 572 K/uL    MPV 10.8 9.2 - 11.8 FL    NRBC 0.0 0  WBC    ABSOLUTE NRBC 0.00 0.00 - 0.01 K/uL    NEUTROPHILS 63 40 - 73 %    LYMPHOCYTES 24 21 - 52 %    MONOCYTES 10 3 - 10 %    EOSINOPHILS 2 0 - 5 %    BASOPHILS 1 0 - 2 %    IMMATURE GRANULOCYTES 0 0.0 - 0.5 %    ABS. NEUTROPHILS 3.3 1.8 - 8.0 K/UL    ABS. LYMPHOCYTES 1.3 0.9 - 3.6 K/UL    ABS. MONOCYTES 0.5 0.05 - 1.2 K/UL    ABS. EOSINOPHILS 0.1 0.0 - 0.4 K/UL    ABS. BASOPHILS 0.0 0.0 - 0.1 K/UL    ABS. IMM. GRANS. 0.0 0.00 - 0.04 K/UL    DF AUTOMATED     METABOLIC PANEL, BASIC    Collection Time: 12/13/22  7:50 AM   Result Value Ref Range    Sodium 142 136 - 145 mmol/L    Potassium 3.7 3.5 - 5.5 mmol/L    Chloride 104 100 - 111 mmol/L    CO2 34 (H) 21 - 32 mmol/L    Anion gap 4 3.0 - 18 mmol/L    Glucose 113 (H) 74 - 99 mg/dL    BUN 17 7.0 - 18 MG/DL    Creatinine 0.94 0.6 - 1.3 MG/DL    BUN/Creatinine ratio 18 12 - 20      eGFR >60 >60 ml/min/1.73m2    Calcium 9.8 8.5 - 10.1 MG/DL   PROTHROMBIN TIME + INR    Collection Time: 12/13/22  7:50 AM   Result Value Ref Range    Prothrombin time 12.9 11.5 - 15.2 sec    INR 0.9 0.8 - 1.2     GLUCOSE, POC    Collection Time: 12/13/22  8:03 AM   Result Value Ref Range    Glucose (POC) 84 70 - 110 mg/dL     CT Results  (Last 48 hours)                 12/13/22 0823  CTA HEAD NECK W CONT Preliminary result      This result has not been signed. Information might be incomplete. Narrative:  No large vessel occlusion. No significant ICA stenosis. Non critical RVA origin stenosis. Discussed with Dr. Aba Milligan at 8:42 am               12/13/22 0820  CT HEAD WO CONT Final result    Impression:  Mild to moderately motion degraded study. No acute intracranial process detected. Note that MRI is more sensitive for   ischemia in the first 24 to 48 hours. Air-fluid level in the left maxillary sinus, may indicate acute sinusitis.        These findings were discussed with DAVEY JAVIER ABAD by Simon Turner M.D.   on 12/13/2022 8:28 AM.       Narrative:  EXAM: CT HEAD WO CONT       CLINICAL INDICATION/HISTORY: 67 years Female. CODE STROKE    ADDITIONAL HISTORY: None       TECHNIQUE: CT of the head from the vertex to the skull base performed. No IV   contrast administered. All CT scans at this facility are performed using dose optimization technique as   appropriate to a performed exam, to include automated exposure control,   adjustment of the mA and/or kV according to patient size (including appropriate   matching for site specific examination) or use of iterative reconstruction   technique. COMPARISON: 4/12/2022       FINDINGS:        Bilateral basal ganglia calcifications. No evidence of acute intracranial   hemorrhage. The ventricles, sulci, and cisterns are concordant with cerebral   volume. There is no mass effect. There is no midline shift. Basal cisterns are   patent. No evidence of acute fracture. Air-fluid level in the left maxillary   sinus, which may indicate acute sinusitis. The visualized paranasal sinuses and   mastoid air cells are otherwise essentially clear. The evaluation is mild to moderately degraded due to motion and streak artifact. Assessment:     Active Problems:    Fall (12/13/2022)      Stroke (cerebrum) ([facility] [address] (12/13/2022)      Right sided weakness (12/13/2022)      1. Right-sided weakness  2. TIA versus stroke  3. Chronic right leg numbness with decreased mobility secondary to recent lumbar spine surgery  4. Diabetes mellitus  5. Hypertension  6. Dyslipidemia  7. Osteoarthritis  8. S/p recent anterior lumbar interbody fusion, L4-L5 and L5-S1 fusion. Plan:     Patient will be admitted to telemetry floor. Stroke order set has been ordered. We will start patient on aspirin and Lipitor. Neurology has been consulted. MRI of brain has been ordered. Echocardiogram will be performed.   We will also get CT L-spine and CT C-spine. Patient will be treated with insulin sliding scale in place of metformin for diabetes management. Patient was taking low-dose metoprolol at home which will be held. PT and OT to follow this patient. Patient is a full code. Total time to take care of this patient was 65 minutes and more than 50% of time was spent counseling and coordinating care. Disclaimer: Sections of this note are dictated using utilizing voice recognition software, which may have resulted in some phonetic based errors in grammar and contents. Even though attempts were made to correct all the mistakes, some may have been missed, and remained in the body of the document. If questions arise, please contact our department.

## 2022-12-13 NOTE — ED NOTES
Patient was seen and examined with the resident. The patient initially came in for a fall and some numbness into her right leg and has a history of recent lumbar surgery and long recovery with decreased mobility. The patient notes that she got up to go to the bathroom approximately 330 4:00 and felt her right leg was more numb than usual and weak and then she fell to the ground. Patient did have some numbness into the right hand as well so at that point the resident empty me and we decided to activate a code stroke. Patient was last known well at midnight is not on any anticoagulation and is not a candidate for tPA. The patient will be getting a CT of the head and CTA of the head neck for evaluation however peripheral process is likely given her surgeries but given elevated blood pressure and increasing numbness and weakness would like to evaluate the patient for stroke. Patient's blood glucose is 84. Cynthia Wilkerson DO 8:10 AM

## 2022-12-13 NOTE — PROGRESS NOTES
completed the initial Spiritual Assessment of the patient, and offered Pastoral Care support to the patient. There is no family here at this time. Patient stated that she does not have an advance directive, when she was asked. Patient does not have any Confucianist/cultural needs that will affect patients preferences in health care. Chaplains will continue to follow and will provide pastoral care on an as needed/requested basis.     Mauri Masterson  Spiritual Care Department  [phone]

## 2022-12-13 NOTE — CONSULTS
A 67years old female patient with medical history of arthritis, chronic low back pain, diabetes, hyperlipidemia, and hypertension was brought to the emergency room after a fall. This morning, at around 4 to 5 AM she fell down while trying to go to the bathroom. She mentioned that her right leg gave out on her and fell face forward. No loss of consciousness. She has difficulty standing up and was on the floor for a couple of hours. She also noticed that the right upper extremity is weak. Has numbness on the right lower extremity. Mild numbness over the right upper extremity. There is no facial drooping. No change in her speech. Denied having any headache, nausea, vomiting. Patient had recent lumbar sacral surgery and has been in the rehab for about 11 days. She is attending outpatient physical therapy currently. From the lower back pain, she used to have radiating pain to the right lower extremity with numbness. Right lower extremity is weaker at baseline. No previous history of stroke. CT scan of the brain did not show any acute changes. CT angiography of the head and neck did not show any LVO. No significant intracranial artery stenosis.     Social History     Socioeconomic History    Marital status: SINGLE     Spouse name: Not on file    Number of children: Not on file    Years of education: Not on file    Highest education level: Not on file   Occupational History    Not on file   Tobacco Use    Smoking status: Never    Smokeless tobacco: Never   Vaping Use    Vaping Use: Never used   Substance and Sexual Activity    Alcohol use: Never    Drug use: Never    Sexual activity: Not on file   Other Topics Concern    Not on file   Social History Narrative    Not on file     Social Determinants of Health     Financial Resource Strain: Not on file   Food Insecurity: Not on file   Transportation Needs: Not on file   Physical Activity: Not on file   Stress: Not on file   Social Connections: Not on file Intimate Partner Violence: Not on file   Housing Stability: Not on file       Family History   Problem Relation Age of Onset    Breast Cancer Mother 80        Current Facility-Administered Medications   Medication Dose Route Frequency Provider Last Rate Last Admin    0.9% sodium chloride infusion  100 mL/hr IntraVENous CONTINUOUS Becca Andrea MD        bisacodyL (DULCOLAX) tablet 10 mg  10 mg Oral Q48H PRN Becca Andrea MD        acetaminophen (TYLENOL) tablet 500 mg  500 mg Oral Q6H PRN Becca Andrea MD        cyclobenzaprine (FLEXERIL) tablet 5 mg  5 mg Oral TID PRN Becca Andrea MD        docusate sodium (COLACE) capsule 100 mg  100 mg Oral BID Becca Andrea MD        [START ON 12/14/2022] therapeutic multivitamin (THERAGRAN) tablet 1 Tablet  1 Tablet Oral DAILY Becca Andrea MD        insulin lispro (HUMALOG) injection   SubCUTAneous AC&HS Becca Andrea MD        glucose chewable tablet 16 g  16 g Oral PRN Becca Andrea MD        glucagon (GLUCAGEN) injection 1 mg  1 mg IntraMUSCular PRN Becca Andrea MD        dextrose 10% infusion 0-250 mL  0-250 mL IntraVENous PRN Becca Andrea MD        ondansetron (ZOFRAN) injection 4 mg  4 mg IntraVENous Q6H PRN Becca Andrea MD        [START ON 12/14/2022] aspirin tablet 325 mg  325 mg Oral DAILY Becca Andrea MD        atorvastatin (LIPITOR) tablet 80 mg  80 mg Oral QHS Gerri Fernández MD        labetaloL (NORMODYNE;TRANDATE) 20 mg/4 mL (5 mg/mL) injection 5 mg  5 mg IntraVENous Q10MIN PRN Becca Andrea MD        heparin (porcine) injection 5,000 Units  5,000 Units SubCUTAneous Q8H Rafiq Fernández MD        gabapentin (NEURONTIN) capsule 200 mg  200 mg Oral TID Becca Andrea MD         Current Outpatient Medications   Medication Sig Dispense Refill    bisacodyL (DULCOLAX) 5 mg EC tablet Take 2 Tablets by mouth every forty-eight (48) hours as needed for Constipation.  Indications: constipation 10 Tablet 0 polyethylene glycol (MIRALAX) 17 gram packet Take 1 Packet by mouth daily. (Patient taking differently: Take 1 Packet by mouth daily as needed for Constipation.) 5 Packet 0    acetaminophen (TYLENOL) 500 mg tablet Take 1 Tablet by mouth every six (6) hours as needed for Fever. Indications: pain 20 Tablet 0    OTHER Incentive Spirometer - Use as instructed. 1 Each 0    OTHER CBC, BMP, and Mg in 4 days. Results to pcp immediately. DX: Lumbar spinal stenosis without neurological claudication status post anterior lumbar interbody fusion, L4-L5 and L5-S1 1 prone transpoas fusion. 1 Each 0    docusate sodium (Colace) 100 mg capsule Take 100 mg by mouth two (2) times a day.      gabapentin (NEURONTIN) 300 mg capsule Take 300 mg by mouth three (3) times daily. naproxen sodium (NAPROSYN) 220 mg tablet Take 1 Tablet by mouth two (2) times a day. metFORMIN (GLUCOPHAGE) 500 mg tablet Take 500 mg by mouth daily. cyclobenzaprine (FLEXERIL) 5 mg tablet Take 5 mg by mouth three (3) times daily as needed for Muscle Spasm(s). multivitamin (ONE A DAY) tablet Take 1 Tablet by mouth daily. Past Medical History:   Diagnosis Date    Arthritis     back and hands    Chronic pain     back    COVID-19     Summer of  2021    Diabetes [facility])     NIDDM    HLD (hyperlipidemia)     Hypertension     Nausea & vomiting     Surgery only not colonoscopy       Past Surgical History:   Procedure Laterality Date    COLONOSCOPY N/A 6/24/2022    COLONOSCOPY performed by Maddy Palacios MD at [facility]    HX COLONOSCOPY      HX TUBAL LIGATION         Allergies   Allergen Reactions    Codeine Nausea and Vomiting    Penicillin G Swelling       Patient Active Problem List   Diagnosis Code    Spinal stenosis of lumbar region at multiple levels M48.061    Impaired mobility and ADLs Z74.09, Z78.9    Fall W19. XXXA    Stroke (cerebrum) ([facility]) I63.9    Right sided weakness R53.1         Review of Systems:   Constitutional no fever or chills  Skin denies rash or itching  HENT  no hearing lose  Eyes denies diplopia vision lose  Respiratory denies shortness of breath  Cardiovascular denies chest pain  Gastrointestinal denies nausea, vomitin  Genitourinary denies incontinence  Musculoskeletal: has LBP; s/p recent surgery. Endocrine denies weight change  Hematology denies easy bruising or bleeding   Neurological as above in HPI      PHYSICAL EXAMINATION:      VITAL SIGNS:  Visit Vitals  BP (!) 138/120   Pulse 87   Resp 20   Ht 5' 6\" (1.676 m)   Wt 80.7 kg (178 lb)   SpO2 100%   BMI 28.73 kg/m²       GENERAL: In no apparent distress. EXTREMITIES: Muscle tone is normal.  HEAD:   The patient is normocephalic. NEUROLOGIC EXAMINATION  Mental status: Awake, alert, oriented X3, follows simple and complex commands, no neglect or extinction. Speech and languge: fluent, coherent, naming and repitition intact, reading and comprehension intact  CN: VFF, EOMI, PERRLA, face sensation intact , no facial asymmetry noted, palate elevation symmetric bilat, SS+SCM 5/5 bilat, tongue midline  Motor: no pronator drift, tone normal throughout, strength 5/5 throughout except the RLE: unbale to lift it off the bed(possible poor effort from low back pain). Sensory: Decreased LT and PP over the right side(including the face). Coordination: FNF accurate w/o dysmetria  DTR: 2+ throughout  Gait: not tested   Study Result    Narrative & Impression   EXAM: CT HEAD WO CONT     CLINICAL INDICATION/HISTORY: 67 years Female. CODE STROKE   ADDITIONAL HISTORY: None     TECHNIQUE: CT of the head from the vertex to the skull base performed. No IV  contrast administered.      All CT scans at this facility are performed using dose optimization technique as  appropriate to a performed exam, to include automated exposure control,  adjustment of the mA and/or kV according to patient size (including appropriate  matching for site specific examination) or use of iterative reconstruction  technique. COMPARISON: 4/12/2022     FINDINGS:      Bilateral basal ganglia calcifications. No evidence of acute intracranial  hemorrhage. The ventricles, sulci, and cisterns are concordant with cerebral  volume. There is no mass effect. There is no midline shift. Basal cisterns are  patent. No evidence of acute fracture. Air-fluid level in the left maxillary  sinus, which may indicate acute sinusitis. The visualized paranasal sinuses and  mastoid air cells are otherwise essentially clear. The evaluation is mild to moderately degraded due to motion and streak artifact. IMPRESSION  Mild to moderately motion degraded study. No acute intracranial process detected. Note that MRI is more sensitive for  ischemia in the first 24 to 48 hours. Air-fluid level in the left maxillary sinus, may indicate acute sinusitis. Study Result    Narrative & Impression   CTA NECK/BRAIN     CLINICAL INDICATIONS: Awoke this morning with increased right-sided weakness. TECHNIQUE: Helical CT scan of the brain and neck were performed at 1.25 mm  intervals during rapid IV bolus contrast administration. These data were  reconstructed at .625 mm intervals for vascular analysis. The data was also  reviewed at 2.5 mm intervals for accompanying soft tissue analysis. 3D post  processed images, including surface shaded displays, were produced for this exam  on independent console, permanently archived and interpreted. All CT scans at this facility are performed using dose optimization technique as  appropriate to a performed exam, to include automated exposure control,  adjustment of the MA and/or kV according to patient size (including appropriate  matching for site-specific examinations) or use of  iterative reconstruction  technique. Additional reformatted images for cervical spine evaluation. CONTRAST: 67 cc Isovue-370 IV     CTA SOFT TISSUE ANALYSIS: Imaged lung apices are clear.  Soft tissue and mucosal  spaces of the neck are unremarkable. Circumferential mucosal thickening and  small dependent effusion in the left maxillary antrum. No enhancing brain  abnormalities. Additional cervical spine re-formations were obtained from the CTA data set. There is mild anterolisthesis of C4 on C5 and straightening of the lordosis. Normal alignment at the craniocervical junction. Normal vertebral body heights. No evidence of acute fracture. Multilevel chronic disc degeneration consisting  of mild disc height narrowing and endplate osteophytes. Multilevel severe bony  hypertrophy of the facet joints. CTA NECK VASCULAR ANALYSIS:      Aortic arch: Visualized aorta is normal caliber. Common innominate and left  carotid origins. Innominate: Patent, no stenosis. Right Subclavian:Patent, no stenosis. Left Subclavian:Patent, no stenosis. Right carotid:  -CCA: Patent. -ECA: Patent. -ICA: Patent. 0% stenosis of proximal ICA by NASCET criteria. Left carotid:  -CCA: Patent. -ECA: Patent. -ICA: Patent. 0% stenosis of proximal ICA by NASCET criteria. Right vertebral: Patent. Atherosclerotic calcification at the origin with  estimated moderate stenosis. No stenosis elsewhere. Left vertebral: Patent. Mild luminal narrowing caused by extrinsic compression  from osteophytes but no atherosclerotic stenosis. CTA BRAIN VASCULAR ANALYSIS:      Right anterior circulation:  -ICA:Patent. -HUDSON:Patent. -MCA: Patent. Left anterior circulation:  -ICA:Patent. -HUDSON:Patent. -MCA: Patent. Posterior circulation:  -RVA:Patent. -LVA: Patent.  -Basilar: Patent. -Right PCA: Patent.  -Left PCA: Patent. IMPRESSION     1. No large vessel occlusion. 2.  Moderate RVA origin stenosis. Cervical carotid and vertebral arteries are  otherwise nonstenotic. 3.  Largest intracranial cerebral arteries are without significant stenosis.   4.  Grade 1 anterolisthesis of C4 on C5 with multilevel disc degeneration and  facet joint arthropathy but no acute cervical spine fracture. 5.  Possible acute on chronic left maxillary sinusitis. CBC:   Lab Results   Component Value Date/Time    WBC 5.2 12/13/2022 07:50 AM    RBC 4.15 (L) 12/13/2022 07:50 AM    HGB 11.3 (L) 12/13/2022 07:50 AM    HCT 36.5 12/13/2022 07:50 AM    PLATELET 670 37/90/2826 07:50 AM     BMP:   Lab Results   Component Value Date/Time    Glucose 113 (H) 12/13/2022 07:50 AM    Sodium 142 12/13/2022 07:50 AM    Potassium 3.7 12/13/2022 07:50 AM    Chloride 104 12/13/2022 07:50 AM    CO2 34 (H) 12/13/2022 07:50 AM    BUN 17 12/13/2022 07:50 AM    Creatinine 0.94 12/13/2022 07:50 AM    Calcium 9.8 12/13/2022 07:50 AM     CMP:   Lab Results   Component Value Date/Time    Glucose 113 (H) 12/13/2022 07:50 AM    Sodium 142 12/13/2022 07:50 AM    Potassium 3.7 12/13/2022 07:50 AM    Chloride 104 12/13/2022 07:50 AM    CO2 34 (H) 12/13/2022 07:50 AM    BUN 17 12/13/2022 07:50 AM    Creatinine 0.94 12/13/2022 07:50 AM    Calcium 9.8 12/13/2022 07:50 AM    Anion gap 4 12/13/2022 07:50 AM    BUN/Creatinine ratio 18 12/13/2022 07:50 AM    Alk. phosphatase 73 09/24/2022 06:52 PM    Protein, total 6.5 09/24/2022 06:52 PM    Albumin 2.5 (L) 09/24/2022 06:52 PM    Globulin 4.0 09/24/2022 06:52 PM    A-G Ratio 0.6 (L) 09/24/2022 06:52 PM     Coagulation:   Lab Results   Component Value Date/Time    Prothrombin time 12.9 12/13/2022 07:50 AM    INR 0.9 12/13/2022 07:50 AM        Impression:   A 67years old female patient with above medical problems including recent lumbar fusion surgery and currently attending physical therapy was brought to the emergency room after a fall. Noticed to have right-sided body weakness, more over the right lower extremity. On physical examination, has decreased sensation on the right side including the face. No changes in her speech.   CT scan did not show any acute changes; has air-fluid level over the left maxillary sinus suggesting possible sinusitis. CT angiography of the head and neck did not show any LVO. TTE showed normal ejection fraction; there is a small PFO. The right lower extremity weakness could be from her lower back; but since she also has involvement of the face and upper extremity, need to rule out stroke. Plan:   -Continuous telemetry  -MRI Brain w/o contrast  -C/w Aspirin 325 mg PO/day and Atorvastatin 80 mg PO/day  -HgbA1C and Fasting Lipid Panel  -PT/OT/ST eval and follow recommendations  -Call for questions  -Will follow patient. PLEASE NOTE:   This document has been produced using voice recognition software. Unrecognized errors in transcription may be present.

## 2022-12-13 NOTE — ED TRIAGE NOTES
Pt arrived via  with no complaints. Pt did have a fall about 3 hours ago. Family wanted pt to be evaluated in ED. Pt was ambulatory on scene.  0 pain at this time

## 2022-12-13 NOTE — PROGRESS NOTES
Problem: Self Care Deficits Care Plan (Adult)  Goal: *Acute Goals and Plan of Care (Insert Text)  Description: Occupational Therapy Goals  Initiated 12/13/2022 within 7 day(s). 1.  Patient will perform grooming with modified independence. 2.  Patient will perform bathing with modified independence using adaptive equipment. 3.  Patient will perform upper body dressing and lower body dressing with modified independence using adaptive equipment. .  4.  Patient will perform toilet transfers with modified independence using RW with good balance. 5.  Patient will perform all aspects of toileting with modified independence. 6.  Patient will participate in upper extremity therapeutic exercise/activities with modified independence for 8 minutes. 7.  Patient will utilize energy conservation techniques during functional activities with min verbal cues. Prior Level of Function: Mod I with ADLs, used AE for LB ADLs: reacher, sockaide and LH bathsponge, Mod I functional mobility using RW     Outcome: Progressing Towards Goal   OCCUPATIONAL THERAPY EVALUATION    Patient: Aquiles Gonzalez (79 y.o. female)  Date: 12/13/2022  Primary Diagnosis: Right sided weakness [R53.1]  Fall [W19. 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 Patient presents lying semi-reclined on ED stretcher. A & O x 4, no c/o pain, reports numbness ongoing in RLE and slightly in RUE/face. Bed mobility: CGA supine <-> sit on edge of ED stretcher,. STS w/ CGA, side stepping towards left for simulated bedside commode transfers with CGA using RW, Fair balance. Patient resting  lying semi reclined on ED stretcher, call bell within reach & pt verbalized understanding and provided return demonstration to utilize for assist e.g. functional transfers in order to prevent falls.        Patient will benefit from skilled intervention to address the above TB reading in 48-72 hours: Must be read after 9:12 AM on 10/14/2022 or before 9:12 AM on 10/15/2022.  Expect a call to schedule your appointment.    MANTOUX TUBERCULIN (a.k.a. TB) SKIN TEST      What is Tuberculosis/TB?  Tuberculosis/TB is an infectious disease, which is spread through the air by tiny germs when people cough, sneeze, speak or sing. TB germs are very small and can remain in the air for a long period of time. TB germs most oft  en settle in your lungs, but may also settle in other organs of your body. TB germs can be present in your body without making you ill. This is called TB INFECTION. TB infection cannot be spread to other people. When your  body’s defenses become weak and can no longer control the TB germs they multiply, this is called TB DISEASE. It can take month(s) to year(s) for TB infection to become TB disease. The TB SKIN TEST can show if a person has  been “infected” by TB germs.    How is the Mantoux Tuberculin/TB skin test given?  A very small amount of a product called Purified Protein Derivative (PPD) is injected just under the top layer of the skin on the forearm. Persons who have been infected by the TB germs usually react to the test by developing swelling at the injection site. The skin test results must be read 48 to 72 hours after given. Failure to return within this time frame means the test will need to be repeated.    Side effects…  Side effects from a skin test are rare and may include itching and discomfort at the injection site and very rarely the possibility of a blister, ulceration or necrosis (dead tissue) at the site if the injection.   impairments. Patient's rehabilitation potential is considered to be Good  Factors which may influence rehabilitation potential include:   []             None noted  []             Mental ability/status  [x]             Medical condition  []             Home/family situation and support systems  []             Safety awareness  []             Pain tolerance/management  []             Other:      PLAN :  Recommendations and Planned Interventions:   [x]               Self Care Training                  [x]      Therapeutic Activities  [x]               Functional Mobility Training   []      Cognitive Retraining  [x]               Therapeutic Exercises           [x]      Endurance Activities  [x]               Balance Training                    [x]      Neuromuscular Re-Education  []               Visual/Perceptual Training     [x]      Home Safety Training  [x]               Patient Education                   [x]      Family Training/Education  []               Other (comment):    Frequency/Duration: Patient will be followed by occupational therapy 3-5 times a week to address goals. Further Equipment Recommendations for Discharge: wheelchair     AMPAC: Current research shows that an AM-PAC score of 17 or less is not associated with a discharge to the patient's home setting. Based on an AM-PAC score of 16/24 and their current ADL deficits; it is recommended that the patient have 5-7 sessions per week of Occupational Therapy at d/c to increase the patient's independence. Currently, this patient demonstrates the potential endurance, and/or tolerance for 3 hours of therapy each day at d/c. This AMPAC score should be considered in conjunction with interdisciplinary team recommendations to determine the most appropriate discharge setting. Patient's social support, diagnosis, medical stability, and prior level of function should also be taken into consideration.      SUBJECTIVE:   Patient stated I have a 3 in 1 commode that I use in the shower.     OBJECTIVE DATA SUMMARY:     Past Medical History:   Diagnosis Date    Arthritis     back and hands    Chronic pain     back    COVID-19     Summer of  2021    Diabetes ([address]     NIDDM    HLD (hyperlipidemia)     Hypertension     Nausea & vomiting     Surgery only not colonoscopy     Past Surgical History:   Procedure Laterality Date    COLONOSCOPY N/A 6/24/2022    COLONOSCOPY performed by Gilles Marks MD at [facility]    HX COLONOSCOPY      HX TUBAL LIGATION       Barriers to Learning/Limitations: None  Compensate with: visual, verbal, tactile, kinesthetic cues/model    Home Situation:   Home Situation  Home Environment: Trailer/mobile home  # Steps to Enter: 0  Wheelchair Ramp: Yes  One/Two Story Residence: One story  Living Alone: Yes  Support Systems: Child(chago)  Current DME Used/Available at Home: Walker, rolling, Commode, bedside, Grab bars, Adaptive dressing aides  Tub or Shower Type: Shower  []  Right hand dominant   []  Left hand dominant    Cognitive/Behavioral Status:  Neurologic State: Alert  Orientation Level: Oriented X4  Cognition: Follows commands  Safety/Judgement: Fall prevention    Skin: appears intact  Edema: none noted     Vision/Perceptual:  appears intact      Coordination: BUE  Coordination: Within functional limits  Fine Motor Skills-Upper: Left Intact; Right Intact    Gross Motor Skills-Upper: Left Intact; Right Intact    Balance:  Sitting: Impaired  Sitting - Static: Fair (occasional)  Sitting - Dynamic: Fair (occasional)  Standing: Impaired; With support  Standing - Static: Fair  Standing - Dynamic : Fair    Strength: BUE  Strength:  Within functional limits  Tone & Sensation: BUE  Tone: Normal  Sensation: Intact     Range of Motion: BUE  AROM: Within functional limits     Functional Mobility and Transfers for ADLs:  Bed Mobility:     Supine to Sit: Contact guard assistance  Sit to Supine: Contact guard assistance  Scooting: Contact guard assistance  Transfers:  Sit to Stand: Contact guard assistance  Stand to Sit: Contact guard assistance    ADL Assessment:   Feeding: Modified independent    Oral Facial Hygiene/Grooming: Stand-by assistance    Bathing: Minimum assistance    Upper Body Dressing: Stand-by assistance    Lower Body Dressing: Moderate assistance    Toileting: Minimum assistance       Cognitive Retraining  Safety/Judgement: Fall prevention    Pain:  Pain level pre-treatment: 0/10   Pain level post-treatment: 0/10     Activity Tolerance:   poor  Please refer to the flowsheet for vital signs taken during this treatment. After treatment:   [] Patient left in no apparent distress sitting up in chair  [x] Patient left in no apparent distress in bed  [x] Call bell left within reach  [x] Nursing notified  [] Caregiver present  [] Bed alarm activated    COMMUNICATION/EDUCATION:   [x] Role of Occupational Therapy in the acute care setting  [x] Home safety education was provided and the patient/caregiver indicated understanding. [x] Patient/family have participated as able in goal setting and plan of care. [x] Patient/family agree to work toward stated goals and plan of care. [] Patient understands intent and goals of therapy, but is neutral about his/her participation. [] Patient is unable to participate in goal setting and plan of care. Thank you for this referral.  Rick Kline  Time Calculation: 24 mins    Eval Complexity: History: MEDIUM Complexity : Expanded review of history including physical, cognitive and psychosocial  history ; Examination: MEDIUM Complexity : 3-5 performance deficits relating to physical, cognitive , or psychosocial skils that result in activity limitations and / or participation restrictions; Decision Making:MEDIUM Complexity : Patient may present with comorbidities that affect occupational performnce.  Miniml to moderate modification of tasks or assistance (eg, physical or verbal ) with assesment(s) is necessary to enable patient to complete evaluation     [facility]® Daily Activity Inpatient Short Form (6-Clicks)*    How much HELP from another person does the patient currently need    (If the patient hasn't done an activity recently, how much help from another person do you think he/she would need if he/she tried?)   Total (Total A or Dep)   A Lot  (Mod to Max A)   A Little (Sup or Min A)   None (Mod I to I)   Putting on and taking off regular lower body clothing? [] 1 [x] 2 [] 3 [] 4   2. Bathing (including washing, rinsing,      drying)? [] 1 [x] 2 [] 3 [] 4   3. Toileting, which includes using toilet, bedpan or urinal?   [] 1 [] 2 [x] 3 [] 4   4. Putting on and taking off regular upper body clothing? [] 1 [] 2 [x] 3 [] 4   5. Taking care of personal grooming such as brushing teeth? [] 1 [] 2 [x] 3 [] 4   6. Eating meals?    [] 1 [] 2 [x] 3 [] 4

## 2022-12-13 NOTE — PROGRESS NOTES
MRI Screening form needs to be filled out and faxed to [phone] MRI can be scheduled. If unable to obtain information from pt, [facility] needs to be contacted.  If pt is claustro or will need pain meds, please have ordered in advance in order to facilitate exam.

## 2022-12-13 NOTE — ACP (ADVANCE CARE PLANNING)
Advance Care Planning   Advance Care Planning Inpatient Note  [address] [place] [address]   Spiritual Care Department    Today's Date: 12/13/2022  Unit: [facility] EMERGENCY DEPT    Received request from . Upon review of chart and communication with care team, . Patient was/were present in the room during visit. Goals of ACP Conversation:  Discuss Advance Care planning documents    Health Care Decision Makers:    No healthcare decision makers have been documented. Click here to complete [place] including selection of the Healthcare Decision Maker Relationship (ie \"Primary\")  Summary:  No Decision Maker named by patient at this time      Advance Care Planning Documents (Patient Wishes) on file:  None     Assessment:     responded to a Code S this morning in the emergency room. Patient was taken to CT Scan and will later be admitted to the hospital.  There is no advance directive present .     Interventions:  Deferred conversation as patient not interested in completing an advance directive at this time    Care Preferences Communicated:  No    Outcomes/Plan:      Adams Garcia [facility] on 12/13/2022 at 12:30 PM

## 2022-12-14 ENCOUNTER — APPOINTMENT (OUTPATIENT)
Dept: MRI IMAGING | Age: 72
DRG: 472 | End: 2022-12-14
Payer: MEDICARE

## 2022-12-14 ENCOUNTER — APPOINTMENT (OUTPATIENT)
Dept: PHYSICAL THERAPY | Age: 72
End: 2022-12-14
Payer: MEDICARE

## 2022-12-14 LAB
ALBUMIN SERPL-MCNC: 3.5 G/DL (ref 3.4–5)
ALBUMIN/GLOB SERPL: 1 {RATIO} (ref 0.8–1.7)
ALP SERPL-CCNC: 93 U/L (ref 45–117)
ALT SERPL-CCNC: 17 U/L (ref 13–56)
ANION GAP SERPL CALC-SCNC: 6 MMOL/L (ref 3–18)
AST SERPL-CCNC: 14 U/L (ref 10–38)
ATRIAL RATE: 95 BPM
BILIRUB DIRECT SERPL-MCNC: 0.1 MG/DL (ref 0–0.2)
BILIRUB SERPL-MCNC: 0.6 MG/DL (ref 0.2–1)
BUN SERPL-MCNC: 10 MG/DL (ref 7–18)
BUN/CREAT SERPL: 13 (ref 12–20)
CALCIUM SERPL-MCNC: 9.9 MG/DL (ref 8.5–10.1)
CALCULATED P AXIS, ECG09: 71 DEGREES
CALCULATED R AXIS, ECG10: -40 DEGREES
CALCULATED T AXIS, ECG11: 14 DEGREES
CHLORIDE SERPL-SCNC: 104 MMOL/L (ref 100–111)
CHOLEST SERPL-MCNC: 159 MG/DL
CO2 SERPL-SCNC: 32 MMOL/L (ref 21–32)
CREAT SERPL-MCNC: 0.77 MG/DL (ref 0.6–1.3)
DIAGNOSIS, 93000: NORMAL
ERYTHROCYTE [DISTWIDTH] IN BLOOD BY AUTOMATED COUNT: 14.6 % (ref 11.6–14.5)
EST. AVERAGE GLUCOSE BLD GHB EST-MCNC: 114 MG/DL
GLOBULIN SER CALC-MCNC: 3.5 G/DL (ref 2–4)
GLUCOSE BLD STRIP.AUTO-MCNC: 101 MG/DL (ref 70–110)
GLUCOSE BLD STRIP.AUTO-MCNC: 106 MG/DL (ref 70–110)
GLUCOSE BLD STRIP.AUTO-MCNC: 87 MG/DL (ref 70–110)
GLUCOSE BLD STRIP.AUTO-MCNC: 91 MG/DL (ref 70–110)
GLUCOSE SERPL-MCNC: 100 MG/DL (ref 74–99)
HBA1C MFR BLD: 5.6 % (ref 4.2–5.6)
HCT VFR BLD AUTO: 35 % (ref 35–45)
HDLC SERPL-MCNC: 53 MG/DL (ref 40–60)
HDLC SERPL: 3 {RATIO} (ref 0–5)
HGB BLD-MCNC: 10.9 G/DL (ref 12–16)
INR PPP: 1 (ref 0.8–1.2)
LDLC SERPL CALC-MCNC: 81.6 MG/DL (ref 0–100)
LIPID PROFILE,FLP: NORMAL
MAGNESIUM SERPL-MCNC: 1.7 MG/DL (ref 1.6–2.6)
MCH RBC QN AUTO: 27.3 PG (ref 24–34)
MCHC RBC AUTO-ENTMCNC: 31.1 G/DL (ref 31–37)
MCV RBC AUTO: 87.5 FL (ref 78–100)
NRBC # BLD: 0 K/UL (ref 0–0.01)
NRBC BLD-RTO: 0 PER 100 WBC
P-R INTERVAL, ECG05: 168 MS
PLATELET # BLD AUTO: 214 K/UL (ref 135–420)
PMV BLD AUTO: 11.2 FL (ref 9.2–11.8)
POTASSIUM SERPL-SCNC: 3.8 MMOL/L (ref 3.5–5.5)
PROT SERPL-MCNC: 7 G/DL (ref 6.4–8.2)
PROTHROMBIN TIME: 13.6 SEC (ref 11.5–15.2)
Q-T INTERVAL, ECG07: 348 MS
QRS DURATION, ECG06: 68 MS
QTC CALCULATION (BEZET), ECG08: 437 MS
RBC # BLD AUTO: 4 M/UL (ref 4.2–5.3)
SODIUM SERPL-SCNC: 142 MMOL/L (ref 136–145)
T4 FREE SERPL-MCNC: 1.2 NG/DL (ref 0.7–1.5)
TRIGL SERPL-MCNC: 122 MG/DL (ref ?–150)
TSH SERPL DL<=0.05 MIU/L-ACNC: 4.28 UIU/ML (ref 0.36–3.74)
VENTRICULAR RATE, ECG03: 95 BPM
VLDLC SERPL CALC-MCNC: 24.4 MG/DL
WBC # BLD AUTO: 4.2 K/UL (ref 4.6–13.2)

## 2022-12-14 PROCEDURE — 2709999900 HC NON-CHARGEABLE SUPPLY

## 2022-12-14 PROCEDURE — 36415 COLL VENOUS BLD VENIPUNCTURE: CPT

## 2022-12-14 PROCEDURE — 74011250637 HC RX REV CODE- 250/637: Performed by: INTERNAL MEDICINE

## 2022-12-14 PROCEDURE — 84443 ASSAY THYROID STIM HORMONE: CPT

## 2022-12-14 PROCEDURE — 83735 ASSAY OF MAGNESIUM: CPT

## 2022-12-14 PROCEDURE — 82962 GLUCOSE BLOOD TEST: CPT

## 2022-12-14 PROCEDURE — 80048 BASIC METABOLIC PNL TOTAL CA: CPT

## 2022-12-14 PROCEDURE — 85027 COMPLETE CBC AUTOMATED: CPT

## 2022-12-14 PROCEDURE — 83036 HEMOGLOBIN GLYCOSYLATED A1C: CPT

## 2022-12-14 PROCEDURE — 99232 SBSQ HOSP IP/OBS MODERATE 35: CPT | Performed by: HOSPITALIST

## 2022-12-14 PROCEDURE — 65270000046 HC RM TELEMETRY

## 2022-12-14 PROCEDURE — 97161 PT EVAL LOW COMPLEX 20 MIN: CPT

## 2022-12-14 PROCEDURE — 70551 MRI BRAIN STEM W/O DYE: CPT

## 2022-12-14 PROCEDURE — 77030018842 HC SOL IRR SOD CL 9% BAXT -A

## 2022-12-14 PROCEDURE — 92610 EVALUATE SWALLOWING FUNCTION: CPT

## 2022-12-14 PROCEDURE — 84439 ASSAY OF FREE THYROXINE: CPT

## 2022-12-14 PROCEDURE — 80076 HEPATIC FUNCTION PANEL: CPT

## 2022-12-14 PROCEDURE — 97535 SELF CARE MNGMENT TRAINING: CPT

## 2022-12-14 PROCEDURE — 85610 PROTHROMBIN TIME: CPT

## 2022-12-14 PROCEDURE — 80061 LIPID PANEL: CPT

## 2022-12-14 PROCEDURE — 99233 SBSQ HOSP IP/OBS HIGH 50: CPT | Performed by: STUDENT IN AN ORGANIZED HEALTH CARE EDUCATION/TRAINING PROGRAM

## 2022-12-14 PROCEDURE — 74011250636 HC RX REV CODE- 250/636: Performed by: INTERNAL MEDICINE

## 2022-12-14 RX ADMIN — GABAPENTIN 200 MG: 100 CAPSULE ORAL at 21:21

## 2022-12-14 RX ADMIN — DOCUSATE SODIUM 100 MG: 100 CAPSULE, LIQUID FILLED ORAL at 18:05

## 2022-12-14 RX ADMIN — GABAPENTIN 200 MG: 100 CAPSULE ORAL at 16:39

## 2022-12-14 RX ADMIN — HEPARIN SODIUM 5000 UNITS: 5000 INJECTION INTRAVENOUS; SUBCUTANEOUS at 05:55

## 2022-12-14 RX ADMIN — SODIUM CHLORIDE 100 ML/HR: 9 INJECTION, SOLUTION INTRAVENOUS at 12:02

## 2022-12-14 RX ADMIN — ASPIRIN 325 MG ORAL TABLET 325 MG: 325 PILL ORAL at 08:58

## 2022-12-14 RX ADMIN — DOCUSATE SODIUM 100 MG: 100 CAPSULE, LIQUID FILLED ORAL at 08:58

## 2022-12-14 RX ADMIN — THERA TABS 1 TABLET: TAB at 08:58

## 2022-12-14 RX ADMIN — SODIUM CHLORIDE 100 ML/HR: 9 INJECTION, SOLUTION INTRAVENOUS at 05:56

## 2022-12-14 RX ADMIN — ATORVASTATIN CALCIUM 80 MG: 40 TABLET, FILM COATED ORAL at 21:21

## 2022-12-14 RX ADMIN — HEPARIN SODIUM 5000 UNITS: 5000 INJECTION INTRAVENOUS; SUBCUTANEOUS at 21:21

## 2022-12-14 RX ADMIN — HEPARIN SODIUM 5000 UNITS: 5000 INJECTION INTRAVENOUS; SUBCUTANEOUS at 12:19

## 2022-12-14 RX ADMIN — GABAPENTIN 200 MG: 100 CAPSULE ORAL at 08:58

## 2022-12-14 NOTE — PROGRESS NOTES
Problem: Mobility Impaired (Adult and Pediatric)  Goal: *Acute Goals and Plan of Care (Insert Text)  Description: Physical Therapy Goals  Initiated 12/14/2022 and to be accomplished within 7 day(s)  1. Patient will move from supine to sit and sit to supine , scoot up and down, and roll side to side in bed with modified independence. 2.  Patient will transfer from bed to chair and chair to bed with modified independence using the least restrictive device. 3.  Patient will perform sit to stand with modified independence. 4.  Patient will ambulate with modified independence for 300 feet with the least restrictive device. 5.  Patient will participate in LE exercise to tolerance. PLOF: Pt lives in [facility] behind [facility] house. Pt [place] with cane/RW. Ramp to enter. Outcome: Progressing Towards Goal   PHYSICAL THERAPY EVALUATION    Patient: Maddie Pierce (44 y.o. female)  Date: 12/14/2022  Primary Diagnosis: Right sided weakness [R53.1]  Fall [W19. 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 Completing with OT to maximize safety and mobility. Based on the objective data described below, the patient presents with decreased endurance, decreased strength, decreased balance reactions, gait deviations, and decreased independence in functional mobility. Pt completing sit to stand with SBA to RW, ambulating to bathroom and in room with slow gait speed and CGA. Pt returned to sitting in recliner, demonstrating decreased active LAQ through RLE. Discussed working on knee extension while OOB for strengthening. Pt positioned for comfort and educated to call for assist before getting up, pt verbalized understanding. Pt left with all needs met and call bell in reach. RN notified of position and participation.        Patient will benefit from skilled intervention to address the above impairments. Patient's rehabilitation potential is considered to be Good  Factors which may influence rehabilitation potential include:   [x]         None noted  []         Mental ability/status  []         Medical condition  []         Home/family situation and support systems  []         Safety awareness  []         Pain tolerance/management  []         Other:      PLAN :  Recommendations and Planned Interventions:   [x]           Bed Mobility Training             []    Neuromuscular Re-Education  [x]           Transfer Training                   []    Orthotic/Prosthetic Training  [x]           Gait Training                          []    Modalities  [x]           Therapeutic Exercises           []    Edema Management/Control  [x]           Therapeutic Activities            [x]    Family Training/Education  [x]           Patient Education  []           Other (comment):    Frequency/Duration: Patient will be followed by physical therapy 1-2 times per day/4-7 days per week to address goals. Further Equipment Recommendations for Discharge: pt has all equipment at home    AMPAC: Current research shows that an AM-PAC score of 18 or greater is associated with a discharge to the patient's home setting. Based on an AM-PAC score of 18/24 and their current functional mobility deficits, it is recommended that the patient have 2-3 sessions per week of Physical Therapy at d/c to increase the patient's independence. This AMPAC score should be considered in conjunction with interdisciplinary team recommendations to determine the most appropriate discharge setting. Patient's social support, diagnosis, medical stability, and prior level of function should also be taken into consideration. SUBJECTIVE:   Patient stated I hadn't been falling since my surgery.     OBJECTIVE DATA SUMMARY:     Past Medical History:   Diagnosis Date    Arthritis     back and hands    Chronic pain     back    COVID-19     Summer of 2021    Diabetes ([facility])     NIDDM    HLD (hyperlipidemia)     Hypertension     Nausea & vomiting     Surgery only not colonoscopy     Past Surgical History:   Procedure Laterality Date    COLONOSCOPY N/A 6/24/2022    COLONOSCOPY performed by Ray Young MD at [facility]    HX COLONOSCOPY      HX TUBAL LIGATION       Barriers to Learning/Limitations: None  Compensate with: N/A  Home Situation:  Home Situation  Home Environment: Trailer/mobile home  # Steps to Enter: 0  Wheelchair Ramp: Yes  One/Two Story Residence: One story  Living Alone: Yes  Support Systems: Child(chago)  Current DME Used/Available at Home: Walker, rolling, Commode, bedside, Grab bars, Adaptive dressing aides  Tub or Shower Type: Shower  Critical Behavior:  Neurologic State: Alert  Orientation Level: Oriented X4  Cognition: Follows commands    Strength:    Strength: Generally decreased, functional    Tone & Sensation:   Tone: Normal    Sensation: Intact    Range Of Motion:  AROM: Generally decreased, functional (RLE)    Posture:  Posture (WDL): Within defined limits     Functional Mobility:  Bed Mobility:     Supine to Sit:  (found sitting in recliner)     Scooting: Supervision  Transfers:  Sit to Stand: Stand-by assistance  Stand to Sit: Stand-by assistance    Balance:   Sitting: Intact  Sitting - Static: Good (unsupported)  Sitting - Dynamic: Good (unsupported)  Standing: Impaired; With support  Standing - Static: Good  Standing - Dynamic : Fair    Ambulation/Gait Training:  Distance (ft): 30 Feet (ft)  Assistive Device: Walker, rolling  Ambulation - Level of Assistance: Contact guard assistance     Gait Description (WDL): Exceptions to WDL  Gait Abnormalities: Decreased step clearance    Base of Support: Shift to left     Speed/Laura: Slow;Shuffled  Step Length: Right shortened;Left shortened    Pain:  Pain level pre-treatment: 0/10   Pain level post-treatment: 0/10       Activity Tolerance:   Fair tolerance     Please refer to the flowsheet for vital signs taken during this treatment. After treatment:   []         Patient left in no apparent distress sitting up in chair  [x]         Patient left in no apparent distress in bed  [x]         Call bell left within reach  [x]         Nursing notified  []         Caregiver present  []         Bed alarm activated  []         SCDs applied    COMMUNICATION/EDUCATION:   [x]         Role of Physical Therapy in the acute care setting. [x]         Fall prevention education was provided and the patient/caregiver indicated understanding. [x]         Patient/family have participated as able in goal setting and plan of care. [x]         Patient/family agree to work toward stated goals and plan of care. []         Patient understands intent and goals of therapy, but is neutral about his/her participation. []         Patient is unable to participate in goal setting/plan of care: ongoing with therapy staff.  []         Other: Thank you for this referral.  Torsten Collado, PT   Time Calculation: 12 mins      Eval Complexity: History: MEDIUM  Complexity : 1-2 comorbidities / personal factors will impact the outcome/ POC Exam:LOW Complexity : 1-2 Standardized tests and measures addressing body structure, function, activity limitation and / or participation in recreation  Presentation: LOW Complexity : Stable, uncomplicated  Clinical Decision Making:Low Complexity low  Overall Complexity:LOW     Baljit Encarnacion AM-PAC® Basic Mobility Inpatient Short Form (6-Clicks) Version 2    How much HELP from another person does the patient currently need    (If the patient hasn't done an activity recently, how much help from another person do you think he/she would need if he/she tried?)   Total (Total A or Dep)   A Lot  (Mod to Max A)   A Little (Sup or Min A)   None (Mod I to I)   Turning from your back to your side while in a flat bed without using bedrails? [] 1 [] 2 [x] 3 [] 4   2.  Moving from lying on your back to sitting on the side of a flat bed without using bedrails? [] 1 [] 2 [x] 3 [] 4   3. Moving to and from a bed to a chair (including a wheelchair)? [] 1 [] 2 [x] 3 [] 4   4. Standing up from a chair using your arms (e.g., wheelchair, or bedside chair)? [] 1 [] 2 [x] 3 [] 4   5. Walking in hospital room? [] 1 [] 2 [x] 3 [] 4   6. Climbing 3-5 steps with a railing?+   [] 1 [] 2 [x] 3 [] 4   +If stair climbing cannot be assessed, skip item #6. Sum responses from items 1-5.

## 2022-12-14 NOTE — PROGRESS NOTES
ARU/IPR REFERRAL CONTACT NOTE  [id number] [place] [address] for Physical Rehabilitation    RE:  Adrian Zepeda Thank you for the opportunity to review this patient's case for admission to [id number] [place] [address] for Physical Rehabilitation. Based on our pre-admission screening:     [ [facility] Team/Medical Director is following this case. Comments:  Referral received. Pending PT evaluation and recommendations. Please be advised admission to ARU will be based on meeting admission requirements. Thank you for this referral.   Please do not hesitate to call if you need further information or have additional questions.      Regards,    Susanna Haley PTA   Admissions [facility] Physical Rehabilitation  [phone]

## 2022-12-14 NOTE — PROGRESS NOTES
Problem: Self Care Deficits Care Plan (Adult)  Goal: *Acute Goals and Plan of Care (Insert Text)  Description: Occupational Therapy Goals  Initiated 12/13/2022 within 7 day(s). 1.  Patient will perform grooming with modified independence. 2.  Patient will perform bathing with modified independence using adaptive equipment. 3.  Patient will perform upper body dressing and lower body dressing with modified independence using adaptive equipment. .  4.  Patient will perform toilet transfers with modified independence using RW with good balance. 5.  Patient will perform all aspects of toileting with modified independence. 6.  Patient will participate in upper extremity therapeutic exercise/activities with modified independence for 8 minutes. 7.  Patient will utilize energy conservation techniques during functional activities with min verbal cues. Prior Level of Function: Mod I with ADLs, used AE for LB ADLs: reacher, sockaide and LH bathsponge, Mod I functional mobility using RW     Outcome: Progressing Towards Goal   OCCUPATIONAL THERAPY TREATMENT    Patient: Kira Alegre (28 y.o. female)  Date: 12/14/2022  Diagnosis: Right sided weakness [R53.1]  Fall [W19. XXXA]  Stroke (cerebrum) ([facility] [address] [I63.9] <principal problem not specified>      Precautions: Fall  PLOF: Mod I with ADLs, used AE for LB ADLs: reacher, sockaide and LH bathsponge, Mod I functional mobility using RW    Chart, occupational therapy assessment, plan of care, and goals were reviewed. ASSESSMENT:  PT co tx to maximize pt safety and participation. Pt presented sitting in reclining chair upon entry and agreeable for participation. Pt educated on EC/WS techniques to increase safety and (I) during all ADL tasks. STS transfer SBA with RW and maneuvered into BR ~ 8 ft CGA. Pt stood at sink and engaged in hand hygiene SBA ~ 1.5 min, no LOB noted. Pt returned back to sitting in chair at end of session with all needs left within reach. RN made aware. Progression toward goals:  []          Improving appropriately and progressing toward goals  [x]          Improving slowly and progressing toward goals  []          Not making progress toward goals and plan of care will be adjusted     PLAN:  Patient continues to benefit from skilled intervention to address the above impairments. Continue treatment per established plan of care. Further Equipment Recommendations for Discharge:  N/A    AMPAC: Current research shows that an AM-PAC score of 18 or greater is associated with a discharge to the patient's home setting. Based on an AM-PAC score of 18/24 and their current ADL deficits; it is recommended that the patient have 2-3 sessions per week of Occupational Therapy at d/c to increase the patient's independence. This AMPAC score should be considered in conjunction with interdisciplinary team recommendations to determine the most appropriate discharge setting. Patient's social support, diagnosis, medical stability, and prior level of function should also be taken into consideration. SUBJECTIVE:   Patient stated I already washed up today.     OBJECTIVE DATA SUMMARY:   Cognitive/Behavioral Status:  Neurologic State: Alert  Orientation Level: Oriented X4  Cognition: Follows commands  Safety/Judgement: Fall prevention    Functional Mobility and Transfers for ADLs:   Bed Mobility:     Supine to Sit:  (found sitting in recliner)     Scooting: Supervision   Transfers:  Sit to Stand: Stand-by assistance  Stand to Sit: Stand-by assistance     Balance:  Sitting: Intact  Sitting - Static: Good (unsupported)  Sitting - Dynamic: Good (unsupported)  Standing: Impaired; With support  Standing - Static: Good  Standing - Dynamic : Fair    ADL Intervention:       Grooming  Position Performed: Standing  Washing Hands: Stand-by assistance    Pain:  Pain level pre-treatment: 0/10   Pain level post-treatment:0/10      Activity Tolerance:    Fair   Please refer to the flowsheet for vital signs taken during this treatment. After treatment:   [x]  Patient left in no apparent distress sitting up in chair  []  Patient left in no apparent distress in bed  [x]  Call bell left within reach  [x]  Nursing notified  []  Caregiver present  []  Bed alarm activated    COMMUNICATION/EDUCATION:   [x] Role of Occupational Therapy in the acute care setting  [x] Home safety education was provided and the patient/caregiver indicated understanding. [x] Patient/family have participated as able in working towards goals and plan of care. [x] Patient/family agree to work toward stated goals and plan of care. [] Patient understands intent and goals of therapy, but is neutral about his/her participation. [] Patient is unable to participate in goal setting and plan of care. Thank you for this referral.  FILIPE Arreola  Time Calculation: 13 mins    [employer] AM-PAC® Daily Activity Inpatient Short Form (6-Clicks)    How much HELP from another person does the patient currently need    (If the patient hasn't done an activity recently, how much help from another person do you think he/she would need if he/she tried?)   Total (Total A or Dep)   A Lot  (Mod to Max A)   A Little (Sup or Min A)   None (Mod I to I)   Putting on and taking off regular lower body clothing? [] 1 [x] 2 [] 3 [] 4   2. Bathing (including washing, rinsing,      drying)? [] 1 [] 2 [x] 3 [] 4   3. Toileting, which includes using toilet, bedpan or urinal?   [] 1 [] 2 [x] 3 [] 4   4. Putting on and taking off regular upper body clothing? [] 1 [] 2 [x] 3 [] 4   5. Taking care of personal grooming such as brushing teeth? [] 1 [] 2 [x] 3 [] 4   6. Eating meals?    [] 1 [] 2 [] 3 [x] 4

## 2022-12-14 NOTE — PROGRESS NOTES
Problem: Dysphagia (Adult)  Goal: *Acute Goals and Plan of Care (Insert Text)  Description:   Patient will:  1. Tolerate PO trials with 0 s/s overt distress in 4/5 trials- met  2. Utilize compensatory swallow strategies/maneuvers (decrease bite/sip, size/rate, alt. liq/sol) with min cues in 4/5 trials- met    Rec:     Reg solid with thin liquids  Aspiration precautions  HOB >45 during po intake, remain >30 for 30-45 minutes after po   Small bites/sips; alternate liquid/solid with slow feeding rate   Oral care TID  Meds per pt preference    Outcome: Resolved/Met   [facility]    Patient: Janis Ospina (49 y.o. female)  Date: 12/14/2022  Primary Diagnosis: Right sided weakness [R53.1]  Fall [W19. XXXA]  Stroke (cerebrum) ([facility] [address] [I63.9]       Precautions: aspiration  Fall  PLOF: As per H&P    ASSESSMENT :  Based on the objective data described below, the patient presents with oropharyngeal swallow fxn essentially [facility]. Strength, ROM, and coordination of the orofacial musculature were all found to be [facility]. Pt tolerated reg solid, puree, and thin liquids +/- straw consecutive swallows without any overt s/sx of aspiration. Mastication was appropriate with timely a-p transit. Positive oral clearance observed across all trials. Laryngeal elevation was functional/timely to palpation with all PO trials. Speech production was fluent; no dysarthria observed. Expressive/receptive speech production appeared WFL to informal observation. Pt communicated in sentences of appropriate form, content, and use. Social conversation appropriate. Pt safe for initiation of reg solid diet with thin liquids, aspiration precautions, oral care TID, and meds as tolerated. No further skilled SLP services are indicated at this time. Please re-consult if needed. PLAN :  Recommendations and Planned Interventions:  No formal ST needs ID'd for dysphagia. Eval only.       SUBJECTIVE:   Patient stated Is there any way that you can get me some hot tea?     OBJECTIVE:     Past Medical History:   Diagnosis Date    Arthritis     back and hands    Chronic pain     back    COVID-19     Summer of  2021    Diabetes ([address]     NIDDM    HLD (hyperlipidemia)     Hypertension     Nausea & vomiting     Surgery only not colonoscopy     Past Surgical History:   Procedure Laterality Date    COLONOSCOPY N/A 6/24/2022    COLONOSCOPY performed by Aide Armstrong MD at [facility]    HX COLONOSCOPY      HX TUBAL LIGATION       Prior Level of Function/Home Situation: see below  Home Situation  Home Environment: Trailer/mobile home  # Steps to Enter: 0  Wheelchair Ramp: Yes  One/Two Story Residence: One story  Living Alone: Yes  Support Systems: Child(chago)  Current DME Used/Available at Home: Walker, rolling, Commode, bedside, Grab bars, Adaptive dressing aides  Tub or Shower Type: Shower  Diet prior to admission: regular solid with thin liquids  Current Diet:  regular solid with thin liquids   Cognitive and Communication Status:  Neurologic State: Alert  Orientation Level: Oriented X4  Cognition: Follows commands  Perception: Appears intact  Perseveration: No perseveration noted  Safety/Judgement: Fall prevention  Oral Assessment:  Oral Assessment  Labial: No impairment  Dentition: Natural;Intact  Oral Hygiene: Adequate  Lingual: No impairment  Velum: No impairment  Mandible: No impairment  P.O. Trials:  Patient Position: 90 in chair at bedside  Vocal quality prior to P.O.: No impairment  Consistency Presented: Thin liquid; Solid;Puree  How Presented: Self-fed/presented;Cup/sip;Spoon;Straw;Successive swallows  Bolus Acceptance: No impairment  Bolus Formation/Control: No impairment  Propulsion: No impairment  Oral Residue: None  Initiation of Swallow: No impairment  Laryngeal Elevation: Functional  Aspiration Signs/Symptoms: None  Pharyngeal Phase Characteristics: No impairment, issues, or problems   Effective Modifications: None  Cues for Modifications: None  Oral Phase Severity: No impairment  Pharyngeal Phase Severity : No impairment    PAIN:  Start of Eval: 0  End of Eval: 0     After evaluation:   []            Patient left in no apparent distress sitting up in chair  [x]            Patient left in no apparent distress in bed  [x]            Call bell left within reach  [x]            Nursing notified  []            Family present  []            Caregiver present  []            Bed alarm activated    COMMUNICATION/EDUCATION:   [x]            Aspiration precautions; swallow safety; compensatory techniques. [x]            Patient/family have participated as able in goal setting and plan of care. []            Patient/family agree to work toward stated goals and plan of care. []            Patient understands intent and goals of therapy; neutral about participation. []            Patient unable to participate in goal setting/plan of care; educ ongoing with interdisciplinary staff  [x]         Posted safety precautions in patient's room.     Thank you for this referral.    Ole Zheng M.S. CCC-SLP/L  Speech-Language Pathologist

## 2022-12-14 NOTE — PROGRESS NOTES
Re:  Albertina Henning up visit     12/14/2022 3:52 PM    SSN: [id number]    Subjective:   Julissa Guerra  was seen on follow up. No acute changes today. Has been up and walking with PT; drags the RLE. Right arm os better. Normal speech. Unable to get MRI last night.      Medications:    Current Facility-Administered Medications   Medication Dose Route Frequency Provider Last Rate Last Admin    0.9% sodium chloride infusion  100 mL/hr IntraVENous CONTINUOUS Jim Masterson  mL/hr at 12/14/22 1202 100 mL/hr at 12/14/22 1202    bisacodyL (DULCOLAX) tablet 10 mg  10 mg Oral Q48H PRN Jim Masterson MD        acetaminophen (TYLENOL) tablet 500 mg  500 mg Oral Q6H PRN Jim Masterson MD        cyclobenzaprine (FLEXERIL) tablet 5 mg  5 mg Oral TID PRN Jim Masterson MD        docusate sodium (COLACE) capsule 100 mg  100 mg Oral BID Jim Masterson MD   100 mg at 12/14/22 0858    therapeutic multivitamin [facility]) tablet 1 Tablet  1 Tablet Oral DAILY Jim Masterson MD   1 Tablet at 12/14/22 0858    insulin lispro (HUMALOG) injection   SubCUTAneous AC&HS Jim Masterson MD        glucose chewable tablet 16 g  16 g Oral PRN Jim Masterson MD        glucagon (GLUCAGEN) injection 1 mg  1 mg IntraMUSCular PRN Jim Masterson MD        dextrose 10% infusion 0-250 mL  0-250 mL IntraVENous PRN Jim Masterson MD        ondansetron [facility]) injection 4 mg  4 mg IntraVENous Q6H PRN Jim Masterson MD        aspirin tablet 325 mg  325 mg Oral DAILY Jim Masterson MD   325 mg at 12/14/22 0858    atorvastatin (LIPITOR) tablet 80 mg  80 mg Oral QHS Jim Masterson MD   80 mg at 12/13/22 2144    labetaloL (NORMODYNE;TRANDATE) 20 mg/4 mL (5 mg/mL) injection 5 mg  5 mg IntraVENous Q10MIN PRN Jim Masterson MD        heparin (porcine) injection 5,000 Units  5,000 Units SubCUTAneous Q8H Jim Masterson MD   5,000 Units at 12/14/22 1219    gabapentin (NEURONTIN) capsule 200 mg  200 mg Oral TID Timmy Hernandez MD   200 mg at 12/14/22 9060       Vital signs:  Visit Vitals  BP (!) 152/87   Pulse 91   Temp 98.8 °F (37.1 °C)   Resp 16   Ht 5' 6\" (1.676 m)   Wt 80.7 kg (178 lb)   SpO2 100%   BMI 28.73 kg/m²       Review of Systems:   Constitutional no fever or chills  Skin denies rash or itching  HENT  no hearing lose  Eyes denies diplopia vision lose  Respiratory denies shortness of breath  Cardiovascular denies chest pain  Gastrointestinal denies nausea, vomitin  Genitourinary denies incontinence  Musculoskeletal: has LBP; s/p recent surgery. Endocrine denies weight change  Hematology denies easy bruising or bleeding   Neurological as above in HPI      Patient Active Problem List   Diagnosis Code    Spinal stenosis of lumbar region at multiple levels M48.061    Impaired mobility and ADLs Z74.09, Z78.9    Fall W19. XXXA    Stroke (cerebrum) ([facility] I63.9    Right sided weakness R53.1         Objective:   GENERAL:                  In no apparent distress. EXTREMITIES:           Muscle tone is normal.  HEAD:                         The patient is normocephalic. NEUROLOGIC EXAMINATION    Mental status: Awake, alert, oriented X3, follows simple and complex commands, no neglect or extinction. Speech and languge: fluent, coherent, naming and repitition intact, reading and comprehension intact  CN: VFF, EOMI, PERRLA, face sensation intact , no facial asymmetry noted, palate elevation symmetric bilat, SS+SCM 5/5 bilat, tongue midline  Motor: no pronator drift, tone normal throughout, strength 5/5 throughout except the RLE: unbale to lift it off the bed(possible poor effort from low back pain). Sensory: Decreased LT and PP over the right side(including the face). Coordination: FNF accurate w/o dysmetria  DTR: 2+ throughout  Gait: not tested       Study Result     Narrative & Impression   EXAM: CT HEAD WO CONT     CLINICAL INDICATION/HISTORY: 67 years Female.  CODE STROKE   ADDITIONAL HISTORY: None     TECHNIQUE: CT of the head from the vertex to the skull base performed. No IV  contrast administered. All CT scans at this facility are performed using dose optimization technique as  appropriate to a performed exam, to include automated exposure control,  adjustment of the mA and/or kV according to patient size (including appropriate  matching for site specific examination) or use of iterative reconstruction  technique. COMPARISON: 4/12/2022     FINDINGS:      Bilateral basal ganglia calcifications. No evidence of acute intracranial  hemorrhage. The ventricles, sulci, and cisterns are concordant with cerebral  volume. There is no mass effect. There is no midline shift. Basal cisterns are  patent. No evidence of acute fracture. Air-fluid level in the left maxillary  sinus, which may indicate acute sinusitis. The visualized paranasal sinuses and  mastoid air cells are otherwise essentially clear. The evaluation is mild to moderately degraded due to motion and streak artifact. IMPRESSION  Mild to moderately motion degraded study. No acute intracranial process detected. Note that MRI is more sensitive for  ischemia in the first 24 to 48 hours. Air-fluid level in the left maxillary sinus, may indicate acute sinusitis. Study Result     Narrative & Impression   CTA NECK/BRAIN     CLINICAL INDICATIONS: Awoke this morning with increased right-sided weakness. TECHNIQUE: Helical CT scan of the brain and neck were performed at 1.25 mm  intervals during rapid IV bolus contrast administration. These data were  reconstructed at .625 mm intervals for vascular analysis. The data was also  reviewed at 2.5 mm intervals for accompanying soft tissue analysis. 3D post  processed images, including surface shaded displays, were produced for this exam  on independent console, permanently archived and interpreted.      All CT scans at this facility are performed using dose optimization technique as  appropriate to a performed exam, to include automated exposure control,  adjustment of the MA and/or kV according to patient size (including appropriate  matching for site-specific examinations) or use of  iterative reconstruction  technique. Additional reformatted images for cervical spine evaluation. CONTRAST: 67 cc Isovue-370 IV     CTA SOFT TISSUE ANALYSIS: Imaged lung apices are clear. Soft tissue and mucosal  spaces of the neck are unremarkable. Circumferential mucosal thickening and  small dependent effusion in the left maxillary antrum. No enhancing brain  abnormalities. Additional cervical spine re-formations were obtained from the CTA data set. There is mild anterolisthesis of C4 on C5 and straightening of the lordosis. Normal alignment at the craniocervical junction. Normal vertebral body heights. No evidence of acute fracture. Multilevel chronic disc degeneration consisting  of mild disc height narrowing and endplate osteophytes. Multilevel severe bony  hypertrophy of the facet joints. CTA NECK VASCULAR ANALYSIS:      Aortic arch: Visualized aorta is normal caliber. Common innominate and left  carotid origins. Innominate: Patent, no stenosis. Right Subclavian:Patent, no stenosis. Left Subclavian:Patent, no stenosis. Right carotid:  -CCA: Patent. -ECA: Patent. -ICA: Patent. 0% stenosis of proximal ICA by NASCET criteria. Left carotid:  -CCA: Patent. -ECA: Patent. -ICA: Patent. 0% stenosis of proximal ICA by NASCET criteria. Right vertebral: Patent. Atherosclerotic calcification at the origin with  estimated moderate stenosis. No stenosis elsewhere. Left vertebral: Patent. Mild luminal narrowing caused by extrinsic compression  from osteophytes but no atherosclerotic stenosis. CTA BRAIN VASCULAR ANALYSIS:      Right anterior circulation:  -ICA:Patent. -HUDSON:Patent. -MCA: Patent. Left anterior circulation:  -ICA:Patent. -HUDSON:Patent. -MCA: Patent. Posterior circulation:  -RVA:Patent. -LVA: Patent.  -Basilar: Patent. -Right PCA: Patent.  -Left PCA: Patent. IMPRESSION     1. No large vessel occlusion. 2.  Moderate RVA origin stenosis. Cervical carotid and vertebral arteries are  otherwise nonstenotic. 3.  Largest intracranial cerebral arteries are without significant stenosis. 4.  Grade 1 anterolisthesis of C4 on C5 with multilevel disc degeneration and  facet joint arthropathy but no acute cervical spine fracture. 5.  Possible acute on chronic left maxillary sinusitis. CBC:   Lab Results   Component Value Date/Time    WBC 4.2 (L) 12/14/2022 03:09 AM    RBC 4.00 (L) 12/14/2022 03:09 AM    HGB 10.9 (L) 12/14/2022 03:09 AM    HCT 35.0 12/14/2022 03:09 AM    PLATELET 424 98/45/5076 03:09 AM     BMP:   Lab Results   Component Value Date/Time    Glucose 100 (H) 12/14/2022 03:09 AM    Sodium 142 12/14/2022 03:09 AM    Potassium 3.8 12/14/2022 03:09 AM    Chloride 104 12/14/2022 03:09 AM    CO2 32 12/14/2022 03:09 AM    BUN 10 12/14/2022 03:09 AM    Creatinine 0.77 12/14/2022 03:09 AM    Calcium 9.9 12/14/2022 03:09 AM     CMP:   Lab Results   Component Value Date/Time    Glucose 100 (H) 12/14/2022 03:09 AM    Sodium 142 12/14/2022 03:09 AM    Potassium 3.8 12/14/2022 03:09 AM    Chloride 104 12/14/2022 03:09 AM    CO2 32 12/14/2022 03:09 AM    BUN 10 12/14/2022 03:09 AM    Creatinine 0.77 12/14/2022 03:09 AM    Calcium 9.9 12/14/2022 03:09 AM    Anion gap 6 12/14/2022 03:09 AM    BUN/Creatinine ratio 13 12/14/2022 03:09 AM    Alk. phosphatase 93 12/14/2022 03:09 AM    Protein, total 7.0 12/14/2022 03:09 AM    Albumin 3.5 12/14/2022 03:09 AM    Globulin 3.5 12/14/2022 03:09 AM    A-G Ratio 1.0 12/14/2022 03:09 AM       Impression:      A 67years old female patient with above medical problems including recent lumbar fusion surgery and currently attending physical therapy was brought to the emergency room after a fall. Noticed to have right-sided body weakness, more over the right lower extremity. On physical examination, has decreased sensation on the right side including the face. No changes in her speech. CT scan did not show any acute changes; has air-fluid level over the left maxillary sinus suggesting possible sinusitis. CT angiography of the head and neck did not show any LVO. TTE showed normal ejection fraction; there is a small PFO. The right lower extremity weakness could be from her lower back; but since she also has involvement of the face and upper extremity, need to rule out stroke. Lipid panel and A1C are normal.      Plan:     -Continuous telemetry  -Will Follow MRI Brain result  -C/w Aspirin 325 mg PO/day and Atorvastatin 80 mg PO/day  -Call for questions  -Will follow patient as needed. Sincerely,      Micki Fleming M.D. PLEASE NOTE:   This document has been produced using voice recognition software. Unrecognized errors in transcription may be present.

## 2022-12-14 NOTE — PROGRESS NOTES
Problem: Patient Education: Go to Patient Education Activity  Goal: Patient/Family Education  Outcome: Progressing Towards Goal     Problem: Falls - Risk of  Goal: *Absence of Falls  Description: Document Neli Gamma Fall Risk and appropriate interventions in the flowsheet.   Outcome: Progressing Towards Goal  Note: Fall Risk Interventions:            Medication Interventions: Patient to call before getting OOB    Elimination Interventions: Call light in reach              Problem: Injury - Risk of, Adverse Drug Event  Goal: *Absence of adverse drug events  Outcome: Progressing Towards Goal

## 2022-12-14 NOTE — PROGRESS NOTES
[facility]  [facility]    Patient: Leticia Watson Age: 67 y.o. : 1950 MR#: [id number] [id number]  Date/Time: 2022     Subjective: Patient sitting in the recliner, feels weakness is slightly better. She denies any slurred speech or dysphagia. Assessment/Plan:   1. Right-sided weakness  2. Possible CVA  3. Chronic right leg numbness with decreased mobility secondary to recent lumbar spine surgery  4. Diabetes mellitus  5. Hypertension  6. Dyslipidemia  7. Osteoarthritis  8. S/p recent anterior lumbar interbody fusion, L4-L5 and L5-S1 fusion. Plan  MRI pending, discussed with RN to obtain MRI ASAP. Echo pending  Neurology input noted, continue aspirin, statin  Continue PT/OT eval and treatment  Continue permissive hypertension, hold hypertensive medication  Continue heparin subcu for DVT prophylaxis  Continue IV fluids    Discussed with the patient at bedside and explained about my above plan care. Discussed with RN      Case discussed with:  [x]Patient  []Family  [x]Nursing  []Case Management  DVT Prophylaxis:  []Lovenox  [x]Hep SQ  []SCDs  []Coumadin   []Eliquis/Xarelto     Objective:   VS: Visit Vitals  BP (!) 152/87   Pulse 91   Temp 98.8 °F (37.1 °C)   Resp 16   Ht 5' 6\" (1.676 m)   Wt 80.7 kg (178 lb)   SpO2 100%   BMI 28.73 kg/m²      Tmax/24hrs: Temp (24hrs), Av.2 °F (36.8 °C), Min:97.7 °F (36.5 °C), Max:98.8 °F (37.1 °C)  IOBRIEFNo intake or output data in the 24 hours ending 22 1604    General:  Alert, cooperative, no acute distress    HEENT: PERRLA, anicteric sclerae. Pulmonary:  CTA Bilaterally. No Wheezing/Rales. Cardiovascular: Regular rate and Rhythm. GI:  Soft, Non distended, Non tender. + Bowel sounds. Extremities:  No edema. No calf tenderness. Psych: Good insight. Not anxious or agitated. Neurologic: Alert and oriented X 3.   No slurred speech, no facial droop, right lower extremity 3-4/5, rest 5/5  Additional:    Medications:   Current Facility-Administered Medications   Medication Dose Route Frequency    0.9% sodium chloride infusion  100 mL/hr IntraVENous CONTINUOUS    bisacodyL (DULCOLAX) tablet 10 mg  10 mg Oral Q48H PRN    acetaminophen (TYLENOL) tablet 500 mg  500 mg Oral Q6H PRN    cyclobenzaprine (FLEXERIL) tablet 5 mg  5 mg Oral TID PRN    docusate sodium (COLACE) capsule 100 mg  100 mg Oral BID    therapeutic multivitamin (THERAGRAN) tablet 1 Tablet  1 Tablet Oral DAILY    insulin lispro (HUMALOG) injection   SubCUTAneous AC&HS    glucose chewable tablet 16 g  16 g Oral PRN    glucagon (GLUCAGEN) injection 1 mg  1 mg IntraMUSCular PRN    dextrose 10% infusion 0-250 mL  0-250 mL IntraVENous PRN    ondansetron (ZOFRAN) injection 4 mg  4 mg IntraVENous Q6H PRN    aspirin tablet 325 mg  325 mg Oral DAILY    atorvastatin (LIPITOR) tablet 80 mg  80 mg Oral QHS    labetaloL (NORMODYNE;TRANDATE) 20 mg/4 mL (5 mg/mL) injection 5 mg  5 mg IntraVENous Q10MIN PRN    heparin (porcine) injection 5,000 Units  5,000 Units SubCUTAneous Q8H    gabapentin (NEURONTIN) capsule 200 mg  200 mg Oral TID       Labs:    Recent Results (from the past 24 hour(s))   GLUCOSE, POC    Collection Time: 12/13/22  5:08 PM   Result Value Ref Range    Glucose (POC) 112 (H) 70 - 110 mg/dL   GLUCOSE, POC    Collection Time: 12/13/22  6:04 PM   Result Value Ref Range    Glucose (POC) 103 70 - 110 mg/dL   GLUCOSE, POC    Collection Time: 12/13/22  9:24 PM   Result Value Ref Range    Glucose (POC) 96 70 - 110 mg/dL   LIPID PANEL    Collection Time: 12/14/22  3:09 AM   Result Value Ref Range    LIPID PROFILE          Cholesterol, total 159 <200 MG/DL    Triglyceride 122 <150 MG/DL    HDL Cholesterol 53 40 - 60 MG/DL    LDL, calculated 81.6 0 - 100 MG/DL    VLDL, calculated 24.4 MG/DL    CHOL/HDL Ratio 3.0 0 - 5.0     HEMOGLOBIN A1C WITH EAG    Collection Time: 12/14/22  3:09 AM   Result Value Ref Range    Hemoglobin A1c 5.6 4.2 - 5.6 % Est. average glucose 114 mg/dL   CBC W/O DIFF    Collection Time: 12/14/22  3:09 AM   Result Value Ref Range    WBC 4.2 (L) 4.6 - 13.2 K/uL    RBC 4.00 (L) 4.20 - 5.30 M/uL    HGB 10.9 (L) 12.0 - 16.0 g/dL    HCT 35.0 35.0 - 45.0 %    MCV 87.5 78.0 - 100.0 FL    MCH 27.3 24.0 - 34.0 PG    MCHC 31.1 31.0 - 37.0 g/dL    RDW 14.6 (H) 11.6 - 14.5 %    PLATELET 256 923 - 295 K/uL    MPV 11.2 9.2 - 11.8 FL    NRBC 0.0 0  WBC    ABSOLUTE NRBC 0.00 0.00 - 0.01 K/uL   PROTHROMBIN TIME + INR    Collection Time: 12/14/22  3:09 AM   Result Value Ref Range    Prothrombin time 13.6 11.5 - 15.2 sec    INR 1.0 0.8 - 1.2     TSH 3RD GENERATION    Collection Time: 12/14/22  3:09 AM   Result Value Ref Range    TSH 4.28 (H) 0.36 - 3.74 uIU/mL   T4, FREE    Collection Time: 12/14/22  3:09 AM   Result Value Ref Range    T4, Free 1.2 0.7 - 1.5 NG/DL   METABOLIC PANEL, BASIC    Collection Time: 12/14/22  3:09 AM   Result Value Ref Range    Sodium 142 136 - 145 mmol/L    Potassium 3.8 3.5 - 5.5 mmol/L    Chloride 104 100 - 111 mmol/L    CO2 32 21 - 32 mmol/L    Anion gap 6 3.0 - 18 mmol/L    Glucose 100 (H) 74 - 99 mg/dL    BUN 10 7.0 - 18 MG/DL    Creatinine 0.77 0.6 - 1.3 MG/DL    BUN/Creatinine ratio 13 12 - 20      eGFR >60 >60 ml/min/1.73m2    Calcium 9.9 8.5 - 10.1 MG/DL   MAGNESIUM    Collection Time: 12/14/22  3:09 AM   Result Value Ref Range    Magnesium 1.7 1.6 - 2.6 mg/dL   HEPATIC FUNCTION PANEL    Collection Time: 12/14/22  3:09 AM   Result Value Ref Range    Protein, total 7.0 6.4 - 8.2 g/dL    Albumin 3.5 3.4 - 5.0 g/dL    Globulin 3.5 2.0 - 4.0 g/dL    A-G Ratio 1.0 0.8 - 1.7      Bilirubin, total 0.6 0.2 - 1.0 MG/DL    Bilirubin, direct 0.1 0.0 - 0.2 MG/DL    Alk.  phosphatase 93 45 - 117 U/L    AST (SGOT) 14 10 - 38 U/L    ALT (SGPT) 17 13 - 56 U/L   GLUCOSE, POC    Collection Time: 12/14/22  7:11 AM   Result Value Ref Range    Glucose (POC) 87 70 - 110 mg/dL   GLUCOSE, POC    Collection Time: 12/14/22 11:39 AM Result Value Ref Range    Glucose (POC) 91 70 - 110 mg/dL       Signed By: Juan Santiago MD     December 14, 2022      Disclaimer: Sections of this note are dictated using utilizing voice recognition software. Minor typographical errors may be present. If questions arise, please do not hesitate to contact me or call our department.

## 2022-12-15 ENCOUNTER — TELEPHONE (OUTPATIENT)
Dept: PHYSICAL THERAPY | Age: 72
End: 2022-12-15

## 2022-12-15 ENCOUNTER — APPOINTMENT (OUTPATIENT)
Dept: MRI IMAGING | Age: 72
DRG: 472 | End: 2022-12-15
Attending: ORTHOPAEDIC SURGERY
Payer: MEDICARE

## 2022-12-15 LAB
GLUCOSE BLD STRIP.AUTO-MCNC: 105 MG/DL (ref 70–110)
GLUCOSE BLD STRIP.AUTO-MCNC: 152 MG/DL (ref 70–110)
GLUCOSE BLD STRIP.AUTO-MCNC: 76 MG/DL (ref 70–110)
GLUCOSE BLD STRIP.AUTO-MCNC: 99 MG/DL (ref 70–110)

## 2022-12-15 PROCEDURE — 82962 GLUCOSE BLOOD TEST: CPT

## 2022-12-15 PROCEDURE — 99233 SBSQ HOSP IP/OBS HIGH 50: CPT | Performed by: STUDENT IN AN ORGANIZED HEALTH CARE EDUCATION/TRAINING PROGRAM

## 2022-12-15 PROCEDURE — 99232 SBSQ HOSP IP/OBS MODERATE 35: CPT | Performed by: HOSPITALIST

## 2022-12-15 PROCEDURE — 74011250636 HC RX REV CODE- 250/636: Performed by: INTERNAL MEDICINE

## 2022-12-15 PROCEDURE — 72141 MRI NECK SPINE W/O DYE: CPT

## 2022-12-15 PROCEDURE — 97530 THERAPEUTIC ACTIVITIES: CPT

## 2022-12-15 PROCEDURE — 97535 SELF CARE MNGMENT TRAINING: CPT

## 2022-12-15 PROCEDURE — 72158 MRI LUMBAR SPINE W/O & W/DYE: CPT

## 2022-12-15 PROCEDURE — 74011250637 HC RX REV CODE- 250/637: Performed by: HOSPITALIST

## 2022-12-15 PROCEDURE — 74011250636 HC RX REV CODE- 250/636: Performed by: HOSPITALIST

## 2022-12-15 PROCEDURE — 74011250637 HC RX REV CODE- 250/637: Performed by: INTERNAL MEDICINE

## 2022-12-15 PROCEDURE — 97116 GAIT TRAINING THERAPY: CPT

## 2022-12-15 PROCEDURE — A9577 INJ MULTIHANCE: HCPCS | Performed by: HOSPITALIST

## 2022-12-15 PROCEDURE — 65270000046 HC RM TELEMETRY

## 2022-12-15 PROCEDURE — 74011636637 HC RX REV CODE- 636/637: Performed by: INTERNAL MEDICINE

## 2022-12-15 RX ORDER — METOPROLOL TARTRATE 25 MG/1
25 TABLET, FILM COATED ORAL EVERY 12 HOURS
Status: DISCONTINUED | OUTPATIENT
Start: 2022-12-15 | End: 2022-12-17

## 2022-12-15 RX ADMIN — Medication 2 UNITS: at 22:14

## 2022-12-15 RX ADMIN — HEPARIN SODIUM 5000 UNITS: 5000 INJECTION INTRAVENOUS; SUBCUTANEOUS at 22:26

## 2022-12-15 RX ADMIN — SODIUM CHLORIDE 100 ML/HR: 9 INJECTION, SOLUTION INTRAVENOUS at 03:30

## 2022-12-15 RX ADMIN — DOCUSATE SODIUM 100 MG: 100 CAPSULE, LIQUID FILLED ORAL at 08:12

## 2022-12-15 RX ADMIN — ATORVASTATIN CALCIUM 80 MG: 40 TABLET, FILM COATED ORAL at 22:26

## 2022-12-15 RX ADMIN — GABAPENTIN 200 MG: 100 CAPSULE ORAL at 22:26

## 2022-12-15 RX ADMIN — GABAPENTIN 200 MG: 100 CAPSULE ORAL at 17:09

## 2022-12-15 RX ADMIN — ASPIRIN 325 MG ORAL TABLET 325 MG: 325 PILL ORAL at 08:12

## 2022-12-15 RX ADMIN — HEPARIN SODIUM 5000 UNITS: 5000 INJECTION INTRAVENOUS; SUBCUTANEOUS at 03:29

## 2022-12-15 RX ADMIN — DOCUSATE SODIUM 100 MG: 100 CAPSULE, LIQUID FILLED ORAL at 17:09

## 2022-12-15 RX ADMIN — GADOBENATE DIMEGLUMINE 15 ML: 529 INJECTION, SOLUTION INTRAVENOUS at 14:37

## 2022-12-15 RX ADMIN — THERA TABS 1 TABLET: TAB at 08:12

## 2022-12-15 RX ADMIN — HEPARIN SODIUM 5000 UNITS: 5000 INJECTION INTRAVENOUS; SUBCUTANEOUS at 13:16

## 2022-12-15 RX ADMIN — GABAPENTIN 200 MG: 100 CAPSULE ORAL at 08:12

## 2022-12-15 RX ADMIN — METOPROLOL TARTRATE 25 MG: 25 TABLET, FILM COATED ORAL at 22:26

## 2022-12-15 NOTE — PROGRESS NOTES
Asked to see patient with complaints of right arm and leg symptoms. On review of chart ordered MRI C and LS spine. MRI of c spine demonstrates severe cervical stenosis with cord edema may likely explain symptoms. WIll hold off on steroids given DM. Will see patient in AM - full consult to follow.

## 2022-12-15 NOTE — PROGRESS NOTES
Care Management Interventions  PCP Verified by CM: Yes  Mode of Transport at Discharge: Self Mika Fischer will be transporting patient home at time of discharge. )  Transition of Care Consult (CM Consult): [address]: Yes  Discharge Durable Medical Equipment: No  Physical Therapy Consult: Yes  Occupational Therapy Consult: Yes  Speech Therapy Consult: Yes  Support Systems: Child(chago) (Patient lives with her daughter. )  Confirm Follow Up Transport: Family  The Plan for Transition of Care is Related to the Following Treatment Goals : Home with [facility]  The Patient and/or Patient Representative was Provided with a Choice of Provider and Agrees with the Discharge Plan?: Yes  Freedom of Choice List was Provided with Basic Dialogue that Supports the Patient's Individualized Plan of Care/Goals, Treatment Preferences and Shares the Quality Data Associated with the Providers?: Yes  Discharge Location  Patient Expects to be Discharged to[de-identified] Home with home health        Patient lives with her daughter, has a ramp to enter the home. Patient has DME Luz Marina Armando, [facility], in the home. Patient was not using [address], has stayed in SNF or Rehab before, not in the last 60 days. Patient is not a Dialysis patient, and does not use Oxygen in the home. FOC verbal consent given for [facility]. Mika Fischer will be transporting patient home at time of discharge. Moisés Yi for [facility] orders when possible.              Brandy Aguayo, RN  Case Management [phone]

## 2022-12-15 NOTE — PROGRESS NOTES
Problem: Self Care Deficits Care Plan (Adult)  Goal: *Acute Goals and Plan of Care (Insert Text)  Description: Occupational Therapy Goals  Initiated 12/13/2022 within 7 day(s). 1.  Patient will perform grooming with modified independence. 2.  Patient will perform bathing with modified independence using adaptive equipment. 3.  Patient will perform upper body dressing and lower body dressing with modified independence using adaptive equipment. .  4.  Patient will perform toilet transfers with modified independence using RW with good balance. 5.  Patient will perform all aspects of toileting with modified independence. 6.  Patient will participate in upper extremity therapeutic exercise/activities with modified independence for 8 minutes. 7.  Patient will utilize energy conservation techniques during functional activities with min verbal cues. Prior Level of Function: Mod I with ADLs, used AE for LB ADLs: reacher, sockaide and LH bathsponge, Mod I functional mobility using RW     Outcome: Progressing Towards Goal  OCCUPATIONAL THERAPY TREATMENT    Patient: Janis Ospina (10 y.o. female)  Date: 12/15/2022  Diagnosis: Right sided weakness [R53.1]  Fall [W19. XXXA]  Stroke (cerebrum) ([facility] [address] [I63.9] <principal problem not specified>      Precautions: Fall    Chart, occupational therapy assessment, plan of care, and goals were reviewed. ASSESSMENT:  Pt is up in recliner upon entry, agreeable to participate in OT. Pt performed functional mobility to the BR with FWW, performed all aspects of toileting with SBA and min VCs for sequencing as pt forgot to pull up underwear. Pt performed standing grooming tasks at the since, occasionally defaults to using LUE for reaching due to pain radiating to R hand with reaching overhead tasks. Pt returned to recliner, all needs met, agrees to call for assistance if needed to get up for any reason.   Progression toward goals:  [x]          Improving appropriately and progressing toward goals  []          Improving slowly and progressing toward goals  []          Not making progress toward goals and plan of care will be adjusted     PLAN:  Patient continues to benefit from skilled intervention to address the above impairments. Continue treatment per established plan of care. Further Equipment Recommendations for Discharge:  N/A    AMPAC: Current research shows that an AM-PAC score of 18 or greater is associated with a discharge to the patient's home setting. Based on an AM-PAC score of 19/24 and their current ADL deficits; it is recommended that the patient have 2-3 sessions per week of Occupational Therapy at d/c to increase the patient's independence. This AMPAC score should be considered in conjunction with interdisciplinary team recommendations to determine the most appropriate discharge setting. Patient's social support, diagnosis, medical stability, and prior level of function should also be taken into consideration. SUBJECTIVE:   Patient stated My hand feels okay, just hurts when I reach for paper towels.     OBJECTIVE DATA SUMMARY:   Cognitive/Behavioral Status:  Neurologic State: Alert  Orientation Level: Oriented X4  Cognition: Follows commands  Safety/Judgement: Fall prevention    Functional Mobility and Transfers for ADLs:   Bed Mobility:      Scooting: Supervision   Transfers:  Sit to Stand: Stand-by assistance  Stand to Sit: Stand-by assistance   Toilet Transfer : Stand-by assistance    Bathroom Mobility: Stand-by assistance    Balance:  Sitting: Intact  Standing: Impaired; With support  Standing - Static: Good  Standing - Dynamic : Fair    ADL Intervention:       Grooming  Grooming Assistance: Supervision  Position Performed: Standing  Washing Face: Supervision  Brushing Teeth: Supervision  Lower Body Dressing Assistance  Underpants: Stand-by assistance  Socks: Stand-by assistance    Pain:  Pain level pre-treatment: 0/10   Pain level post-treatment: 0/10    Activity Tolerance:    Fair  Please refer to the flowsheet for vital signs taken during this treatment. After treatment:   []  Patient left in no apparent distress sitting up in chair  [x]  Patient left in no apparent distress in bed  [x]  Call bell left within reach  []  Nursing notified  []  Caregiver present  []  Bed alarm activated    COMMUNICATION/EDUCATION:   [x] Role of Occupational Therapy in the acute care setting  [x] Home safety education was provided and the patient/caregiver indicated understanding. [x] Patient/family have participated as able in working towards goals and plan of care. [] Patient/family agree to work toward stated goals and plan of care. [] Patient understands intent and goals of therapy, but is neutral about his/her participation. [] Patient is unable to participate in goal setting and plan of care. Thank you for this referral.  Miguel Angel Marquez OTR/L  Time Calculation: 23 mins    Nick Lewis AM-PAC® Daily Activity Inpatient Short Form (6-Clicks)*    How much HELP from another person does the patient currently need    (If the patient hasn't done an activity recently, how much help from another person do you think he/she would need if he/she tried?)   Total (Total A or Dep)   A Lot  (Mod to Max A)   A Little (Sup or Min A)   None (Mod I to I)   Putting on and taking off regular lower body clothing? [] 1 [] 2 [x] 3 [] 4   2. Bathing (including washing, rinsing,      drying)? [] 1 [] 2 [x] 3 [] 4   3. Toileting, which includes using toilet, bedpan or urinal?   [] 1 [] 2 [x] 3 [] 4   4. Putting on and taking off regular upper body clothing? [] 1 [] 2 [] 3 [] 4   5. Taking care of personal grooming such as brushing teeth? [] 1 [] 2 [x] 3 [] 4   6. Eating meals? [] 1 [] 2 [] 3 [x] 4        Current research shows that an AM-PAC score of 18 or greater is associated with a discharge to the patient's home setting.  Based on an AM-PAC score of 19/24 and their current ADL deficits; it is recommended that the patient have 2-3 sessions per week of Occupational Therapy at d/c to increase the patient's independence.

## 2022-12-15 NOTE — PROGRESS NOTES
Re:  Melissa Luis up visit     12/15/2022 9:07 AM     SSN: [id number]    Subjective:   Leticia Watson  was seen on follow up. No acute changes today. No new weakness of her extremities. Has been walking with her walker. Right LE feels weak. No weakness over the RUE. Continues to have numbness over the RLE. MRI of the brain didn't show acute changes; mild parenchymal volume loss and left maxillary sinusitis seen.       Medications:    Current Facility-Administered Medications   Medication Dose Route Frequency Provider Last Rate Last Admin    0.9% sodium chloride infusion  100 mL/hr IntraVENous CONTINUOUS Becca Andrea  mL/hr at 12/15/22 0330 100 mL/hr at 12/15/22 0330    bisacodyL (DULCOLAX) tablet 10 mg  10 mg Oral Q48H PRN Becca Andrea MD        acetaminophen (TYLENOL) tablet 500 mg  500 mg Oral Q6H PRN Becca Andrea MD        cyclobenzaprine (FLEXERIL) tablet 5 mg  5 mg Oral TID PRN Becca Andrea MD        docusate sodium (COLACE) capsule 100 mg  100 mg Oral BID Becca Andrea MD   100 mg at 12/15/22 1750    therapeutic multivitamin [facility]) tablet 1 Tablet  1 Tablet Oral DAILY Becca Andrea MD   1 Tablet at 12/15/22 4541    insulin lispro (HUMALOG) injection   SubCUTAneous AC&HS Becca Andrea MD        glucose chewable tablet 16 g  16 g Oral PRN Becca Andrea MD        glucagon (GLUCAGEN) injection 1 mg  1 mg IntraMUSCular PRN Becca Andrea MD        dextrose 10% infusion 0-250 mL  0-250 mL IntraVENous PRN Becca Andrea MD        ondansetron [facility]) injection 4 mg  4 mg IntraVENous Q6H PRN Becca Andrea MD        aspirin tablet 325 mg  325 mg Oral DAILY Becca Andrea MD   325 mg at 12/15/22 0760    atorvastatin (LIPITOR) tablet 80 mg  80 mg Oral QHS Becca Andrea MD   80 mg at 12/14/22 4111    labetaloL (NORMODYNE;TRANDATE) 20 mg/4 mL (5 mg/mL) injection 5 mg  5 mg IntraVENous Q10MIN PRN Becca Andrea MD        heparin (porcine) injection 5,000 Units  5,000 Units SubCUTAneous Q8H Vidhi Santizo MD   5,000 Units at 12/15/22 8042    gabapentin (NEURONTIN) capsule 200 mg  200 mg Oral TID Vidhi Santizo MD   200 mg at 12/15/22 0796       Vital signs:  Visit Vitals  BP (!) 157/83 (BP 1 Location: Left arm, BP Patient Position: Sitting)   Pulse (!) 111   Temp 98.2 °F (36.8 °C)   Resp 16   Ht 5' 6\" (1.676 m)   Wt 80.7 kg (178 lb)   SpO2 100%   BMI 28.73 kg/m²       Review of Systems:   Constitutional no fever or chills  Skin denies rash or itching  HENT  no hearing lose  Eyes denies diplopia vision lose  Respiratory denies shortness of breath  Cardiovascular denies chest pain  Gastrointestinal denies nausea, vomitin  Genitourinary denies incontinence  Musculoskeletal: has LBP; s/p recent surgery. Endocrine denies weight change  Hematology denies easy bruising or bleeding   Neurological as above in HPI      Patient Active Problem List   Diagnosis Code    Spinal stenosis of lumbar region at multiple levels M48.061    Impaired mobility and ADLs Z74.09, Z78.9    Fall W19. XXXA    Stroke (cerebrum) ([facility]) I63.9    Right sided weakness R53.1         Objective:   GENERAL:                  In no apparent distress. EXTREMITIES:           Muscle tone is normal.  HEAD:                         The patient is normocephalic. NEUROLOGIC EXAMINATION    Mental status: Awake, alert, oriented X3, follows simple and complex commands, no neglect or extinction. Speech and languge: fluent, coherent, naming and repitition intact, reading and comprehension intact  CN: VFF, EOMI, PERRLA, face sensation intact , no facial asymmetry noted, palate elevation symmetric bilat, SS+SCM 5/5 bilat, tongue midline  Motor: no pronator drift, tone normal throughout, strength 5/5 throughout except the RLE: unbale to lift it off the bed(possible poor effort from low back pain). Sensory: Decreased LT and PP over the right side(including the face).    Coordination: FNF accurate w/o dysmetria  DTR: 2+ throughout  Gait: not tested          Study Result    Narrative & Impression   EXAM: MRI of the Head without contrast     INDICATION: RLE/RUE decreased sensation and RLE weakness since 0430     TECHNIQUE: MRI of the head without IV contrast. Multiplanar multisequence MR  images of the brain without contrast.      IV contrast:  None     COMPARISON: Head CT dated 12/13/2022     FINDINGS: No focus of restricted diffusion appreciated. The ventricles and sulci  are symmetric but mildly prominent. Only a couple white matter hyperintensities  are noted. No midline shift, mass effect, or mass lesion appreciated. The  grey-white junction is intact. No evidence for an acute infarct identified. No  focus of abnormal susceptibility appreciated. The vascular flow voids are  intact. The cerebellopontine angles are unremarkable. The visualized internal  auditory canals and semicircular canals are unremarkable. The sella is empty. The mastoid air cells are unremarkable. Left maxillary sinusitis is noted. The  orbits are unremarkable. The scalp and skull are unremarkable. IMPRESSION  1. No acute intracranial process. Mild parenchymal volume loss. 2.  Left maxillary sinusitis           Study Result     Narrative & Impression   CTA NECK/BRAIN     CLINICAL INDICATIONS: Awoke this morning with increased right-sided weakness. TECHNIQUE: Helical CT scan of the brain and neck were performed at 1.25 mm  intervals during rapid IV bolus contrast administration. These data were  reconstructed at .625 mm intervals for vascular analysis. The data was also  reviewed at 2.5 mm intervals for accompanying soft tissue analysis. 3D post  processed images, including surface shaded displays, were produced for this exam  on independent console, permanently archived and interpreted.      All CT scans at this facility are performed using dose optimization technique as  appropriate to a performed exam, to include automated exposure control,  adjustment of the MA and/or kV according to patient size (including appropriate  matching for site-specific examinations) or use of  iterative reconstruction  technique. Additional reformatted images for cervical spine evaluation. CONTRAST: 67 cc Isovue-370 IV     CTA SOFT TISSUE ANALYSIS: Imaged lung apices are clear. Soft tissue and mucosal  spaces of the neck are unremarkable. Circumferential mucosal thickening and  small dependent effusion in the left maxillary antrum. No enhancing brain  abnormalities. Additional cervical spine re-formations were obtained from the CTA data set. There is mild anterolisthesis of C4 on C5 and straightening of the lordosis. Normal alignment at the craniocervical junction. Normal vertebral body heights. No evidence of acute fracture. Multilevel chronic disc degeneration consisting  of mild disc height narrowing and endplate osteophytes. Multilevel severe bony  hypertrophy of the facet joints. CTA NECK VASCULAR ANALYSIS:      Aortic arch: Visualized aorta is normal caliber. Common innominate and left  carotid origins. Innominate: Patent, no stenosis. Right Subclavian:Patent, no stenosis. Left Subclavian:Patent, no stenosis. Right carotid:  -CCA: Patent. -ECA: Patent. -ICA: Patent. 0% stenosis of proximal ICA by NASCET criteria. Left carotid:  -CCA: Patent. -ECA: Patent. -ICA: Patent. 0% stenosis of proximal ICA by NASCET criteria. Right vertebral: Patent. Atherosclerotic calcification at the origin with  estimated moderate stenosis. No stenosis elsewhere. Left vertebral: Patent. Mild luminal narrowing caused by extrinsic compression  from osteophytes but no atherosclerotic stenosis. CTA BRAIN VASCULAR ANALYSIS:      Right anterior circulation:  -ICA:Patent. -HUDSON:Patent. -MCA: Patent. Left anterior circulation:  -ICA:Patent. -HUDSON:Patent. -MCA: Patent.      Posterior circulation:  -RVA:Patent. -LVA: Patent.  -Basilar: Patent. -Right PCA: Patent.  -Left PCA: Patent. IMPRESSION     1. No large vessel occlusion. 2.  Moderate RVA origin stenosis. Cervical carotid and vertebral arteries are  otherwise nonstenotic. 3.  Largest intracranial cerebral arteries are without significant stenosis. 4.  Grade 1 anterolisthesis of C4 on C5 with multilevel disc degeneration and  facet joint arthropathy but no acute cervical spine fracture. 5.  Possible acute on chronic left maxillary sinusitis. CBC:   Lab Results   Component Value Date/Time    WBC 4.2 (L) 12/14/2022 03:09 AM    RBC 4.00 (L) 12/14/2022 03:09 AM    HGB 10.9 (L) 12/14/2022 03:09 AM    HCT 35.0 12/14/2022 03:09 AM    PLATELET 904 38/39/6820 03:09 AM     BMP:   Lab Results   Component Value Date/Time    Glucose 100 (H) 12/14/2022 03:09 AM    Sodium 142 12/14/2022 03:09 AM    Potassium 3.8 12/14/2022 03:09 AM    Chloride 104 12/14/2022 03:09 AM    CO2 32 12/14/2022 03:09 AM    BUN 10 12/14/2022 03:09 AM    Creatinine 0.77 12/14/2022 03:09 AM    Calcium 9.9 12/14/2022 03:09 AM     CMP:   Lab Results   Component Value Date/Time    Glucose 100 (H) 12/14/2022 03:09 AM    Sodium 142 12/14/2022 03:09 AM    Potassium 3.8 12/14/2022 03:09 AM    Chloride 104 12/14/2022 03:09 AM    CO2 32 12/14/2022 03:09 AM    BUN 10 12/14/2022 03:09 AM    Creatinine 0.77 12/14/2022 03:09 AM    Calcium 9.9 12/14/2022 03:09 AM    Anion gap 6 12/14/2022 03:09 AM    BUN/Creatinine ratio 13 12/14/2022 03:09 AM    Alk. phosphatase 93 12/14/2022 03:09 AM    Protein, total 7.0 12/14/2022 03:09 AM    Albumin 3.5 12/14/2022 03:09 AM    Globulin 3.5 12/14/2022 03:09 AM    A-G Ratio 1.0 12/14/2022 03:09 AM       Impression:      A 67years old female patient with above medical problems including recent lumbar fusion surgery and currently attending physical therapy was brought to the emergency room after a fall.   Noticed to have right-sided body weakness, more over the right lower extremity. On physical examination, has decreased sensation on the right side including the face. No changes in her speech. CT scan did not show any acute changes; has air-fluid level over the left maxillary sinus suggesting possible sinusitis. MRI of the brain didn't show cute stroke. CT angiography of the head and neck did not show any LVO. TTE showed normal ejection fraction; there is a small PFO. The right lower extremity weakness could be from her lower back; but since she also has involvement of the face and upper extremity, need to rule out stroke. Lipid panel and A1C are normal.      Plan:     -C/w Aspirin 325 mg PO/day and Atorvastatin 80 mg PO/day  -Needs follow up with cardiology for the small PFO  -Needs to follow with her PCP/ENT for the maxillary sinusitis.   -Call for questions  -Will sign off. Sincerely,      Angel Luna M.D. PLEASE NOTE:   This document has been produced using voice recognition software. Unrecognized errors in transcription may be present.

## 2022-12-15 NOTE — PROGRESS NOTES
[facility] Hospitalist [facility]  Progress Note    Patient: Tabitha Carvajal Age: 67 y.o. : 1950 MR#: [id number] SSN: [id number]  Date/Time: 12/15/2022     Subjective: Patient working with physical therapy during my visit. Patient states right leg weakness slightly better but still not back to baseline. She still having some numbness in the right hand and also having pain and worsening numbness when she lifts her arm above the shoulder. Complains of very minimal shoulder pain, no back pain. Assessment/Plan:   1. Right-sided weakness  2. Possible CVA  3. Chronic right leg numbness with decreased mobility secondary to recent lumbar spine surgery  4. Diabetes mellitus  5. Hypertension  6. Dyslipidemia  7. Osteoarthritis  8. S/p recent anterior lumbar interbody fusion, L4-L5 and L5-S1 fusion. Plan  MRI negative for acute CVA, patient still having some mild right lower extremity weakness and also right upper extremity radiculopathy. Discussed with neuro and spine surgery, will obtain MRI of C-spine and LS-spine  Echo noted with small PFO, needs outpatient follow-up with cardiology  continue aspirin, statin  Will add low-dose of beta-blocker and monitor blood pressure  Continue PT/OT eval and treatment  Continue heparin subcu for DVT prophylaxis  Discontinue IV fluids    Discussed with the patient at bedside and explained about my above plan care.   Discussed with RN      Case discussed with:  [x]Patient  []Family  [x]Nursing  []Case Management  DVT Prophylaxis:  []Lovenox  [x]Hep SQ  []SCDs  []Coumadin   []Eliquis/Xarelto     Objective:   VS: Visit Vitals  BP (!) 159/84 (BP 1 Location: Left arm, BP Patient Position: At rest)   Pulse 98   Temp 98.2 °F (36.8 °C)   Resp 16   Ht 5' 6\" (1.676 m)   Wt 80.7 kg (178 lb)   SpO2 97%   BMI 28.73 kg/m²      Tmax/24hrs: Temp (24hrs), Av.1 °F (36.7 °C), Min:97.8 °F (36.6 °C), Max:98.3 °F (36.8 °C)  IOBRIEF  Intake/Output Summary (Last 24 hours) at 12/15/2022 1659  Last data filed at 12/15/2022 0825  Gross per 24 hour   Intake 4646.67 ml   Output 500 ml   Net 4146.67 ml       General:  Alert, cooperative, no acute distress    HEENT: PERRLA, anicteric sclerae. Pulmonary:  CTA Bilaterally. No Wheezing/Rales. Cardiovascular: Regular rate and Rhythm. GI:  Soft, Non distended, Non tender. + Bowel sounds. Extremities:  No edema. No calf tenderness. Psych: Good insight. Not anxious or agitated. Neurologic: Alert and oriented X 3.   No slurred speech, no facial droop, right lower extremity 3-4/5, rest 5/5  Additional:    Medications:   Current Facility-Administered Medications   Medication Dose Route Frequency    bisacodyL (DULCOLAX) tablet 10 mg  10 mg Oral Q48H PRN    acetaminophen (TYLENOL) tablet 500 mg  500 mg Oral Q6H PRN    cyclobenzaprine (FLEXERIL) tablet 5 mg  5 mg Oral TID PRN    docusate sodium (COLACE) capsule 100 mg  100 mg Oral BID    therapeutic multivitamin (THERAGRAN) tablet 1 Tablet  1 Tablet Oral DAILY    insulin lispro (HUMALOG) injection   SubCUTAneous AC&HS    glucose chewable tablet 16 g  16 g Oral PRN    glucagon (GLUCAGEN) injection 1 mg  1 mg IntraMUSCular PRN    dextrose 10% infusion 0-250 mL  0-250 mL IntraVENous PRN    ondansetron (ZOFRAN) injection 4 mg  4 mg IntraVENous Q6H PRN    aspirin tablet 325 mg  325 mg Oral DAILY    atorvastatin (LIPITOR) tablet 80 mg  80 mg Oral QHS    labetaloL (NORMODYNE;TRANDATE) 20 mg/4 mL (5 mg/mL) injection 5 mg  5 mg IntraVENous Q10MIN PRN    heparin (porcine) injection 5,000 Units  5,000 Units SubCUTAneous Q8H    gabapentin (NEURONTIN) capsule 200 mg  200 mg Oral TID       Labs:    Recent Results (from the past 24 hour(s))   GLUCOSE, POC    Collection Time: 12/14/22  9:31 PM   Result Value Ref Range    Glucose (POC) 106 70 - 110 mg/dL   GLUCOSE, POC    Collection Time: 12/15/22  6:56 AM   Result Value Ref Range    Glucose (POC) 76 70 - 110 mg/dL   GLUCOSE, POC    Collection Time: 12/15/22 11:19 AM   Result Value Ref Range    Glucose (POC) 99 70 - 110 mg/dL   GLUCOSE, POC    Collection Time: 12/15/22  4:33 PM   Result Value Ref Range    Glucose (POC) 105 70 - 110 mg/dL       Signed By: Rose Decker MD     December 15, 2022      Disclaimer: Sections of this note are dictated using utilizing voice recognition software. Minor typographical errors may be present. If questions arise, please do not hesitate to contact me or call our department.

## 2022-12-15 NOTE — PROGRESS NOTES
Problem: Mobility Impaired (Adult and Pediatric)  Goal: *Acute Goals and Plan of Care (Insert Text)  Description: Physical Therapy Goals  Initiated 12/14/2022 and to be accomplished within 7 day(s)  1. Patient will move from supine to sit and sit to supine , scoot up and down, and roll side to side in bed with modified independence. 2.  Patient will transfer from bed to chair and chair to bed with modified independence using the least restrictive device. 3.  Patient will perform sit to stand with modified independence. 4.  Patient will ambulate with modified independence for 300 feet with the least restrictive device. 5.  Patient will participate in LE exercise to tolerance. PLOF: Pt lives in [facility] behind [facility] house. Pt [place] with cane/RW. Ramp to enter. Outcome: Progressing Towards Goal     PHYSICAL THERAPY TREATMENT    Patient: Aquiles Gonzalez (44 y.o. female)  Date: 12/15/2022  Diagnosis: Right sided weakness [R53.1]  Fall [W19. XXXA]  Stroke (cerebrum) ([facility] [address] [I63.9] <principal problem not specified>      Precautions: Fall      ASSESSMENT:  Patient in restroom with call light on. Patient standing with RW. She ambulates 60 ft in hallway with supervision. Fairly steady gait and improved right LE step clearance. Verbal cues to improve posture. Reviewed LE exercises and verbalizes understanding. Patient returns to the chair with call bell in reach. Progression toward goals:   [x]      Improving appropriately and progressing toward goals  []      Improving slowly and progressing toward goals  []      Not making progress toward goals and plan of care will be adjusted     PLAN:  Patient continues to benefit from skilled intervention to address the above impairments. Continue treatment per established plan of care.     Further Equipment Recommendations for Discharge:  rolling walker    [facility]: Current research shows that an AM-PAC score of 18 (14 without stairs) or greater is associated with a discharge to the patient's home setting. Based on an AM-PAC score of 18/24 (or **/20 if omitting stairs) and their current functional mobility deficits, it is recommended that the patient have 2-3 sessions per week of Physical Therapy at d/c to increase the patient's independence. This AMPA score should be considered in conjunction with interdisciplinary team recommendations to determine the most appropriate discharge setting. Patient's social support, diagnosis, medical stability, and prior level of function should also be taken into consideration. SUBJECTIVE:   Patient stated I had back surgery .     OBJECTIVE DATA SUMMARY:   Critical Behavior:  Neurologic State: Alert  Orientation Level: Oriented X4  Cognition: Follows commands  Safety/Judgement: Fall prevention  Functional Mobility Training:  Bed Mobility:           Scooting: Supervision         Transfers:  Sit to Stand: Stand-by assistance  Stand to Sit: Stand-by assistance                   Balance:  Sitting: Intact  Standing: Impaired; With support  Standing - Static: Good  Standing - Dynamic : Fair         Ambulation/Gait Training:  Distance (ft): 60 Feet (ft)  Assistive Device: Walker, rolling  Ambulation - Level of Assistance: Supervision        Gait Abnormalities: Decreased step clearance              Pain:  Pain level pre-treatment: 0/10  Pain level post-treatment: 0/10   Pain Intervention(s): Medication (see MAR); Rest, Ice, Repositioning   Response to intervention: Nurse notified, See doc flow    Activity Tolerance:   Fair  Please refer to the flowsheet for vital signs taken during this treatment. After treatment:   [x] Patient left in no apparent distress sitting up in chair  [] Patient left in no apparent distress in bed  [x] Call bell left within reach  [x] Nursing notified  [] Caregiver present  [] Bed alarm activated  [] SCDs applied      COMMUNICATION/EDUCATION:   [x]         Role of Physical Therapy in the acute care setting.   [x] Fall prevention education was provided and the patient/caregiver indicated understanding. [x]         Patient/family have participated as able in working toward goals and plan of care. [x]         Patient/family agree to work toward stated goals and plan of care. []         Patient understands intent and goals of therapy, but is neutral about his/her participation. []         Patient is unable to participate in stated goals/plan of care: ongoing with therapy staff.  []         Other:        Jessica Brumfield, PT   Time Calculation: 21 mins    [employer] AM-PAC® Basic Mobility Inpatient Short Form (6-Clicks) Version 2    How much HELP from another person does the patient currently need    (If the patient hasn't done an activity recently, how much help from another person do you think he/she would need if he/she tried?)   Total (Total A or Dep)   A Lot  (Mod to Max A)   A Little (Sup or Min A)   None (Mod I to I)   Turning from your back to your side while in a flat bed without using bedrails? [] 1 [] 2 [x] 3 [] 4   2. Moving from lying on your back to sitting on the side of a flat bed without using bedrails? [] 1 [] 2 [x] 3 [] 4   3. Moving to and from a bed to a chair (including a wheelchair)? [] 1 [] 2 [x] 3 [] 4   4. Standing up from a chair using your arms (e.g., wheelchair, or bedside chair)? [] 1 [] 2 [x] 3 [] 4   5. Walking in hospital room? [] 1 [] 2 [x] 3 [] 4   6. Climbing 3-5 steps with a railing?+   [] 1 [] 2 [x] 3 [] 4   +If stair climbing cannot be assessed, skip item #6. Sum responses from items 1-5.

## 2022-12-16 ENCOUNTER — ANESTHESIA EVENT (OUTPATIENT)
Dept: SURGERY | Age: 72
DRG: 472 | End: 2022-12-16
Payer: MEDICARE

## 2022-12-16 LAB
ANION GAP SERPL CALC-SCNC: 5 MMOL/L (ref 3–18)
BUN SERPL-MCNC: 13 MG/DL (ref 7–18)
BUN/CREAT SERPL: 15 (ref 12–20)
CALCIUM SERPL-MCNC: 9.3 MG/DL (ref 8.5–10.1)
CHLORIDE SERPL-SCNC: 106 MMOL/L (ref 100–111)
CO2 SERPL-SCNC: 30 MMOL/L (ref 21–32)
CREAT SERPL-MCNC: 0.86 MG/DL (ref 0.6–1.3)
GLUCOSE BLD STRIP.AUTO-MCNC: 175 MG/DL (ref 70–110)
GLUCOSE BLD STRIP.AUTO-MCNC: 175 MG/DL (ref 70–110)
GLUCOSE BLD STRIP.AUTO-MCNC: 90 MG/DL (ref 70–110)
GLUCOSE BLD STRIP.AUTO-MCNC: 94 MG/DL (ref 70–110)
GLUCOSE SERPL-MCNC: 98 MG/DL (ref 74–99)
POTASSIUM SERPL-SCNC: 3.6 MMOL/L (ref 3.5–5.5)
SODIUM SERPL-SCNC: 141 MMOL/L (ref 136–145)

## 2022-12-16 PROCEDURE — 36415 COLL VENOUS BLD VENIPUNCTURE: CPT

## 2022-12-16 PROCEDURE — 2709999900 HC NON-CHARGEABLE SUPPLY

## 2022-12-16 PROCEDURE — 74011250636 HC RX REV CODE- 250/636: Performed by: ORTHOPAEDIC SURGERY

## 2022-12-16 PROCEDURE — 74011636637 HC RX REV CODE- 636/637: Performed by: INTERNAL MEDICINE

## 2022-12-16 PROCEDURE — 80048 BASIC METABOLIC PNL TOTAL CA: CPT

## 2022-12-16 PROCEDURE — 97116 GAIT TRAINING THERAPY: CPT

## 2022-12-16 PROCEDURE — 99232 SBSQ HOSP IP/OBS MODERATE 35: CPT | Performed by: HOSPITALIST

## 2022-12-16 PROCEDURE — 82962 GLUCOSE BLOOD TEST: CPT

## 2022-12-16 PROCEDURE — 74011250637 HC RX REV CODE- 250/637: Performed by: INTERNAL MEDICINE

## 2022-12-16 PROCEDURE — 74011636637 HC RX REV CODE- 636/637: Performed by: HOSPITALIST

## 2022-12-16 PROCEDURE — 65270000046 HC RM TELEMETRY

## 2022-12-16 PROCEDURE — 74011250637 HC RX REV CODE- 250/637: Performed by: HOSPITALIST

## 2022-12-16 PROCEDURE — 97535 SELF CARE MNGMENT TRAINING: CPT

## 2022-12-16 PROCEDURE — 74011250636 HC RX REV CODE- 250/636: Performed by: INTERNAL MEDICINE

## 2022-12-16 RX ORDER — INSULIN GLARGINE 100 [IU]/ML
10 INJECTION, SOLUTION SUBCUTANEOUS
Status: DISCONTINUED | OUTPATIENT
Start: 2022-12-16 | End: 2022-12-21

## 2022-12-16 RX ORDER — DEXAMETHASONE SODIUM PHOSPHATE 4 MG/ML
10 INJECTION, SOLUTION INTRA-ARTICULAR; INTRALESIONAL; INTRAMUSCULAR; INTRAVENOUS; SOFT TISSUE EVERY 6 HOURS
Status: DISCONTINUED | OUTPATIENT
Start: 2022-12-16 | End: 2022-12-20

## 2022-12-16 RX ADMIN — THERA TABS 1 TABLET: TAB at 09:10

## 2022-12-16 RX ADMIN — Medication 10 UNITS: at 22:41

## 2022-12-16 RX ADMIN — ASPIRIN 325 MG ORAL TABLET 325 MG: 325 PILL ORAL at 09:10

## 2022-12-16 RX ADMIN — DEXAMETHASONE SODIUM PHOSPHATE 10 MG: 4 INJECTION, SOLUTION INTRAMUSCULAR; INTRAVENOUS at 09:11

## 2022-12-16 RX ADMIN — DOCUSATE SODIUM 100 MG: 100 CAPSULE, LIQUID FILLED ORAL at 17:56

## 2022-12-16 RX ADMIN — ATORVASTATIN CALCIUM 80 MG: 40 TABLET, FILM COATED ORAL at 22:41

## 2022-12-16 RX ADMIN — GABAPENTIN 200 MG: 100 CAPSULE ORAL at 16:12

## 2022-12-16 RX ADMIN — DOCUSATE SODIUM 100 MG: 100 CAPSULE, LIQUID FILLED ORAL at 09:17

## 2022-12-16 RX ADMIN — DEXAMETHASONE SODIUM PHOSPHATE 10 MG: 4 INJECTION, SOLUTION INTRAMUSCULAR; INTRAVENOUS at 17:56

## 2022-12-16 RX ADMIN — Medication 2 UNITS: at 16:55

## 2022-12-16 RX ADMIN — DEXAMETHASONE SODIUM PHOSPHATE 10 MG: 4 INJECTION, SOLUTION INTRAMUSCULAR; INTRAVENOUS at 12:35

## 2022-12-16 RX ADMIN — HEPARIN SODIUM 5000 UNITS: 5000 INJECTION INTRAVENOUS; SUBCUTANEOUS at 12:35

## 2022-12-16 RX ADMIN — GABAPENTIN 200 MG: 100 CAPSULE ORAL at 22:41

## 2022-12-16 RX ADMIN — METOPROLOL TARTRATE 25 MG: 25 TABLET, FILM COATED ORAL at 22:46

## 2022-12-16 RX ADMIN — HEPARIN SODIUM 5000 UNITS: 5000 INJECTION INTRAVENOUS; SUBCUTANEOUS at 22:41

## 2022-12-16 RX ADMIN — HEPARIN SODIUM 5000 UNITS: 5000 INJECTION INTRAVENOUS; SUBCUTANEOUS at 06:59

## 2022-12-16 RX ADMIN — GABAPENTIN 200 MG: 100 CAPSULE ORAL at 09:10

## 2022-12-16 RX ADMIN — Medication 2 UNITS: at 22:41

## 2022-12-16 RX ADMIN — METOPROLOL TARTRATE 25 MG: 25 TABLET, FILM COATED ORAL at 09:10

## 2022-12-16 NOTE — PROGRESS NOTES
Problem: Mobility Impaired (Adult and Pediatric)  Goal: *Acute Goals and Plan of Care (Insert Text)  Description: Physical Therapy Goals  Initiated 12/14/2022 and to be accomplished within 7 day(s)  1. Patient will move from supine to sit and sit to supine , scoot up and down, and roll side to side in bed with modified independence. 2.  Patient will transfer from bed to chair and chair to bed with modified independence using the least restrictive device. 3.  Patient will perform sit to stand with modified independence. 4.  Patient will ambulate with modified independence for 300 feet with the least restrictive device. 5.  Patient will participate in LE exercise to tolerance. PLOF: Pt lives in [place] behind daughters house. Pt [place] with cane/RW. Ramp to enter. Outcome: Resolved/Met   PHYSICAL THERAPY TREATMENT AND DISCHARGE    Patient: Jordan Covarrubias (36 y.o. female)  Date: 12/16/2022  Diagnosis: Right sided weakness [R53.1]  Fall [W19. XXXA]  Stroke (cerebrum) ([facility] [address] [I63.9]   Procedure(s) (LRB):  ANTERIOR CERVICAL DECOMPRESSION WITH FUSION CERVICAL THREE-FOUR, CERVICAL FOUR-FIVE; C-ARM/ZEB (N/A)    Precautions: Fall  ASSESSMENT:  Encountered patient in bathroom getting washed up. Mod I for transfers and standing balance at sink. Mod I for amb 10ft to seated in chair. Assisted in set-up for donned underwear; good dynamic standing balance for pulling up. Amb 120ft mod I with ww. Educated on benefits of OOB and amb over weekend. Pending cervical surgery next week. Seated in chair at end of session with BLE elevated. Call bell in reach. PLAN:  Further skilled physical therapy is not indicated at this time. Rationale for discharge:  [x]     Goals Achieved  []     Plateau Reached  []     Patient not participating in therapy  [x]     Other: Please re-order post cervical surgery as indicated.     Further Equipment Recommendations for Discharge:  rolling walker    [facility] Basic Mobility Inpatient Short Form: 20/20, with stairs omitted  This [facility] score should be considered in conjunction with interdisciplinary team recommendations to determine the most appropriate discharge setting. Patient's social support, diagnosis, medical stability, and prior level of function should also be taken into consideration. At this time and based on an AM-PAC score of 20/20 if omitting stairs, no further PT is recommended upon discharge. SUBJECTIVE:   Patient stated [facility] said he could do it today but I need time.     OBJECTIVE DATA SUMMARY:   Critical Behavior:  Neurologic State: Alert  Orientation Level: Oriented X4  Cognition: Follows commands  Safety/Judgement: Fall prevention  Psychosocial  Patient Behaviors: Cooperative  Functional Mobility Training:  Transfers:  Sit to Stand: Modified independent  Stand to Sit: Modified independent  Balance:   Sitting: Intact  Standing: Impaired  Standing - Static: Good  Standing - Dynamic : Good  Ambulation/Gait Training:  Distance (ft): 120 Feet (ft)   Assistive Device: Walker, rolling  Ambulation - Level of Assistance: Modified independent  Neuro Re-Education:  Standing balance 8 minutes    Pain:  Pain level pre-treatment: 0/10   Pain level post-treatment: 0/10     Activity Tolerance:   Fair    After treatment:   [x] Patient left in no apparent distress sitting up in chair  [] Patient left in no apparent distress in bed  [x] Call bell left within reach  [x] Nursing notified  [] Caregiver present  [] Bed alarm activated  [] SCDs applied      COMMUNICATION/EDUCATION:   [x]         Role of physical therapy in the acute care setting. [x]         Fall prevention education was provided and the patient/caregiver indicated understanding. [x]         Patient/family have participated as able and agree with findings and recommendations. []         Patient is unable to participate in plan of care at this time.        Ashwin Sheppard, PT   Time Calculation: [address] [facility] AM-PAC® Basic Mobility Inpatient Short Form (6-Clicks) Version 2    How much HELP from another person does the patient currently need    (If the patient hasn't done an activity recently, how much help from another person do you think he/she would need if he/she tried?)   Total (Total A or Dep)   A Lot  (Mod to Max A)   A Little (Sup or Min A)   None (Mod I to I)   Turning from your back to your side while in a flat bed without using bedrails? [] 1 [] 2 [] 3 [x] 4   2. Moving from lying on your back to sitting on the side of a flat bed without using bedrails? [] 1 [] 2 [] 3 [x] 4   3. Moving to and from a bed to a chair (including a wheelchair)? [] 1 [] 2 [] 3 [x] 4   4. Standing up from a chair using your arms (e.g., wheelchair, or bedside chair)? [] 1 [] 2 [] 3 [x] 4   5. Walking in hospital room? [] 1 [] 2 [] 3 [x] 4   6. Climbing 3-5 steps with a railing?+  Not tested-none in home set-up [] 1 [] 2 [] 3 [] 4   +If stair climbing cannot be assessed, skip item #6. Sum responses from items 1-5.

## 2022-12-16 NOTE — PROGRESS NOTES
[facility] Hospitalist Group  Progress Note    Patient: Venkata Nair Age: 67 y.o. : 1950 MR#: [id number] SSN: [id number]  Date/Time: 2022     Subjective: Patient sitting in the recliner, symptoms no change today. Continues to have numbness in her right hand fingers and weakness in the right leg. Assessment/Plan:   1. Right-sided weakness  2. Possible CVA  3. Chronic right leg numbness with decreased mobility secondary to recent lumbar spine surgery  4. Diabetes mellitus  5. Hypertension  6. Dyslipidemia  7. Osteoarthritis  8. S/p recent anterior lumbar interbody fusion, L4-L5 and L5-S1 fusion. Plan  Discussed with spine surgery, MRI C-spine and L-spine reviewed by spine surgery and suspect symptoms mostly from spinal stenosis in the cervical spine. Needs decompression surgery. Started on steroids per spine surgery, anticipating laminectomy on Monday  Anticipate blood sugars to rise with Decadron, will add Lantus and continue SSI  Echo noted with small PFO, needs outpatient follow-up with cardiology  continue aspirin, statin  Continue beta-blocker and monitor blood pressure  Continue PT/OT eval and treatment  Continue heparin subcu for DVT prophylaxis    Discussed with the patient at bedside and explained about my above plan care. Patient understood and agreed with my plan  Discussed with RN      Case discussed with:  [x]Patient  []Family  [x]Nursing  []Case Management  DVT Prophylaxis:  []Lovenox  [x]Hep SQ  []SCDs  []Coumadin   []Eliquis/Xarelto     Objective:   VS: Visit Vitals  BP (!) 155/80   Pulse 92   Temp 97.9 °F (36.6 °C)   Resp 20   Ht 5' 6\" (1.676 m)   Wt 80.7 kg (178 lb)   SpO2 97%   BMI 28.73 kg/m²      Tmax/24hrs: Temp (24hrs), Av °F (36.7 °C), Min:97.9 °F (36.6 °C), Max:98.2 °F (36.8 °C)  IOBRIEFNo intake or output data in the 24 hours ending 22 1657      General:  Alert, cooperative, no acute distress    Pulmonary:  CTA Bilaterally.  No Wheezing/Rales. Cardiovascular: Regular rate and Rhythm. GI:  Soft, Non distended, Non tender. + Bowel sounds. Extremities:  No edema. No calf tenderness. Psych: Good insight. Not anxious or agitated. Neurologic: Alert and oriented X 3.   No slurred speech, no facial droop, right lower extremity 3-4/5, rest 5/5  Additional:    Medications:   Current Facility-Administered Medications   Medication Dose Route Frequency    dexamethasone (DECADRON) 4 mg/mL injection 10 mg  10 mg IntraVENous Q6H    insulin glargine (LANTUS) injection 10 Units  10 Units SubCUTAneous QHS    metoprolol tartrate (LOPRESSOR) tablet 25 mg  25 mg Oral Q12H    bisacodyL (DULCOLAX) tablet 10 mg  10 mg Oral Q48H PRN    acetaminophen (TYLENOL) tablet 500 mg  500 mg Oral Q6H PRN    cyclobenzaprine (FLEXERIL) tablet 5 mg  5 mg Oral TID PRN    docusate sodium (COLACE) capsule 100 mg  100 mg Oral BID    therapeutic multivitamin (THERAGRAN) tablet 1 Tablet  1 Tablet Oral DAILY    insulin lispro (HUMALOG) injection   SubCUTAneous AC&HS    glucose chewable tablet 16 g  16 g Oral PRN    glucagon (GLUCAGEN) injection 1 mg  1 mg IntraMUSCular PRN    dextrose 10% infusion 0-250 mL  0-250 mL IntraVENous PRN    ondansetron (ZOFRAN) injection 4 mg  4 mg IntraVENous Q6H PRN    aspirin tablet 325 mg  325 mg Oral DAILY    atorvastatin (LIPITOR) tablet 80 mg  80 mg Oral QHS    labetaloL (NORMODYNE;TRANDATE) 20 mg/4 mL (5 mg/mL) injection 5 mg  5 mg IntraVENous Q10MIN PRN    heparin (porcine) injection 5,000 Units  5,000 Units SubCUTAneous Q8H    gabapentin (NEURONTIN) capsule 200 mg  200 mg Oral TID       Labs:    Recent Results (from the past 24 hour(s))   GLUCOSE, POC    Collection Time: 12/15/22  9:17 PM   Result Value Ref Range    Glucose (POC) 152 (H) 70 - 009 mg/dL   METABOLIC PANEL, BASIC    Collection Time: 12/16/22  2:20 AM   Result Value Ref Range    Sodium 141 136 - 145 mmol/L    Potassium 3.6 3.5 - 5.5 mmol/L    Chloride 106 100 - 111 mmol/L    CO2 30 21 - 32 mmol/L    Anion gap 5 3.0 - 18 mmol/L    Glucose 98 74 - 99 mg/dL    BUN 13 7.0 - 18 MG/DL    Creatinine 0.86 0.6 - 1.3 MG/DL    BUN/Creatinine ratio 15 12 - 20      eGFR >60 >60 ml/min/1.73m2    Calcium 9.3 8.5 - 10.1 MG/DL   GLUCOSE, POC    Collection Time: 12/16/22  7:09 AM   Result Value Ref Range    Glucose (POC) 90 70 - 110 mg/dL   GLUCOSE, POC    Collection Time: 12/16/22 12:21 PM   Result Value Ref Range    Glucose (POC) 94 70 - 110 mg/dL   GLUCOSE, POC    Collection Time: 12/16/22  4:20 PM   Result Value Ref Range    Glucose (POC) 175 (H) 70 - 110 mg/dL       Signed By: Josias Muniz MD     December 16, 2022      Disclaimer: Sections of this note are dictated using utilizing voice recognition software. Minor typographical errors may be present. If questions arise, please do not hesitate to contact me or call our department.

## 2022-12-16 NOTE — CONSULTS
Consult    Patient: Tamiko Clayton MRN: [id number]  SSN: [id number]    YOB: 1950  Age: 67 y.o. Sex: female      Subjective:      Tamiko Clayton is a 67 y.o. female who is being seen for weakness right arm right leg numbness in hands bilaterally. Patient was recently admitted for concern for CVA with primarily right-sided weakness. She is a diabetic hypertensive. She has had an extensive lumbosacral fusion. She has a strong family history of cervical spondylosis and lumbar spondylosis. Work-up to date has not demonstrated a CVA. I was contacted yesterday afternoon. On reviewing the chart I ordered MRIs of the cervical and lumbar spine. MRI demonstrates no specific stenosis and appropriate alignment and fixation. Cervical MRI demonstrates cervical stenosis with area of cord edema. Patient is globally spondylitic with some stenosis at C2-3 down to C6-7 more acutely there is stenosis from a measure of anterior cord compression due to disc protrusion and uncinate hypertrophy as well as some ligamentous invagination at C3-4 and C4-5 causing cord edema. There is no instability. Patient had been progressing somewhat with therapy but continued arm and numbness global weakness. She has some mild neck discomfort.   Her physical exam demonstrates numbness in her hands right worse than the left arm dysfunction good strength of her deltoids biceps triceps intrinsics EHL tib ant hams quads back pain no clonus Ledesma's of Babinski        Past Medical History:   Diagnosis Date    Arthritis     back and hands    Chronic pain     back    COVID-19     Summer of  2021    Diabetes ([facility] [address]     NIDDM    HLD (hyperlipidemia)     Hypertension     Nausea & vomiting     Surgery only not colonoscopy     Past Surgical History:   Procedure Laterality Date    COLONOSCOPY N/A 6/24/2022    COLONOSCOPY performed by Aide Armstrong MD at [facility]    HX COLONOSCOPY      HX TUBAL LIGATION        Family History Problem Relation Age of Onset    Breast Cancer Mother 80     Social History     Tobacco Use    Smoking status: Never    Smokeless tobacco: Never   Substance Use Topics    Alcohol use: Never      Current Facility-Administered Medications   Medication Dose Route Frequency Provider Last Rate Last Admin    metoprolol tartrate (LOPRESSOR) tablet 25 mg  25 mg Oral Q12H Dean Cochran MD   25 mg at 12/15/22 2226    bisacodyL (DULCOLAX) tablet 10 mg  10 mg Oral Q48H PRN Rajan Reyes MD        acetaminophen (TYLENOL) tablet 500 mg  500 mg Oral Q6H PRN Rajan Reyes MD        cyclobenzaprine (FLEXERIL) tablet 5 mg  5 mg Oral TID PRN Rajan Reyes MD        docusate sodium (COLACE) capsule 100 mg  100 mg Oral BID Rajan Reyes MD   100 mg at 12/15/22 1709    therapeutic multivitamin [facility]) tablet 1 Tablet  1 Tablet Oral DAILY Rajan Reyes MD   1 Tablet at 12/15/22 3352    insulin lispro (HUMALOG) injection   SubCUTAneous AC&HS Rajan Reyes MD   2 Units at 12/15/22 2214    glucose chewable tablet 16 g  16 g Oral PRN Rajan Reyes MD        glucagon (GLUCAGEN) injection 1 mg  1 mg IntraMUSCular PRN Rajan Reyes MD        dextrose 10% infusion 0-250 mL  0-250 mL IntraVENous PRN Rajan Reyes MD        ondansetron (ZOFRAN) injection 4 mg  4 mg IntraVENous Q6H PRN Rajan Reyes MD        aspirin tablet 325 mg  325 mg Oral DAILY Rajan Reyes MD   325 mg at 12/15/22 1938    atorvastatin (LIPITOR) tablet 80 mg  80 mg Oral QHS Rajan Reyes MD   80 mg at 12/15/22 2226    labetaloL (NORMODYNE;TRANDATE) 20 mg/4 mL (5 mg/mL) injection 5 mg  5 mg IntraVENous Q10MIN PRN Rajan Reyes MD        heparin (porcine) injection 5,000 Units  5,000 Units SubCUTAneous Q8H Rajan Reyes MD   5,000 Units at 12/16/22 0659    gabapentin (NEURONTIN) capsule 200 mg  200 mg Oral TID Rajan Reyes MD   200 mg at 12/15/22 2226        Allergies   Allergen Reactions Codeine Nausea and Vomiting    Penicillin G Swelling       Review of Systems:  Pertinent items are noted in the History of Present Illness. Objective:     Vitals:    12/15/22 1639 12/15/22 2017 12/16/22 0113 12/16/22 0434   BP: (!) 159/84 137/74 138/71 (!) 153/89   Pulse: 98 (!) 107 88 87   Resp: 16 17 17 18   Temp: 98.2 °F (36.8 °C) 98 °F (36.7 °C) 98.1 °F (36.7 °C) 97.9 °F (36.6 °C)   SpO2: 97% 97% 99% 98%   Weight:       Height:            Physical Exam:  General:  Alert, cooperative, no distress, appears stated age. Eyes:  Conjunctivae/corneas clear. PERRL, EOMs intact. Fundi benign   Ears:  Normal TMs and external ear canals both ears. Nose: Nares normal. Septum midline. Mucosa normal. No drainage or sinus tenderness. Mouth/Throat: Lips, mucosa, and tongue normal. Teeth and gums normal.   Neck: Supple, symmetrical, trachea midline, no adenopathy, thyroid: no enlargment/tenderness/nodules, no carotid bruit and no JVD. Back:   Symmetric, no curvature. ROM normal. No CVA tenderness. Lungs:   Clear to auscultation bilaterally. Heart:  Regular rate and rhythm, S1, S2 normal, no murmur, click, rub or gallop. Abdomen:   Soft, non-tender. Bowel sounds normal. No masses,  No organomegaly. Extremities: Extremities normal, atraumatic, no cyanosis or edema. Pulses: 2+ and symmetric all extremities. Skin: Skin color, texture, turgor normal. No rashes or lesions   Lymph nodes: Cervical, supraclavicular, and axillary nodes normal.   Neurologic: CNII-XII intact. Normal strength, numbness with loss of dexterity in her hands global weakness sense of numbness in her right greater than left legs with poor endurance and weakness       MRI of the cervical spine demonstrates cervical stenosis with cord edema C3 445 areas of relative stenosis from C2-3 the C6-7  Assessment:       I believe the patient is suffering from cord edema from cervical stenosis with a myelopathic picture acutely.   I believe the main segments of concern are C3-4 and C4-5 his other areas of relatives spondylitic stenosis. Believe patient would be best treated by a decompression of the stenotic areas that are causing cord edema. Treatment with steroids is difficult because of patient's diabetic status. She is on coverage and I will contact the hospitalist regarding use of IV steroids. Regards to surgery I believe there are 2 basic options :    1 would be an anterior cervical decompression and fusion at 3 4 and 4 5. This would address the most traumatic stenosis at I believe that the 2 levels that are most likely the acutely involved levels. It would be the least traumatic surgery least painful surgery the easiest surgery for this diabetic to heal.  The downside of this approach would be that it would leave an decompressed the C2-3 segment as well as 5 6 and 6 7 it would not globally decompressed if 3 4 and 4 5 segments which have some measure of posterior ligamentous invagination. If there would still be a continuing possibility that this patient would require subsequent posterior decompressive procedure. The second option would be a posterior cervical decompression and fusion from C2-C7. This would globally resolve her stenotic condition but would be a very large soft tissue surgery certainly more painful lack of movement of her cervical spine and have more concerning incision for this diabetic to heal from. Either surgery holds the risk of requiring a secondary procedure to complete the decompression. Overall it is my clinical sense that the patient would be best served by a limited anterior cervical decompression fusion I believe that is most likely to resolve the acute issues present about the patient earlier release with less risk of wound complications and if a more extensive posterior cervical procedure was required it could hopefully be done electively at some point in the future.     I discussed this matter at length with the patient and answered her questions. She would like to proceed with surgery but wants to discuss the options with her family. I have tentatively made her n.p.o. The timing of surgery again I would have to discuss with the hospitalist care team the patient and her family as well as the operating room to see availability but I would hope to take care of it within the next 72 hours. Risks benefits complications and alternatives to surgery have been discussed and the patient wishes to proceed        Plan:     N.p.o. Await patient decision as to surgical options discussed surgical options with hospitalist team     tentatively schedule patient for cervical decompression and fusion anteriorly C3-4 C4-5.     Signed By: Theresa Gonzales MD     December 16, 2022

## 2022-12-16 NOTE — PROGRESS NOTES
Problem: Self Care Deficits Care Plan (Adult)  Goal: *Acute Goals and Plan of Care (Insert Text)  Description: Occupational Therapy Goals  Initiated 12/13/2022 within 7 day(s). 1.  Patient will perform grooming with modified independence. 2.  Patient will perform bathing with modified independence using adaptive equipment. 3.  Patient will perform upper body dressing and lower body dressing with modified independence using adaptive equipment. .  4.  Patient will perform toilet transfers with modified independence using RW with good balance. 5.  Patient will perform all aspects of toileting with modified independence. 6.  Patient will participate in upper extremity therapeutic exercise/activities with modified independence for 8 minutes. 7.  Patient will utilize energy conservation techniques during functional activities with min verbal cues. Prior Level of Function: Mod I with ADLs, used AE for LB ADLs: reacher, sockaide and LH bathsponge, Mod I functional mobility using RW     Outcome: Progressing Towards Goal   OCCUPATIONAL THERAPY TREATMENT    Patient: Tabitha Carvajal (49 y.o. female)  Date: 12/16/2022  Diagnosis: Right sided weakness [R53.1]  Fall [W19. XXXA]  Stroke (cerebrum) ([facility] [address] [I63.9] <principal problem not specified>  Procedure(s) (LRB):  ANTERIOR CERVICAL DECOMPRESSION WITH FUSION CERVICAL THREE-FOUR, CERVICAL FOUR-FIVE; C-ARM/ZEB (N/A)    Precautions: Fall  Chart, occupational therapy assessment, plan of care, and goals were reviewed. ASSESSMENT:  Nursing/RN cleared for pt to participate in OT tx session.  Pt issued AE: long handle reacher and sock aide with good return demonstration for use to doff and don slipper socks with Mod I and instructed on use of reacher for item retrieval from floor surface using reacher with Mod I. Pt sitting up in recliner with BLEs elevated, call bell within reach & pt verbalized understanding following repetition with education for how to utilize for assist e.g. functional transfers in order to prevent falls. Progression toward goals:  [x]          Improving appropriately and progressing toward goals  []          Improving slowly and progressing toward goals  []          Not making progress toward goals and plan of care will be adjusted     PLAN:  Patient continues to benefit from skilled intervention to address the above impairments. Continue treatment per established plan of care. Further Equipment Recommendations for Discharge:  shower chair and rolling walker    AMPAC: Current research shows that an AM-PAC score of 18 or greater is associated with a discharge to the patient's home setting. Based on an AM-PAC score of 19/24 and their current ADL deficits; it is recommended that the patient have 2-3 sessions per week of Occupational Therapy at d/c to increase the patient's independence. This AMPAC score should be considered in conjunction with interdisciplinary team recommendations to determine the most appropriate discharge setting. Patient's social support, diagnosis, medical stability, and prior level of function should also be taken into consideration. SUBJECTIVE:   Patient stated I have a soft sock aide, but it's hard to use, a hard one would be better.     OBJECTIVE DATA SUMMARY:   Cognitive/Behavioral Status:  Neurologic State: Alert  Orientation Level: Oriented X4  Cognition: Follows commands  Safety/Judgement: Fall prevention      ADL Intervention:  Lower Body Dressing Assistance  Socks: Modified independent  Leg Crossed Method Used: No  Position Performed: Seated in chair  Adaptive Equipment Used: Sock aid;Reacher         Cognitive Retraining  Safety/Judgement: Fall prevention    Pain:  Pain level pre-treatment: 0/10   Pain level post-treatment: 0/10    Activity Tolerance:    fair  Please refer to the flowsheet for vital signs taken during this treatment.   After treatment:   [x]  Patient left in no apparent distress sitting up in chair  [] Patient left in no apparent distress in bed  [x]  Call bell left within reach  [x]  Nursing notified  []  Caregiver present  []  Bed alarm activated    COMMUNICATION/EDUCATION:   [x] Role of Occupational Therapy in the acute care setting  [x] Home safety education was provided and the patient/caregiver indicated understanding. [x] Patient/family have participated as able in working towards goals and plan of care. [x] Patient/family agree to work toward stated goals and plan of care. [] Patient understands intent and goals of therapy, but is neutral about his/her participation. [] Patient is unable to participate in goal setting and plan of care. Thank you for this referral.  Janice Coulter  Time Calculation: 19 mins    [facility] AM-PAC® Daily Activity Inpatient Short Form (6-Clicks)*    How much HELP from another person does the patient currently need    (If the patient hasn't done an activity recently, how much help from another person do you think he/she would need if he/she tried?)   Total (Total A or Dep)   A Lot  (Mod to Max A)   A Little (Sup or Min A)   None (Mod I to I)   Putting on and taking off regular lower body clothing? [] 1 [] 2 [x] 3 [] 4   2. Bathing (including washing, rinsing,      drying)? [] 1 [] 2 [x] 3 [] 4   3. Toileting, which includes using toilet, bedpan or urinal?   [] 1 [] 2 [x] 3 [] 4   4. Putting on and taking off regular upper body clothing? [] 1 [] 2 [x] 3 [] 4   5. Taking care of personal grooming such as brushing teeth? [] 1 [] 2 [x] 3 [] 4   6. Eating meals?    [] 1 [] 2 [] 3 [x] 4

## 2022-12-17 LAB
GLUCOSE BLD STRIP.AUTO-MCNC: 126 MG/DL (ref 70–110)
GLUCOSE BLD STRIP.AUTO-MCNC: 127 MG/DL (ref 70–110)
GLUCOSE BLD STRIP.AUTO-MCNC: 189 MG/DL (ref 70–110)
GLUCOSE BLD STRIP.AUTO-MCNC: 192 MG/DL (ref 70–110)

## 2022-12-17 PROCEDURE — 82962 GLUCOSE BLOOD TEST: CPT

## 2022-12-17 PROCEDURE — 65270000046 HC RM TELEMETRY

## 2022-12-17 PROCEDURE — 74011250637 HC RX REV CODE- 250/637: Performed by: INTERNAL MEDICINE

## 2022-12-17 PROCEDURE — 74011636637 HC RX REV CODE- 636/637: Performed by: INTERNAL MEDICINE

## 2022-12-17 PROCEDURE — 2709999900 HC NON-CHARGEABLE SUPPLY

## 2022-12-17 PROCEDURE — 74011250636 HC RX REV CODE- 250/636: Performed by: INTERNAL MEDICINE

## 2022-12-17 PROCEDURE — 97535 SELF CARE MNGMENT TRAINING: CPT

## 2022-12-17 PROCEDURE — 74011636637 HC RX REV CODE- 636/637: Performed by: HOSPITALIST

## 2022-12-17 PROCEDURE — 97530 THERAPEUTIC ACTIVITIES: CPT

## 2022-12-17 PROCEDURE — 74011250637 HC RX REV CODE- 250/637: Performed by: HOSPITALIST

## 2022-12-17 PROCEDURE — 99232 SBSQ HOSP IP/OBS MODERATE 35: CPT | Performed by: HOSPITALIST

## 2022-12-17 PROCEDURE — 74011250636 HC RX REV CODE- 250/636: Performed by: ORTHOPAEDIC SURGERY

## 2022-12-17 RX ORDER — METOPROLOL TARTRATE 50 MG/1
50 TABLET ORAL EVERY 12 HOURS
Status: DISCONTINUED | OUTPATIENT
Start: 2022-12-17 | End: 2022-12-21 | Stop reason: HOSPADM

## 2022-12-17 RX ADMIN — HEPARIN SODIUM 5000 UNITS: 5000 INJECTION INTRAVENOUS; SUBCUTANEOUS at 12:49

## 2022-12-17 RX ADMIN — HEPARIN SODIUM 5000 UNITS: 5000 INJECTION INTRAVENOUS; SUBCUTANEOUS at 22:18

## 2022-12-17 RX ADMIN — DEXAMETHASONE SODIUM PHOSPHATE 10 MG: 4 INJECTION, SOLUTION INTRAMUSCULAR; INTRAVENOUS at 00:49

## 2022-12-17 RX ADMIN — ATORVASTATIN CALCIUM 80 MG: 40 TABLET, FILM COATED ORAL at 22:18

## 2022-12-17 RX ADMIN — Medication 10 UNITS: at 22:18

## 2022-12-17 RX ADMIN — DOCUSATE SODIUM 100 MG: 100 CAPSULE, LIQUID FILLED ORAL at 09:16

## 2022-12-17 RX ADMIN — DOCUSATE SODIUM 100 MG: 100 CAPSULE, LIQUID FILLED ORAL at 17:42

## 2022-12-17 RX ADMIN — DEXAMETHASONE SODIUM PHOSPHATE 10 MG: 4 INJECTION, SOLUTION INTRAMUSCULAR; INTRAVENOUS at 17:42

## 2022-12-17 RX ADMIN — GABAPENTIN 200 MG: 100 CAPSULE ORAL at 22:18

## 2022-12-17 RX ADMIN — THERA TABS 1 TABLET: TAB at 09:00

## 2022-12-17 RX ADMIN — DEXAMETHASONE SODIUM PHOSPHATE 10 MG: 4 INJECTION, SOLUTION INTRAMUSCULAR; INTRAVENOUS at 23:53

## 2022-12-17 RX ADMIN — DEXAMETHASONE SODIUM PHOSPHATE 10 MG: 4 INJECTION, SOLUTION INTRAMUSCULAR; INTRAVENOUS at 06:16

## 2022-12-17 RX ADMIN — GABAPENTIN 200 MG: 100 CAPSULE ORAL at 09:15

## 2022-12-17 RX ADMIN — METOPROLOL TARTRATE 25 MG: 25 TABLET, FILM COATED ORAL at 09:16

## 2022-12-17 RX ADMIN — Medication 2 UNITS: at 22:19

## 2022-12-17 RX ADMIN — HEPARIN SODIUM 5000 UNITS: 5000 INJECTION INTRAVENOUS; SUBCUTANEOUS at 06:16

## 2022-12-17 RX ADMIN — DEXAMETHASONE SODIUM PHOSPHATE 10 MG: 4 INJECTION, SOLUTION INTRAMUSCULAR; INTRAVENOUS at 12:49

## 2022-12-17 RX ADMIN — ASPIRIN 325 MG ORAL TABLET 325 MG: 325 PILL ORAL at 09:16

## 2022-12-17 RX ADMIN — GABAPENTIN 200 MG: 100 CAPSULE ORAL at 15:52

## 2022-12-17 RX ADMIN — METOPROLOL TARTRATE 50 MG: 50 TABLET, FILM COATED ORAL at 22:18

## 2022-12-17 RX ADMIN — Medication 2 UNITS: at 17:42

## 2022-12-17 NOTE — PROGRESS NOTES
Vss afeb  Feels much improved since starting decadron  Discussed options- will go ahead with ACDF C3/4, C4/5 on Monday  Consent obtained.

## 2022-12-17 NOTE — PROGRESS NOTES
[facility] Hospitalist Group  Progress Note    Patient: Addison Graves Age: 67 y.o. : 1950 MR#: [id number] SSN: [id number]  Date/Time: 2022     Subjective: Patient sitting in the recliner, no new complaints     Assessment/Plan:   1. Right-sided weakness  2. Possible CVA  3. Chronic right leg numbness with decreased mobility secondary to recent lumbar spine surgery  4. Diabetes mellitus  5. Hypertension  6. Dyslipidemia  7. Osteoarthritis  8. S/p recent anterior lumbar interbody fusion, L4-L5 and L5-S1 fusion. Plan  Continue steroids, meloxicam and  Continue Lantus and SSI  BP elevated, will increase metoprolol as  Echo noted with small PFO, needs outpatient follow-up with cardiology  continue aspirin, statin  Continue beta-blocker and monitor blood pressure  Continue PT/OT eval and treatment  Continue heparin subcu for DVT prophylaxis    Discussed with the patient at bedside and explained about my above plan care. Patient understood and agreed with my plan  Discussed with RN      Case discussed with:  [x]Patient  []Family  [x]Nursing  []Case Management  DVT Prophylaxis:  []Lovenox  [x]Hep SQ  []SCDs  []Coumadin   []Eliquis/Xarelto     Objective:   VS: Visit Vitals  BP (!) 147/83   Pulse 95   Temp 97.9 °F (36.6 °C)   Resp 17   Ht 5' 6\" (1.676 m)   Wt 80.7 kg (178 lb)   SpO2 98%   BMI 28.73 kg/m²      Tmax/24hrs: Temp (24hrs), Av.9 °F (36.6 °C), Min:97.2 °F (36.2 °C), Max:98.3 °F (36.8 °C)  IOBRIEFNo intake or output data in the 24 hours ending 22 1055      General:  Alert, cooperative, no acute distress    Pulmonary:  CTA Bilaterally. No Wheezing/Rales. Cardiovascular: Regular rate and Rhythm. GI:  Soft, Non distended, Non tender. + Bowel sounds. Extremities:  No edema. No calf tenderness. Psych: Good insight. Not anxious or agitated. Neurologic: Alert and oriented X 3.   No slurred speech, no facial droop, right lower extremity 3-4/5, rest 5/5  Additional:    Medications:   Current Facility-Administered Medications   Medication Dose Route Frequency    metoprolol tartrate (LOPRESSOR) tablet 50 mg  50 mg Oral Q12H    dexamethasone (DECADRON) 4 mg/mL injection 10 mg  10 mg IntraVENous Q6H    insulin glargine (LANTUS) injection 10 Units  10 Units SubCUTAneous QHS    bisacodyL (DULCOLAX) tablet 10 mg  10 mg Oral Q48H PRN    acetaminophen (TYLENOL) tablet 500 mg  500 mg Oral Q6H PRN    cyclobenzaprine (FLEXERIL) tablet 5 mg  5 mg Oral TID PRN    docusate sodium (COLACE) capsule 100 mg  100 mg Oral BID    therapeutic multivitamin (THERAGRAN) tablet 1 Tablet  1 Tablet Oral DAILY    insulin lispro (HUMALOG) injection   SubCUTAneous AC&HS    glucose chewable tablet 16 g  16 g Oral PRN    glucagon (GLUCAGEN) injection 1 mg  1 mg IntraMUSCular PRN    dextrose 10% infusion 0-250 mL  0-250 mL IntraVENous PRN    ondansetron (ZOFRAN) injection 4 mg  4 mg IntraVENous Q6H PRN    aspirin tablet 325 mg  325 mg Oral DAILY    atorvastatin (LIPITOR) tablet 80 mg  80 mg Oral QHS    labetaloL (NORMODYNE;TRANDATE) 20 mg/4 mL (5 mg/mL) injection 5 mg  5 mg IntraVENous Q10MIN PRN    heparin (porcine) injection 5,000 Units  5,000 Units SubCUTAneous Q8H    gabapentin (NEURONTIN) capsule 200 mg  200 mg Oral TID       Labs:    Recent Results (from the past 24 hour(s))   GLUCOSE, POC    Collection Time: 12/16/22 12:21 PM   Result Value Ref Range    Glucose (POC) 94 70 - 110 mg/dL   GLUCOSE, POC    Collection Time: 12/16/22  4:20 PM   Result Value Ref Range    Glucose (POC) 175 (H) 70 - 110 mg/dL   GLUCOSE, POC    Collection Time: 12/16/22  9:13 PM   Result Value Ref Range    Glucose (POC) 175 (H) 70 - 110 mg/dL   GLUCOSE, POC    Collection Time: 12/17/22  6:57 AM   Result Value Ref Range    Glucose (POC) 126 (H) 70 - 110 mg/dL       Signed By: Alem Patel MD     December 17, 2022      Disclaimer: Sections of this note are dictated using utilizing voice recognition software. Minor typographical errors may be present. If questions arise, please do not hesitate to contact me or call our department.

## 2022-12-17 NOTE — ROUTINE PROCESS
Bedside and Verbal shift change report given to [address] (oncoming nurse) by Trice Mendez RN (offgoing nurse). Report included the following information SBAR, Kardex, OR Summary, and MAR.

## 2022-12-17 NOTE — PROGRESS NOTES
Problem: Self Care Deficits Care Plan (Adult)  Goal: *Acute Goals and Plan of Care (Insert Text)  Description: Occupational Therapy Goals  Initiated 12/13/2022 within 7 day(s). 1.  Patient will perform grooming with modified independence. 2.  Patient will perform bathing with modified independence using adaptive equipment. 3.  Patient will perform upper body dressing and lower body dressing with modified independence using adaptive equipment. .  4.  Patient will perform toilet transfers with modified independence using RW with good balance. 5.  Patient will perform all aspects of toileting with modified independence. 6.  Patient will participate in upper extremity therapeutic exercise/activities with modified independence for 8 minutes. 7.  Patient will utilize energy conservation techniques during functional activities with min verbal cues. Prior Level of Function: Mod I with ADLs, used AE for LB ADLs: reacher, sockaide and LH bathsponge, Mod I functional mobility using RW     Outcome: Progressing Towards Goal   OCCUPATIONAL THERAPY TREATMENT    Patient: Christin Fatima (25 y.o. female)  Date: 12/17/2022  Diagnosis: Right sided weakness [R53.1]  Fall [W19. XXXA]  Stroke (cerebrum) ([facility] [address] [I63.9] <principal problem not specified>  Procedure(s) (LRB):  ANTERIOR CERVICAL DECOMPRESSION WITH FUSION CERVICAL THREE-FOUR, CERVICAL FOUR-FIVE; C-ARM/ZEB (N/A)    Precautions: Fall  PLOF: Mod I with ADLs, used AE for LB ADLs: reacher, sockaide and LH bathsponge, Mod I functional mobility using RW    Chart, occupational therapy assessment, plan of care, and goals were reviewed. ASSESSMENT:  Pt presented sitting upright in reclining chair upon arrival and agreeable to work with OT. Pt declining all ADL's at this time 2/2 reporting she already washed and dressing this morning. Reviewed  energy conservation techniques w/ADLs, and safety w/RW and functional transfers/mobility.  Pt requesting to maneuvered in room and hallway. STS transfer MOD I with RW and performed functional room and hallway mobility ~ 150 ft SBA w/ RW to improve overall functional activity tolerance needed for self cares. She returned back to chair at end of session and left with B LE's elevated and all needs within reach. Pt awaiting cervical surgery in next few days. RN made aware. Progression toward goals:  []          Improving appropriately and progressing toward goals  [x]          Improving slowly and progressing toward goals  []          Not making progress toward goals and plan of care will be adjusted     PLAN:  Patient continues to benefit from skilled intervention to address the above impairments. Continue treatment per established plan of care. Further Equipment Recommendations for Discharge: shower chair and RW    AMPAC: Current research shows that an AM-PAC score of 18 or greater is associated with a discharge to the patient's home setting. Based on an AM-PAC score of 19/24 and their current ADL deficits; it is recommended that the patient have 2-3 sessions per week of Occupational Therapy at d/c to increase the patient's independence. This AMPAC score should be considered in conjunction with interdisciplinary team recommendations to determine the most appropriate discharge setting. Patient's social support, diagnosis, medical stability, and prior level of function should also be taken into consideration. SUBJECTIVE:   Patient stated Angela Madden was able to get my socks on myself today w/ sock aide.     OBJECTIVE DATA SUMMARY:   Cognitive/Behavioral Status:  Neurologic State: Alert  Orientation Level: Oriented X4  Cognition: Follows commands  Safety/Judgement: Fall prevention    Functional Mobility and Transfers for ADLs:   Bed Mobility:     Scooting: Supervision   Transfers:  Sit to Stand: Modified independent  Stand to Sit: Modified independent          Balance:  Sitting: Intact  Standing: Impaired; With support  Standing - Static: Good  Standing - Dynamic : Fair (+)      Pain:  Pain level pre-treatment: 0/10   Pain level post-treatment: 0/10      Activity Tolerance:    Fair   Please refer to the flowsheet for vital signs taken during this treatment. After treatment:   [x]  Patient left in no apparent distress sitting up in chair  []  Patient left in no apparent distress in bed  [x]  Call bell left within reach  [x]  Nursing notified  []  Caregiver present  []  Bed alarm activated    COMMUNICATION/EDUCATION:   [x] Role of Occupational Therapy in the acute care setting  [x] Home safety education was provided and the patient/caregiver indicated understanding. [x] Patient/family have participated as able in working towards goals and plan of care. [x] Patient/family agree to work toward stated goals and plan of care. [] Patient understands intent and goals of therapy, but is neutral about his/her participation. [] Patient is unable to participate in goal setting and plan of care. Thank you for this referral.  FILIPE Carreon  Time Calculation: 24 mins    [address] [facility] [address] [address] AM-PAC® Daily Activity Inpatient Short Form (6-Clicks)    How much HELP from another person does the patient currently need    (If the patient hasn't done an activity recently, how much help from another person do you think he/she would need if he/she tried?)   Total (Total A or Dep)   A Lot  (Mod to Max A)   A Little (Sup or Min A)   None (Mod I to I)   Putting on and taking off regular lower body clothing? [] 1 [] 2 [x] 3 [] 4   2. Bathing (including washing, rinsing,      drying)? [] 1 [] 2 [x] 3 [] 4   3. Toileting, which includes using toilet, bedpan or urinal?   [] 1 [] 2 [x] 3 [] 4   4. Putting on and taking off regular upper body clothing? [] 1 [] 2 [x] 3 [] 4   5. Taking care of personal grooming such as brushing teeth? [] 1 [] 2 [x] 3 [] 4   6. Eating meals?    [] 1 [] 2 [] 3 [x] 4

## 2022-12-17 NOTE — PROGRESS NOTES
Problem: Patient Education: Go to Patient Education Activity  Goal: Patient/Family Education  Outcome: Progressing Towards Goal     Problem: Falls - Risk of  Goal: *Absence of Falls  Description: Document Vernia Colder Fall Risk and appropriate interventions in the flowsheet.   Outcome: Progressing Towards Goal  Note: Fall Risk Interventions:  Mobility Interventions: Assess mobility with egress test, Patient to call before getting OOB         Medication Interventions: Assess postural VS orthostatic hypotension, Bed/chair exit alarm, Teach patient to arise slowly, Patient to call before getting OOB    Elimination Interventions: Bed/chair exit alarm, Call light in reach    History of Falls Interventions: Bed/chair exit alarm, Investigate reason for fall

## 2022-12-18 LAB
ERYTHROCYTE [DISTWIDTH] IN BLOOD BY AUTOMATED COUNT: 14.6 % (ref 11.6–14.5)
GLUCOSE BLD STRIP.AUTO-MCNC: 134 MG/DL (ref 70–110)
GLUCOSE BLD STRIP.AUTO-MCNC: 176 MG/DL (ref 70–110)
GLUCOSE BLD STRIP.AUTO-MCNC: 179 MG/DL (ref 70–110)
HCT VFR BLD AUTO: 35.4 % (ref 35–45)
HGB BLD-MCNC: 10.9 G/DL (ref 12–16)
MCH RBC QN AUTO: 27.2 PG (ref 24–34)
MCHC RBC AUTO-ENTMCNC: 30.8 G/DL (ref 31–37)
MCV RBC AUTO: 88.3 FL (ref 78–100)
NRBC # BLD: 0 K/UL (ref 0–0.01)
NRBC BLD-RTO: 0 PER 100 WBC
PLATELET # BLD AUTO: 260 K/UL (ref 135–420)
PMV BLD AUTO: 10.9 FL (ref 9.2–11.8)
RBC # BLD AUTO: 4.01 M/UL (ref 4.2–5.3)
WBC # BLD AUTO: 11.6 K/UL (ref 4.6–13.2)

## 2022-12-18 PROCEDURE — 99232 SBSQ HOSP IP/OBS MODERATE 35: CPT | Performed by: HOSPITALIST

## 2022-12-18 PROCEDURE — 74011636637 HC RX REV CODE- 636/637: Performed by: HOSPITALIST

## 2022-12-18 PROCEDURE — 82962 GLUCOSE BLOOD TEST: CPT

## 2022-12-18 PROCEDURE — 74011250637 HC RX REV CODE- 250/637: Performed by: HOSPITALIST

## 2022-12-18 PROCEDURE — 74011250637 HC RX REV CODE- 250/637: Performed by: INTERNAL MEDICINE

## 2022-12-18 PROCEDURE — 74011250636 HC RX REV CODE- 250/636: Performed by: INTERNAL MEDICINE

## 2022-12-18 PROCEDURE — 2709999900 HC NON-CHARGEABLE SUPPLY

## 2022-12-18 PROCEDURE — 74011636637 HC RX REV CODE- 636/637: Performed by: INTERNAL MEDICINE

## 2022-12-18 PROCEDURE — 65270000046 HC RM TELEMETRY

## 2022-12-18 PROCEDURE — 85027 COMPLETE CBC AUTOMATED: CPT

## 2022-12-18 PROCEDURE — 74011250636 HC RX REV CODE- 250/636: Performed by: ORTHOPAEDIC SURGERY

## 2022-12-18 PROCEDURE — 36415 COLL VENOUS BLD VENIPUNCTURE: CPT

## 2022-12-18 RX ADMIN — GABAPENTIN 200 MG: 100 CAPSULE ORAL at 16:39

## 2022-12-18 RX ADMIN — HEPARIN SODIUM 5000 UNITS: 5000 INJECTION INTRAVENOUS; SUBCUTANEOUS at 05:09

## 2022-12-18 RX ADMIN — THERA TABS 1 TABLET: TAB at 10:36

## 2022-12-18 RX ADMIN — Medication 10 UNITS: at 21:16

## 2022-12-18 RX ADMIN — GABAPENTIN 200 MG: 100 CAPSULE ORAL at 21:19

## 2022-12-18 RX ADMIN — DEXAMETHASONE SODIUM PHOSPHATE 10 MG: 4 INJECTION, SOLUTION INTRAMUSCULAR; INTRAVENOUS at 23:36

## 2022-12-18 RX ADMIN — DEXAMETHASONE SODIUM PHOSPHATE 10 MG: 4 INJECTION, SOLUTION INTRAMUSCULAR; INTRAVENOUS at 16:39

## 2022-12-18 RX ADMIN — HEPARIN SODIUM 5000 UNITS: 5000 INJECTION INTRAVENOUS; SUBCUTANEOUS at 12:14

## 2022-12-18 RX ADMIN — METOPROLOL TARTRATE 50 MG: 50 TABLET, FILM COATED ORAL at 10:36

## 2022-12-18 RX ADMIN — HEPARIN SODIUM 5000 UNITS: 5000 INJECTION INTRAVENOUS; SUBCUTANEOUS at 21:11

## 2022-12-18 RX ADMIN — Medication 2 UNITS: at 17:09

## 2022-12-18 RX ADMIN — DOCUSATE SODIUM 100 MG: 100 CAPSULE, LIQUID FILLED ORAL at 10:36

## 2022-12-18 RX ADMIN — GABAPENTIN 200 MG: 100 CAPSULE ORAL at 10:36

## 2022-12-18 RX ADMIN — DOCUSATE SODIUM 100 MG: 100 CAPSULE, LIQUID FILLED ORAL at 16:38

## 2022-12-18 RX ADMIN — ASPIRIN 325 MG ORAL TABLET 325 MG: 325 PILL ORAL at 10:36

## 2022-12-18 RX ADMIN — DEXAMETHASONE SODIUM PHOSPHATE 10 MG: 4 INJECTION, SOLUTION INTRAMUSCULAR; INTRAVENOUS at 05:09

## 2022-12-18 RX ADMIN — Medication 2 UNITS: at 21:17

## 2022-12-18 RX ADMIN — METOPROLOL TARTRATE 50 MG: 50 TABLET, FILM COATED ORAL at 21:11

## 2022-12-18 RX ADMIN — ATORVASTATIN CALCIUM 80 MG: 40 TABLET, FILM COATED ORAL at 21:19

## 2022-12-18 RX ADMIN — DEXAMETHASONE SODIUM PHOSPHATE 10 MG: 4 INJECTION, SOLUTION INTRAMUSCULAR; INTRAVENOUS at 12:14

## 2022-12-18 NOTE — PROGRESS NOTES
Problem: Patient Education: Go to Patient Education Activity  Goal: Patient/Family Education  Outcome: Progressing Towards Goal     Problem: Falls - Risk of  Goal: *Absence of Falls  Description: Document Neli Gamma Fall Risk and appropriate interventions in the flowsheet.   Outcome: Progressing Towards Goal  Note: Fall Risk Interventions:  Mobility Interventions: Bed/chair exit alarm         Medication Interventions: Bed/chair exit alarm, Patient to call before getting OOB, Teach patient to arise slowly    Elimination Interventions: Bed/chair exit alarm, Patient to call for help with toileting needs    History of Falls Interventions: Bed/chair exit alarm, Room close to nurse's station         Problem: Patient Education: Go to Patient Education Activity  Goal: Patient/Family Education  Outcome: Progressing Towards Goal     Problem: Injury - Risk of, Adverse Drug Event  Goal: *Absence of adverse drug events  Outcome: Progressing Towards Goal  Goal: *Absence of medication errors  Outcome: Progressing Towards Goal  Goal: *Knowledge of prescribed medications  Outcome: Progressing Towards Goal     Problem: Patient Education: Go to Patient Education Activity  Goal: Patient/Family Education  Outcome: Progressing Towards Goal     Problem: Patient Education: Go to Patient Education Activity  Goal: Patient/Family Education  Outcome: Progressing Towards Goal     Problem: Patient Education: Go to Patient Education Activity  Goal: Patient/Family Education  Outcome: Progressing Towards Goal

## 2022-12-18 NOTE — ROUTINE PROCESS
Bedside and Verbal shift change report given to Shira Hernandez (oncoming nurse) by Rinku Rosa (offgoing nurse). Report included the following information SBAR, Kardex, Intake/Output, MAR, and Recent Results.

## 2022-12-18 NOTE — PROGRESS NOTES
[facility] Hospitalist Group  Progress Note    Patient: Addison Graves Age: 67 y.o. : 1950 MR#: [id number] SSN: [id number]  Date/Time: 2022     Subjective: Patient sitting in the recliner, no new complaints     Assessment/Plan:   1. Right-sided weakness  2. Possible CVA  3. Chronic right leg numbness with decreased mobility secondary to recent lumbar spine surgery  4. Diabetes mellitus  5. Hypertension  6. Dyslipidemia  7. Osteoarthritis  8. S/p recent anterior lumbar interbody fusion, L4-L5 and L5-S1 fusion. Plan  Continue steroids, plan for laminectomy tomorrow but in the  Continue Lantus and SSI  BP stable, continue metoprolol  Patient moved to moderate cardiovascular risk for low risk surgery. Benefits outweigh risk. Echo noted with small PFO, needs outpatient follow-up with cardiology  continue aspirin, statin  Continue beta-blocker and monitor blood pressure  Continue PT/OT eval and treatment  Continue heparin subcu for DVT prophylaxis    Discussed with the patient at bedside and explained about my above plan care. Discussed with the patient about risk and benefits of surgery, patient understands risks and wants to proceed with the surgery. Discussed with RN      Case discussed with:  [x]Patient  []Family  [x]Nursing  []Case Management  DVT Prophylaxis:  []Lovenox  [x]Hep SQ  []SCDs  []Coumadin   []Eliquis/Xarelto     Objective:   VS: Visit Vitals  /70   Pulse 73   Temp 97.5 °F (36.4 °C)   Resp 18   Ht 5' 6\" (1.676 m)   Wt 80.7 kg (178 lb)   SpO2 98%   BMI 28.73 kg/m²      Tmax/24hrs: Temp (24hrs), Av.1 °F (36.7 °C), Min:97.5 °F (36.4 °C), Max:98.8 °F (37.1 °C)  IOBRIEFNo intake or output data in the 24 hours ending 22 1248      General:  Alert, cooperative, no acute distress    Pulmonary:  CTA Bilaterally. No Wheezing/Rales. Cardiovascular: Regular rate and Rhythm. GI:  Soft, Non distended, Non tender. + Bowel sounds.   Extremities:  No edema. No calf tenderness. Psych: Good insight. Not anxious or agitated. Neurologic: Alert and oriented X 3.   No slurred speech, no facial droop, right lower extremity 3-4/5, rest 5/5  Additional:    Medications:   Current Facility-Administered Medications   Medication Dose Route Frequency    metoprolol tartrate (LOPRESSOR) tablet 50 mg  50 mg Oral Q12H    dexamethasone (DECADRON) 4 mg/mL injection 10 mg  10 mg IntraVENous Q6H    insulin glargine (LANTUS) injection 10 Units  10 Units SubCUTAneous QHS    bisacodyL (DULCOLAX) tablet 10 mg  10 mg Oral Q48H PRN    acetaminophen (TYLENOL) tablet 500 mg  500 mg Oral Q6H PRN    cyclobenzaprine (FLEXERIL) tablet 5 mg  5 mg Oral TID PRN    docusate sodium (COLACE) capsule 100 mg  100 mg Oral BID    therapeutic multivitamin (THERAGRAN) tablet 1 Tablet  1 Tablet Oral DAILY    insulin lispro (HUMALOG) injection   SubCUTAneous AC&HS    glucose chewable tablet 16 g  16 g Oral PRN    glucagon (GLUCAGEN) injection 1 mg  1 mg IntraMUSCular PRN    dextrose 10% infusion 0-250 mL  0-250 mL IntraVENous PRN    ondansetron (ZOFRAN) injection 4 mg  4 mg IntraVENous Q6H PRN    aspirin tablet 325 mg  325 mg Oral DAILY    atorvastatin (LIPITOR) tablet 80 mg  80 mg Oral QHS    labetaloL (NORMODYNE;TRANDATE) 20 mg/4 mL (5 mg/mL) injection 5 mg  5 mg IntraVENous Q10MIN PRN    heparin (porcine) injection 5,000 Units  5,000 Units SubCUTAneous Q8H    gabapentin (NEURONTIN) capsule 200 mg  200 mg Oral TID       Labs:    Recent Results (from the past 24 hour(s))   GLUCOSE, POC    Collection Time: 12/17/22  4:17 PM   Result Value Ref Range    Glucose (POC) 189 (H) 70 - 110 mg/dL   GLUCOSE, POC    Collection Time: 12/17/22  8:56 PM   Result Value Ref Range    Glucose (POC) 192 (H) 70 - 110 mg/dL   CBC W/O DIFF    Collection Time: 12/18/22  3:35 AM   Result Value Ref Range    WBC 11.6 4.6 - 13.2 K/uL    RBC 4.01 (L) 4.20 - 5.30 M/uL    HGB 10.9 (L) 12.0 - 16.0 g/dL    HCT 35.4 35.0 - 45.0 %    MCV 88.3 78.0 - 100.0 FL    MCH 27.2 24.0 - 34.0 PG    MCHC 30.8 (L) 31.0 - 37.0 g/dL    RDW 14.6 (H) 11.6 - 14.5 %    PLATELET 733 640 - 487 K/uL    MPV 10.9 9.2 - 11.8 FL    NRBC 0.0 0  WBC    ABSOLUTE NRBC 0.00 0.00 - 0.01 K/uL   GLUCOSE, POC    Collection Time: 12/18/22  6:57 AM   Result Value Ref Range    Glucose (POC) 134 (H) 70 - 110 mg/dL       Signed By: Devonte Taylor MD     December 18, 2022      Disclaimer: Sections of this note are dictated using utilizing voice recognition software. Minor typographical errors may be present. If questions arise, please do not hesitate to contact me or call our department.

## 2022-12-18 NOTE — PROGRESS NOTES
Bedside shift report given to Rosanna Griffith. Svetlana COONEY from [facility]. Report including the following information SBAR, Kardex, recent results, MAR, intake/output and cardiac rhythm NSR.

## 2022-12-18 NOTE — PROGRESS NOTES
Beside report given to [facility] from [place]. Svetlana to includes SBAR, Kardex, recent results, MAR, intake/output.

## 2022-12-19 ENCOUNTER — APPOINTMENT (OUTPATIENT)
Dept: GENERAL RADIOLOGY | Age: 72
DRG: 472 | End: 2022-12-19
Attending: HOSPITALIST
Payer: MEDICARE

## 2022-12-19 ENCOUNTER — ANESTHESIA (OUTPATIENT)
Dept: SURGERY | Age: 72
DRG: 472 | End: 2022-12-19
Payer: MEDICARE

## 2022-12-19 LAB
GLUCOSE BLD STRIP.AUTO-MCNC: 115 MG/DL (ref 70–110)
GLUCOSE BLD STRIP.AUTO-MCNC: 130 MG/DL (ref 70–110)
GLUCOSE BLD STRIP.AUTO-MCNC: 134 MG/DL (ref 70–110)
GLUCOSE BLD STRIP.AUTO-MCNC: 141 MG/DL (ref 70–110)

## 2022-12-19 PROCEDURE — 74011250636 HC RX REV CODE- 250/636: Performed by: ORTHOPAEDIC SURGERY

## 2022-12-19 PROCEDURE — 74011250636 HC RX REV CODE- 250/636: Performed by: NURSE ANESTHETIST, CERTIFIED REGISTERED

## 2022-12-19 PROCEDURE — 77030013079 HC BLNKT BAIR HGGR 3M -A: Performed by: NURSE ANESTHETIST, CERTIFIED REGISTERED

## 2022-12-19 PROCEDURE — 77030008683 HC TU ET CUF COVD -A: Performed by: NURSE ANESTHETIST, CERTIFIED REGISTERED

## 2022-12-19 PROCEDURE — 77030040361 HC SLV COMPR DVT MDII -B: Performed by: ORTHOPAEDIC SURGERY

## 2022-12-19 PROCEDURE — 74011250637 HC RX REV CODE- 250/637: Performed by: HOSPITALIST

## 2022-12-19 PROCEDURE — 77030016441 HC APPL CLP LIG1 J&J -B: Performed by: ORTHOPAEDIC SURGERY

## 2022-12-19 PROCEDURE — 74011000250 HC RX REV CODE- 250: Performed by: NURSE ANESTHETIST, CERTIFIED REGISTERED

## 2022-12-19 PROCEDURE — 77030040922 HC BLNKT HYPOTHRM STRY -A: Performed by: ORTHOPAEDIC SURGERY

## 2022-12-19 PROCEDURE — 77030004402 HC BUR NEUR STRY -C: Performed by: ORTHOPAEDIC SURGERY

## 2022-12-19 PROCEDURE — 77030012406 HC DRN WND PENRS BARD -A: Performed by: ORTHOPAEDIC SURGERY

## 2022-12-19 PROCEDURE — 77030029372 HC ADH SKN CLSR PRINEO J&J -C: Performed by: ORTHOPAEDIC SURGERY

## 2022-12-19 PROCEDURE — C1713 ANCHOR/SCREW BN/BN,TIS/BN: HCPCS | Performed by: ORTHOPAEDIC SURGERY

## 2022-12-19 PROCEDURE — 77030011267 HC ELECTRD BLD COVD -A: Performed by: ORTHOPAEDIC SURGERY

## 2022-12-19 PROCEDURE — 74011000250 HC RX REV CODE- 250: Performed by: ORTHOPAEDIC SURGERY

## 2022-12-19 PROCEDURE — 77030012890: Performed by: ORTHOPAEDIC SURGERY

## 2022-12-19 PROCEDURE — 77030029099 HC BN WAX SSPC -A: Performed by: ORTHOPAEDIC SURGERY

## 2022-12-19 PROCEDURE — 74011250637 HC RX REV CODE- 250/637: Performed by: INTERNAL MEDICINE

## 2022-12-19 PROCEDURE — 74011000272 HC RX REV CODE- 272: Performed by: ORTHOPAEDIC SURGERY

## 2022-12-19 PROCEDURE — 65270000046 HC RM TELEMETRY

## 2022-12-19 PROCEDURE — 74011250637 HC RX REV CODE- 250/637: Performed by: ORTHOPAEDIC SURGERY

## 2022-12-19 PROCEDURE — C1821 INTERSPINOUS IMPLANT: HCPCS | Performed by: ORTHOPAEDIC SURGERY

## 2022-12-19 PROCEDURE — 2709999900 HC NON-CHARGEABLE SUPPLY

## 2022-12-19 PROCEDURE — 2709999900 HC NON-CHARGEABLE SUPPLY: Performed by: ORTHOPAEDIC SURGERY

## 2022-12-19 PROCEDURE — 82962 GLUCOSE BLOOD TEST: CPT

## 2022-12-19 PROCEDURE — 77030038692 HC WND DEB SYS IRMX -B: Performed by: ORTHOPAEDIC SURGERY

## 2022-12-19 PROCEDURE — 74011250637 HC RX REV CODE- 250/637: Performed by: NURSE ANESTHETIST, CERTIFIED REGISTERED

## 2022-12-19 PROCEDURE — 99232 SBSQ HOSP IP/OBS MODERATE 35: CPT | Performed by: HOSPITALIST

## 2022-12-19 PROCEDURE — 74011250636 HC RX REV CODE- 250/636: Performed by: INTERNAL MEDICINE

## 2022-12-19 PROCEDURE — 77030031139 HC SUT VCRL2 J&J -A: Performed by: ORTHOPAEDIC SURGERY

## 2022-12-19 PROCEDURE — 74011000636 HC RX REV CODE- 636: Performed by: ORTHOPAEDIC SURGERY

## 2022-12-19 PROCEDURE — 76210000017 HC OR PH I REC 1.5 TO 2 HR: Performed by: ORTHOPAEDIC SURGERY

## 2022-12-19 PROCEDURE — 77030012407 HC DRN WND BARD -B: Performed by: ORTHOPAEDIC SURGERY

## 2022-12-19 PROCEDURE — 76010000153 HC OR TIME 1.5 TO 2 HR: Performed by: ORTHOPAEDIC SURGERY

## 2022-12-19 PROCEDURE — 76060000034 HC ANESTHESIA 1.5 TO 2 HR: Performed by: ORTHOPAEDIC SURGERY

## 2022-12-19 PROCEDURE — 74011636637 HC RX REV CODE- 636/637: Performed by: HOSPITALIST

## 2022-12-19 PROCEDURE — L0120 CERV FLEX N/ADJ FOAM PRE OTS: HCPCS | Performed by: ORTHOPAEDIC SURGERY

## 2022-12-19 DEVICE — IMPLANTABLE DEVICE: Type: IMPLANTABLE DEVICE | Site: NECK | Status: FUNCTIONAL

## 2022-12-19 DEVICE — SCREW SPNL L14MM DIA4MM SELF STARTING VAR ANT CERV TI OZARK: Type: IMPLANTABLE DEVICE | Site: NECK | Status: FUNCTIONAL

## 2022-12-19 DEVICE — PLATE ANT CERV CONSTRND 2 LVL 38MM OZARK VIEW: Type: IMPLANTABLE DEVICE | Site: NECK | Status: FUNCTIONAL

## 2022-12-19 RX ORDER — TAMSULOSIN HYDROCHLORIDE 0.4 MG/1
0.4 CAPSULE ORAL
Status: COMPLETED | OUTPATIENT
Start: 2022-12-19 | End: 2022-12-19

## 2022-12-19 RX ORDER — INSULIN LISPRO 100 [IU]/ML
INJECTION, SOLUTION INTRAVENOUS; SUBCUTANEOUS ONCE
Status: DISCONTINUED | OUTPATIENT
Start: 2022-12-19 | End: 2022-12-19 | Stop reason: HOSPADM

## 2022-12-19 RX ORDER — SODIUM CHLORIDE 0.9 % (FLUSH) 0.9 %
5-40 SYRINGE (ML) INJECTION EVERY 8 HOURS
Status: DISCONTINUED | OUTPATIENT
Start: 2022-12-19 | End: 2022-12-21 | Stop reason: HOSPADM

## 2022-12-19 RX ORDER — VANCOMYCIN HYDROCHLORIDE 1 G/20ML
INJECTION, POWDER, LYOPHILIZED, FOR SOLUTION INTRAVENOUS AS NEEDED
Status: DISCONTINUED | OUTPATIENT
Start: 2022-12-19 | End: 2022-12-19 | Stop reason: HOSPADM

## 2022-12-19 RX ORDER — DIPHENHYDRAMINE HYDROCHLORIDE 50 MG/ML
12.5 INJECTION, SOLUTION INTRAMUSCULAR; INTRAVENOUS
Status: DISCONTINUED | OUTPATIENT
Start: 2022-12-19 | End: 2022-12-21 | Stop reason: HOSPADM

## 2022-12-19 RX ORDER — FENTANYL CITRATE 50 UG/ML
25 INJECTION, SOLUTION INTRAMUSCULAR; INTRAVENOUS AS NEEDED
Status: DISCONTINUED | OUTPATIENT
Start: 2022-12-19 | End: 2022-12-19 | Stop reason: HOSPADM

## 2022-12-19 RX ORDER — ONDANSETRON 2 MG/ML
4 INJECTION INTRAMUSCULAR; INTRAVENOUS
Status: DISCONTINUED | OUTPATIENT
Start: 2022-12-19 | End: 2022-12-21 | Stop reason: HOSPADM

## 2022-12-19 RX ORDER — SODIUM CHLORIDE 0.9 % (FLUSH) 0.9 %
5-40 SYRINGE (ML) INJECTION AS NEEDED
Status: DISCONTINUED | OUTPATIENT
Start: 2022-12-19 | End: 2022-12-19 | Stop reason: HOSPADM

## 2022-12-19 RX ORDER — MAGNESIUM SULFATE 100 %
4 CRYSTALS MISCELLANEOUS AS NEEDED
Status: DISCONTINUED | OUTPATIENT
Start: 2022-12-19 | End: 2022-12-19 | Stop reason: HOSPADM

## 2022-12-19 RX ORDER — EPHEDRINE SULFATE/0.9% NACL/PF 25 MG/5 ML
SYRINGE (ML) INTRAVENOUS AS NEEDED
Status: DISCONTINUED | OUTPATIENT
Start: 2022-12-19 | End: 2022-12-19 | Stop reason: HOSPADM

## 2022-12-19 RX ORDER — NALOXONE HYDROCHLORIDE 0.4 MG/ML
0.4 INJECTION, SOLUTION INTRAMUSCULAR; INTRAVENOUS; SUBCUTANEOUS AS NEEDED
Status: DISCONTINUED | OUTPATIENT
Start: 2022-12-19 | End: 2022-12-21 | Stop reason: HOSPADM

## 2022-12-19 RX ORDER — SODIUM CHLORIDE, SODIUM LACTATE, POTASSIUM CHLORIDE, CALCIUM CHLORIDE 600; 310; 30; 20 MG/100ML; MG/100ML; MG/100ML; MG/100ML
25 INJECTION, SOLUTION INTRAVENOUS CONTINUOUS
Status: DISCONTINUED | OUTPATIENT
Start: 2022-12-19 | End: 2022-12-19 | Stop reason: HOSPADM

## 2022-12-19 RX ORDER — DEXMEDETOMIDINE HYDROCHLORIDE 4 UG/ML
INJECTION, SOLUTION INTRAVENOUS AS NEEDED
Status: DISCONTINUED | OUTPATIENT
Start: 2022-12-19 | End: 2022-12-19 | Stop reason: HOSPADM

## 2022-12-19 RX ORDER — ONDANSETRON 2 MG/ML
4 INJECTION INTRAMUSCULAR; INTRAVENOUS
Status: DISCONTINUED | OUTPATIENT
Start: 2022-12-19 | End: 2022-12-19 | Stop reason: HOSPADM

## 2022-12-19 RX ORDER — DIPHENHYDRAMINE HCL 25 MG
25 CAPSULE ORAL
Status: DISCONTINUED | OUTPATIENT
Start: 2022-12-19 | End: 2022-12-21 | Stop reason: HOSPADM

## 2022-12-19 RX ORDER — DEXAMETHASONE SODIUM PHOSPHATE 4 MG/ML
INJECTION, SOLUTION INTRA-ARTICULAR; INTRALESIONAL; INTRAMUSCULAR; INTRAVENOUS; SOFT TISSUE AS NEEDED
Status: DISCONTINUED | OUTPATIENT
Start: 2022-12-19 | End: 2022-12-19 | Stop reason: HOSPADM

## 2022-12-19 RX ORDER — ACETAMINOPHEN 500 MG
1000 TABLET ORAL EVERY 6 HOURS
Status: DISCONTINUED | OUTPATIENT
Start: 2022-12-19 | End: 2022-12-21 | Stop reason: HOSPADM

## 2022-12-19 RX ORDER — FAMOTIDINE 20 MG/1
20 TABLET, FILM COATED ORAL ONCE
Status: COMPLETED | OUTPATIENT
Start: 2022-12-19 | End: 2022-12-19

## 2022-12-19 RX ORDER — FAMOTIDINE 20 MG/1
40 TABLET, FILM COATED ORAL EVERY 12 HOURS
Status: DISCONTINUED | OUTPATIENT
Start: 2022-12-19 | End: 2022-12-21 | Stop reason: HOSPADM

## 2022-12-19 RX ORDER — PROPOFOL 10 MG/ML
INJECTION, EMULSION INTRAVENOUS AS NEEDED
Status: DISCONTINUED | OUTPATIENT
Start: 2022-12-19 | End: 2022-12-19 | Stop reason: HOSPADM

## 2022-12-19 RX ORDER — HYDROMORPHONE HYDROCHLORIDE 1 MG/ML
0.5 INJECTION, SOLUTION INTRAMUSCULAR; INTRAVENOUS; SUBCUTANEOUS
Status: DISCONTINUED | OUTPATIENT
Start: 2022-12-19 | End: 2022-12-19 | Stop reason: HOSPADM

## 2022-12-19 RX ORDER — HYDROCODONE BITARTRATE AND ACETAMINOPHEN 5; 325 MG/1; MG/1
1 TABLET ORAL AS NEEDED
Status: DISCONTINUED | OUTPATIENT
Start: 2022-12-19 | End: 2022-12-19 | Stop reason: HOSPADM

## 2022-12-19 RX ORDER — SODIUM CHLORIDE 0.9 % (FLUSH) 0.9 %
5-40 SYRINGE (ML) INJECTION AS NEEDED
Status: DISCONTINUED | OUTPATIENT
Start: 2022-12-19 | End: 2022-12-21 | Stop reason: HOSPADM

## 2022-12-19 RX ORDER — LIDOCAINE HYDROCHLORIDE 10 MG/ML
0.1 INJECTION, SOLUTION EPIDURAL; INFILTRATION; INTRACAUDAL; PERINEURAL AS NEEDED
Status: DISCONTINUED | OUTPATIENT
Start: 2022-12-19 | End: 2022-12-19 | Stop reason: HOSPADM

## 2022-12-19 RX ORDER — OXYCODONE HYDROCHLORIDE 5 MG/1
5-10 TABLET ORAL
Status: DISCONTINUED | OUTPATIENT
Start: 2022-12-19 | End: 2022-12-21 | Stop reason: HOSPADM

## 2022-12-19 RX ORDER — KETAMINE HCL 50MG/ML(1)
SYRINGE (ML) INTRAVENOUS AS NEEDED
Status: DISCONTINUED | OUTPATIENT
Start: 2022-12-19 | End: 2022-12-19 | Stop reason: HOSPADM

## 2022-12-19 RX ORDER — ONDANSETRON 2 MG/ML
INJECTION INTRAMUSCULAR; INTRAVENOUS AS NEEDED
Status: DISCONTINUED | OUTPATIENT
Start: 2022-12-19 | End: 2022-12-19 | Stop reason: HOSPADM

## 2022-12-19 RX ORDER — FENTANYL CITRATE 50 UG/ML
INJECTION, SOLUTION INTRAMUSCULAR; INTRAVENOUS AS NEEDED
Status: DISCONTINUED | OUTPATIENT
Start: 2022-12-19 | End: 2022-12-19 | Stop reason: HOSPADM

## 2022-12-19 RX ORDER — PREGABALIN 50 MG/1
50 CAPSULE ORAL ONCE
Status: COMPLETED | OUTPATIENT
Start: 2022-12-19 | End: 2022-12-19

## 2022-12-19 RX ORDER — SODIUM CHLORIDE 0.9 % (FLUSH) 0.9 %
5-40 SYRINGE (ML) INJECTION EVERY 8 HOURS
Status: DISCONTINUED | OUTPATIENT
Start: 2022-12-19 | End: 2022-12-19 | Stop reason: HOSPADM

## 2022-12-19 RX ORDER — HYDROMORPHONE HYDROCHLORIDE 1 MG/ML
1 INJECTION, SOLUTION INTRAMUSCULAR; INTRAVENOUS; SUBCUTANEOUS
Status: DISCONTINUED | OUTPATIENT
Start: 2022-12-19 | End: 2022-12-21 | Stop reason: HOSPADM

## 2022-12-19 RX ORDER — SODIUM CHLORIDE, SODIUM LACTATE, POTASSIUM CHLORIDE, CALCIUM CHLORIDE 600; 310; 30; 20 MG/100ML; MG/100ML; MG/100ML; MG/100ML
50 INJECTION, SOLUTION INTRAVENOUS CONTINUOUS
Status: DISCONTINUED | OUTPATIENT
Start: 2022-12-19 | End: 2022-12-19 | Stop reason: HOSPADM

## 2022-12-19 RX ORDER — CELECOXIB 100 MG/1
200 CAPSULE ORAL ONCE
Status: COMPLETED | OUTPATIENT
Start: 2022-12-19 | End: 2022-12-19

## 2022-12-19 RX ORDER — SUCCINYLCHOLINE CHLORIDE 20 MG/ML
INJECTION INTRAMUSCULAR; INTRAVENOUS AS NEEDED
Status: DISCONTINUED | OUTPATIENT
Start: 2022-12-19 | End: 2022-12-19 | Stop reason: HOSPADM

## 2022-12-19 RX ORDER — LORAZEPAM 2 MG/ML
1 INJECTION INTRAMUSCULAR
Status: DISCONTINUED | OUTPATIENT
Start: 2022-12-19 | End: 2022-12-21 | Stop reason: HOSPADM

## 2022-12-19 RX ORDER — DEXTROSE, SODIUM CHLORIDE, AND POTASSIUM CHLORIDE 5; .45; .15 G/100ML; G/100ML; G/100ML
50 INJECTION INTRAVENOUS CONTINUOUS
Status: DISCONTINUED | OUTPATIENT
Start: 2022-12-19 | End: 2022-12-20

## 2022-12-19 RX ORDER — DIAZEPAM 5 MG/1
5 TABLET ORAL
Status: DISCONTINUED | OUTPATIENT
Start: 2022-12-19 | End: 2022-12-21 | Stop reason: HOSPADM

## 2022-12-19 RX ORDER — LIDOCAINE HYDROCHLORIDE 20 MG/ML
INJECTION, SOLUTION EPIDURAL; INFILTRATION; INTRACAUDAL; PERINEURAL AS NEEDED
Status: DISCONTINUED | OUTPATIENT
Start: 2022-12-19 | End: 2022-12-19 | Stop reason: HOSPADM

## 2022-12-19 RX ORDER — DOCUSATE SODIUM 100 MG/1
100 CAPSULE, LIQUID FILLED ORAL 2 TIMES DAILY
Status: DISCONTINUED | OUTPATIENT
Start: 2022-12-19 | End: 2022-12-20 | Stop reason: SDUPTHER

## 2022-12-19 RX ORDER — MIDAZOLAM HYDROCHLORIDE 1 MG/ML
INJECTION, SOLUTION INTRAMUSCULAR; INTRAVENOUS AS NEEDED
Status: DISCONTINUED | OUTPATIENT
Start: 2022-12-19 | End: 2022-12-19 | Stop reason: HOSPADM

## 2022-12-19 RX ORDER — ROCURONIUM BROMIDE 10 MG/ML
INJECTION, SOLUTION INTRAVENOUS AS NEEDED
Status: DISCONTINUED | OUTPATIENT
Start: 2022-12-19 | End: 2022-12-19 | Stop reason: HOSPADM

## 2022-12-19 RX ADMIN — Medication 4 MCG: at 12:25

## 2022-12-19 RX ADMIN — Medication 4 MCG: at 11:19

## 2022-12-19 RX ADMIN — POTASSIUM CHLORIDE, DEXTROSE MONOHYDRATE AND SODIUM CHLORIDE 50 ML/HR: 150; 5; 450 INJECTION, SOLUTION INTRAVENOUS at 18:13

## 2022-12-19 RX ADMIN — FAMOTIDINE 20 MG: 20 TABLET, FILM COATED ORAL at 08:52

## 2022-12-19 RX ADMIN — DEXAMETHASONE SODIUM PHOSPHATE 10 MG: 4 INJECTION, SOLUTION INTRAMUSCULAR; INTRAVENOUS at 11:16

## 2022-12-19 RX ADMIN — SODIUM CHLORIDE, PRESERVATIVE FREE 10 ML: 5 INJECTION INTRAVENOUS at 18:00

## 2022-12-19 RX ADMIN — HEPARIN SODIUM 5000 UNITS: 5000 INJECTION INTRAVENOUS; SUBCUTANEOUS at 21:30

## 2022-12-19 RX ADMIN — Medication 4 MCG: at 11:34

## 2022-12-19 RX ADMIN — FENTANYL CITRATE 25 MCG: 50 INJECTION, SOLUTION INTRAMUSCULAR; INTRAVENOUS at 15:57

## 2022-12-19 RX ADMIN — VANCOMYCIN HYDROCHLORIDE 1000 MG: 1 INJECTION, POWDER, LYOPHILIZED, FOR SOLUTION INTRAVENOUS at 09:51

## 2022-12-19 RX ADMIN — GABAPENTIN 200 MG: 100 CAPSULE ORAL at 18:30

## 2022-12-19 RX ADMIN — FENTANYL CITRATE 25 MCG: 50 INJECTION, SOLUTION INTRAMUSCULAR; INTRAVENOUS at 13:55

## 2022-12-19 RX ADMIN — ACETAMINOPHEN 1000 MG: 500 TABLET ORAL at 18:00

## 2022-12-19 RX ADMIN — GABAPENTIN 200 MG: 100 CAPSULE ORAL at 18:16

## 2022-12-19 RX ADMIN — SODIUM CHLORIDE, SODIUM LACTATE, POTASSIUM CHLORIDE, AND CALCIUM CHLORIDE 25 ML/HR: 600; 310; 30; 20 INJECTION, SOLUTION INTRAVENOUS at 08:52

## 2022-12-19 RX ADMIN — DEXAMETHASONE SODIUM PHOSPHATE 10 MG: 4 INJECTION, SOLUTION INTRAMUSCULAR; INTRAVENOUS at 23:23

## 2022-12-19 RX ADMIN — OXYCODONE HYDROCHLORIDE 5 MG: 5 TABLET ORAL at 23:23

## 2022-12-19 RX ADMIN — Medication 4 MCG: at 12:16

## 2022-12-19 RX ADMIN — ATORVASTATIN CALCIUM 80 MG: 40 TABLET, FILM COATED ORAL at 21:36

## 2022-12-19 RX ADMIN — MIDAZOLAM HYDROCHLORIDE 1 MG: 2 INJECTION, SOLUTION INTRAMUSCULAR; INTRAVENOUS at 11:01

## 2022-12-19 RX ADMIN — VANCOMYCIN HYDROCHLORIDE 1000 MG: 1 INJECTION, POWDER, LYOPHILIZED, FOR SOLUTION INTRAVENOUS at 21:41

## 2022-12-19 RX ADMIN — Medication 25 MG: at 11:33

## 2022-12-19 RX ADMIN — GABAPENTIN 200 MG: 100 CAPSULE ORAL at 21:37

## 2022-12-19 RX ADMIN — HYDROMORPHONE HYDROCHLORIDE 0.5 MG: 1 INJECTION, SOLUTION INTRAMUSCULAR; INTRAVENOUS; SUBCUTANEOUS at 15:42

## 2022-12-19 RX ADMIN — SODIUM CHLORIDE, PRESERVATIVE FREE 10 ML: 5 INJECTION INTRAVENOUS at 20:51

## 2022-12-19 RX ADMIN — FAMOTIDINE 40 MG: 20 TABLET ORAL at 21:31

## 2022-12-19 RX ADMIN — Medication 4 MCG: at 11:37

## 2022-12-19 RX ADMIN — Medication 10 MG: at 11:47

## 2022-12-19 RX ADMIN — DEXAMETHASONE SODIUM PHOSPHATE 10 MG: 4 INJECTION, SOLUTION INTRAMUSCULAR; INTRAVENOUS at 18:15

## 2022-12-19 RX ADMIN — ONDANSETRON 4 MG: 2 INJECTION INTRAMUSCULAR; INTRAVENOUS at 12:11

## 2022-12-19 RX ADMIN — FENTANYL CITRATE 50 MCG: 50 INJECTION, SOLUTION INTRAMUSCULAR; INTRAVENOUS at 11:01

## 2022-12-19 RX ADMIN — PREGABALIN 50 MG: 50 CAPSULE ORAL at 08:52

## 2022-12-19 RX ADMIN — HYDROMORPHONE HYDROCHLORIDE 1 MG: 1 INJECTION, SOLUTION INTRAMUSCULAR; INTRAVENOUS; SUBCUTANEOUS at 20:49

## 2022-12-19 RX ADMIN — SUCCINYLCHOLINE CHLORIDE 100 MG: 20 INJECTION, SOLUTION INTRAMUSCULAR; INTRAVENOUS at 11:01

## 2022-12-19 RX ADMIN — CYCLOBENZAPRINE HYDROCHLORIDE 5 MG: 5 TABLET, FILM COATED ORAL at 21:31

## 2022-12-19 RX ADMIN — PROPOFOL 140 MG: 10 INJECTION, EMULSION INTRAVENOUS at 11:01

## 2022-12-19 RX ADMIN — ROCURONIUM BROMIDE 10 MG: 50 INJECTION INTRAVENOUS at 11:01

## 2022-12-19 RX ADMIN — FENTANYL CITRATE 50 MCG: 50 INJECTION, SOLUTION INTRAMUSCULAR; INTRAVENOUS at 12:32

## 2022-12-19 RX ADMIN — DEXAMETHASONE SODIUM PHOSPHATE 10 MG: 4 INJECTION, SOLUTION INTRAMUSCULAR; INTRAVENOUS at 06:15

## 2022-12-19 RX ADMIN — TAMSULOSIN HYDROCHLORIDE 0.4 MG: 0.4 CAPSULE ORAL at 08:52

## 2022-12-19 RX ADMIN — ACETAMINOPHEN 1000 MG: 500 TABLET ORAL at 23:22

## 2022-12-19 RX ADMIN — CELECOXIB 200 MG: 100 CAPSULE ORAL at 08:52

## 2022-12-19 RX ADMIN — Medication 25 MG: at 11:01

## 2022-12-19 RX ADMIN — LIDOCAINE HYDROCHLORIDE 50 MG: 20 INJECTION, SOLUTION EPIDURAL; INFILTRATION; INTRACAUDAL; PERINEURAL at 11:01

## 2022-12-19 RX ADMIN — Medication 10 UNITS: at 21:54

## 2022-12-19 RX ADMIN — METOPROLOL TARTRATE 50 MG: 50 TABLET, FILM COATED ORAL at 21:31

## 2022-12-19 NOTE — PROGRESS NOTES
151/82 BP  Neuro intact  Scant amount in SANDY  Believes her right arm pain is improved  Mobilize  Pt ot

## 2022-12-19 NOTE — PROGRESS NOTES
Problem: Patient Education: Go to Patient Education Activity  Goal: Patient/Family Education  Outcome: Progressing Towards Goal     Problem: Falls - Risk of  Goal: *Absence of Falls  Description: Document Padmini Hood Fall Risk and appropriate interventions in the flowsheet.   Outcome: Progressing Towards Goal  Note: Fall Risk Interventions:  Mobility Interventions: Utilize walker, cane, or other assistive device         Medication Interventions: Patient to call before getting OOB, Teach patient to arise slowly    Elimination Interventions: Call light in reach, Patient to call for help with toileting needs    History of Falls Interventions: Evaluate medications/consider consulting pharmacy, Room close to nurse's station         Problem: Patient Education: Go to Patient Education Activity  Goal: Patient/Family Education  Outcome: Progressing Towards Goal     Problem: Injury - Risk of, Adverse Drug Event  Goal: *Absence of adverse drug events  Outcome: Progressing Towards Goal  Goal: *Absence of medication errors  Outcome: Progressing Towards Goal  Goal: *Knowledge of prescribed medications  Outcome: Progressing Towards Goal     Problem: Patient Education: Go to Patient Education Activity  Goal: Patient/Family Education  Outcome: Progressing Towards Goal     Problem: Patient Education: Go to Patient Education Activity  Goal: Patient/Family Education  Outcome: Progressing Towards Goal     Problem: Patient Education: Go to Patient Education Activity  Goal: Patient/Family Education  Outcome: Progressing Towards Goal

## 2022-12-19 NOTE — ANESTHESIA POSTPROCEDURE EVALUATION
Procedure(s):  ANTERIOR CERVICAL DECOMPRESSION WITH FUSION CERVICAL THREE-FOUR, CERVICAL FOUR-FIVE; C-ARM/ZEB. general    Anesthesia Post Evaluation      Multimodal analgesia: multimodal analgesia used between 6 hours prior to anesthesia start to PACU discharge  Patient location during evaluation: PACU  Patient participation: complete - patient participated  Level of consciousness: sleepy but conscious  Pain management: adequate  Airway patency: patent  Anesthetic complications: no  Cardiovascular status: acceptable  Respiratory status: acceptable  Hydration status: acceptable  Post anesthesia nausea and vomiting:  controlled  Final Post Anesthesia Temperature Assessment:  Normothermia (36.0-37.5 degrees C)      INITIAL Post-op Vital signs:   Vitals Value Taken Time   /67 12/19/22 1338   Temp 37 °C (98.6 °F) 12/19/22 1242   Pulse 73 12/19/22 1344   Resp 15 12/19/22 1344   SpO2 100 % 12/19/22 1344   Vitals shown include unvalidated device data.

## 2022-12-19 NOTE — PROGRESS NOTES
Vss afeb  Neuro stable  Awaiting cervical decmpression and fusion today. RBCA reviewed and consent confirmed. Questions answered.

## 2022-12-19 NOTE — ANESTHESIA PREPROCEDURE EVALUATION
Relevant Problems   NEUROLOGY   (+) Stroke (cerebrum) (HCC)       Anesthetic History     PONV          Review of Systems / Medical History  Patient summary reviewed, nursing notes reviewed and pertinent labs reviewed    Pulmonary  Within defined limits                 Neuro/Psych       CVA: no residual symptoms  TIA     Cardiovascular    Hypertension: well controlled                   GI/Hepatic/Renal  Within defined limits              Endo/Other    Diabetes: well controlled, type 2    Arthritis     Other Findings              Physical Exam    Airway  Mallampati: II  TM Distance: 4 - 6 cm  Neck ROM: normal range of motion   Mouth opening: Normal     Cardiovascular  Regular rate and rhythm,  S1 and S2 normal,  no murmur, click, rub, or gallop  Rhythm: regular  Rate: normal         Dental  No notable dental hx       Pulmonary  Breath sounds clear to auscultation               Abdominal  GI exam deferred       Other Findings            Anesthetic Plan    ASA: 2  Anesthesia type: general          Induction: Intravenous  Anesthetic plan and risks discussed with: Patient      Discussed with Dr. Rekha Bourne about his request for 10mg Decadron in this patient with DM2; Dr. Rekha Bourne would still like 10mg decadron.

## 2022-12-19 NOTE — BRIEF OP NOTE
Brief Postoperative Note    Patient: Nanci Salgado  YOB: 1950  MRN: [id number]    Date of Procedure: 12/19/2022     Pre-Op Diagnosis: CERVICAL MYELOPATHY    Post-Op Diagnosis: Same as preoperative diagnosis. Procedure(s):  ANTERIOR CERVICAL DECOMPRESSION WITH FUSION CERVICAL THREE-FOUR, CERVICAL FOUR-FIVE; C-ARM/ZEB    Surgeon(s):  Asha Ovalles MD    Surgical Assistant: Physician Assistant: Sandra Pittman PA-C  Surg Asst-1: Shorty Landers    Anesthesia: General     Estimated Blood Loss (mL): Minimal    Complications: None    Specimens: * No specimens in log *     Implants:   Implant Name Type Inv. Item Serial No.  Lot No. LRB No. Used Action   ALLOGRAFT BNE SPACER 7 DEG 14.5X11. 5X6 MM CERV CORTICAL CANC - [id number]  ALLOGRAFT BNE SPACER 7 DEG 14.5X11. 5X6 MM CERV CORTICAL CANC [id number] [employer] HOWM[employer]WD  N/A 1 Implanted   ALLOGRAFT BNE SPACER 7 DEG 14.5X11. 5X6 MM CERV CORTICAL CANC - [id number]  ALLOGRAFT BNE SPACER 7 DEG 14.5X11. 5X6 MM CERV CORTICAL CANC [id number] [employer] HOWM[employer]WD  N/A 1 Implanted   PLATE ANT CERV CONSTRND 2 LVL 38MM OZ[employer] VIEW - [id number]  PLATE ANT CERV CONSTRND 2 LVL 38MM OZARK VIEW  [employer]WD N/A N/A 1 Implanted   SCREW SPNL L14MM DIA4MM SELF STARTING SHAMA ANT CERV TI OZARK - [id number]  SCREW SPNL L14MM DIA4MM SELF STARTING SHAMA ANT CERV TI OZARK  ZEB SPINE HOWM_WD N/A N/A 6 Implanted       Drains: * No LDAs found *    Findings: stenosis    Electronically Signed by Paula Devine MD on 12/19/2022 at 12:13 PM

## 2022-12-19 NOTE — ROUTINE PROCESS
TRANSFER - IN REPORT:    Verbal report received from [place] Rn(name) on Tabitha Pro  being received from [place]) for routine progression of care      Report consisted of patients Situation, Background, Assessment and   Recommendations(SBAR). Information from the following report(s) SBAR, Kardex, and Recent Results was reviewed with the receiving nurse. Opportunity for questions and clarification was provided. Assessment completed upon patients arrival to unit and care assumed.

## 2022-12-19 NOTE — PROGRESS NOTES
Bedside shift report given to Carolee Mota RN from [facility]. Report including the following information SBAR, Kardex, recent results, MAR, intake/output and cardiac rhythm NSR.

## 2022-12-19 NOTE — ROUTINE PROCESS
TRANSFER - IN REPORT:    Verbal report received from Jed Escobar RN(name) on Rodney Layton  being received from [address] [place] [address](unit) for ordered procedure      Report consisted of patients Situation, Background, Assessment and   Recommendations(SBAR). Information from the following report(s) SBAR and Kardex was reviewed with the receiving nurse. Opportunity for questions and clarification was provided. Assessment completed upon patients arrival to unit and care assumed.

## 2022-12-19 NOTE — ROUTINE PROCESS
TRANSFER - IN REPORT:    Verbal report received from IVET Guillaume on Whitney Bone  being received from [address] for ordered procedure      Report consisted of patients Situation, Background, Assessment and   Recommendations(SBAR). Information from the following report(s) SBAR was reviewed with the receiving nurse. Opportunity for questions and clarification was provided. Assessment completed upon patients arrival to unit and care assumed.

## 2022-12-19 NOTE — ROUTINE PROCESS
TRANSFER - OUT REPORT:    Verbal report given to Ovidio DANGELO RN(name) on Ken Elias  being transferred to OR(unit) for ordered procedure       Report consisted of patients Situation, Background, Assessment and   Recommendations(SBAR). Information from the following report(s) SBAR, Kardex, and Recent Results was reviewed with the receiving nurse. Lines:   Peripheral IV 75/93/18 Right Basilic (Active)   Site Assessment Clean, dry, & intact 12/19/22 0736   Phlebitis Assessment 0 12/19/22 0736   Infiltration Assessment 0 12/19/22 0736   Dressing Status Clean, dry, & intact 12/19/22 0736   Dressing Type Transparent 12/19/22 0736   Hub Color/Line Status White 12/19/22 0044   Alcohol Cap Used Yes 12/19/22 0736        Opportunity for questions and clarification was provided.       Patient transported with:   [employer]

## 2022-12-19 NOTE — PROGRESS NOTES
[facility] Hospitalist Group  Progress Note    Patient: Ken Elias Age: 67 y.o. : 1950 MR#: [id number] [id number]: [id number]  Date/Time: 2022     Subjective: Patient seen in PACU, feeling better. No chest pain no shortness of breath. Spoke to surgeon at the bedside, surgery went well, no complications. If does well possible discharge tomorrow     Assessment/Plan:   1. Cervical spine stenosis with upper extremity radiculopathy, post ANTERIOR CERVICAL DECOMPRESSION WITH FUSION C 3-4, C 4-5 on 2022  2. Possible CVA  3. Chronic right leg numbness with decreased mobility secondary to recent lumbar spine surgery  4. Diabetes mellitus  5. Hypertension  6. Dyslipidemia  7. Osteoarthritis  8. S/p recent anterior lumbar interbody fusion, L4-L5 and L5-S1 fusion. Plan  Continue steroids and PT per surgical team  Continue Lantus and SSI  BP stable, continue metoprolol  Echo noted with small PFO, needs outpatient follow-up with cardiology  continue aspirin, statin  Continue beta-blocker and monitor blood pressure  Continue PT/OT eval and treatment  Continue heparin subcu for DVT prophylaxis    Disposition: Home with home health care tomorrow if clinically better and cleared by spine surgery    Discussed with the patient at bedside and explained about my above plan care.           Case discussed with:  [x]Patient  []Family  [x]Nursing  []Case Management  DVT Prophylaxis:  []Lovenox  [x]Hep SQ  []SCDs  []Coumadin   []Eliquis/Xarelto     Objective:   VS: Visit Vitals  /64   Pulse 75   Temp 98.6 °F (37 °C)   Resp 14   Ht 5' 6\" (1.676 m)   Wt 80.7 kg (178 lb)   SpO2 98%   BMI 28.73 kg/m²      Tmax/24hrs: Temp (24hrs), Av.9 °F (36.6 °C), Min:97.4 °F (36.3 °C), Max:98.6 °F (37 °C)  IOBRIEF  Intake/Output Summary (Last 24 hours) at 2022 1515  Last data filed at 2022 1230  Gross per 24 hour   Intake 700 ml   Output --   Net 700 ml         General:  Alert, cooperative, no acute distress, soft collar of neck noted. Pulmonary:  CTA Bilaterally. No Wheezing/Rales. Cardiovascular: Regular rate and Rhythm. GI:  Soft, Non distended, Non tender. + Bowel sounds. Extremities:  No edema. No calf tenderness. Psych: Good insight. Not anxious or agitated. Neurologic: Alert and oriented X 3.   No slurred speech, no facial droop, right lower extremity 3-4/5, rest 5/5  Additional:    Medications:   Current Facility-Administered Medications   Medication Dose Route Frequency    lactated Ringers infusion  50 mL/hr IntraVENous CONTINUOUS    HYDROcodone-acetaminophen (NORCO) 5-325 mg per tablet 1 Tablet  1 Tablet Oral PRN    fentaNYL citrate (PF) injection 25 mcg  25 mcg IntraVENous PRN    HYDROmorphone (DILAUDID) syringe 0.5 mg  0.5 mg IntraVENous Multiple    ondansetron (ZOFRAN) injection 4 mg  4 mg IntraVENous ONCE PRN    insulin lispro (HUMALOG) injection   SubCUTAneous ONCE    glucose chewable tablet 16 g  4 Tablet Oral PRN    glucagon (GLUCAGEN) injection 1 mg  1 mg IntraMUSCular PRN    metoprolol tartrate (LOPRESSOR) tablet 50 mg  50 mg Oral Q12H    dexamethasone (DECADRON) 4 mg/mL injection 10 mg  10 mg IntraVENous Q6H    insulin glargine (LANTUS) injection 10 Units  10 Units SubCUTAneous QHS    bisacodyL (DULCOLAX) tablet 10 mg  10 mg Oral Q48H PRN    acetaminophen (TYLENOL) tablet 500 mg  500 mg Oral Q6H PRN    cyclobenzaprine (FLEXERIL) tablet 5 mg  5 mg Oral TID PRN    docusate sodium (COLACE) capsule 100 mg  100 mg Oral BID    therapeutic multivitamin (THERAGRAN) tablet 1 Tablet  1 Tablet Oral DAILY    insulin lispro (HUMALOG) injection   SubCUTAneous AC&HS    glucose chewable tablet 16 g  16 g Oral PRN    glucagon (GLUCAGEN) injection 1 mg  1 mg IntraMUSCular PRN    dextrose 10% infusion 0-250 mL  0-250 mL IntraVENous PRN    ondansetron (ZOFRAN) injection 4 mg  4 mg IntraVENous Q6H PRN    aspirin tablet 325 mg  325 mg Oral DAILY    atorvastatin (LIPITOR) tablet 80 mg 80 mg Oral QHS    labetaloL (NORMODYNE;TRANDATE) 20 mg/4 mL (5 mg/mL) injection 5 mg  5 mg IntraVENous Q10MIN PRN    heparin (porcine) injection 5,000 Units  5,000 Units SubCUTAneous Q8H    gabapentin (NEURONTIN) capsule 200 mg  200 mg Oral TID       Labs:    Recent Results (from the past 24 hour(s))   GLUCOSE, POC    Collection Time: 12/18/22  5:07 PM   Result Value Ref Range    Glucose (POC) 176 (H) 70 - 110 mg/dL   GLUCOSE, POC    Collection Time: 12/18/22  9:16 PM   Result Value Ref Range    Glucose (POC) 179 (H) 70 - 110 mg/dL   GLUCOSE, POC    Collection Time: 12/19/22  8:07 AM   Result Value Ref Range    Glucose (POC) 130 (H) 70 - 110 mg/dL   GLUCOSE, POC    Collection Time: 12/19/22  1:52 PM   Result Value Ref Range    Glucose (POC) 134 (H) 70 - 110 mg/dL       Signed By: Mary Munson MD     December 19, 2022      Disclaimer: Sections of this note are dictated using utilizing voice recognition software. Minor typographical errors may be present. If questions arise, please do not hesitate to contact me or call our department.

## 2022-12-20 LAB
GLUCOSE BLD STRIP.AUTO-MCNC: 131 MG/DL (ref 70–110)
GLUCOSE BLD STRIP.AUTO-MCNC: 135 MG/DL (ref 70–110)
GLUCOSE BLD STRIP.AUTO-MCNC: 155 MG/DL (ref 70–110)
GLUCOSE BLD STRIP.AUTO-MCNC: 161 MG/DL (ref 70–110)
HCT VFR BLD AUTO: 34.6 % (ref 35–45)
HGB BLD-MCNC: 10.6 G/DL (ref 12–16)

## 2022-12-20 PROCEDURE — 82962 GLUCOSE BLOOD TEST: CPT

## 2022-12-20 PROCEDURE — 36415 COLL VENOUS BLD VENIPUNCTURE: CPT

## 2022-12-20 PROCEDURE — 74011250636 HC RX REV CODE- 250/636: Performed by: INTERNAL MEDICINE

## 2022-12-20 PROCEDURE — 97116 GAIT TRAINING THERAPY: CPT

## 2022-12-20 PROCEDURE — 97168 OT RE-EVAL EST PLAN CARE: CPT

## 2022-12-20 PROCEDURE — 74011636637 HC RX REV CODE- 636/637: Performed by: INTERNAL MEDICINE

## 2022-12-20 PROCEDURE — 74011250637 HC RX REV CODE- 250/637: Performed by: HOSPITALIST

## 2022-12-20 PROCEDURE — 97535 SELF CARE MNGMENT TRAINING: CPT

## 2022-12-20 PROCEDURE — 85018 HEMOGLOBIN: CPT

## 2022-12-20 PROCEDURE — 92610 EVALUATE SWALLOWING FUNCTION: CPT

## 2022-12-20 PROCEDURE — 74011250637 HC RX REV CODE- 250/637: Performed by: ORTHOPAEDIC SURGERY

## 2022-12-20 PROCEDURE — 92526 ORAL FUNCTION THERAPY: CPT

## 2022-12-20 PROCEDURE — 74011250636 HC RX REV CODE- 250/636: Performed by: ORTHOPAEDIC SURGERY

## 2022-12-20 PROCEDURE — 74011250637 HC RX REV CODE- 250/637: Performed by: INTERNAL MEDICINE

## 2022-12-20 PROCEDURE — 74011636637 HC RX REV CODE- 636/637: Performed by: HOSPITALIST

## 2022-12-20 PROCEDURE — 74011000250 HC RX REV CODE- 250: Performed by: ORTHOPAEDIC SURGERY

## 2022-12-20 PROCEDURE — 97164 PT RE-EVAL EST PLAN CARE: CPT

## 2022-12-20 PROCEDURE — 99232 SBSQ HOSP IP/OBS MODERATE 35: CPT | Performed by: HOSPITALIST

## 2022-12-20 PROCEDURE — 2709999900 HC NON-CHARGEABLE SUPPLY

## 2022-12-20 PROCEDURE — 65270000046 HC RM TELEMETRY

## 2022-12-20 RX ORDER — HYDRALAZINE HYDROCHLORIDE 20 MG/ML
10 INJECTION INTRAMUSCULAR; INTRAVENOUS
Status: DISCONTINUED | OUTPATIENT
Start: 2022-12-20 | End: 2022-12-21 | Stop reason: HOSPADM

## 2022-12-20 RX ADMIN — DEXAMETHASONE SODIUM PHOSPHATE 10 MG: 4 INJECTION, SOLUTION INTRAMUSCULAR; INTRAVENOUS at 11:20

## 2022-12-20 RX ADMIN — GABAPENTIN 200 MG: 100 CAPSULE ORAL at 11:26

## 2022-12-20 RX ADMIN — THERA TABS 1 TABLET: TAB at 11:27

## 2022-12-20 RX ADMIN — ATORVASTATIN CALCIUM 80 MG: 40 TABLET, FILM COATED ORAL at 21:56

## 2022-12-20 RX ADMIN — VANCOMYCIN HYDROCHLORIDE 1000 MG: 1 INJECTION, POWDER, LYOPHILIZED, FOR SOLUTION INTRAVENOUS at 11:26

## 2022-12-20 RX ADMIN — FAMOTIDINE 40 MG: 20 TABLET ORAL at 21:56

## 2022-12-20 RX ADMIN — GABAPENTIN 200 MG: 100 CAPSULE ORAL at 21:56

## 2022-12-20 RX ADMIN — FAMOTIDINE 40 MG: 20 TABLET ORAL at 11:26

## 2022-12-20 RX ADMIN — GABAPENTIN 200 MG: 100 CAPSULE ORAL at 18:59

## 2022-12-20 RX ADMIN — HEPARIN SODIUM 5000 UNITS: 5000 INJECTION INTRAVENOUS; SUBCUTANEOUS at 04:30

## 2022-12-20 RX ADMIN — OXYCODONE HYDROCHLORIDE 5 MG: 5 TABLET ORAL at 05:39

## 2022-12-20 RX ADMIN — OXYCODONE HYDROCHLORIDE 5 MG: 5 TABLET ORAL at 17:38

## 2022-12-20 RX ADMIN — HEPARIN SODIUM 5000 UNITS: 5000 INJECTION INTRAVENOUS; SUBCUTANEOUS at 21:56

## 2022-12-20 RX ADMIN — ACETAMINOPHEN 1000 MG: 500 TABLET ORAL at 17:37

## 2022-12-20 RX ADMIN — ASPIRIN 325 MG ORAL TABLET 325 MG: 325 PILL ORAL at 11:26

## 2022-12-20 RX ADMIN — Medication 2 UNITS: at 21:57

## 2022-12-20 RX ADMIN — ACETAMINOPHEN 1000 MG: 500 TABLET ORAL at 05:40

## 2022-12-20 RX ADMIN — ACETAMINOPHEN 1000 MG: 500 TABLET ORAL at 11:22

## 2022-12-20 RX ADMIN — METOPROLOL TARTRATE 50 MG: 50 TABLET, FILM COATED ORAL at 21:56

## 2022-12-20 RX ADMIN — DEXAMETHASONE SODIUM PHOSPHATE 10 MG: 4 INJECTION, SOLUTION INTRAMUSCULAR; INTRAVENOUS at 05:39

## 2022-12-20 RX ADMIN — HYDROMORPHONE HYDROCHLORIDE 1 MG: 1 INJECTION, SOLUTION INTRAMUSCULAR; INTRAVENOUS; SUBCUTANEOUS at 11:17

## 2022-12-20 RX ADMIN — SODIUM CHLORIDE, PRESERVATIVE FREE 10 ML: 5 INJECTION INTRAVENOUS at 05:49

## 2022-12-20 RX ADMIN — METOPROLOL TARTRATE 50 MG: 50 TABLET, FILM COATED ORAL at 11:27

## 2022-12-20 RX ADMIN — SODIUM CHLORIDE, PRESERVATIVE FREE 10 ML: 5 INJECTION INTRAVENOUS at 21:57

## 2022-12-20 RX ADMIN — Medication 10 UNITS: at 22:07

## 2022-12-20 RX ADMIN — DOCUSATE SODIUM 100 MG: 100 CAPSULE, LIQUID FILLED ORAL at 11:27

## 2022-12-20 RX ADMIN — HEPARIN SODIUM 5000 UNITS: 5000 INJECTION INTRAVENOUS; SUBCUTANEOUS at 11:27

## 2022-12-20 NOTE — PROGRESS NOTES
Problem: Self Care Deficits Care Plan (Adult)  Goal: *Acute Goals and Plan of Care (Insert Text)  Description: Occupational Therapy Goals  Initiated 12/13/2022 within 7 day(s). Re-eval 12/20/22, pt is making good progress on goals. Goals continue to be appropriate at this time due to increased post-surgical precautions. 1.  Patient will perform grooming with modified independence. 2.  Patient will perform bathing with modified independence using adaptive equipment. 3.  Patient will perform upper body dressing and lower body dressing with modified independence using adaptive equipment. .  4.  Patient will perform toilet transfers with modified independence using RW with good balance. 5.  Patient will perform all aspects of toileting with modified independence. 6.  Patient will participate in upper extremity therapeutic exercise/activities with modified independence for 8 minutes. 7.  Patient will utilize energy conservation techniques during functional activities with min verbal cues. Prior Level of Function: Mod I with ADLs, used AE for LB ADLs: reacher, sockaide and LH bathsponge, Mod I functional mobility using RW     Outcome: Progressing Towards Goal    OCCUPATIONAL THERAPY RE-EVALUATION    Patient: Venkata Nair (70 y.o. female)  Date: 12/20/2022  Primary Diagnosis: Right sided weakness [R53.1]  Fall [W19. XXXA]  Stroke (cerebrum) ([facility] [address] [I63.9]  Procedure(s) (LRB):  ANTERIOR CERVICAL DECOMPRESSION WITH FUSION CERVICAL THREE-FOUR, CERVICAL FOUR-FIVE; C-ARM/ZEB (N/A) 1 Day Post-Op   Precautions:  Fall  PLOF: Mod I with ADLs, used AE for LB ADLs: reacher, sockaide and LH bathsponge, Mod I functional mobility using RW      ASSESSMENT :  Based on the objective data described below, the patient presents with decreased strength, endurance and independence in self care tasks. Pt in semi-reclined position in chair when OT arrived and agreeable to complete ADLs. Requires supervision for STS txrs. Completed UB/LB bathing and dressing SBA. MaxA to doff compression socks and Ryan to use sock aid to don compression and  socks. SBA for mobility around the room and bathroom. Supervision for all toileting tasks and SBA for hygiene tasks at sink. Pt took all tele leads off in order to bathe, informed nurse post session to replace leads. Left pt semi-reclined in chair with all needs met and call bell within reach. Patient will benefit from skilled intervention to address the above impairments. Patient's rehabilitation potential is considered to be Good  Factors which may influence rehabilitation potential include:   [x]             None noted  []             Mental ability/status  []             Medical condition  []             Home/family situation and support systems  []             Safety awareness  []             Pain tolerance/management  []             Other:      PLAN :  Recommendations and Planned Interventions:   [x]               Self Care Training                  [x]      Therapeutic Activities  [x]               Functional Mobility Training   []      Cognitive Retraining  [x]               Therapeutic Exercises           [x]      Endurance Activities  []               Balance Training                    []      Neuromuscular Re-Education  []               Visual/Perceptual Training     [x]      Home Safety Training  [x]               Patient Education                   [x]      Family Training/Education  []               Other (comment):    Frequency/Duration: Patient will be followed by occupational therapy 2 times a week to address goals. Further Equipment Recommendations for Discharge: wheelchair     AMPAC: Current research shows that an AM-PAC score of 18 or greater is associated with a discharge to the patient's home setting.  Based on an AM-PAC score of 19/24 and their current ADL deficits; it is recommended that the patient have 2-3 sessions per week of Occupational Therapy at d/c to increase the patient's independence. This AMPAC score should be considered in conjunction with interdisciplinary team recommendations to determine the most appropriate discharge setting. Patient's social support, diagnosis, medical stability, and prior level of function should also be taken into consideration. SUBJECTIVE:   Patient stated I need to get all these gross leads off me.     OBJECTIVE DATA SUMMARY:   Hospital course since last seen and reason for reevaluation:   Past Medical History:   Diagnosis Date    Arthritis     back and hands    Chronic pain     back    COVID-19     Summer of  2021    Diabetes ([facility] [address]     NIDDM    HLD (hyperlipidemia)     Hypertension     Nausea & vomiting     Surgery only not colonoscopy     Past Surgical History:   Procedure Laterality Date    COLONOSCOPY N/A 6/24/2022    COLONOSCOPY performed by Cassie Sim MD at [facility]COPY    HX COLONOSCOPY      HX TUBAL LIGATION       Barriers to Learning/Limitations: None  Compensate with: visual, verbal, tactile, kinesthetic cues/model    Home Situation:   Home Situation  Home Environment: Trailer/mobile home  # Steps to Enter: 0  Wheelchair Ramp: Yes  One/Two Story Residence: One story  Living Alone: Yes  Support Systems: Child(chago)  Patient Expects to be Discharged to[de-identified] Home with home health  Current DME Used/Available at Home: Walker, rolling, Commode, bedside, Grab bars, Adaptive dressing aides  Tub or Shower Type: Shower  []  Right hand dominant   []  Left hand dominant    Cognitive/Behavioral Status:  Neurologic State: Alert  Orientation Level: Oriented X4  Cognition: Follows commands       Skin: intact  Edema: none noted     Vision/Perceptual:    Acuity: Within Defined Limits      Coordination: BUE  Coordination: Within functional limits  Fine Motor Skills-Upper: Left Intact; Right Intact    Gross Motor Skills-Upper: Left Intact; Right Intact    Balance:  Sitting: Intact  Sitting - Static: Good (unsupported)  Sitting - Dynamic: Good (unsupported)  Standing: Impaired  Standing - Static: Good  Standing - Dynamic : Good    Strength: BUE  Strength: Generally decreased, functional    Tone & Sensation: BUE  Tone: Normal  Sensation: Intact    Range of Motion: BUE  AROM: Generally decreased, functional    Functional Mobility and Transfers for ADLs:  Bed Mobility:  Supine to Sit: Modified independent    Transfers:  Sit to Stand: Modified independent  Stand to Sit: Modified independent   Toilet Transfer : Supervision   Bathroom Mobility: Supervision/set up    ADL Assessment:   Feeding: Modified independent  Oral Facial Hygiene/Grooming: Supervision  Bathing: Stand-by assistance  Upper Body Dressing: Supervision  Lower Body Dressing: Stand-by assistance  Toileting: Supervision    Pain:  Pain level pre-treatment: 0/10   Pain level post-treatment: 0/10  Pain Intervention(s): Medication (see MAR); Rest, Ice, Repositioning   Response to intervention: Nurse notified, See doc flow    Activity Tolerance:   Good   Please refer to the flowsheet for vital signs taken during this treatment. After treatment:   [x] Patient left in no apparent distress sitting up in chair  [] Patient left in no apparent distress in bed  [x] Call bell left within reach  [x] Nursing notified  [] Caregiver present  [] Bed alarm activated    COMMUNICATION/EDUCATION:   [x] Role of Occupational Therapy in the acute care setting  [x] Home safety education was provided and the patient/caregiver indicated understanding. [x] Patient/family have participated as able in goal setting and plan of care. [x] Patient/family agree to work toward stated goals and plan of care. [] Patient understands intent and goals of therapy, but is neutral about his/her participation. [] Patient is unable to participate in goal setting and plan of care.     Thank you for this referral.  Ryder Cronin OT  Time Calculation: 46 mins    Geovanna Jean Baptiste -PAC® Daily Activity Inpatient Short Form (6-Clicks)*    How much HELP from another person does the patient currently need    (If the patient hasn't done an activity recently, how much help from another person do you think he/she would need if he/she tried?)   Total (Total A or Dep)   A Lot  (Mod to Max A)   A Little (Sup or Min A)   None (Mod I to I)   Putting on and taking off regular lower body clothing? [] 1 [] 2 [x] 3 [] 4   2. Bathing (including washing, rinsing,      drying)? [] 1 [] 2 [x] 3 [] 4   3. Toileting, which includes using toilet, bedpan or urinal?   [] 1 [] 2 [x] 3 [] 4   4. Putting on and taking off regular upper body clothing? [] 1 [] 2 [x] 3 [] 4   5. Taking care of personal grooming such as brushing teeth? [] 1 [] 2 [x] 3 [] 4   6. Eating meals?    [] 1 [] 2 [] 3 [x] 4

## 2022-12-20 NOTE — OP NOTES
[address] [place] [address]   OPERATIVE REPORT    Name:  Camron Islas  MR#:   [id number]  :  1950  ACCOUNT #:  [de-identified]  DATE OF SERVICE:  2022    PREOPERATIVE DIAGNOSIS:  Cervical spondylosis with myelopathy. POSTOPERATIVE DIAGNOSIS:  Cervical spondylosis with myelopathy. PROCEDURES PERFORMED:  Anterior cervical decompression and fusion C3-4; anterior cervical decompression and fusion C4-5, structural allograft x2; anterior cervical plate fixation C3, C4, C5 with Lizzy anterior cervical locking plate and screws. SURGEON:  Damien Nation MD    ASSISTANT:  DEION Laurent    ANESTHESIA:  General endotracheal.    COMPLICATIONS:  None. SPECIMENS REMOVED:  None. IMPLANTS:  [place]. ESTIMATED BLOOD LOSS:  Minimal.    FINDINGS:  The patient had spondylotic stenosis with disk protrusion, uncinate spurring at each level. We were able to resolve the stenosis from that restored disk space height, provide stabilization. OPERATION:  Following induction of general endotracheal anesthesia, the patient was placed with head in neutral position on cervical head pike. The patient prepped and draped in usual fashion. Right-sided approach was utilized. Sternocleidomastoid and great vessels mobilized laterally, esophagus and trachea mobilized medially with blunt finger dissection. Prevertebral fascia was entered, and C-arm image verified the surgical level. [place] pins placed in the bodies of C4 and C5. Cloward self-retaining retractor blades placed under the cover of longus colli musculature bilaterally. Annular tissue was incised. Radical diskectomy done back to the posterior margin. Posterior marginal osteophytes thinned with high-speed bur, then resected with a 4.0 Jadyn curette and sella punch. Posterior longitudinal ligament was taken, and a thorough decompression done of the epidural space. Endplates prepared.   A #6 structural allograft tamped firmly into place with excellent bony apposition. Procedure was then repeated at C3-4. Following a thorough decompression of the epidural space at both levels with resection of disk, annulus, posterior ligament and decompression of the cord with structural grafting, an anterior cervical locking plate was affixed to the spine with two screws in C3, two in C4, two in C5 with the locking device engaged. Wound was irrigated, there was no apparent bleeding. A deep drain used per routine. Neck was then closed in layers and the skin closed with subcuticular suture and Dermabond. Sterile occlusive dressing placed upon the wound. All counts were correct.       MD ALLISON Francois/[id number]  D:  12/19/2022 12:28  T:  12/19/2022 20:54  JOB #:  [id number]

## 2022-12-20 NOTE — PROGRESS NOTES
Comprehensive Nutrition Assessment    Type and Reason for Visit: Initial, RD nutrition re-screen/LOS    Nutrition Recommendations/Plan:   Add supplement: Glucerna shake BID (220 kcal, 10 gm protein each)  Update food intolerance in diet order  Continue all other nutrition interventions. Encourage/ monitor po intake of meals and supplements. Malnutrition Assessment:  Malnutrition Status:  No malnutrition (12/20/22 1521)      Nutrition History and Allergies:   Past medical hx: covid in summer 2021, HLD, DM, HTN, N/V, arthritis. Good appetite/ po intake PTA. UBW is 178 lb. Pt denied experiencing any recent wt loss PTA. No known food allergies  (lactose intolerance to milk and ice cream)     Nutrition Assessment:    Pt reported good appetite/ meal intake PTA and prior to surgery yesterday; s/p neck surgery. Is on soft/ bite sized diet; tolerating most meals, has been needing soft foods post-op. Pt agreeable to nutrition supplement. Fair meal intake post-op. Nutrition Related Findings:    BM 12/17. No edema. IVF, D5 1/2NS with KCl 20 mEq/L at 50 mL/hr (60 gm dextrose, 204 kcal, 24 mEq KCl per day). Daily MVI supplement. Wound Type: Surgical incision    Current Nutrition Intake & Therapies:  Average Meal Intake: 26-50% (was 100% prior to surgery on 12/19)  Average Supplement Intake: None ordered  ADULT DIET Dysphagia - Soft & Bite Sized; No Drinking Straws; oatmeal in the morning and yogurt    Anthropometric Measures:  Height: 5' 6\" (167.6 cm)  Ideal Body Weight (IBW): 130 lbs (59 kg)  Admission Body Weight: 177 lb 14.6 oz  Current Body Wt:  80.7 kg (177 lb 14.6 oz), 136.9 % IBW. Current BMI (kg/m2): 28.7  Usual Body Weight: 80.7 kg (178 lb)  % Weight Change (Calculated): 0  Weight Adjustment: No adjustment  BMI Category: Overweight (BMI 25.0-29. 9)    Estimated Daily Nutrient Needs:  Energy Requirements Based On: Formula (MSJ x1-1.3)  Weight Used for Energy Requirements: Admission  Energy (kcal/day): 5339-4727  Weight Used for Protein Requirements: Admission  Protein (g/day): 65-97 (wt x0.8-1.2)  Method Used for Fluid Requirements: 1 ml/kcal  Fluid (ml/day): 8466-7313    Nutrition Diagnosis:   Predicted inadequate energy intake related to  (chewing/ swallowing difficulty post recent neck surgery) as evidenced by intake 26-50%     Nutrition Interventions:   Food and/or Nutrient Delivery: Continue current diet, Mineral supplement, Vitamin supplement, Start oral nutrition supplement, IV fluid delivery  Nutrition Education/Counseling: Education not indicated  Coordination of Nutrition Care: Continue to monitor while inpatient  Plan of Care discussed with: pt    Goals:     Goals: Meet at least 75% of estimated needs, by next RD assessment       Nutrition Monitoring and Evaluation:   Behavioral-Environmental Outcomes: None identified  Food/Nutrient Intake Outcomes: Diet advancement/tolerance, Food and nutrient intake, Supplement intake, Vitamin/mineral intake, IVF intake  Physical Signs/Symptoms Outcomes: Biochemical data, Chewing or swallowing, Meal time behavior    Discharge Planning:     Too soon to determine    Suzette Monet, [address]  Contact: [phone]

## 2022-12-20 NOTE — REHAB NOTE
ARU/IPR REFERRAL CONTACT NOTE  [id number] [place] [address] for Physical Rehabilitation      Thank you for the opportunity to review this patient's case for admission to [id number] [place] [address] for Physical Rehabilitation. Based on our pre-admission screening:     [ x] This patient does not meet criteria for admission to [facility] for Physical Rehabilitation due to:    [ x] Too functional, per documentation, patient does not require both Physical and Occupational Therapy Services at an acute rehabilitation level of intensity. Pt was seen after cervical decompression and fusion by PT OT. PT evaluated and discharged. Again, Thank you for this referral. Should you have any questions please do not hesitate to call.      Sincerely,  Kacie Huynh Liaison  [facility] for [facility]  [phone]

## 2022-12-20 NOTE — ROUTINE PROCESS
Bedside and Verbal shift change report given to Luna Cintron RN (oncoming nurse) by Kulwant Jacobson RN (offgoing nurse). Report included the following information SBAR, Kardex, and Recent Results.

## 2022-12-20 NOTE — PROGRESS NOTES
Problem: Mobility Impaired (Adult and Pediatric)  Goal: *Acute Goals and Plan of Care (Insert Text)  Outcome: Resolved/Met   PHYSICAL THERAPY RE-EVALUATION AND DISCHARGE    Patient: Janis Ospina (63 y.o. female)  Date: 12/20/2022  Primary Diagnosis: Right sided weakness [R53.1]  Fall [W19. XXXA]  Stroke (cerebrum) ([facility] [address] [I63.9]  Procedure(s) (LRB):  ANTERIOR CERVICAL DECOMPRESSION WITH FUSION CERVICAL THREE-FOUR, CERVICAL FOUR-FIVE; C-ARM/ZEB (N/A) 1 Day Post-Op   Precautions: Fall  ASSESSMENT :  Re-evaluation s/p ACDF C3/4, C4/5 12/19/2022; goals reviewed. Seated in bed with soft cervical collar donned. Educated on cervical precautions; verbalized understanding. Mod I for supine to sit with log roll technique. Seated EOB with good balance. Assistance required for lower body dressing to don undergarments; defer to OT. Mod I for sit to stand. Amb 200ft in reyes with slow steady gait. Returned to seated in recliner. Call bell in reach. Further skilled physical therapy is not indicated at this time. PLAN :  Recommendations and Planned Interventions:   Further skilled physical therapy is not indicated. Further Equipment Recommendations for Discharge: rolling walker    AMPAC Basic Mobility Inpatient Short Form:  20/20, with stairs omitted  This AMPAC score should be considered in conjunction with interdisciplinary team recommendations to determine the most appropriate discharge setting. Patient's social support, diagnosis, medical stability, and prior level of function should also be taken into consideration. At this time and based on an AM-PAC score of 20/20 if omitting stairs, no further PT is recommended upon discharge. SUBJECTIVE:   Patient stated My daughter is headed to [employer] to get my grandson.     OBJECTIVE DATA SUMMARY:     Past Medical History:   Diagnosis Date    Arthritis     back and hands    Chronic pain     back    COVID-19     Summer of  2021    Diabetes [facility])     NIDDM    HLD (hyperlipidemia)     Hypertension     Nausea & vomiting     Surgery only not colonoscopy     Past Surgical History:   Procedure Laterality Date    COLONOSCOPY N/A 6/24/2022    COLONOSCOPY performed by Shahida Jerome MD at [facility]    HX COLONOSCOPY      HX TUBAL LIGATION         Critical Behavior:  Neurologic State: Alert  Orientation Level: Oriented X4  Cognition: Follows commands     Psychosocial  Patient Behaviors: Talkative  Strength:    Manual Muscle Testing (LE)         R     L    Hip Flexion:   4+/5  4+/5  Knee EXT:   4+/5  4+/5  Knee FLEX:   4+/5  4+/5  Ankle DF:   4+/5  4+/5  _________________________________________________   Range Of Motion:  BLE AROM WFL  Functional Mobility:  Bed Mobility:  Supine to Sit: Modified independent  Transfers:  Sit to Stand: Modified independent  Stand to Sit: Modified independent  Balance:   Sitting: Intact  Standing: Impaired  Standing - Static: Good  Standing - Dynamic : Good  Ambulation/Gait Training:  Distance (ft): 200 Feet (ft)   Assistive Device: Walker, rolling  Ambulation - Level of Assistance: Modified independent    Pain:  Pain level pre-treatment: 6/10   Pain level post-treatment: 6/10     Activity Tolerance:   Good    After treatment:   [x]         Patient left in no apparent distress sitting up in chair  []         Patient left in no apparent distress in bed  [x]         Call bell left within reach  [x]         Nursing notified  []         Caregiver present  []         Bed alarm activated  []         SCDs applied    COMMUNICATION/EDUCATION:   [x]         Role of physical therapy in the acute care setting. [x]         Fall prevention education was provided and the patient/caregiver indicated understanding. [x]         Patient/family have participated as able in goal setting and plan of care. [x]         Patient/family agree to work toward stated goals and plan of care.   []         Patient understands intent and goals of therapy, but is neutral about his/her participation. []         Patient is unable to participate in goal setting/plan of care: ongoing with therapy staff. Thank you for this referral.  Juan Hughes, PT   Time Calculation: 33 mins    [address] [facility] [address] [address] AM-PAC® Basic Mobility Inpatient Short Form (6-Clicks) Version 2    How much HELP from another person does the patient currently need    (If the patient hasn't done an activity recently, how much help from another person do you think he/she would need if he/she tried?)   Total (Total A or Dep)   A Lot  (Mod to Max A)   A Little (Sup or Min A)   None (Mod I to I)   Turning from your back to your side while in a flat bed without using bedrails? [] 1 [] 2 [] 3 [x] 4   2. Moving from lying on your back to sitting on the side of a flat bed without using bedrails? [] 1 [] 2 [] 3 [x] 4   3. Moving to and from a bed to a chair (including a wheelchair)? [] 1 [] 2 [] 3 [x] 4   4. Standing up from a chair using your arms (e.g., wheelchair, or bedside chair)? [] 1 [] 2 [] 3 [x] 4   5. Walking in hospital room? [] 1 [] 2 [] 3 [x] 4   6. Climbing 3-5 steps with a railing?+  Not tested d/t none in home set-up [] 1 [] 2 [] 3 [] 4   +If stair climbing cannot be assessed, skip item #6. Sum responses from items 1-5.

## 2022-12-20 NOTE — DISCHARGE INSTRUCTIONS
Dr. Brandon Heaton Instructions: Cervical Surgery    DO NOT take any NSAIDS if you had Fusion Surgery. ACTIVITIES:  Change positions every hour while you are awake. Walking is the best way to rebuild strength. Wear your soft collar just for comfort if it is provided to you. You may remove if desired. Sleep with your head elevated for 2-3 nights after surgery to prevent swelling. Avoid strenuous activity, such as yard work, vacuuming, or lifting anything heavier than a gallon of milk. Avoid strenuous activities, such as vacuuming, and do not lift anything heavier than 1 gallon of milk (or about 5-8 pounds). Walk at a pace that avoids fatigue or severe pain. Do not try to walk several blocks the first day! Follow-up with Dr. Andre Hummel will be 7-10 days from surgery. BATHING and INCISION CARE:  The incision may be tender or feel numb: this is normal.   Keep the incision clean and dry. You may shower 3 days after surgery. Cover the dressing with saran wrap before getting in the shower. The incision is closed with sutures under the skin and glue on top. Do not apply any lotions, ointments or oils on the incision. Do not remove the dressing. Your dressing will be changed at your first post op appointment. If it comes loose or is damaged, dirty or wet before this appointment, call your home health nurse (if you are being seen by a nurse at home) or the office to have the dressing changed. If you notice any excessive swelling, redness, or persistent drainage around the incision, notify the office immediately. CONSTIPATION:  Take a stool softener twice a day while you are taking a narcotic. If you have not had a bowel movement within 3 days of surgery, you will need to use a laxative or suppository that can be obtained over the counter at your local pharmacy     ICE  Use ice on your neck and shoulders to decrease pain and swelling. Do NOT use heat.     MEDICATIONS:  If you had fusion surgery DO NOT TAKE non-steroidal anti-inflammatory (NSAID) medications, such as Motrin, Aleve, Advil Naprosyn, Ibuprofen or aspirin. Take Tylenol/Acetaminophen every 4-6 hours for pain. Do not take more than 3000 mg each day. (Do not take Tylenol/Acetaminophen if you have liver problems). Take your prescribed narcotic pain medication as needed for pain that is not tolerable. Eat food before you take any pain medication to avoid nausea. If you need a medication refill, please call the office during working hours at least 2 days before your prescription runs out. Do not wait until your bottle is empty to call for a refill. EATING & NUTRITION:  You can eat anything you can chew and make soft. Take small bites and avoid tough foods like meat and bread. Painful swallowing is normal after surgery and may be experienced up to 2 weeks post-operatively. You may obtain over the counter Chloraseptic spray. The more you swallow the better your throat will feel. If at any point you can no longer swallow your own saliva, call Dr. Kelvin Hernandez office right away. Eat a healthy balanced diet to help your wound to heal. Protein supplements should be considered if you are eating less than 50% of your meal.   Drink plenty of water to stay hydrated. DRIVING & RETURN TO WORK:   You will be told at your follow up appointment if it is safe for you to drive or return to work and will be provided with a [id number] if needed (please ask). NEVER drive while taking narcotic medication. WHEN TO CALL THE OFFICE:  If you have severe pain unrelieved by the medications, new numbness or tingling in your legs;        If you have a fever of 101.0°F or greater   If you notice increased swelling, redness, or increased drainage from the incision    If you are not able to urinate  If you are not able to control your bowels   If you are unable to swallow your own saliva      [address], [address] number is [phone]. They are open from 8:00am to 5:00pm Mon - Fri. After 5:00pm, or on weekends/holidays, please call the answering service at [phone] for a call back. Discharge Instructions    Patient: Janis Ospina MRN: [id number]  CSN: [id number]    YOB: 1950  Age: 67 y.o. Sex: female    DOA: 12/13/2022       DIET:  Cardiac Diet and Diabetic Diet    ACTIVITY: Activity as tolerated  Home health care for Skilled care for DM, Hypertension and medication management       PT/OT consult      ADDITIONAL INFORMATION: If you experience any of the following symptoms but not limited to Fever, chills, nausea, vomiting, diarrhea, change in mentation, falling, bleeding, shortness of breath, chest pain, please call your primary care physician or return to the emergency room if you cannot get hold of your doctor:     FOLLOW UP CARE:  Dr. Jaylyn Robbins in 5-7 days. Please call and set up an appointment.   Dr. Lizandro Fleming in 1-2 week  Cardiology in 3 week      Josias Muniz MD  12/21/2022 10:58 AM

## 2022-12-20 NOTE — PROGRESS NOTES
[facility]  Progress Note    Patient: Anaya Raya Age: 67 y.o. : 1950 MR#: [id number] SSN: [id number]  Date/Time: 2022     Subjective: Patient sitting in recliner, complains of having some dysphagia, pain control. Assessment/Plan:   1. Cervical spine stenosis with upper extremity radiculopathy, post ANTERIOR CERVICAL DECOMPRESSION WITH FUSION C 3-4, C 4-5 on 2022  2. Dysphagia due to #1  3. Chronic right leg numbness with decreased mobility secondary to recent lumbar spine surgery  4. Diabetes mellitus  5. Hypertension  6. Dyslipidemia  7. Osteoarthritis  8. S/p recent anterior lumbar interbody fusion, L4-L5 and L5-S1 fusion. Plan  SLP eval, discussed with speech therapist  Discussed with spine surgery, will discontinue steroids  Continue Lantus and SSI  BP elevated, continue metoprolol, add hydralazine as needed  Echo noted with small PFO, needs outpatient follow-up with cardiology  continue aspirin, statin  Continue beta-blocker and monitor blood pressure  Continue PT/OT eval and treatment  Continue heparin subcu for DVT prophylaxis    Disposition: Home with home health care later today if she feels better    3 PM  Patient seen and evaluated again  Patient still feels dysphagia but doing better with thickened liquids  Patient not comfortable going home today given her dysphagia and also does not have anybody at home to take care of her  Patient requesting for discharge tomorrow morning, discussed with spine surgery agree with the holding discharge. Discussed with the patient at bedside and explained about my above plan care.       Disposition: Home with home health care tomorrow    Case discussed with:  [x]Patient  []Family  [x]Nursing  []Case Management  DVT Prophylaxis:  []Lovenox  [x]Hep SQ  []SCDs  []Coumadin   []Eliquis/Xarelto     Objective:   VS: Visit Vitals  BP (!) 176/82   Pulse 74   Temp 97.3 °F (36.3 °C)   Resp 17   Ht 5' 6\" (1.676 m)   Wt 80.7 kg (178 lb)   SpO2 98%   BMI 28.73 kg/m²      Tmax/24hrs: Temp (24hrs), Av.7 °F (36.5 °C), Min:97.3 °F (36.3 °C), Max:98.2 °F (36.8 °C)  IOBRIEF  Intake/Output Summary (Last 24 hours) at 2022 1504  Last data filed at 2022 1656  Gross per 24 hour   Intake --   Output 0 ml   Net 0 ml         General:  Alert, cooperative, no acute distress, soft collar of neck noted. Pulmonary:  CTA Bilaterally. No Wheezing/Rales. Cardiovascular: Regular rate and Rhythm. GI:  Soft, Non distended, Non tender. + Bowel sounds. Extremities:  No edema. No calf tenderness. Psych: Good insight. Not anxious or agitated. Neurologic: Alert and oriented X 3.   No slurred speech, no facial droop, right lower extremity 3-4/5, rest 5/5  Additional:    Medications:   Current Facility-Administered Medications   Medication Dose Route Frequency    sodium chloride (NS) flush 5-40 mL  5-40 mL IntraVENous Q8H    sodium chloride (NS) flush 5-40 mL  5-40 mL IntraVENous PRN    acetaminophen (TYLENOL) tablet 1,000 mg  1,000 mg Oral Q6H    oxyCODONE IR (ROXICODONE) tablet 5-10 mg  5-10 mg Oral Q4H PRN    HYDROmorphone (DILAUDID) injection 1 mg  1 mg IntraVENous Q4H PRN    naloxone (NARCAN) injection 0.4 mg  0.4 mg IntraVENous PRN    ondansetron (ZOFRAN) injection 4 mg  4 mg IntraVENous Q4H PRN    phenol throat spray (CHLORASEPTIC) 1 Spray  1 Spray Oral PRN    benzocaine-menthoL (CEPACOL) lozenge 1 Lozenge  1 Lozenge Oral PRN    diphenhydrAMINE (BENADRYL) injection 12.5 mg  12.5 mg IntraVENous Q4H PRN    diphenhydrAMINE (BENADRYL) capsule 25 mg  25 mg Oral Q4H PRN    famotidine (PEPCID) tablet 40 mg  40 mg Oral Q12H    diazePAM (VALIUM) tablet 5 mg  5 mg Oral Q6H PRN    LORazepam (ATIVAN) injection 1 mg  1 mg IntraVENous Q6H PRN    dextrose 5% - 0.45% NaCl with KCl 20 mEq/L infusion  50 mL/hr IntraVENous CONTINUOUS    metoprolol tartrate (LOPRESSOR) tablet 50 mg  50 mg Oral Q12H    insulin glargine (LANTUS) injection 10 Units  10 Units SubCUTAneous QHS    bisacodyL (DULCOLAX) tablet 10 mg  10 mg Oral Q48H PRN    acetaminophen (TYLENOL) tablet 500 mg  500 mg Oral Q6H PRN    cyclobenzaprine (FLEXERIL) tablet 5 mg  5 mg Oral TID PRN    docusate sodium (COLACE) capsule 100 mg  100 mg Oral BID    therapeutic multivitamin (THERAGRAN) tablet 1 Tablet  1 Tablet Oral DAILY    insulin lispro (HUMALOG) injection   SubCUTAneous AC&HS    glucose chewable tablet 16 g  16 g Oral PRN    glucagon (GLUCAGEN) injection 1 mg  1 mg IntraMUSCular PRN    dextrose 10% infusion 0-250 mL  0-250 mL IntraVENous PRN    aspirin tablet 325 mg  325 mg Oral DAILY    atorvastatin (LIPITOR) tablet 80 mg  80 mg Oral QHS    labetaloL (NORMODYNE;TRANDATE) 20 mg/4 mL (5 mg/mL) injection 5 mg  5 mg IntraVENous Q10MIN PRN    heparin (porcine) injection 5,000 Units  5,000 Units SubCUTAneous Q8H    gabapentin (NEURONTIN) capsule 200 mg  200 mg Oral TID       Labs:    Recent Results (from the past 24 hour(s))   GLUCOSE, POC    Collection Time: 12/19/22  4:55 PM   Result Value Ref Range    Glucose (POC) 115 (H) 70 - 110 mg/dL   GLUCOSE, POC    Collection Time: 12/19/22  9:44 PM   Result Value Ref Range    Glucose (POC) 141 (H) 70 - 110 mg/dL   HGB & HCT    Collection Time: 12/20/22  3:04 AM   Result Value Ref Range    HGB 10.6 (L) 12.0 - 16.0 g/dL    HCT 34.6 (L) 35.0 - 45.0 %   GLUCOSE, POC    Collection Time: 12/20/22  8:04 AM   Result Value Ref Range    Glucose (POC) 131 (H) 70 - 110 mg/dL   GLUCOSE, POC    Collection Time: 12/20/22 11:54 AM   Result Value Ref Range    Glucose (POC) 135 (H) 70 - 110 mg/dL       Signed By: Yuki Jeffries MD     December 20, 2022      Disclaimer: Sections of this note are dictated using utilizing voice recognition software. Minor typographical errors may be present. If questions arise, please do not hesitate to contact me or call our department.

## 2022-12-20 NOTE — PROGRESS NOTES
Bedside shift report given to Natalia Mota RN from [employer]. Report including the following information SBAR, Kardex, recent results, MAR, intake/output and cardiac rhythm NSR.

## 2022-12-20 NOTE — PROGRESS NOTES
Vss afeb  Neuro intact  Doing well  Incisional pain  Pt ot  Dc carlos- scant  Stable for discharge from ortho perspective  Fu 2 weeks

## 2022-12-20 NOTE — PROGRESS NOTES
Beside report given to [facility] from [facility]. Svetlana to includes SBAR, Kardex, recent results, MAR, intake/output.

## 2022-12-20 NOTE — PROGRESS NOTES
Problem: Patient Education: Go to Patient Education Activity  Goal: Patient/Family Education  Outcome: Progressing Towards Goal     Problem: Falls - Risk of  Goal: *Absence of Falls  Description: Document Lettie Duverney Fall Risk and appropriate interventions in the flowsheet.   Outcome: Progressing Towards Goal  Note: Fall Risk Interventions:  Mobility Interventions: Bed/chair exit alarm, PT Consult for assist device competence, Utilize walker, cane, or other assistive device         Medication Interventions: Evaluate medications/consider consulting pharmacy    Elimination Interventions: Call light in reach, Patient to call for help with toileting needs, Toileting schedule/hourly rounds    History of Falls Interventions: Bed/chair exit alarm, Room close to nurse's station, Investigate reason for fall         Problem: Patient Education: Go to Patient Education Activity  Goal: Patient/Family Education  Outcome: Progressing Towards Goal     Problem: Injury - Risk of, Adverse Drug Event  Goal: *Absence of adverse drug events  Outcome: Progressing Towards Goal  Goal: *Absence of medication errors  Outcome: Progressing Towards Goal  Goal: *Knowledge of prescribed medications  Outcome: Progressing Towards Goal     Problem: Patient Education: Go to Patient Education Activity  Goal: Patient/Family Education  Outcome: Progressing Towards Goal     Problem: Patient Education: Go to Patient Education Activity  Goal: Patient/Family Education  Outcome: Progressing Towards Goal     Problem: Patient Education: Go to Patient Education Activity  Goal: Patient/Family Education  Outcome: Progressing Towards Goal

## 2022-12-21 ENCOUNTER — APPOINTMENT (OUTPATIENT)
Dept: PHYSICAL THERAPY | Age: 72
End: 2022-12-21
Payer: MEDICARE

## 2022-12-21 ENCOUNTER — HOME HEALTH ADMISSION (OUTPATIENT)
Dept: HOME HEALTH SERVICES | Facility: HOME HEALTH | Age: 72
End: 2022-12-21
Payer: MEDICARE

## 2022-12-21 VITALS
TEMPERATURE: 98.6 F | SYSTOLIC BLOOD PRESSURE: 104 MMHG | BODY MASS INDEX: 28.61 KG/M2 | HEIGHT: 66 IN | OXYGEN SATURATION: 99 % | WEIGHT: 178 LBS | DIASTOLIC BLOOD PRESSURE: 65 MMHG | HEART RATE: 66 BPM | RESPIRATION RATE: 17 BRPM

## 2022-12-21 LAB
GLUCOSE BLD STRIP.AUTO-MCNC: 71 MG/DL (ref 70–110)
GLUCOSE BLD STRIP.AUTO-MCNC: 92 MG/DL (ref 70–110)

## 2022-12-21 PROCEDURE — 74011250637 HC RX REV CODE- 250/637: Performed by: INTERNAL MEDICINE

## 2022-12-21 PROCEDURE — 97535 SELF CARE MNGMENT TRAINING: CPT

## 2022-12-21 PROCEDURE — 2709999900 HC NON-CHARGEABLE SUPPLY

## 2022-12-21 PROCEDURE — 74011250636 HC RX REV CODE- 250/636: Performed by: INTERNAL MEDICINE

## 2022-12-21 PROCEDURE — 99239 HOSP IP/OBS DSCHRG MGMT >30: CPT | Performed by: HOSPITALIST

## 2022-12-21 PROCEDURE — 74011250637 HC RX REV CODE- 250/637: Performed by: HOSPITALIST

## 2022-12-21 PROCEDURE — 82962 GLUCOSE BLOOD TEST: CPT

## 2022-12-21 PROCEDURE — 74011250637 HC RX REV CODE- 250/637: Performed by: ORTHOPAEDIC SURGERY

## 2022-12-21 PROCEDURE — 74011000250 HC RX REV CODE- 250: Performed by: ORTHOPAEDIC SURGERY

## 2022-12-21 PROCEDURE — 92526 ORAL FUNCTION THERAPY: CPT

## 2022-12-21 RX ORDER — ASPIRIN 325 MG
325 TABLET ORAL DAILY
Qty: 30 TABLET | Refills: 0 | Status: SHIPPED | OUTPATIENT
Start: 2022-12-22 | End: 2023-01-21

## 2022-12-21 RX ORDER — ATORVASTATIN CALCIUM 40 MG/1
40 TABLET, FILM COATED ORAL
Qty: 30 TABLET | Refills: 0 | Status: SHIPPED | OUTPATIENT
Start: 2022-12-21 | End: 2023-01-20

## 2022-12-21 RX ORDER — METOPROLOL TARTRATE 50 MG/1
50 TABLET ORAL EVERY 12 HOURS
Qty: 60 TABLET | Refills: 0 | Status: SHIPPED | OUTPATIENT
Start: 2022-12-21 | End: 2023-01-20

## 2022-12-21 RX ORDER — OXYCODONE HYDROCHLORIDE 5 MG/1
5 TABLET ORAL
Qty: 6 TABLET | Refills: 0 | Status: SHIPPED | OUTPATIENT
Start: 2022-12-21 | End: 2022-12-24

## 2022-12-21 RX ORDER — AMLODIPINE BESYLATE 5 MG/1
5 TABLET ORAL DAILY
Status: DISCONTINUED | OUTPATIENT
Start: 2022-12-21 | End: 2022-12-21

## 2022-12-21 RX ADMIN — ACETAMINOPHEN 1000 MG: 500 TABLET ORAL at 06:56

## 2022-12-21 RX ADMIN — GABAPENTIN 200 MG: 100 CAPSULE ORAL at 09:16

## 2022-12-21 RX ADMIN — ACETAMINOPHEN 1000 MG: 500 TABLET ORAL at 12:21

## 2022-12-21 RX ADMIN — ASPIRIN 325 MG ORAL TABLET 325 MG: 325 PILL ORAL at 09:16

## 2022-12-21 RX ADMIN — SODIUM CHLORIDE, PRESERVATIVE FREE 10 ML: 5 INJECTION INTRAVENOUS at 06:56

## 2022-12-21 RX ADMIN — ACETAMINOPHEN 1000 MG: 500 TABLET ORAL at 00:00

## 2022-12-21 RX ADMIN — METOPROLOL TARTRATE 50 MG: 50 TABLET, FILM COATED ORAL at 09:17

## 2022-12-21 RX ADMIN — HEPARIN SODIUM 5000 UNITS: 5000 INJECTION INTRAVENOUS; SUBCUTANEOUS at 04:36

## 2022-12-21 RX ADMIN — FAMOTIDINE 40 MG: 20 TABLET ORAL at 09:17

## 2022-12-21 RX ADMIN — THERA TABS 1 TABLET: TAB at 09:17

## 2022-12-21 RX ADMIN — Medication 1 CAPSULE: at 12:22

## 2022-12-21 NOTE — DISCHARGE SUMMARY
Discharge Summary    Patient: Ken Elias MRN: [id number]  CSN: [id number]    YOB: 1950  Age: 67 y.o. Sex: female    DOA: 12/13/2022 LOS:  LOS: 8 days        Disposition: Home with [facility] AT [facility]    Discharge Date:   12/21/2022    Admission Diagnosis: Right sided weakness [R53.1]  Fall [W19. XXXA]  Stroke (cerebrum) ([facility] [address] [I63.9]    Discharge Diagnosis:    1. Cervical spine stenosis with upper extremity radiculopathy, post ANTERIOR CERVICAL DECOMPRESSION WITH FUSION C 3-4, C 4-5 on 12/19/2022  2. Dysphagia due to #1  3. Chronic right leg numbness with decreased mobility secondary to recent lumbar spine surgery  4. Diabetes mellitus  5. Hypertension  6. Dyslipidemia  7. Osteoarthritis  8. S/p recent anterior lumbar interbody fusion, L4-L5 and L5-S1 fusion. Discharge Condition: Stable      PHYSICAL EXAM  Visit Vitals  /69 (BP 1 Location: Left upper arm, BP Patient Position: Sitting)   Pulse 65   Temp 98 °F (36.7 °C)   Resp 18   Ht 5' 6\" (1.676 m)   Wt 80.7 kg (178 lb)   SpO2 95%   BMI 28.73 kg/m²       General: Alert, cooperative, no acute distress    HEENT: PERRLA, EOMI. Anicteric sclerae. Lungs:  CTA Bilaterally. No Wheezing/Rales. Heart:             Regular rate and Rhythm. Abdomen: Soft, Non distended, Non tender. + Bowel sounds. Extremities: No edema. Psych:   Good insight. Not anxious or agitated. Neurologic:  AA, oriented X 3. Moves all ext   Soft, nontender, dressing clean at the surgical site                             Hospital Course:     Ken Elias is a 67 y.o.  female who presented to the emergency room for further evaluation of fall and right-sided weakness started around 3:30 AM.  Patient has history of right leg numbness secondary to recent lumbar surgery with decreased mobility. She fell to the ground around 3.30 this morning. Because patient having right hand numbness on clinical examination performed by ED provider, Code S was called.   Patient had CT head and CT head done both were negative. Patient was admitted to hospital with impression of rule out CVA. MRI brain was done which was negative. Patient continued to have upper extremity numbness and lower extremity numbness and weakness. There was also concerned about cervical radiculopathy, MRI of C-spine and L-spine was ordered and spine surgery was consulted. MRI of C-spine showed spinal stenosis. Spine surgery recommended to start on steroids and plan for decompression and fusion surgery. Patient underwent surgery tolerated well. Patient had mild dysphagia post surgery, SLP saw the patient and diet was adjusted accordingly. Patient started slowly getting better, patient was seen and eval by PT/OT recommended home health care. Spine surgery cleared patient for discharge, patient was discharged home with outpatient follow-up. Addendum  Patient's echo showed PFO, I have discussed with the patient about the finding and follow-up with cardiology. Discussed with cardiology PA Karma Leyden for outpatient appointment and follow-up. Discussed with the patient about discharge plan, follow-up appointments, new medications and side effects. Patient understood and agreed with my plan. Procedures:   ANTERIOR CERVICAL DECOMPRESSION WITH FUSION C 3-4, C 4-5 on 12/19/2022    Consults:   Spine surgery, Dr. Selwyn Brothers    Imaging studies:   MRI Results (most recent):  Results from [place] encounter on 12/13/22    MRI LUMB SPINE W WO CONT    Narrative  EXAM: Lumbar MRI with and without contrast    CLINICAL INDICATION: right leg weakness    TECHNIQUE: MRI of the lumbar spine with and without contrast obtained. Multiplanar multisequence MR images of the lumbar spine obtained. IV Contrast: 15 cc MultiHance    COMPARISON: CT dated 12/13/2021, plain film dated 11/8/2021    FINDINGS:  The exam is significantly degraded by motion. All numbering assumes 5 lumbar type vertebrae.     Postoperative Changes: Circumferential fusion of the lumbar spine has been  performed from L2 through S1. Pedicle screws are present as well as  intervertebral hardware. Alignment:  Unchanged. Osseous structures/Marrow: Evaluation of the marrow is limited due to motion and  metallic artifact. No gross pathology appreciated. The fractures described on CT  at L4 and L5 are not well appreciated on this exam.    The cord terminates at L1. No cord signal abnormalities appreciated. No  epidural collection identified. Retroperitoneum/Incidental: The abdominal aorta is without evidence of aneurysm. No paraaortic adenopathy appreciated. The visualized kidneys are unremarkable. There is heterogeneous signal in the left iliopsoas with likely heterotopic  ossification. Anterior to L5 and S1, there is a fluid collection which was  likely present on most recent prior CT which measures 2.7 x 3 x 4.3 cm. Lower Thoracic Spine: The majority of the lower thoracic spine is only  visualized on the sagittal views. No significant canal or neural foraminal  stenosis appreciated. T12-L1: No significant canal or neural foraminal stenosis. L1-L2 level: Epidural fat is present. A central disc protrusion is noted which  causes moderate central canal narrowing in conjunction with epidural fat. This  appears to be new since prior imaging. Mild bilateral neural foraminal narrowing  is noted. L2-L3: Postoperative changes are noted. Mild canal narrowing with mild disc  bulge. Mild bilateral neural foraminal narrowing. L3-L4: Postoperative changes. Mild canal narrowing. Mild to moderate bilateral  neural foraminal narrowing. L4-L5: Postoperative changes. Broad-based disc bulge, facet hypertrophy are  present. There is mild canal narrowing and moderate bilateral neural foraminal  narrowing. L5-S1: Postoperative change. Mild disc bulge. No significant canal stenosis. Mild bilateral neural foraminal narrowing. Impression  1.   New central disc protrusion at L1-L2 which in conjunction with epidural fat  causes moderate central canal narrowing. 2.  Extensive multilevel postoperative changes without evidence of significant  canal narrowing within the postoperative levels. 3.  Left sided fluid collection anterior to L5 and just medial to the left  iliopsoas. CT Results (most recent):  Results from Hospital Encounter encounter on 22    CT SPINE LUMB WO CONT    Narrative  EXAM: CT SPINE LUMB WO CONT    CLINICAL INDICATION/HISTORY: 67 years Female. back pain, right leg weakness  ADDITIONAL HISTORY: None    TECHNIQUE: CT of the lumbar spine without contrast obtained. Sagittal and  coronal reformations obtained. All CT scans at this facility are performed using dose optimization technique as  appropriate to a performed exam, to include automated exposure control,  adjustment of the mA and/or kV according to patient size (including appropriate  matching for site specific examination) or use of iterative reconstruction  technique. IV Contrast: None    COMPARISON: CT lumbar spine 2022    FINDINGS:    VERTEBRAL NUMBERIN lumbar type vertebral bodies are assumed for the purposes  of this dictation. POSTSURGICAL CHANGE: Interbody grafts at L2-3, L3-4, L4-5, and L5-S1. Bilateral  pedicle screws at L2, L3, L4, L5, and S1. Again, the right elbow S1 pedicle  screw extends into the right S1 neural foramen although this no definite mass  effect on the S1 nerve. No perihardware lucency or hardware disruption detected. The bilateral L3 pedicles extend somewhat laterally but demonstrate similar  morphology compared to prior. The left L5 pedicle also extends lateral to the  vertebral body but appears similar to prior. ALIGNMENT: Mild retrolisthesis of L1 on L2. Otherwise normal.    Fracture: Redemonstrated fracture involving the left L5 inferior articular  process extending to the L5-S1 neural foramen.  Appearance is unchanged from  prior. No new fracture detected. Suspected additional fracture at the posterior  aspect of the L4 vertebral artery wall, unchanged. LUMBAR VERTEBRAL BODY HEIGHTS: Appear preserved. FACET JOINTS: Moderate bilateral facet arthritis at the L2-3 through L4-5  levels, similar to prior. SPINAL CANAL: Mild to moderate L1-2 spinal canal narrowing secondary to disc  herniation and ligamentum flavum hypertrophy, similar to prior. Suspected  moderate spinal canal stenosis at L2-3 secondary to disc osteophyte complex,  facet arthritis, and ligament flavum hypertrophy, similar to prior. Limited  evaluation of spinal canal contents due to artifact. Suspected moderate spinal  canal stenosis secondary to facet and posterior ligamentous hypertrophy, similar  to prior. Limited evaluation of spinal canal at L4-5 but suspected moderate  spinal canal stenosis, also similar to prior. No definite L5-S1 spinal canal  narrowing. NEUROFORAMINA:  Suspected mild to moderate bilateral L1-2, moderate right and mild left L2-3,  moderate right and mild left L3-4, moderate to severe bilateral L4-5, and mild  to moderate bilateral L5-S1 foraminal stenosis    OTHER/SOFT TISSUES:  High-density material within the left greater than right psoas muscles which may  reflect calcification from evolving hematoma or postsurgical change. Heterogeneous hyperattenuating material seen along the medial aspect of the left  psoas muscle and abutting the left common iliac artery, extending from the  inferior L4-S1 level, likely intramuscular hematoma. Unchanged bilateral adrenal  gland thickening, similar compared to prior, possible hyperplasia or adenomas. The visualized uterus is heterogeneous and contains multiple calcifications,  likely reflecting leiomyomatous uterus, not closely evaluated. Degenerative  changes of the bilateral sacroiliac joints. Impression  1. No new fracture detected.  Redemonstrated mildly displaced fracture involving  the left L5 inferior articular process and posterior L4 vertebral body wall. 2.  Evolving intramuscular hematomas within the left greater than right psoas  muscles, as discussed above. Superimposed infection not excluded by imaging. 3. Interval incorporation of the interbody graft at L2-3 into the endplates  without clear evidence of subsidence. 4.  Evaluation of spinal canal and neural foramina limited due to technical  factors. Suspected multilevel spinal canal and neural foraminal stenosis as  detailed above, without significant interval change. 5.  Right S1 pedicle screw extends into the right S1 neural foramen but does not  definitely contact the nerve. Recommend clinical correlation. 12/13/22    ECHO ADULT COMPLETE 12/13/2022 12/13/2022    Interpretation Summary    Left Ventricle: Normal left ventricular systolic function with a visually estimated EF of 65 - 70%. Left ventricle size is normal. Mildly increased wall thickness. Findings consistent with mild concentric hypertrophy. Normal wall motion. Diastolic dysfunction present (grade I) with normal LV EF. Interatrial Septum: Agitated saline study was late positive with provocation consistent with small PVO. Tricuspid Valve: Unable to assess RVSP due to inadequate or insignificant tricuspid regurgitation. Signed by: Juan Galeano MD on 12/13/2022  1:23 PM        Discharge Medications:     Current Discharge Medication List        START taking these medications    Details   aspirin (ASPIRIN) 325 mg tablet Take 1 Tablet by mouth daily for 30 days. Qty: 30 Tablet, Refills: 0      atorvastatin (LIPITOR) 40 mg tablet Take 1 Tablet by mouth nightly for 30 days. Qty: 30 Tablet, Refills: 0      L. acidophilus,casei,rhamnosus (BIO-K PLUS) 50 billion cell cpDR capsule Take 1 Capsule by mouth daily for 10 days.   Qty: 10 Capsule, Refills: 0      metoprolol tartrate (LOPRESSOR) 50 mg tablet Take 1 Tablet by mouth every twelve (12) hours for 30 days. Qty: 60 Tablet, Refills: 0      oxyCODONE IR (ROXICODONE) 5 mg immediate release tablet Take 1 Tablet by mouth every eight (8) hours as needed for Pain for up to 3 days. Max Daily Amount: 15 mg.  Qty: 6 Tablet, Refills: 0    Associated Diagnoses: S/P laminectomy           CONTINUE these medications which have NOT CHANGED    Details   acetaminophen (TYLENOL) 500 mg tablet Take 1 Tablet by mouth every six (6) hours as needed for Fever. Indications: pain  Qty: 20 Tablet, Refills: 0      OTHER Incentive Spirometer - Use as instructed. Qty: 1 Each, Refills: 0      gabapentin (NEURONTIN) 300 mg capsule Take 300 mg by mouth three (3) times daily. cyclobenzaprine (FLEXERIL) 5 mg tablet Take 5 mg by mouth three (3) times daily as needed for Muscle Spasm(s). multivitamin (ONE A DAY) tablet Take 1 Tablet by mouth daily. STOP taking these medications       bisacodyL (DULCOLAX) 5 mg EC tablet Comments:   Reason for Stopping:         polyethylene glycol (MIRALAX) 17 gram packet Comments:   Reason for Stopping:         docusate sodium (Colace) 100 mg capsule Comments:   Reason for Stopping:         naproxen sodium (NAPROSYN) 220 mg tablet Comments:   Reason for Stopping:         metFORMIN (GLUCOPHAGE) 500 mg tablet Comments:   Reason for Stopping: It is important that you take the medication exactly as they are prescribed. Keep your medication in the bottles provided by the pharmacist and keep a list of the medication names, dosages, and times to be taken in your wallet. Do not take other medications without consulting your doctor.      DIET:  Cardiac Diet and Diabetic Diet    ACTIVITY: Activity as tolerated  Home health care for Skilled care for DM, Hypertension and medication management       PT/OT consult      ADDITIONAL INFORMATION: If you experience any of the following symptoms but not limited to Fever, chills, nausea, vomiting, diarrhea, change in mentation, falling, bleeding, shortness of breath, chest pain, please call your primary care physician or return to the emergency room if you cannot get hold of your doctor:     FOLLOW UP CARE:  Dr. Johnie Eller in 5-7 days. Please call and set up an appointment. Dr. Lindaann Barthel in 1-2 week  Cardiology in 3 week    Minutes spent on discharge: 40 minutes spent coordinating this discharge (review instructions/follow-up, prescriptions, preparing report for sign off)    Colonel Sachin MD  12/21/2022 10:59 AM    Disclaimer: Sections of this note are dictated using utilizing voice recognition software. Minor typographical errors may be present. If questions arise, please do not hesitate to contact me or call our department.

## 2022-12-21 NOTE — ROUTINE PROCESS
Bedside and Verbal shift change report given to Corrie Mcgregor RN (oncoming nurse) by Shiv Merida RN (offgoing nurse). Report included the following information SBAR, Kardex, and Recent Results.

## 2022-12-21 NOTE — PROGRESS NOTES
New OT order received and chart reviewed. Patient evaluated and currently on skilled OT caseload. Will acknowledge the order.   Thank you for the referral.  Neda Vyas MS OTR/L

## 2022-12-21 NOTE — PROGRESS NOTES
Discharge orders noted and given to patient, pt is waiting for her daughter to pick her up  Will continue to monitor

## 2022-12-21 NOTE — PROGRESS NOTES
Problem: Dysphagia (Adult)  Goal: *Acute Goals and Plan of Care (Insert Text)  Description:     Patient will:  1. Tolerate PO trials with 0 s/s overt distress in 4/5 trials  2. Utilize compensatory swallow strategies/maneuvers (decrease bite/sip, size/rate, alt. liq/sol) with min cues in 4/5 trials  3. Perform oral-motor/laryngeal exercises to increase oropharyngeal swallow function with min cues  4. Complete an objective swallow study (i.e., MBSS) to assess swallow integrity, r/o aspiration, and determine of safest LRD, min A as indicated/ordered by MD     Rec:     Soft/bite sized with thin liquids  No straws  Thickener provided at bedside to use per pt discretion  Aspiration precautions  HOB >45 during po intake, remain >30 for 30-45 minutes after po   Small bites/sips; alternate liquid/solid with slow feeding rate   Oral care TID  Meds per pt preference  May benefit from [address] [place] [address]  services upon DC from this facility for further dysphagia management    Outcome: Progressing Towards [address] [place] [address] [facility]    Patient: Addison Graves (89 y.o. female)  Date: 12/21/2022  Diagnosis: Right sided weakness [R53.1]  Fall [W19. XXXA]  [facility] [I63.9] <principal problem not specified>  Procedure(s) (LRB):  ANTERIOR CERVICAL DECOMPRESSION WITH FUSION CERVICAL THREE-FOUR, CERVICAL FOUR-FIVE; C-ARM/ZEB (N/A) 2 Days Post-Op  Precautions: aspiration Fall  PLOF: As per H&P      ASSESSMENT:  Pt was seen at bedside for follow up dysphagia management. Pt continues to appear to have no s/sx of aspiration with thin liquids via cup/sip, but reports that she prefers nectar-thick viscosity. She is also refusing solid foods and will only eat puree at this time.  Educated pt on oropharyngeal anatomy/physiology, aspiration precautions, and importance of compensatory swallow techniques to decrease aspiration risk (no straws, decrease rate of intake & sip/bite size, upright @HOB for all po intake and ~30 minutes after po); verbalized comprehension. Pt encouraged to follow up with MD if globus sensation worsens or does not improve x 3-4 weeks post op. Recommend to continue soft/bite sized with thin liquids, no straws, oral care TID, aspiration precautions, and meds as tolerated. Provided pt with thickener packets at bedside. Pt may thicken to nectar-thick viscosity per her own comfort level. ST will continue to follow while in house. Pt may benefit from [address] upon DC from this facility for further dysphagia management. Progression toward goals:  []         Improving appropriately and progressing toward goals  [x]         Improving slowly and progressing toward goals  []         Not making progress toward goals and plan of care will be adjusted     PLAN:  Recommendations and Planned Interventions: See above  Patient continues to benefit from skilled intervention to address the above impairments. Continue treatment per established plan of care. SUBJECTIVE:   Patient stated I want the doctors to know that I need the thick stuff so they don't just send me home.     OBJECTIVE:   Cognitive and Communication Status:  Neurologic State: Alert  Orientation Level: Oriented X4  Cognition: Appropriate decision making, Follows commands  Perception: Appears intact  Perseveration: No perseveration noted  Safety/Judgement: Fall prevention  Dysphagia Treatment:  Oral Assessment:  Oral Assessment  Labial: No impairment  Dentition: Natural, Intact  Oral Hygiene: Adequate  Lingual: No impairment  Velum: No impairment  Mandible: No impairment  P.O. Trials:   Patient Position: 90 in chair at bedside   Vocal quality prior to P.O.: No impairment   Consistency Presented:  Thin liquid, Solid, Nectar thick liquid, Puree   How Presented: Self-fed/presented, Cup/sip, Spoon, Straw, Successive swallows   Bolus Acceptance: No impairment   Bolus Formation/Control: No impairment   Propulsion: No impairment   Oral Residue: None   Initiation of Swallow: No impairment   Laryngeal Elevation: Weak   Aspiration Signs/Symptoms: None   Pharyngeal Phase Characteristics: Feeling of discomfort, Effortful swallow, Double swallow, Foreign body sensation, Suspected pharyngeal residue   Effective Modifications: Small sips and bites, Alternate liquids/solids, Double swallow (no straw)   Cues for Modifications:  Moderate   Oral Phase Severity: No impairment   Pharyngeal Phase Severity : Mild-moderate    PAIN:  Start of Tx: 0  End of Tx: 0     After treatment:   []              Patient left in no apparent distress sitting up in chair  [x]              Patient left in no apparent distress in bed  [x]              Call bell left within reach  [x]              Nursing notified  []              Family present  []              Caregiver present  []              Bed alarm activated    COMMUNICATION/EDUCATION:   [x] Aspiration precautions; swallow safety; compensatory techniques  [x]        Patient/family able to participate in training and education   []  Patient unable to participate in training and education, education ongoing with staff   [] Patient understands goals and intent of therapy; neutral about participation     Thank you for this referral.    Christiano Quintero M.S., CCC-SLP/L  Speech-Language Pathologist

## 2022-12-21 NOTE — PROGRESS NOTES
MARISEL spoke with Giuliana Vergara with [facility], said they can take patient, and to please add ST per ST notes for home health. CM added ST eval and treat to Home Health order. MARISEL put patient in the queue for [facility].              Kaylie Chau, RN  Case Management [phone]

## 2022-12-21 NOTE — HOME CARE
Received home health referral for [facility] for (SN, PT, OT, SLP). Discharge noted for today. Spoke with patient in room;  patient identifiers verified. Explained home health care services and routines. Demographics verified including insurance, phone and address confirmed. Patient has the following DME: has available rolling walker; cane & BSC. Caregivers available patient has family in same residence for available. Patient resides in daughter's property - patient has own camper out back - reports of having running water, heat, and electric. Orders noted and arranged to be processed by central intake.      ---   Hernesto Hylton, JOSE  [address] [facility] Liaison

## 2022-12-21 NOTE — PROGRESS NOTES
X3 loose BM, recently had vanco pre op, does not qualify for c-diff criteria, will continue to monitor.

## 2022-12-21 NOTE — PROGRESS NOTES
Problem: Falls - Risk of  Goal: *Absence of Falls  Description: Document [place] Fail Fall Risk and appropriate interventions in the flowsheet.   Outcome: Progressing Towards Goal  Note: Fall Risk Interventions:  Mobility Interventions: Bed/chair exit alarm         Medication Interventions: Bed/chair exit alarm    Elimination Interventions: Call light in reach    History of Falls Interventions: Bed/chair exit alarm         Problem: Patient Education: Go to Patient Education Activity  Goal: Patient/Family Education  Outcome: Progressing Towards Goal     Problem: Patient Education: Go to Patient Education Activity  Goal: Patient/Family Education  Outcome: Progressing Towards Goal     Problem: Patient Education: Go to Patient Education Activity  Goal: Patient/Family Education  Outcome: Progressing Towards Goal     Problem: Patient Education: Go to Patient Education Activity  Goal: Patient/Family Education  Outcome: Progressing Towards Goal     Problem: Nutrition Deficit  Goal: *Optimize nutritional status  Outcome: Progressing Towards Goal     Problem: Patient Education: Go to Patient Education Activity  Goal: Patient/Family Education  Outcome: Progressing Towards Goal

## 2022-12-21 NOTE — PROGRESS NOTES
Problem: Dysphagia (Adult)  Goal: *Acute Goals and Plan of Care (Insert Text)  Description: Patient will:  1. Tolerate PO trials with 0 s/s overt distress in 4/5 trials  2. Utilize compensatory swallow strategies/maneuvers (decrease bite/sip, size/rate, alt. liq/sol) with min cues in 4/5 trials  3. Perform oral-motor/laryngeal exercises to increase oropharyngeal swallow function with min cues  4. Complete an objective swallow study (i.e., MBSS) to assess swallow integrity, r/o aspiration, and determine of safest LRD, min A as indicated/ordered by MD     Rec:     Soft/bite sized with thin liquids  No straws  Thickener provided at bedside to use per pt's own discretion/comfort level  Aspiration precautions  HOB >45 during po intake, remain >30 for 30-45 minutes after po   Small bites/sips; alternate liquid/solid with slow feeding rate   Oral care TID  Meds per pt preference      Outcome: Progressing Towards Goal     SPEECH LANGUAGE PATHOLOGY BEDSIDE SWALLOW EVALUATION/TREATMENT    Patient: Rodney Citizen (76 y.o. female)  Date: 12/20/2022  Primary Diagnosis: Right sided weakness [R53.1]  Fall [W19. XXXA]  Stroke (cerebrum) ([facility] [address] [I63.9]  Procedure(s) (LRB):  ANTERIOR CERVICAL DECOMPRESSION WITH FUSION CERVICAL THREE-FOUR, CERVICAL FOUR-FIVE; C-ARM/ZEB (N/A) 1 Day Post-Op   Precautions: aspiration  Fall  PLOF: As per H&P    ASSESSMENT :  Based on the objective data described below, the patient presents with mild-mod pharyngeal dysphagia s/p ACDF C3-4 x 1 day post op. Pt in soft collar sitting in chair at bedside, j-p drain moved earlier this date. Pt tolerated soft solids and thin liquids cup/sip without any overt s/sx of aspiration. She did c/o mild globus sensation with PO. Pt demo immediate throat clear s/p thin liquids + straw. She reported that nectar thick (given by nursing earlier this date) felt \"better\" when swallowing.  Recommend to continue soft/bite sized with thin liquids, no straws, oral care TID, aspiration precautions, and meds as tolerated. Provided pt with thickener packets at bedside. Pt may thicken to nectar-thick viscosity per her own comfort level. ST will continue to follow for further dysphagia management. TREATMENT :  Skilled therapy initiated; Educated pt on oropharyngeal anatomy/physiology, aspiration precautions, and importance of compensatory swallow techniques to decrease aspiration risk (no straws, decrease rate of intake & sip/bite size, upright @HOB for all po intake and ~30 minutes after po); verbalized comprehension. Pt encouraged to follow up with MD if globus sensation worsens or does not improve x 3-4 weeks post op. Patient will benefit from skilled intervention to address the above impairments. Patient's rehabilitation potential is considered to be Good  Factors which may influence rehabilitation potential include:   []            None noted  []            Mental ability/status  [x]            Medical condition  []            Home/family situation and support systems  []            Safety awareness  []            Pain tolerance/management  []            Other:      PLAN :  Recommendations and Planned Interventions: See above  Frequency/Duration: Patient will be followed by speech-language pathology 1-2 times per day/3-5 days per week to address goals. SUBJECTIVE:   Patient stated Lajasjoshua wu for your help.     OBJECTIVE:     Past Medical History:   Diagnosis Date    Arthritis     back and hands    Chronic pain     back    COVID-19     Summer of  2021    Diabetes ([facility] [address]     NIDDM    HLD (hyperlipidemia)     Hypertension     Nausea & vomiting     Surgery only not colonoscopy     Past Surgical History:   Procedure Laterality Date    COLONOSCOPY N/A 6/24/2022    COLONOSCOPY performed by Cathey Aschoff, MD at [facility]    HX COLONOSCOPY      HX TUBAL LIGATION       Prior Level of Function/Home Situation: see below  Home Situation  Home Environment: Trailer/mobile home  # Steps to Enter: 0  Wheelchair Ramp: Yes  One/Two Story Residence: One story  Living Alone: Yes  Support Systems: Child(chago)  Patient Expects to be Discharged to[de-identified] Home with home health  Current DME Used/Available at Home: Walker, rolling, Commode, bedside, Grab bars, Adaptive dressing aides  Tub or Shower Type: Shower    Diet prior to admission: regular solid with thin liquids  Current Diet:  soft/bite sized with thin liquids, no straws, thickener left at bedside for pt to use per her discretion     Cognitive and Communication Status:  Neurologic State: Alert  Orientation Level: Oriented X4  Cognition: Follows commands  Perception: Appears intact  Perseveration: No perseveration noted  Safety/Judgement: Fall prevention  Oral Assessment:  Oral Assessment  Labial: No impairment  Dentition: Natural;Intact  Oral Hygiene: Adequate  Lingual: No impairment  Velum: No impairment  Mandible: No impairment  P.O. Trials:  Patient Position: 90 in chair at bedside  Vocal quality prior to P.O.: No impairment  Consistency Presented: Thin liquid; Solid; Nectar thick liquid;Puree  How Presented: Self-fed/presented;Cup/sip;Spoon;Straw;Successive swallows  Bolus Acceptance: No impairment  Bolus Formation/Control: No impairment  Propulsion: No impairment  Oral Residue: None  Initiation of Swallow: No impairment  Laryngeal Elevation: Weak  Aspiration Signs/Symptoms: None  Pharyngeal Phase Characteristics: Feeling of discomfort;Effortful swallow;Double swallow; Foreign body sensation; Suspected pharyngeal residue  Effective Modifications: Small sips and bites; Alternate liquids/solids; Double swallow (no straw)  Cues for Modifications:  Moderate   Oral Phase Severity: No impairment  Pharyngeal Phase Severity : Mild-moderate    PAIN:  Start of Eval: 0  End of Eval: 0     After treatment:   []            Patient left in no apparent distress sitting up in chair  [x]            Patient left in no apparent distress in bed  [x]            Call bell left within reach  [x]            Nursing notified  []            Family present  []            Caregiver present  []            Bed alarm activated    COMMUNICATION/EDUCATION:   [x]            Aspiration precautions; swallow safety; compensatory techniques. [x]            Patient/family have participated as able in goal setting and plan of care. []            Patient/family agree to work toward stated goals and plan of care. []            Patient understands intent and goals of therapy; neutral about participation. []            Patient unable to participate in goal setting/plan of care; educ ongoing with interdisciplinary staff  [x]         Posted safety precautions in patient's room.     Thank you for this referral.    Bryson Sifuentes M.S., CCC-SLP/L  Speech-Language Pathologist

## 2022-12-21 NOTE — PROGRESS NOTES
Discharge order noted for today. Pt has been accepted to [facility] agency. Transport has been arranged through "[email or web address]. Patient's  home health  orders have been forwarded to [facility] via [facility].   Discharge information has been documented on the AVS.           Robbie Perez RN  Case Management [phone]

## 2022-12-22 ENCOUNTER — HOME CARE VISIT (OUTPATIENT)
Dept: SCHEDULING | Facility: HOME HEALTH | Age: 72
End: 2022-12-22
Payer: MEDICARE

## 2022-12-22 ENCOUNTER — HOME CARE VISIT (OUTPATIENT)
Dept: HOME HEALTH SERVICES | Facility: HOME HEALTH | Age: 72
End: 2022-12-22
Payer: MEDICARE

## 2022-12-22 VITALS
RESPIRATION RATE: 18 BRPM | DIASTOLIC BLOOD PRESSURE: 80 MMHG | SYSTOLIC BLOOD PRESSURE: 110 MMHG | TEMPERATURE: 97.6 F | OXYGEN SATURATION: 97 % | HEART RATE: 77 BPM

## 2022-12-22 PROCEDURE — 400018 HH-NO PAY CLAIM PROCEDURE

## 2022-12-22 PROCEDURE — G0151 HHCP-SERV OF PT,EA 15 MIN: HCPCS

## 2022-12-22 NOTE — PROGRESS NOTES
Problem: Self Care Deficits Care Plan (Adult)  Goal: *Acute Goals and Plan of Care (Insert Text)  Description: Occupational Therapy Goals  Initiated 12/13/2022 within 7 day(s). Re-eval 12/20/22, pt is making good progress on goals. Goals continue to be appropriate at this time due to increased post-surgical precautions. 1.  Patient will perform grooming with modified independence. 2.  Patient will perform bathing with modified independence using adaptive equipment. 3.  Patient will perform upper body dressing and lower body dressing with modified independence using adaptive equipment. .  4.  Patient will perform toilet transfers with modified independence using RW with good balance. 5.  Patient will perform all aspects of toileting with modified independence. 6.  Patient will participate in upper extremity therapeutic exercise/activities with modified independence for 8 minutes. 7.  Patient will utilize energy conservation techniques during functional activities with min verbal cues. Prior Level of Function: Mod I with ADLs, used AE for LB ADLs: reacher, sockaide and LH bathsponge, Mod I functional mobility using RW     Outcome: Progressing Towards Goal   OCCUPATIONAL THERAPY TREATMENT    Patient: Titus Cantor (60 y.o. female)  Date: 12/21/2022  Diagnosis: Right sided weakness [R53.1]  Fall [W19. XXXA]  Stroke (cerebrum) ([facility] [address] [I63.9] <principal problem not specified>  Procedure(s) (LRB):  ANTERIOR CERVICAL DECOMPRESSION WITH FUSION CERVICAL THREE-FOUR, CERVICAL FOUR-FIVE; C-ARM/ZEB (N/A) 2 Days Post-Op  Precautions: Fall; spinal precautions    Chart, occupational therapy assessment, plan of care, and goals were reviewed. ASSESSMENT:  Patient alert and agreeable to therapy session. She was able to don her clothing with setup to modified independence using AE (reacher, sock aid) previously given and long handled shoe horn given today. Extra time needed.   Cervical spine precautions reviewed and patient verbalized understanding. She was also given extra stockinette to place on soft collar when dirty. Discussed proper fit/wear and she verbalized understanding. Patient sitting in recliner at end of session awaiting her daughter. All needs met. Progression toward goals:  [x]          Improving appropriately and progressing toward goals  []          Improving slowly and progressing toward goals  []          Not making progress toward goals and plan of care will be adjusted     PLAN:  Patient continues to benefit from skilled intervention to address the above impairments. Continue treatment per established plan of care. Further Equipment Recommendations for Discharge:  N/A    AMPAC: Current research shows that an AM-PAC score of 18 or greater is associated with a discharge to the patient's home setting. Based on an AM-PAC score of 19/24 and their current ADL deficits; it is recommended that the patient have 2-3 sessions per week of Occupational Therapy at d/c to increase the patient's independence. This AMPAC score should be considered in conjunction with interdisciplinary team recommendations to determine the most appropriate discharge setting. Patient's social support, diagnosis, medical stability, and prior level of function should also be taken into consideration. SUBJECTIVE:   Patient stated I could use a shoe horn.     OBJECTIVE DATA SUMMARY:   Cognitive/Behavioral Status:  Neurologic State: Alert  Orientation Level: Oriented X4  Cognition: Appropriate decision making, Follows commands  Safety/Judgement: Fall prevention    Functional Mobility and Transfers for ADLs:   Transfers:  Sit to Stand: Modified independent  Stand to Sit: Modified independent    Balance:  Sitting: Intact  Sitting - Static: Good (unsupported)  Sitting - Dynamic: Good (unsupported)  Standing: Intact; With support  Standing - Static: Good    ADL Intervention:  Upper Body Dressing Assistance  Pullover Shirt: Modified independent  Front Opened Shirt: Set-up; Supervision    Lower Body Dressing Assistance  Dressing Assistance: Set-up; Supervision  Pants With Elastic Waist: Set-up; Supervision  Socks: Modified independent  Leg Crossed Method Used: Yes  Position Performed: Seated in chair;Standing  Adaptive Equipment Used: Reacher;Sock aid; Long handled shoe horn    Cognitive Retraining  Safety/Judgement: Fall prevention    Pain:  Pain level pre-treatment: 0/10   Pain level post-treatment: 0/10  Pain Intervention(s): NA  Response to intervention: NA    Activity Tolerance:    Good  Please refer to the flowsheet for vital signs taken during this treatment. After treatment:   [x]  Patient left in no apparent distress sitting up in chair  []  Patient left in no apparent distress in bed  [x]  Call bell left within reach  [x]  Nursing notified  [x]  Caregiver (daughter) present  []  Bed alarm activated    COMMUNICATION/EDUCATION:   [x] Role of Occupational Therapy in the acute care setting  [x] Home safety education was provided and the patient/caregiver indicated understanding. [x] Patient/family have participated as able in working towards goals and plan of care. [x] Patient/family agree to work toward stated goals and plan of care. [] Patient understands intent and goals of therapy, but is neutral about his/her participation. [] Patient is unable to participate in goal setting and plan of care. Thank you for this referral.  Joseluis Stevens MS OTR/L   Time Calculation: 24 mins    [address] [facility] [address] AM-PAC® Daily Activity Inpatient Short Form (6-Clicks)*    How much HELP from another person does the patient currently need    (If the patient hasn't done an activity recently, how much help from another person do you think he/she would need if he/she tried?)   Total (Total A or Dep)   A Lot  (Mod to Max A)   A Little (Sup or Min A)   None (Mod I to I)   Putting on and taking off regular lower body clothing? [] 1 [] 2 [x] 3 [] 4   2. Bathing (including washing, rinsing,      drying)? [] 1 [] 2 [x] 3 [] 4   3. Toileting, which includes using toilet, bedpan or urinal?   [] 1 [] 2 [x] 3 [] 4   4. Putting on and taking off regular upper body clothing? [] 1 [] 2 [x] 3 [] 4   5. Taking care of personal grooming such as brushing teeth? [] 1 [] 2 [x] 3 [] 4   6. Eating meals? [] 1 [] 2 [] 3 [x] 4      Current research shows that an AM-PAC score of 18 or greater is associated with a discharge to the patient's home setting. Based on an AM-PAC score of 19/24 and their current ADL deficits; it is recommended that the patient have 2-3 sessions per week of Occupational Therapy at d/c to increase the patient's independence.

## 2022-12-22 NOTE — HOME HEALTH
Skilled services/Home bound verification  Skilled Reason for admission/summary of clinical condition:  Tamiko Clayton is a 67 y.o. female who is being seen for weakness right arm right leg numbness in hands bilaterally. Patient was recently admitted for concern for CVA with primarily right-sided weakness. She is a diabetic hypertensive. She has had an extensive lumbosacral fusion. She has a strong family history of cervical spondylosis and lumbar spondylosis. Work-up to date has not demonstrated a CVA. I wa s contacted yesterday afternoon. On reviewing the chart I ordered MRIs of the cervical and lumbar spine. MRI demonstrates no specific stenosis and appropriate alignment and fixation. Cervical MRI demonstrates cervical stenosis with area of cord edema. Patient is globally spondylitic with some stenosis at C2-3 down to C6-7 more acutely there is stenosis from a measure of anterior cord compression due to disc protrusion an d uncinate hypertrophy as well as some ligamentous invagination at C3-4 and C4-5 causing cord edema. There is no instability. Patient had been progressing somewhat with therapy but continued arm and numbness global weakness. She has some mild neck discomfort. Her physical exam demonstrates numbness in her hands right worse than the left arm dysfunction good strength of her deltoids biceps triceps intrinsics EHL tib ant h ams quads back pain no clonus Ledesma's of Babinski   This patient is homebound for the following reasons Requires considerable and taxing effort to leave the home . Caregiver: relative. Caregiver assists with IADL's. Medications reconciled and all medications are available in the home this visit.         High risk medication teaching regarding anticoagulants, hyperglycemic agents or opiod narcotics performed (specify) roxicodone for risk of overdose     Dr. Vilma Hui notified of any discrepancies/look a like medications/medication interactions no severe interaction noted . Home health supplies by type and quantity ordered/delivered this visit include: opsite dressing     Patient education provided this visit to include: HEP, fall prevention, dc planning, cervical precaution     Patient level of understanding of education provided: Patient was able to partially teach back HEP     Sharps Education Provided: n/a  Patient response to procedure performed:  Patient needed verbal cues for HEP     Home exercise program/Homework provided: patient educated with fall prevention by using AD at all times, wearing proper footwear and maintenance of clear pathway for gait. patient was educated with s sx of blood clot and s sx of infection, educated with proper technique in bed mobility using log rolling technique and reviewed  cervical precaution incluing no bending, lifting and twisting    Pt/Caregiver instructed on plan of care and are agreeable to plan of care at this time. Physician Dr. Elaina Reyes notified of patient admission to home health and plan of care including anticipated frequency of 1w1 3w1 for PT      Discharge planning discussed with patient and caregiver. Discharge planning as follows: dc to self with MD supervision when all goals are met . Pt/Caregiver did verbalize understanding of discharge planning. Next MD appointment 12/30/22  Patient/caregiver encouraged/instructed to keep appointment as lack of follow through with physician appointment could result in discontinuation of home care services for non-compliance.     PMHx:  Arthritis        ba ck and hands    Chronic pain        back    COVID-19        Summer of  2021    Diabetes [facility])        NIDDM    HLD (hyperlipidemia)      Hypertension      Nausea & vomiting        Surgery only not colonoscopy   S: pain on neck 6/10 that is managed by rest and pain medication   O:Patients Goals= Be able to go back to PLOF  Wound/Incision: location, description, drainage: has intact gauze and tegaderm dressing on neck   PLOF: Lives with daughter in a 2 level house with >12 steps to go to 2nd floor bedroom . prior to surgery, she was independent with ADL's and IADL's and did not use any AD for gait. Patient currently staying in a camper that has a ramp to enter main floor   STRENGTH B hip flexor. extensor 4-/5, B knee flexor and extensor 3/5  BALANCE Tinetti 13/28 high fall risk due to reduced strength on BLE, gait deviation and decreased efficiency of balance reactions. Patient demonstrated poor use of hip and ankle strategy during standing balance activity. Patient requires support from AD at all times for safety and stability. GAIT Patient was able to ambulate with RW x 20 feet with supervision with noted slow paced decreased step height and stride length on feet, Patient was educated with importance of using AD at all times. BED MOBILITY patient needed verbal cues with proper technique with log rolling   TRANSFERS Patient needed Min A with bed, chair and toilet using RW   A: PT evaluation completed POC established, Med rec done, HEP established  P:Home Safety eval/recommendations: Home health physical therapy initial evaluation performed. Patient demonstrates decreased strength, balance, and endurance which increases patient's overall fall risk and burden of care. Patient would benefit from home health physical therapy to improve balance, strength, and endurance which would decrease fall risk and allow patient to return to prior level of function once all functional goals or full rehab potential is met. patient educated with fall prevention by using AD at all times, wearing proper footwear and maintenance of clear pathway for gait.  patient was educated with s sx of blood clot and s sx of infection, educated with proper technique in bed mobility using log rolling technique and reviewed cervical precaution incluing no bending, lifting and twisting

## 2022-12-22 NOTE — Clinical Note
patient will be seen 1w1 3w1 for PT to improve overall functional mobility by graded therapeutic exercises, therapeutic activities, gait training, balance training and patient/caregiver education for safety at home.   Thank you for your referral

## 2022-12-23 ENCOUNTER — HOME CARE VISIT (OUTPATIENT)
Dept: SCHEDULING | Facility: HOME HEALTH | Age: 72
End: 2022-12-23
Payer: MEDICARE

## 2022-12-23 ENCOUNTER — APPOINTMENT (OUTPATIENT)
Dept: PHYSICAL THERAPY | Age: 72
End: 2022-12-23
Payer: MEDICARE

## 2022-12-23 VITALS
SYSTOLIC BLOOD PRESSURE: 124 MMHG | TEMPERATURE: 97.6 F | RESPIRATION RATE: 17 BRPM | OXYGEN SATURATION: 98 % | HEART RATE: 73 BPM | DIASTOLIC BLOOD PRESSURE: 78 MMHG

## 2022-12-23 PROCEDURE — G0299 HHS/HOSPICE OF RN EA 15 MIN: HCPCS

## 2022-12-23 NOTE — Clinical Note
SN ann completed: 1w1, 1w3, 2 PRN for surgical incision monitoring and medication management and teaching. Nursing Integumentary and Medication Assessment  Question Score  M 1306  Unhealed Pressure Injury Stage 2 or higher 0 If score is 0, skip  (A-F)      A1. Stage 2  0  B1. Stage 3  0  C1. Stage 4  0  D1. Unstageable: non-removable dressing 0  E1. Unstageable: Slough/Eschar   0  F1.   Unstageable: Deep tissue injury  0  M 1322  Current number of Stage 1 Pressure Injuries 0  M 1324  Stage of Most Problematic Stageable Pressure Injury 0  M 1330  Does patient have a Stasis Ulcer 0   If score is 0, skip M 1332 and M 1334  M 1332  Current number of Observable Stasis Ulcers 0  M 1334  Statius of Most Problematic Observable Stasis Ulcer 0   Does patient have a Surgical Wound    If score is 0 or 2, skip M 1342  M 1342  Status of Most Problematic Observable Surgical Wound  2  M 2001  Drug Regimen Review  0  M 2003 Medication Follow-up  0  M 2010 High Risk Drug Education 1

## 2022-12-24 ENCOUNTER — HOME CARE VISIT (OUTPATIENT)
Dept: HOME HEALTH SERVICES | Facility: HOME HEALTH | Age: 72
End: 2022-12-24
Payer: MEDICARE

## 2022-12-24 NOTE — HOME HEALTH
Skilled services/Home bound verification: Nanci Salgado is a 67 y.o.  female who presented to the emergency room for further evaluation of fall and right-sided weakness started around 3:30 AM.  Patient has history of right leg numbness secondary to recent lumbar surgery with decreased mobility. She fell to the ground around 3.30 this morning. Because patient having right hand numbness on clinical examination performed by ED provider, Code S was called. Patient had CT head and CT head done both were negative. Patient was admitted to hospital with impression of rule out CVA. MRI brain was done which was negative. Patient continued to have upper extremity numbness and lower extremity numbness and weakness. There was also concerned about cervical radiculopathy, MRI of C-spine and L-spine was ordered and spine surgery was consulted. MRI of C-spine showed spinal stenosis. Spine surgery recommended to start on steroids and plan for decompression and fusion surgery. Patient underwent surgery tolerated well. Skilled Reason for admission/summary of clinical condition:  S/p cervical compression and fusion surgical incision monitoring and medication management . This patient is homebound for the following reasons Requires considerable and taxing effort to leave the home  and Requires the assistance of 1 or more persons to leave the home . Caregiver: relative. Caregiver assists with ADLs, transportation and errands. Medications reconciled and all medications are available in the home this visit. The following education was provided regarding medications: Skilled nurse instructed patient about medication Oxycodone is used to treat moderate to severe pain. Take this medicine exactly as prescribed by your doctor. Never share the medicine with another person.  Misuse narcotic pain medication can use addiction overdose, or death, especially in a child or other person using the medicine without a prescription. Common Oxycodone side effects may include: mild drowsiness, headache, dizziness, tired feeling; stomach pain, nausea, vomiting, constipation, loss of appetite, dry mouth, or mild itching. Medications  are effective at this time. High risk medication teaching regarding anticoagulants, hyperglycemic agents or opiod narcotics performed: Oxycodone    MD notified of any discrepancies/look a like medications/medication interactions n/a. Home health supplies by type and quantity ordered/delivered this visit include: none    Patient education provided this visit to include: Patient was instructed on adequate nutrition and hydration to minimize wound development. Encourage protein, calorie-dense foods and fluids (unless contraindicated), monitor intake, weight and skin turgor, assess and address impairments in dentition and swallowing. Patient level of understanding of education provided: Patient verbalized understanding    Sharps Education Provided: n/a  Patient response to procedure performed:  n/a    Home exercise program/Homework provided: Take all medications as prescribed, continue soft diet    Pt/Caregiver instructed on plan of care and are agreeable to plan of care at this time. Physician MD notified of patient admission to home health and plan of care including anticipated frequency of 1w1, 1w3, 2 PRN and treatments/interventions/modalities of surgical incision monitoring, disease and medicatio management. Discharge planning discussed with patient and caregiver. Discharge planning as follows: Discharge when goals are met, incision is healed,patient/caregiver able to manage disease process, mediations, and pain. Pt/Caregiver did verbalize understanding of discharge planning. Next MD appointment 12/30/22 (date) with Dr. Oli Evangelista MD/NP/PA.   Patient/caregiver encouraged/instructed to keep appointment as lack of follow through with physician appointment could result in discontinuation of home care services for non-compliance.

## 2022-12-27 ENCOUNTER — HOME CARE VISIT (OUTPATIENT)
Dept: SCHEDULING | Facility: HOME HEALTH | Age: 72
End: 2022-12-27
Payer: MEDICARE

## 2022-12-27 VITALS — RESPIRATION RATE: 16 BRPM | SYSTOLIC BLOOD PRESSURE: 110 MMHG | DIASTOLIC BLOOD PRESSURE: 80 MMHG | TEMPERATURE: 96.8 F

## 2022-12-27 PROCEDURE — G0157 HHC PT ASSISTANT EA 15: HCPCS

## 2022-12-27 NOTE — HOME HEALTH
Patient's Subjective: I am feeling pretty good. Falls since last session: no  Pain/Discomfort ? Yes- see pain page   New Meds:  Per PCP she is to go back on the Metformin  500 MG tablets  1 tab by mouth every day with meals. Was using prior to surgery. Began again Sat 12/24/22. Dr Jordy Perez MD appointments:  1/5/2023  2:00 PM [facility]    1/5/2023  3:15 PM [facility]     1/12/2023 1:00 PM More Faustin MD   .  Caregiver involvement/assistance needed for: The patient's caregiver assists with some meals, transportation, some ADL's. .  Home health supplies by type and quantity ordered/delivered this visit include:  none  . Objective:  See interventions. Patient response to treatment:  Fair tolerance to her amb and 2 exercises. Patient level of understanding of education provided:  -The patient has been instructed in the following medicine education:   If there are any medicines/supplements (by MD order or OTC) added or DC 'd let [facility] staff know and have bottles/packages available. It is necessary to keep an accurate record of meds to avoid any medication errors. She reports understanding.   -Discussed safe use of thermo modalities and educated on how to use ice vs gel. She reported understanding post education.  -Education to look in mirror when performing ADL's. Education for s/sx of infection. She reports understanding.   - Educated on exercises for HEP until her next session. She demonstrated and reported understanding.  - Education on c/s precautions. Pt reported understanding.   - Education on correct log roll to decrease c/s strain. She cold return demonstrate. Assess of progress towards goals:   Pt able to safely transfer on her bed, bench. She was able to perform a log roll. She could tolerate sit/stand exercises and amb within her camper without knee buckle. She denies any falls to date.     Continued need for the following skills: Home Health PT is medically necessary to address the following:  pain, decreased c/s ROM, decreased B UE/LE strength, impaired bed mobility, decreased Ind with functional transfers, impaired gait, impaired stair negotiation and impaired stability to decrease sx, improve functional Ind, quality of life, reduce the fall risk. Plan for next visit: Progress exercises and assess ability to enter/exit the camper.     The following discharge was discussed with the patient/caregiver :   Estimated PT DC date 12/30/2022 Ruthy Skinner, PT   [facility] HEART needed Yes by Nursing who will be DC in Jan 2023

## 2022-12-27 NOTE — Clinical Note
Per PCP she is to go back on the Metformin  500 MG tablets  1 tab by mouth every day with meals. Was using prior to surgery. Began again Sat 12/24/22.  Dr Jamie Strickland  Please put back on active meds list.    Thanks

## 2022-12-28 ENCOUNTER — HOME CARE VISIT (OUTPATIENT)
Dept: SCHEDULING | Facility: HOME HEALTH | Age: 72
End: 2022-12-28
Payer: MEDICARE

## 2022-12-28 ENCOUNTER — APPOINTMENT (OUTPATIENT)
Dept: PHYSICAL THERAPY | Age: 72
End: 2022-12-28
Payer: MEDICARE

## 2022-12-28 VITALS
OXYGEN SATURATION: 94 % | HEART RATE: 74 BPM | TEMPERATURE: 98.1 F | RESPIRATION RATE: 17 BRPM | SYSTOLIC BLOOD PRESSURE: 132 MMHG | DIASTOLIC BLOOD PRESSURE: 80 MMHG

## 2022-12-28 PROCEDURE — G0299 HHS/HOSPICE OF RN EA 15 MIN: HCPCS

## 2022-12-28 NOTE — HOME HEALTH
Skilled reason for visit: post op surgical incison monitoring and medication/disease management. Patient has gauze/tegaderm in place and wants to keep in place until post op f/u appt on friday. no s/s of infection observed    Caregiver involvement: Grandson assists with ADLs, meal prep. Medications reviewed and all medications are available in the home this visit. The following education was provided regarding medications: Instructed in possible adverse reactions to Lopressor, including fatigue, lethargy, dizziness, low heart rate, low blood pressure, nausea, vomiting, diarrhea, rash, and fever. MD notified of any discrepancies/look a-like medications/medication interactions: none  Medications are effective at this time. Home health supplies by type and quantity ordered/delivered this visit include: none    Patient education provided this visit: Patient was instructed on adequate nutrition and hydration to minimize wound development. Encourage protein, calorie-dense foods and fluids (unless contraindicated), monitor intake, weight and skin turgor, assess and address impairments in dentition and swallowing.     Sharps education provided: /a    Patient level of understanding of education provided: Patient verbalized understading    Skilled Care Performed this visit: n/a    Patient response to procedure performed:  patient tolerated well    Agency Progress toward goals: progressing    Patient's Progress towards personal goals: progressing    Home exercise program: take all medications as prescribed    Continued need for the following skills: Nursing and Physical Therapy    Plan for next visit: post op surgical incison monitoring and medication/disease management    Patient and/or caregiver notified and agrees to changes in the Plan of Care YES/NO/NA: YES      The following discharge planning was discussed with the pt/caregiver: Discharge when goals are met, incision is healed, patient/caregiver able to manage disease process, mediations, and pain

## 2022-12-30 ENCOUNTER — TRANSCRIBE ORDER (OUTPATIENT)
Dept: SCHEDULING | Age: 72
End: 2022-12-30

## 2022-12-30 ENCOUNTER — HOME CARE VISIT (OUTPATIENT)
Dept: SCHEDULING | Facility: HOME HEALTH | Age: 72
End: 2022-12-30
Payer: MEDICARE

## 2022-12-30 ENCOUNTER — APPOINTMENT (OUTPATIENT)
Dept: PHYSICAL THERAPY | Age: 72
End: 2022-12-30
Payer: MEDICARE

## 2022-12-30 VITALS
OXYGEN SATURATION: 97 % | HEART RATE: 81 BPM | RESPIRATION RATE: 16 BRPM | DIASTOLIC BLOOD PRESSURE: 80 MMHG | SYSTOLIC BLOOD PRESSURE: 120 MMHG | TEMPERATURE: 98.1 F

## 2022-12-30 DIAGNOSIS — Z98.1 S/P CERVICAL SPINAL FUSION: Primary | ICD-10-CM

## 2022-12-30 PROCEDURE — G0157 HHC PT ASSISTANT EA 15: HCPCS

## 2022-12-30 NOTE — HOME HEALTH
Patient's Subjective: The doctor said she was going to send in a new request for more PT. She is also giving me a steroid for the R shoulder pain. I could not get it due to power outage at the pharmacy. Falls since last session: No  Pain/Discomfort ? Yes- see pain page   New Meds: Yes, but but has not been picked up yet. Upcoming MD appointments: Jan 27 th new f/u apt with surgeon  . Caregiver involvement/assistance needed for: The patient's caregiver assists with transportation, meals  . Home health supplies by type and quantity ordered/delivered this visit include:  none  . Objective:  See interventions. Spoke with at Dr Ronak Longo office to request the order for continuation for the patient as she is otherwise due for DC next visit. Direct number to [facility] [phone] ext [phone]. I then spoke with the PA, Sharifa Masters and she is sending in a new order today for 2w2. Patient response to treatment:  Fair tolerance to her new exercises and amb the ramp to enter/exit the camper. She was fatigued having just recently returned from her f/u apt. at Dr Ronak Longo office. Patient level of understanding of education provided:  Pt educated in new exercises, safe ramp negotiation, safe log roll. She conts with some difficulty with the log roll. Fair return demonstration for her new exercises. Observable weakness B LE with LAQ, R>L. Assess of progress towards goals:  Pain controlled with Tylenol, some. She will now begin use of cold therapy to further decrease her sx. No falls to date, per pt report. She had a f/u with Sharifa Masters the PA who is pleased with her incisions. No s/sx of infection observed. Pt reports compliance with her HEP. Continued need for the following skills: Increase B LE strength for added stability. Improve gait mechanics to decrease fall risk and improve her overall functional Ind. Plan for next visit: Tinetti Balance Assessment.     The following discharge was discussed with the patient/caregiver :   Estimated PT DC date : Natashaona Marlon order received today for an additional 2w2 beginning next week.    [address] needed : Yes

## 2022-12-31 ENCOUNTER — HOME CARE VISIT (OUTPATIENT)
Dept: SCHEDULING | Facility: HOME HEALTH | Age: 72
End: 2022-12-31
Payer: MEDICARE

## 2022-12-31 PROCEDURE — G0157 HHC PT ASSISTANT EA 15: HCPCS

## 2023-01-01 VITALS
OXYGEN SATURATION: 98 % | SYSTOLIC BLOOD PRESSURE: 117 MMHG | TEMPERATURE: 98.9 F | DIASTOLIC BLOOD PRESSURE: 68 MMHG | HEART RATE: 95 BPM | RESPIRATION RATE: 14 BRPM

## 2023-01-03 ENCOUNTER — HOME CARE VISIT (OUTPATIENT)
Dept: SCHEDULING | Facility: HOME HEALTH | Age: 73
End: 2023-01-03
Payer: MEDICARE

## 2023-01-03 VITALS
HEART RATE: 101 BPM | OXYGEN SATURATION: 98 % | SYSTOLIC BLOOD PRESSURE: 110 MMHG | DIASTOLIC BLOOD PRESSURE: 80 MMHG | RESPIRATION RATE: 16 BRPM | TEMPERATURE: 97.5 F

## 2023-01-03 PROCEDURE — G0157 HHC PT ASSISTANT EA 15: HCPCS

## 2023-01-03 NOTE — HOME HEALTH
Subjective: Patient relays that she was able to get out of the house this week to go to the doctor with her daughter caregiver with no significant difficulty. She reports that she has minimal neck pain but that the pain presents in her right shoulder more so. Objective: Skilled home health physical therapy interventions completed today listed in care plan section. Interventions performed today to assist in return to prior level of function, address deficits as apparent upon time of initial evaluation, return to community and personal activities, and progress further towards goals as previously established in plan of care. There are not any changes to medications upon timing of this visit. Home health supplies were not ordered/delivered today. Trained patient in how to properly don cervical collar as patient was wearing collar upside down upon beginning of treatment. Tinetti assessment completed. Assessment:  Patient is progressing towards goals as previously established in POC with skilled home health physical therapy services at this time as made apparent by improving overall mobility with being able to navigate living space safely and effectively as well as getting to/from doctor's appointment with caregiver assistance. Family/caregiver daughter involvement is present/set-up at this point in time and assists with meals, transport, and general encouragement. Plan:  Discharge planning discussed at this visit regarding eventual discharge once patient has met goals and/or met maximum benefit from home health skilled PT services with patient understanding and agreeable at this time. Continued need for skilled home health PT at this time to address deficits, reduce risk of falls, and obtain goals as previously established per plan of care. Further gait training distance as tolerated and outside as weather permits.

## 2023-01-03 NOTE — Clinical Note
Hi !   Initially I saw you down on the 3rd, but now your name is on for the 5th. She has 2 MD apts on the 5th. She said she cant see you then. Just wanted to let you know, so you can schedule accordingly. She is a little better, per her report, but still aspirating.          To Denney

## 2023-01-03 NOTE — Clinical Note
thanks.  have her down for 6th  ----- Message -----  From: Tricia Tomlinman, PTA  Sent: 1/3/2023   5:47 PM EST  To: Nicolle Barrientos, SLP      Hi ! Initially I saw you down on the 3rd, but now your name is on for the 5th. She has 2 MD apts on the 5th. She said she cant see you then. Just wanted to let you know, so you can schedule accordingly. She is a little better, per her report, but still aspirating.          Gabriella Frazier

## 2023-01-03 NOTE — HOME HEALTH
Patient's Subjective: She reports performing her sit/stands and AP as well as her new standing exercises. Falls since last session:  no  Pain/Discomfort ? Yes- see pain page   New Meds: yes information sent to PT to add to med list    Upcoming MD appointments:   1/5/2023  2:00 PM [facility]   1/5/2023  3:15 PM [facility]   .  Caregiver involvement/assistance needed for: The patient's caregiver assists with transportation, meals  . Home health supplies by type and quantity ordered/delivered this visit include:  none  . Objective:  See interventions. Patient response to treatment:    Good tolerance to amb 9 minutes, ~ 200 feet total on unlevel surface with her walker. She could tolerate exercises without c/o increased sx. Patient level of understanding of education provided:  Written HEP issued and educated on expectations for frequencies and reps. She reported understanding. Assess of progress towards goals:   Pt amb to amb 9 minutes without a rest break needed. She demonstrated good stability with use of her walker. She denies any falls. She reports gradual decrease in overall R shoulder Sx. No pain in the c/s region. Incision well approximated. No drainage or s/sx of infection. Continued need for the following skills: Improve her B LE strength and stability.     Plan for next visit: Side stepping, Tinetti Balance Assessment    The following discharge was discussed with the patient/caregiver :   Estimated PT DC date : 1/13/2023 Ruthy Velazquez, PT     [facility] HEART needed Yes

## 2023-01-04 ENCOUNTER — HOME CARE VISIT (OUTPATIENT)
Dept: SCHEDULING | Facility: HOME HEALTH | Age: 73
End: 2023-01-04
Payer: MEDICARE

## 2023-01-04 ENCOUNTER — APPOINTMENT (OUTPATIENT)
Dept: PHYSICAL THERAPY | Age: 73
End: 2023-01-04
Payer: MEDICARE

## 2023-01-04 VITALS
DIASTOLIC BLOOD PRESSURE: 88 MMHG | RESPIRATION RATE: 18 BRPM | OXYGEN SATURATION: 97 % | SYSTOLIC BLOOD PRESSURE: 138 MMHG | TEMPERATURE: 97.6 F | HEART RATE: 68 BPM

## 2023-01-04 PROCEDURE — G0299 HHS/HOSPICE OF RN EA 15 MIN: HCPCS

## 2023-01-04 NOTE — Clinical Note
thanks  ----- Message -----  From: Gabriella Saba RN  Sent: 1/4/2023   9:04 PM EST  To: Chayito Thornton, SLP, *      Patient has met all goals, SN disclipine discharge noted this visit.

## 2023-01-05 ENCOUNTER — HOSPITAL ENCOUNTER (OUTPATIENT)
Dept: ULTRASOUND IMAGING | Age: 73
End: 2023-01-05
Payer: MEDICARE

## 2023-01-05 ENCOUNTER — HOSPITAL ENCOUNTER (OUTPATIENT)
Dept: MAMMOGRAPHY | Age: 73
Discharge: HOME | End: 2023-01-05
Payer: MEDICARE

## 2023-01-05 DIAGNOSIS — R92.8 OTHER ABNORMAL AND INCONCLUSIVE FINDINGS ON DIAGNOSTIC IMAGING OF BREAST: ICD-10-CM

## 2023-01-05 DIAGNOSIS — R92.8 ABNORMAL MAMMOGRAM: ICD-10-CM

## 2023-01-05 PROCEDURE — 77061 BREAST TOMOSYNTHESIS UNI: CPT

## 2023-01-05 PROCEDURE — 76642 ULTRASOUND BREAST LIMITED: CPT

## 2023-01-05 NOTE — HOME HEALTH
Skilled reason for visit: Patient discharged from [facility]. Patient has met all goals. Patient started on medrol landy by MD for right shoulder pain, last dose noted 1/5/23. Patient verbalized improvement in pain since medication administration. Caregiver involvement: grandson assists with some ADLs. Medications reviewed and all medications are available in the home this visit. The following education was provided regarding medications:  Review of all medications noted during visit  MD notified of any discrepancies/look a-like medications/medication interactions: none  Medications are effective at this time.       Home health supplies by type and quantity ordered/delivered this visit include: n/a    Patient education provided this visit: take all medications as prescribed, keep all doctors appointments, monitor for s/s if hypo/hyperglycemia    Sharps education provided: n/a    Patient level of understanding of education provided: patient verbalized understanding    Skilled Care Performed this visit: assessment, v/s, teaching    Patient response to procedure performed:  patient tolerated well    Agency Progress toward goals: goals met    Patient's Progress towards personal goals: goals met    Home exercise program: take all medications as prescribed, keep all doctors appointments, monitor for s/s if hypo/hyperglycemia, monitor diabetic foot care and continue rehab    Continued need for the following skills: Physical Therapy, Occupational Therapy and Speech Language Pathology    Plan for next visit: discharged

## 2023-01-06 ENCOUNTER — HOME CARE VISIT (OUTPATIENT)
Dept: SCHEDULING | Facility: HOME HEALTH | Age: 73
End: 2023-01-06
Payer: MEDICARE

## 2023-01-06 VITALS
TEMPERATURE: 99 F | HEART RATE: 62 BPM | SYSTOLIC BLOOD PRESSURE: 121 MMHG | OXYGEN SATURATION: 100 % | DIASTOLIC BLOOD PRESSURE: 70 MMHG | RESPIRATION RATE: 16 BRPM

## 2023-01-06 VITALS
HEART RATE: 70 BPM | DIASTOLIC BLOOD PRESSURE: 91 MMHG | RESPIRATION RATE: 16 BRPM | TEMPERATURE: 97 F | SYSTOLIC BLOOD PRESSURE: 147 MMHG

## 2023-01-06 PROCEDURE — G0152 HHCP-SERV OF OT,EA 15 MIN: HCPCS

## 2023-01-06 PROCEDURE — G0157 HHC PT ASSISTANT EA 15: HCPCS

## 2023-01-06 PROCEDURE — G0153 HHCP-SVS OF S/L PATH,EA 15MN: HCPCS

## 2023-01-06 NOTE — Clinical Note
Pt presents with mild clinical signs of acute dysphagia s/p post ANTERIOR CERVICAL DECOMPRESSION WITH FUSION C 34, C 45 on 12/19/2022. Pt demonstrates mild coughing after the swallow with dry, crunchy solid only, requiring repeat swallows and alternating with liquids. Pt reports occasional globus/sensation of lodged food \"depending on what I eat. \" Pt states no problems with soft solids such as a sandwich, tuna fish or chili. Pt is noted to demonstrate appropriate use of safe swallow strategies such as slow rate, small bites, alternating with liquids, sitting upright. Pt is recommended to continue with soft and bite sized consistency and thin liquids and use of swallowing precautions. SLP provided teaching this visit for pharyngeal/laryngeal swallowing exercises. ST is medically necessary x 1 more visit for continued home teaching/training of swallowing HEP. Pt is recommended to f/u with MD if symptoms of dysphagia do not resolve.

## 2023-01-06 NOTE — Clinical Note
Patient with higher resting BP today, but within the defined parameters. (145/90 manually, 147/91 digital) She reports having it taken with OT and it was lower. ( ~721 systolic)   She also reports it was lower at the MD apt. She was in denial of it being an accurate reading. Re took BP and it continued to be higher, with 1-2 missed beats x 2 with manual BP. Pt wanted to check it on her digital BP machine to compare and it still read higher than she was used to seeing. HR manually x 1 minute without  missed beats. Pt concerned. She has a cardiology appointment next week. She will discuss this at that appointment.

## 2023-01-06 NOTE — Clinical Note
Mrs. Agustin Sam presents at her maximal level of independence with her daily routine with adaptive equipment use and spinal precaution adherence. Pt has all needed adaptive equipment and is utilizing for completing her daily routine with spinal precautions. Pt with modified independence for completion of her daily routine and no further home health occupational therapy needs with pt in agreement.     PLAN: D/C 1w1 with plans to discharge with home health PT

## 2023-01-06 NOTE — HOME HEALTH
Clinical Condition per EPIC:Skilled Reason for admission/summary of clinical condition: Anum Ochoa is a 67 y.o. female who is being seen for weakness right arm right leg numbness in hands bilaterally. Patient was recently admitted for concern for CVA with primarily right-sided weakness. She is a diabetic hypertensive. She has had an extensive lumbosacral fusion. She has a strong family history of cervical spondylosis and lumbar spondylosis. Work-up to date has not demonstrated a CVA.     PMHx: Arthritis, back and hands,  Chronic pain back  COVID-19 Summer of 2021    Diabetes [facility]) NIDDM  HLD (hyperlipidemia)  Hypertension  Nausea & vomiting Surgery only not colonoscopy    Caregiver: relative. Caregiver assists with IADL's. PLOF: Pt lives with daughter in a 2 level house with >12 steps to go to 2nd floor bedroom. Prior to surgery, she was independent with ADL's and IADL's and did not use any assistive device for ambulation. Patient currently staying in a camper that has a ramp to enter main floor. Medications: Pt reports no medication changes since last reviewed and educated to continue as directed per MD.    SUBJECTIVE: Pt expressing no pain and finishing getting dressed upon OT arrival.  DME ORDERED/RECOMMENEDED: N/A    OBJECTIVE:  BATHING: Pt modified independent for upper and lower body bathing using long handled sponge, shower chair, grab bar, and hand held shower head. TOILETING: Pt modified independent for toileting using regular toilet  UB DRESSING: Pt modified independent for donning/doffing shirts  LB DRESSING: Pt SBA for lower body dressing to kentrell/doff socks,shoes and pants using reacher, sock aid and long shoe horn as needed.  Pt   GROOMING: Pt modified indpendent for grooming for oral care, hair care and washing hands/face  FEEDING: Pt independent for self feeding    Modified Cristian RPE 1/10 after performing ambulation, transfers, and I/ADL assessment   BUE ROM: WFL  BUE MMT: WFL grossly 4/5  OT instructed/demonstrated pt the following with good understanding:     IADL: Pt has family assist with IADLs including shopping, cooking, cleaning and transportation  AMBULATION: Pt modified independence for ambluating short house hold distances using rolling walker  EOB/BED TRANSFER: Pt modified indepenence for bed mobility  COUCH: Pt modified independence for sit to stand transfers using rolling walker  TOILET: Pt modifieid independence for toilet transfers  TUB SHOWER:Pt modified independence for step in shower transfers    PATIENT RESPONSE TO TREATMENT: Pt responded well to skilled home health occupational therapy assessment    PATIENT EDUCATION PROVIDED THIS VISIT: OT role, energy conservation, fall prevention/safety training ADL/IADL tasks, continue diet and medications as instructed per MD, consult MD or urgent care for medical assistance as opposed to ER unless situation emergent. PATIENT LEVEL OF UNDERSTANDING OF EDUCATION PROVIDED: Pt able to teach back role of OT with good understanding. Pt able to teach bacn spinal precautions with good understanding. Pt able to teach back use of theraputty of strengthenign her right hand from privous rehab stay. Pt able to teach back use of adaptive equipment including reacher, sock aid, longshoe horn and long handled sponge     Angle Valencia presents at her maximal level of independence with her daily routine with adaptive equipment use and spinal precaution adherance. Pt has all needed adaptive equipment and is utiliznig for completing her daily routine with spinal precautions. Pt with modified independence for completion of her daily routine and no further home health occupational therapy needs with pt in agreement. ASSESSMENT: Pt presents with fair standing balance using rolling walker as needed in her home with no loss of balance throughout.  Pt has grab bar and shower chair in her step in shower which she is able to access without assistance. Pt is able to implament use of adaptive equipment for completion of her daily dressing routine with minimal cues for recalling bending spinal precaution. After inital cues pt able to adhere to spinal precuaitons and implament figure 4 dressing techniques and adaptive equipment use. Pt presents with good B hand strength including her right hand. Pt is able to coordinate her right hand for bilateral coorindation needed for ADLs such as tying shoes. Pt with no further home health occupational therapy needs at this time. Skilled Care Provided: Pt completed full occupational therapy assessment to include balance, coordination, strengthening, ADL education/training, functional mobility and home safety.     PLAN: D/C 1w1 with plans to discharge with home health PT

## 2023-01-06 NOTE — HOME HEALTH
Patient's Subjective: She reports feeling fine. Falls since last session: no  Discomfort - see pain page. New Meds: no  Upcoming MD appointments:   1/12/2023 1:00 PM Светлана Akers MD   .  Caregiver involvement/assistance needed for: The patient's caregiver assists with transportation, meals, washing clothes and some cleaning  . Home health supplies by type and quantity ordered/delivered this visit include:  none  . Objective:  See interventions. Patient response to treatment:  Fair+ tolerance. No increase in sx. Patient level of understanding of education provided:  - Education for new exercise -  side stepping for stability with the ability to return correct. - Review of Tinetti Balance Assessment. Assess of progress towards goals:  Patient with higher resting BP today, but within the defined parameters. She reports having it taken with OT and it was lower. She also reports it was lower at the MD apt. Re took BP and it continued to be higher, with 1-2 missed beats x 2 with manual BP. Pt wanted to check it on her digital BP matching to compare and it still read higher than she was used to seeing. HR manually x 1 minute without  missed beats. Tinetti balance assessment: 20/28. No change since last assessment. She has not required use of cold therapy for abut 5 days indicating decrease in overall Sx. Continued need for the following skills: Prepare for DC. Plan for next visit:    Cont with stability exercises and prepare for DC from [facility].        The following discharge was discussed with the patient/caregiver :   Estimated PT DC date : 1/13/2023 Ruthy Velazquez, PT   [facility] HEART needed Yes

## 2023-01-08 VITALS
TEMPERATURE: 97.3 F | OXYGEN SATURATION: 99 % | SYSTOLIC BLOOD PRESSURE: 138 MMHG | HEART RATE: 81 BPM | DIASTOLIC BLOOD PRESSURE: 78 MMHG

## 2023-01-09 NOTE — HOME HEALTH
RECENT HX OF CURRENT ILLNESS/REASON FOR REFERRAL:  Pt admitted 12/13/2022 - 12/21/2022 to [facility]  Admission Diagnosis:   Right sided weakness [R53.1]  Fall [W19. XXXA]  Stroke (cerebrum) ([facility] [address] [I63.9]    Discharge Diagnosis:    1. Cervical spine stenosis with upper extremity radiculopathy, post ANTERIOR CERVICAL DECOMPRESSION WITH FUSION C 34, C 45 on 12/19/2022  2. Dysphagia due to #1  3. Chronic right leg numbness with decreased mobility secondary to recent lumbar spine surgery  4. Diabetes mellitus  5. Hypertension  6. Dyslipidemia  7. Osteoarthritis  8. S/p recent anterior lumbar interbody fusion, L4-L5 and L5-S1 fusion. Hospital Course:   Josué Gee is a 67 y.o.  female who presented to the emergency room for further evaluation of fall and right-sided weakness started around 3:30 AM.  Patient has history of right leg numbness secondary to recent lumbar surgery with decreased mobility. She fell to the ground around 3.30 this morning. Because patient having right hand numbness on clinical examination performed by ED provider, Code S was called. Patient had CT head and CT head done both were negative. Patient was admitted to hospital with impression of rule out CVA. MRI brain was done which was negative. Patient continued to have upper extremity numbness and lower extremity numbness and weakness. There was also concerned about cervical radiculopathy, MRI of C-spine and L-spine was ordered and spine surgery was consulted. MRI of C-spine showed spinal stenosis. Spine surgery recommended to start on steroids and plan for decompression and fusion surgery. Patient underwent surgery tolerated well. Patient had mild dysphagia post surgery, SLP saw the patient and diet was adjusted accordingly. Patient started slowly getting better, patient was seen and eval by PT/OT recommended home health care.     Spine surgery cleared patient for discharge, patient was discharged home with outpatient follow-up. \"    DIET:  Cardiac Diet and Diabetic Diet    PLOF/DIET/COMMUNICATION: I with ADLs/IADLs    PAST MEDICAL HISTORY:   Arthritis        ba ck and hands    Chronic pain        back    COVID-19        Summer of  2021    Diabetes ([facility] [address]        NIDDM    HLD (hyperlipidemia)      Hypertension      Nausea & vomiting        Surgery only not colonoscopy     CURRENT RESIDENCE/LIVING SITUATION:  Currently staying in mobile home next to house, daughter lives in house next door    SUBJECTIVE (PT/FAMILY COMMENTS, THERAPIST OBSERVATIONS):Pt alert and cooperative. Pt reports having more difficulty with swallowing \" at the beginning\" but is \"resolving now. \" Pt states, \"I have to swallow twice\", \"I can swallow most things\" \"As long as I take time chewing everything seems to be ok\", Pt reports she splits pills takes then with applesauce      CAREGIVER INVOLVEMENT: daughter assists with meals    ASSESSMENT / Ronald Dye / PLAN: Pt presents with mild clinical signs of acute dysphagia s/p post ANTERIOR CERVICAL DECOMPRESSION WITH FUSION C 34, C 45 on 12/19/2022. Pt demonstrates mild coughing after the swallow with dry, crunchy solid only, requiring repeat swallows and alternating with liquids. Pt reports occasional globus/sensation of lodged food \"depending on what I eat. \" Pt states no problems with soft solids such as a sandwich, tuna fish or chili. Pt is noted to demonstrate appropriate use of safe swallow strategies such as slow rate, small bites, alternating with liquids, sitting upright. Pt is recommended to continue with soft and bite sized consistency and thin liquids and use of swallowing precautions. SLP provided teaching this visit for pharyngeal/laryngeal swallowing exercises. ST is medically necessary x 1 more visit for continued home teaching/training of swallowing HEP. Pt is recommended to f/u with MD if symptoms of dysphagia do not resolve.       PATIENT RESPONSE TO TREATMENT:  Pt witl full partcipation    PATIENT LEVEL OF UNDERSTANDING OF EDUCATION PROVIDED: Pt agreed with ST POC      MD NOTIFIED OF POC AND FREQUENCY    PT GOALS:improve swallowing    HOME EXERCISE PROGRAM:  explained and handout provided on swallowing exercises- effortful swallow, Michaela swallow, tongue base retraction    DISCHARGE PLANNING - (ANTICIPATED DC DATE, DC PLAN): ST to d/c in 1 more visits/ wks or when goals met/max potential achieved, Pt/caregiver verbalized understanding

## 2023-01-10 ENCOUNTER — HOME CARE VISIT (OUTPATIENT)
Dept: SCHEDULING | Facility: HOME HEALTH | Age: 73
End: 2023-01-10
Payer: MEDICARE

## 2023-01-10 VITALS
OXYGEN SATURATION: 98 % | DIASTOLIC BLOOD PRESSURE: 80 MMHG | TEMPERATURE: 98.2 F | RESPIRATION RATE: 16 BRPM | HEART RATE: 75 BPM | SYSTOLIC BLOOD PRESSURE: 120 MMHG

## 2023-01-10 PROCEDURE — G0157 HHC PT ASSISTANT EA 15: HCPCS

## 2023-01-10 NOTE — HOME HEALTH
Patient's Subjective: She reports she is doing better. She is not moving as fast as she wants to. She did not perform her side stepping part of HEP due to wanting SBA. Falls since last session: no  Pain/Discomfort ? No  New Meds: no  .  Caregiver involvement/assistance needed for: The patient's caregiver assists with meals, transportation, laundry. .  Home health supplies by type and quantity ordered/delivered this visit include:  none  . Objective:  See interventions. Patient response to treatment:  She was able to tolerate 20 minutes of amb today on un/level surfaces. No increased sx reported. Patient level of understanding of education provided:  - Cont with HEP post DC and consider OP PT post f/u with surgeon. She will discuss with doctor. - Have daughter be SBA for side stepping an attempt for 2x/day. Assessment and Summary of Care:  Patient's current functional status before discharge is as follows  Strength: Will require reassessment at DC visit. Intermittent HEP performance. She presents with B LE weakness and would benefit from cont OP PT for further stability and strengthening. ROM:  [facility]'s  Bed Mobility: MOD I  Transfers: MOD I to Ind in her camper and vehicle. Gait: Community amb up to 20 minutes with RW unlevel grassy terrain, concrete and paved driveway. She amb > 500 feet, 2+ turns. SBA. She travels the table and sink in her confined camper space. Stairs: NT  She has a ramp outside of the camper. She amb the ramp with holding the rail, B hands or the rail and camper side. Previously discussed use of her daughters stairs for practice, but she declined. Special Tests:   Tinetti Balance Assessment:  Eval:   13/28 high fall risk  12/31: 20/28 medium fall risk   1/6 :    20/28 medium fall risk and no change since last assessed. Recommendations: Post f/u with surgeon, consider OP PT.     The following discharge was discussed with the patient/caregiver :   Estimated PT DC date : 1/13/2023 Ruthy Velazquez, PT   [address] needed Yes

## 2023-01-10 NOTE — Clinical Note
Assessment and Summary of Care:  Patient's current functional status before discharge is as follows  Strength: Will require reassessment at DC visit. Intermittent HEP performance. She presents with B LE weakness and would benefit from cont OP PT for further stability and strengthening. ROM:  WFL's  Bed Mobility: MOD I  Transfers: MOD I to Ind in her camper and vehicle. Gait: Community amb up to 20 minutes with RW unlevel grassy terrain, concrete and paved driveway. She amb > 500 feet, 2+ turns. SBA. She travels the table and sink in her confined camper space. Stairs: NT  She has a ramp outside of the camper. She amb the ramp with holding the rail, B hands or the rail and camper side. Previously discussed use of her daughters stairs for practice, but she declined. Special Tests:   Tinetti Balance Assessment:  Eval:   13/28 high fall risk  12/31: 20/28 medium fall risk   1/6 :    20/28 medium fall risk and no change since last assessed. Recommendations: Post f/u with surgeon, consider OP PT.

## 2023-01-11 ENCOUNTER — HOME CARE VISIT (OUTPATIENT)
Dept: SCHEDULING | Facility: HOME HEALTH | Age: 73
End: 2023-01-11
Payer: MEDICARE

## 2023-01-11 VITALS
HEART RATE: 83 BPM | TEMPERATURE: 98.9 F | OXYGEN SATURATION: 99 % | SYSTOLIC BLOOD PRESSURE: 136 MMHG | DIASTOLIC BLOOD PRESSURE: 78 MMHG | RESPIRATION RATE: 16 BRPM

## 2023-01-11 PROCEDURE — G0153 HHCP-SVS OF S/L PATH,EA 15MN: HCPCS

## 2023-01-11 NOTE — Clinical Note
Pt presented with mild clinical signs of acute dysphagia s/p post ANTERIOR CERVICAL DECOMPRESSION WITH FUSION C 34, C 45 on 12/19/2022. Pt exhibited mild coughing after the swallow with dry, crunchy solid only, requiring repeat swallows and alternating with liquids. Pt reports occasional globus/sensation of lodged food \"depending on what I eat. \" Pt is able to safely tolerate recommended diet of soft and bite sized consistency without evidence of aspiration. Pt is independently using  safe swallow strategies such as slow rate, small bites, alternating with liquids, sitting upright. Pt is able to exhibit return demonstration of swallowing HEP to include effortful swallows and tongue base retraction. Pt is discharged from [place] [id number] due to goals met.

## 2023-01-11 NOTE — HOME HEALTH
SUBJECTIVE: Pt alert and cooperative, \"It's come a long way from eating soups, pudding and applesauce\" Pt reported having chicken sandwich with lettuce and tomato, fries last night for dinner, mostly able to tolerate it except for dry parts of chicken  CAREGIVER INVOLVEMENT / ASSISTANCE NEEDED FOR: family assist with meals  [address] ORDERED/DELIVERED THIS VISIT INCLUDE:  n/a  OBJECTIVE / PATIENT RESPONSE TO TREATMENT / PATIENT LEVEL OF UNDERSTANDING OF EDUCATION PROVIDED: Pt met ST goals.   ST d/c completed  ASSESSMENT OF PROGRESS TOWARD GOALS:

## 2023-01-12 ENCOUNTER — OFFICE VISIT (OUTPATIENT)
Dept: CARDIOLOGY CLINIC | Age: 73
End: 2023-01-12
Payer: MEDICARE

## 2023-01-12 VITALS
OXYGEN SATURATION: 100 % | HEART RATE: 84 BPM | WEIGHT: 173.4 LBS | BODY MASS INDEX: 27.99 KG/M2 | DIASTOLIC BLOOD PRESSURE: 78 MMHG | SYSTOLIC BLOOD PRESSURE: 132 MMHG

## 2023-01-12 DIAGNOSIS — I51.7 MILD CONCENTRIC LEFT VENTRICULAR HYPERTROPHY (LVH): ICD-10-CM

## 2023-01-12 DIAGNOSIS — Z74.09 IMPAIRED MOBILITY AND ADLS: ICD-10-CM

## 2023-01-12 DIAGNOSIS — Z78.9 IMPAIRED MOBILITY AND ADLS: ICD-10-CM

## 2023-01-12 DIAGNOSIS — R53.1 RIGHT SIDED WEAKNESS: Primary | ICD-10-CM

## 2023-01-12 DIAGNOSIS — E11.8 CONTROLLED TYPE 2 DIABETES MELLITUS WITH COMPLICATION, WITHOUT LONG-TERM CURRENT USE OF INSULIN (HCC): ICD-10-CM

## 2023-01-12 DIAGNOSIS — I10 PRIMARY HYPERTENSION: ICD-10-CM
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
## 2023-01-12 DIAGNOSIS — Z92.89 HISTORY OF ECHOCARDIOGRAM: ICD-10-CM
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
## 2023-01-12 PROCEDURE — 1101F PT FALLS ASSESS-DOCD LE1/YR: CPT | Performed by: INTERNAL MEDICINE

## 2023-01-12 PROCEDURE — 99204 OFFICE O/P NEW MOD 45 MIN: CPT | Performed by: INTERNAL MEDICINE

## 2023-01-12 PROCEDURE — G8427 DOCREV CUR MEDS BY ELIG CLIN: HCPCS | Performed by: INTERNAL MEDICINE

## 2023-01-12 PROCEDURE — 3046F HEMOGLOBIN A1C LEVEL >9.0%: CPT | Performed by: INTERNAL MEDICINE

## 2023-01-12 PROCEDURE — 3017F COLORECTAL CA SCREEN DOC REV: CPT | Performed by: INTERNAL MEDICINE

## 2023-01-12 PROCEDURE — 2022F DILAT RTA XM EVC RTNOPTHY: CPT | Performed by: INTERNAL MEDICINE

## 2023-01-12 PROCEDURE — G8432 DEP SCR NOT DOC, RNG: HCPCS | Performed by: INTERNAL MEDICINE

## 2023-01-12 PROCEDURE — 3078F DIAST BP <80 MM HG: CPT | Performed by: INTERNAL MEDICINE

## 2023-01-12 PROCEDURE — 1111F DSCHRG MED/CURRENT MED MERGE: CPT | Performed by: INTERNAL MEDICINE

## 2023-01-12 PROCEDURE — 3074F SYST BP LT 130 MM HG: CPT | Performed by: INTERNAL MEDICINE

## 2023-01-12 PROCEDURE — 1123F ACP DISCUSS/DSCN MKR DOCD: CPT | Performed by: INTERNAL MEDICINE

## 2023-01-12 PROCEDURE — G8536 NO DOC ELDER MAL SCRN: HCPCS | Performed by: INTERNAL MEDICINE

## 2023-01-12 PROCEDURE — G8417 CALC BMI ABV UP PARAM F/U: HCPCS | Performed by: INTERNAL MEDICINE

## 2023-01-12 PROCEDURE — G8399 PT W/DXA RESULTS DOCUMENT: HCPCS | Performed by: INTERNAL MEDICINE

## 2023-01-12 PROCEDURE — G9899 SCRN MAM PERF RSLTS DOC: HCPCS | Performed by: INTERNAL MEDICINE

## 2023-01-12 PROCEDURE — 1090F PRES/ABSN URINE INCON ASSESS: CPT | Performed by: INTERNAL MEDICINE

## 2023-01-12 RX ORDER — METFORMIN HYDROCHLORIDE 500 MG/1
1 TABLET ORAL DAILY
COMMUNITY

## 2023-01-12 NOTE — PROGRESS NOTES
Identified pt with two pt identifiers(name and ). Reviewed record in preparation for visit and have obtained necessary documentation. Swati Larson presents today for   Chief Complaint   Patient presents with    New Patient     PFO        Pt denies DIZZINESS, SOB, CHEST PAIN/ PRESSURE, FATIGUE/WEAKNESS, HEADACHES, SWELLING. Swati Mt preferred language for health care discussion is english/other. Personal Protective Equipment:   Personal Protective Equipment was used including: mask-surgical and hands-gloves. Patient was placed on no precaution(s). Patient was masked. Precautions:   Patient currently on None  Patient currently roomed with door closed. Is someone accompanying this pt? no    Is the patient using any DME equipment during [address]? Yes, walker     Depression Screening:  No flowsheet data found. Learning Assessment:  No flowsheet data found. Abuse Screening:  Abuse Screening Questionnaire 2023   Do you ever feel afraid of your partner? N   Are you in a relationship with someone who physically or mentally threatens you? N   Is it safe for you to go home? Y       Fall Risk  Fall Risk Assessment, last 12 mths 2023   Able to walk? Yes   Fall in past 12 months? 1   Do you feel unsteady? 0   Are you worried about falling 0   Is TUG test greater than 12 seconds? 0   Is the gait abnormal? 1   Number of falls in past 12 months 2   Fall with injury? 1       Pt currently taking Anticoagulant therapy? no  Pt currently taking Antiplatelet therapy? yes    Coordination of Care:128  1. Have you been to the ER, urgent care clinic since your last visit? Hospitalized since your last visit? Yes. 22. Weakness     2. Have you seen or consulted any other health care providers outside of the [address] [place] [address] since your last visit? Include any pap smears or colon screening. Yes        Please see Red banners under Allergies and Med Rec to remove outside inquires.  All correct information has been verified with patient and added to chart.      Medication's patient's would liked removed has been marked not taking to be removed per Verbal order and read back per Grady Chan MD

## 2023-01-13 ENCOUNTER — HOME CARE VISIT (OUTPATIENT)
Dept: SCHEDULING | Facility: HOME HEALTH | Age: 73
End: 2023-01-13
Payer: MEDICARE

## 2023-01-13 VITALS
TEMPERATURE: 97.8 F | SYSTOLIC BLOOD PRESSURE: 120 MMHG | RESPIRATION RATE: 18 BRPM | DIASTOLIC BLOOD PRESSURE: 70 MMHG | HEART RATE: 72 BPM | OXYGEN SATURATION: 97 %

## 2023-01-13 PROCEDURE — G0151 HHCP-SERV OF PT,EA 15 MIN: HCPCS

## 2023-01-13 NOTE — HOME HEALTH
SUBJECTIVE no complaint noted, patient was pleased with PTA and therapy received   CAREGIVER ASSISTANCE:daughter will be able to assist as needed   MEDICATIONS REVIEWED AND UPDATED: no changes in medications at this time  WOUND neck incision healing well, no drainage or dressing noted   ROM wfl   BED MOBILITY independent   TRANSFERS independent with use of RW  GAIT TRAINING independent with RW   STAIRS independent   THERAPEUTIC EXERCISES independent  BALANCE Tinetti 24/28  PATIENT/CAREGIVER EDUCATION THIS VISIT: fall prevention, safety, need for assistance as needed for transfers and gait  ASSESSMENT: patient provided with skilled PT intervention consisting of graded therapeutic exercises, gait training, balance training, safe transfers training, caregiver education and Home exercise program education. Patient at time of discharge was able to demonstrate independence with bed mobility, transfers and gait with RW. pt. also noted that she is doing HEP 1x a day.   PLAN DC to out patient PT   DISCHARGE PLANNING DISCUSSED: dc to self and family under MD supervision

## 2023-01-23 PROBLEM — Z92.89 HISTORY OF ECHOCARDIOGRAM: Status: ACTIVE | Noted: 2023-01-23

## 2023-01-23 PROBLEM — I51.7 MILD CONCENTRIC LEFT VENTRICULAR HYPERTROPHY (LVH): Status: ACTIVE | Noted: 2023-01-23

## 2023-01-23 PROBLEM — I10 PRIMARY HYPERTENSION: Status: ACTIVE | Noted: 2023-01-23

## 2023-01-23 PROBLEM — E11.8 CONTROLLED TYPE 2 DIABETES MELLITUS WITH COMPLICATION, WITHOUT LONG-TERM CURRENT USE OF INSULIN (HCC): Status: ACTIVE | Noted: 2023-01-23

## 2023-01-23 PROBLEM — E78.5 DYSLIPIDEMIA: Status: ACTIVE | Noted: 2023-01-23

## 2023-01-23 NOTE — PROGRESS NOTES
Subjective:      Chandler English is in the office today for cardiac evaluation. The patient is a 76-year-old woman with no prior cardiac history. She had a recent evaluation for a fall and right-sided weakness. She had multiple tests including an MRI of the brain which did not show any significant acute event. She has had no chest pain or shortness of breath. She has no history of CVA. She has no history of palpitations, near-syncope or syncope. She has no peripheral edema. An echocardiogram was done on 12/13/2022. There was normal left ventricular systolic function. Estimated EF was 65 to 70%. There was mild concentric LVH. There was grade 1 diastolic dysfunction. There was \"agitated saline study was late positive for provocation consistent with small PFO \". Patient's cardiac risk factors are dyslipidemia, diabetes mellitus, hypertension. Patient Active Problem List    Diagnosis Date Noted    Mild concentric left ventricular hypertrophy (LVH) 01/23/2023    History of echocardiogram 01/23/2023    Controlled type 2 diabetes mellitus with complication, without long-term current use of insulin ([facility] [address] 01/23/2023    Primary hypertension 01/23/2023    Dyslipidemia 01/23/2023    Fall 12/13/2022    Stroke (cerebrum) ([facility] [address] 12/13/2022    Right sided weakness 12/13/2022    Impaired mobility and ADLs 09/23/2022    Spinal stenosis of lumbar region at multiple levels 09/21/2022     Current Outpatient Medications   Medication Sig Dispense Refill    metFORMIN (GLUCOPHAGE) 500 mg tablet Take 1 Tablet by mouth daily. acetaminophen (TYLENOL) 500 mg tablet Take 1 Tablet by mouth every six (6) hours as needed for Fever. Indications: pain 20 Tablet 0    OTHER Incentive Spirometer - Use as instructed. 1 Each 0    gabapentin (NEURONTIN) 300 mg capsule Take 300 mg by mouth three (3) times daily. cyclobenzaprine (FLEXERIL) 5 mg tablet Take 5 mg by mouth three (3) times daily as needed for Muscle Spasm(s). multivitamin (ONE A DAY) tablet Take 1 Tablet by mouth daily. Allergies   Allergen Reactions    Codeine Nausea and Vomiting    Penicillin G Swelling     Past Medical History:   Diagnosis Date    Arthritis     back and hands    Chronic pain     back    COVID-19     Summer of  2021    Diabetes ([address]     NIDDM    HLD (hyperlipidemia)     Hypertension     Nausea & vomiting     Surgery only not colonoscopy     Past Surgical History:   Procedure Laterality Date    COLONOSCOPY N/A 6/24/2022    COLONOSCOPY performed by Smooth Carey MD at [facility]    HX COLONOSCOPY      HX TUBAL LIGATION       Family History   Problem Relation Age of Onset    Breast Cancer Mother 80     Social History     Tobacco Use   Smoking Status Never   Smokeless Tobacco Never          Review of Systems, additional:  Constitutional: negative  Eyes: negative  Respiratory: negative  Cardiovascular: negative  Gastrointestinal: negative  Musculoskeletal:negative  Neurological: negative  Behvioral/Psych: negative  Endocrine: negative  ENT: negative    Objective:   Visit Vitals  /78   Pulse 84   Wt 78.7 kg (173 lb 6.4 oz)   SpO2 100%   BMI 27.99 kg/m²     General:  alert, cooperative, no distress   Chest Wall: inspection normal - no chest wall deformities or tenderness, respiratory effort normal   Lung: clear to auscultation bilaterally   Heart:  normal rate and regular rhythm, S1 and S2 normal, no murmurs noted, no gallops noted, no JVD   Abdomen: soft, non-tender. Bowel sounds normal. No masses,  no organomegaly   Extremities: extremities normal, atraumatic, no cyanosis or edema Skin: no rashes   Neuro: alert, oriented, normal speech, no focal findings or movement disorder noted     EKG: Sinus rhythm. Left axis deviation. Assessment/Plan:       ICD-10-CM ICD-9-CM    1. Right sided weakness , transient with no significant MRI of the brain findings. ROPE score for PFO attributable fraction 0% R53.1 728.87       2.  Mild concentric left ventricular hypertrophy (LVH) , with grade 1 diastolic dysfunction and normal systolic function. Echo findings of 12/13/2022 I51.7 429.3       3. Fall, sequela  W19. XXXS 909.4      E929.3       4. Impaired mobility and ADLs  Z74.09 V49.89     Z78.9        5. History of echocardiogram  Z92.89 V15.89       6. Controlled type 2 diabetes mellitus with complication, without long-term current use of insulin (HCC)  E11.8 250.90       7. Primary hypertension , controlled in the office today. I10 401.9       8       PFO, likely very small and clinically insignificant.

## 2023-01-25 ENCOUNTER — HOSPITAL ENCOUNTER (OUTPATIENT)
Dept: GENERAL RADIOLOGY | Age: 73
Discharge: HOME | End: 2023-01-25
Payer: MEDICARE

## 2023-01-25 DIAGNOSIS — Z98.1 S/P CERVICAL SPINAL FUSION: ICD-10-CM

## 2023-01-25 PROCEDURE — 72050 X-RAY EXAM NECK SPINE 4/5VWS: CPT

## 2023-01-31 DIAGNOSIS — Z12.31 VISIT FOR SCREENING MAMMOGRAM: Primary | ICD-10-CM

## 2023-02-03 ENCOUNTER — APPOINTMENT (OUTPATIENT)
Dept: PHYSICAL THERAPY | Age: 73
End: 2023-02-03
Payer: MEDICARE

## 2023-02-05 DIAGNOSIS — Z12.31 VISIT FOR SCREENING MAMMOGRAM: Primary | ICD-10-CM

## 2023-02-23 ENCOUNTER — HOSPITAL ENCOUNTER (OUTPATIENT)
Facility: HOSPITAL | Age: 73
Setting detail: RECURRING SERIES
Discharge: HOME | End: 2023-02-26
Payer: MEDICARE

## 2023-02-23 PROCEDURE — 97110 THERAPEUTIC EXERCISES: CPT

## 2023-02-23 PROCEDURE — 97116 GAIT TRAINING THERAPY: CPT

## 2023-02-23 PROCEDURE — 97112 NEUROMUSCULAR REEDUCATION: CPT

## 2023-02-23 NOTE — PROGRESS NOTES
PHYSICAL / OCCUPATIONAL THERAPY - DAILY TREATMENT NOTE (updated )    Patient Name: Jared Purvis    Date: 2023    : 1950  Insurance: Payor: MEDICARE / Plan: MEDICARE PART A AND B / Product Type: *No Product type* /      Patient  verified yes     Visit #   Current / Total 2 36   Time   In / Out 1131 1218   Pain   In / Out 3 3   Subjective Functional Status/Changes: I'm doing ok. I've been trying to do some of the exercises at home. Changes to:  Meds, Allergies, Med Hx, Sx Hx? If yes, update Summary List no       TREATMENT AREA =  Low back pain, unspecified [M54.50]    OBJECTIVE    Therapeutic Procedures: Tx Min Billable or 1:1 Min (if diff from Tx Min) Procedure, Rationale, Specifics   29  96824 Therapeutic Exercise (timed):  increase ROM, strength, coordination, balance, and proprioception to improve patient's ability to progress to PLOF and address remaining functional goals. (see flow sheet as applicable)     Details if applicable:       10  72761 Neuromuscular Re-Education (timed):  improve balance, coordination, kinesthetic sense, posture, core stability and proprioception to improve patient's ability to develop conscious control of individual muscles and awareness of position of extremities in order to progress to PLOF and address remaining functional goals. (see flow sheet as applicable)     Details if applicable:     8  38820 Gait Training (timed):    286 feet with RW (assistive device) over even surfaces with S level of assist. Cuing for equal step length. To improve safety and dynamic movement with household/community ambulation.   (see flow sheet as applicable)     Details if applicable:            Details if applicable:            Details if applicable:     52  [facility] Totals Reminder: bill using total billable min of TIMED therapeutic procedures (example: do not include dry needle or estim unattended, both untimed codes, in totals to left)  8-22 min = 1 unit; 23-37 min = 2 units; 38-52 min = 3 units; 53-67 min = 4 units; 68-82 min = 5 units   Total Total     [x]  Patient Education billed concurrently with other procedures   [x] Review HEP    [] Progressed/Changed HEP, detail:    [] Other detail:       Objective Information/Functional Measures/Assessment    First follow up with good effort displayed with all exercises. Able to sustain Rhomberg EO/EC x 30\" without LOB. LOB after 25\" with MSR B. Fatigues quickly with with most standing exercises. Patient will continue to benefit from skilled PT / OT services to modify and progress therapeutic interventions, analyze and address functional mobility deficits, analyze and address strength deficits, analyze and cue for proper movement patterns, and analyze and address imbalance/dizziness to address functional deficits and attain remaining goals. Progress toward goals / Updated goals:  []  See Progress Note/Recertification    Short Term Goals: To be accomplished in 2 treatments:  1. Pt will report compliance and independence to HEP to help the pt manage their pain and symptoms. Eval: established   Long Term Goals: To be accomplished in 36 treatments:  1. Pt will increase FOTO score to 56 points to improve ability to perform ADLs. Eval: 48 points  2. Pt will increase MMT B hip flex to 4-/5, B knee EXT to 4+/5 to improve ability to tolerate transfers. Eval: right hip flex 3/4, left hip flex 3+/5, right knee EXT 4-/5, left knee EXT 4/5  3. Pt will report at least 50% improvement in her pain and symptoms to improve ability to perform daily tasks. Eval: 0%  4. Pt will be able to ambulate 100ft in the clinic with LRAD, no LOB, SBA to improve gait quality with community ambulation.    Eval: ambulates with FWW, SBA, no LOB    PLAN  yes Continue plan of care  []  Upgrade activities as tolerated  []  Discharge due to :  []  Other:    Eugenia Tafoya, PT, CMTPT   2/23/2023    8:52 AM,     Future Appointments   Date Time Provider Department [place]   2/23/2023 11:30 AM Alexandra Morse, PT [facility]   2/28/2023 11:30 AM Alexandra Morse, PT [facility]   3/2/2023 11:30 AM Jazmine Hampton, PTA [facility]   3/7/2023 11:30 AM Alexandra Morse, PT [facility]   3/9/2023 11:30 AM Jazmine Hampton, PTA [facility]   3/14/2023 11:30 AM Alexandra Morse, PT [facility]   3/16/2023 11:30 AM Alexandra Morse, PT [facility]

## 2023-02-28 ENCOUNTER — HOSPITAL ENCOUNTER (OUTPATIENT)
Facility: HOSPITAL | Age: 73
Setting detail: RECURRING SERIES
Discharge: HOME | End: 2023-03-03
Payer: MEDICARE

## 2023-02-28 PROCEDURE — 97110 THERAPEUTIC EXERCISES: CPT

## 2023-02-28 PROCEDURE — 97116 GAIT TRAINING THERAPY: CPT

## 2023-02-28 NOTE — PROGRESS NOTES
PHYSICAL / OCCUPATIONAL THERAPY - DAILY TREATMENT NOTE (updated )    Patient Name: Germán Lima    Date: 2023    : 1950  Insurance: Payor: MEDICARE / Plan: MEDICARE PART A AND B / Product Type: *No Product type* /      Patient  verified yes     Visit #   Current / Total 3 36   Time   In / Out 1130 1209   Pain   In / Out 3 3   Subjective Functional Status/Changes: I'm doing ok. Changes to:  Meds, Allergies, Med Hx, Sx Hx? If yes, update Summary List no       TREATMENT AREA =  Low back pain, unspecified [M54.50]    OBJECTIVE    Therapeutic Procedures: Tx Min Billable or 1:1 Min (if diff from Tx Min) Procedure, Rationale, Specifics   21 13 53573 Therapeutic Exercise (timed):  increase ROM, strength, coordination, balance, and proprioception to improve patient's ability to progress to PLOF and address remaining functional goals. (see flow sheet as applicable)     Details if applicable:       10 10 83660 Neuromuscular Re-Education (timed):  improve balance, coordination, kinesthetic sense, posture, core stability and proprioception to improve patient's ability to develop conscious control of individual muscles and awareness of position of extremities in order to progress to PLOF and address remaining functional goals. (see flow sheet as applicable)     Details if applicable:     8 8 93486 Gait Training (timed):    485 feet with RW (assistive device) over level surfaces with S level of assist. Cuing for heel strike. To improve safety and dynamic movement with household/community ambulation.   (see flow sheet as applicable)     Details if applicable:            Details if applicable:            Details if applicable:     44 31 [facility] Totals Reminder: bill using total billable min of TIMED therapeutic procedures (example: do not include dry needle or estim unattended, both untimed codes, in totals to left)  8-22 min = 1 unit; 23-37 min = 2 units; 38-52 min = 3 units; 53-67 min = 4 units; 68-82 min = 5 units   Total Total     [x]  Patient Education billed concurrently with other procedures   [x] Review HEP    [] Progressed/Changed HEP, detail:    [] Other detail:       Objective Information/Functional Measures/Assessment    Patient able to ambulate 485' without LOB or SOB/fatigue. Experienced some discomfort with PPT to mini bridges, but had no lingering pain post treatment. Patient will continue to benefit from skilled PT / OT services to modify and progress therapeutic interventions, analyze and address functional mobility deficits, analyze and address strength deficits, analyze and cue for proper movement patterns, and analyze and address imbalance/dizziness to address functional deficits and attain remaining goals. Progress toward goals / Updated goals:  []  See Progress Note/Recertification    Short Term Goals: To be accomplished in 2 treatments:  1. Pt will report compliance and independence to HEP to help the pt manage their pain and symptoms. Eval: established   Current:reports compliance 2/28/23  Long Term Goals: To be accomplished in 36 treatments:  1. Pt will increase FOTO score to 56 points to improve ability to perform ADLs. Eval: 48 points  2. Pt will increase MMT B hip flex to 4-/5, B knee EXT to 4+/5 to improve ability to tolerate transfers. Eval: right hip flex 3/4, left hip flex 3+/5, right knee EXT 4-/5, left knee EXT 4/5  3. Pt will report at least 50% improvement in her pain and symptoms to improve ability to perform daily tasks. Eval: 0%  4. Pt will be able to ambulate 100ft in the clinic with LRAD, no LOB, SBA to improve gait quality with community ambulation.    Eval: ambulates with FWW, SBA, no LOB   Current:485' with RW without SOB or LOB 2/28/23    PLAN  yes Continue plan of care  []  Upgrade activities as tolerated  []  Discharge due to :  []  Other:    Francesca Brewer, PT, CMTPT   2/28/2023    8:50 AM,     Future Appointments   Date Time Provider Dash Suarez 2/28/2023 11:30 AM Ksenia Cola, PT [facility]   3/2/2023 11:30 AM Edyta Conley, PTA [facility]   3/7/2023 11:30 AM Ksenia Cola, PT [facility]   3/9/2023 11:30 AM Edyta Conley, PTA [facility]   3/14/2023 11:30 AM Ksenia Cola, PT [facility]   3/16/2023 11:30 AM Ksenia Cola, PT [facility]

## 2023-03-02 ENCOUNTER — HOSPITAL ENCOUNTER (OUTPATIENT)
Facility: HOSPITAL | Age: 73
Setting detail: RECURRING SERIES
Discharge: HOME | End: 2023-03-05
Payer: MEDICARE

## 2023-03-02 PROCEDURE — 97110 THERAPEUTIC EXERCISES: CPT

## 2023-03-02 PROCEDURE — 97116 GAIT TRAINING THERAPY: CPT

## 2023-03-02 NOTE — PROGRESS NOTES
PHYSICAL / OCCUPATIONAL THERAPY - DAILY TREATMENT NOTE (updated )    Patient Name: Lidia Murillo    Date: 3/2/2023    : 1950  Insurance: Payor: MEDICARE / Plan: MEDICARE PART A AND B / Product Type: *No Product type* /      Patient  verified Yes     Visit #   Current / Total 4 36   Time   In / Out 11:30 12:10   Pain   In / Out 3 4   Subjective Functional Status/Changes: Pt reports no new complaints of pain. Changes to:  Meds, Allergies, Med Hx, Sx Hx? If yes, update Summary List no       TREATMENT AREA =  Low back pain, unspecified [M54.50]    OBJECTIVE         Therapeutic Procedures: Tx Min Billable or 1:1 Min (if diff from Tx Min) Procedure, Rationale, Specifics   20  10188 Therapeutic Exercise (timed):  increase ROM, strength, coordination, balance, and proprioception to improve patient's ability to progress to PLOF and address remaining functional goals. (see flow sheet as applicable)     Details if applicable:       12  67698 Neuromuscular Re-Education (timed):  improve balance, coordination, kinesthetic sense, posture, core stability and proprioception to improve patient's ability to develop conscious control of individual muscles and awareness of position of extremities in order to progress to PLOF and address remaining functional goals. (see flow sheet as applicable)     Details if applicable:     8 8 53466 Gait Training (timed):    582 feet with RW (assistive device) over level surfaces with supervision level of assist. Cuing for posture. To improve safety and dynamic movement with household/community ambulation.   (see flow sheet as applicable)     Details if applicable:     40 29 [facility] Totals Reminder: bill using total billable min of TIMED therapeutic procedures (example: do not include dry needle or estim unattended, both untimed codes, in totals to left)  8-22 min = 1 unit; 23-37 min = 2 units; 38-52 min = 3 units; 53-67 min = 4 units; 68-82 min = 5 units   Total Total     [x] Patient Education billed concurrently with other procedures   [x] Review HEP    [] Progressed/Changed HEP, detail:    [] Other detail:       Objective Information/Functional Measures/Assessment    Pt demonstrates able to ambulate community distances with RW. Patient will continue to benefit from skilled PT / OT services to modify and progress therapeutic interventions, analyze and address functional mobility deficits, analyze and address ROM deficits, analyze and address strength deficits, and analyze and address soft tissue restrictions to address functional deficits and attain remaining goals. Progress toward goals / Updated goals:  []  See Progress Note/Recertification    Short Term Goals: To be accomplished in 2 treatments:  1. Pt will report compliance and independence to HEP to help the pt manage their pain and symptoms. Eval: established   Current:reports compliance 2/28/23  Long Term Goals: To be accomplished in 36 treatments:  1. Pt will increase FOTO score to 56 points to improve ability to perform ADLs. Eval: 48 points  2. Pt will increase MMT B hip flex to 4-/5, B knee EXT to 4+/5 to improve ability to tolerate transfers. Eval: right hip flex 3/4, left hip flex 3+/5, right knee EXT 4-/5, left knee EXT 4/5  3. Pt will report at least 50% improvement in her pain and symptoms to improve ability to perform daily tasks. Eval: 0%  4. Pt will be able to ambulate 100ft in the clinic with LRAD, no LOB, SBA to improve gait quality with community ambulation.    Eval: ambulates with FWW, SBA, no LOB   Current:485' with RW without SOB or LOB 2/28/23; progressed to 582 ft with RW on 3/2/2023    PLAN  Yes  Continue plan of care  []  Upgrade activities as tolerated  []  Discharge due to :  []  Other:    Orestes Elliott, PTA    3/2/2023    11:44 AM    Future Appointments   Date Time Provider Dash Suarez   3/7/2023 11:30 AM Juan Manuel Espinosa, PT [facility]   3/9/2023 11:30 AM King Akil Sidonie Pallas   3/14/2023 11:30 AM [facility]   3/16/2023 11:30 AM Hadassah Paget, PT [place] [facility]

## 2023-03-07 ENCOUNTER — HOSPITAL ENCOUNTER (OUTPATIENT)
Facility: HOSPITAL | Age: 73
Setting detail: RECURRING SERIES
Discharge: HOME | End: 2023-03-10
Payer: MEDICARE

## 2023-03-07 PROCEDURE — 97116 GAIT TRAINING THERAPY: CPT

## 2023-03-07 PROCEDURE — 97112 NEUROMUSCULAR REEDUCATION: CPT

## 2023-03-07 PROCEDURE — 97110 THERAPEUTIC EXERCISES: CPT

## 2023-03-07 NOTE — PROGRESS NOTES
PHYSICAL / OCCUPATIONAL THERAPY - DAILY TREATMENT NOTE (updated )    Patient Name: Lexis Baker    Date: 3/7/2023    : 1950  Insurance: Payor: MEDICARE / Plan: MEDICARE PART A AND B / Product Type: *No Product type* /      Patient  verified Yes     Visit #   Current / Total 5 36   Time   In / Out 1130 1222   Pain   In / Out 4 4   Subjective Functional Status/Changes: I've been trying to use my cane at home. I want to get rid of this walker.   Changes to:  Meds, Allergies, Med Hx, Sx Hx?  If yes, update Summary List no       TREATMENT AREA =  Low back pain, unspecified [M54.50]    OBJECTIVE    Therapeutic Procedures:  Tx Min Billable or 1:1 Min (if diff from Tx Min) Procedure, Rationale, Specifics   32 21 74235 Therapeutic Exercise (timed):  increase ROM, strength, coordination, balance, and proprioception to improve patient's ability to progress to PLOF and address remaining functional goals. (see flow sheet as applicable)     Details if applicable:       10 10 37062 Neuromuscular Re-Education (timed):  improve balance, coordination, kinesthetic sense, posture, core stability and proprioception to improve patient's ability to develop conscious control of individual muscles and awareness of position of extremities in order to progress to PLOF and address remaining functional goals. (see flow sheet as applicable)     Details if applicable:     10 10 48869 Gait Training (timed):    970 feet with RW (assistive device) over level surfaces with S level of assist. Cuing for posture.  To improve safety and dynamic movement with household/community ambulation.  (see flow sheet as applicable)     Details if applicable:            Details if applicable:            Details if applicable:     52 41 [facility] Totals Reminder: bill using total billable min of TIMED therapeutic procedures (example: do not include dry needle or estim unattended, both untimed codes, in totals to left)  8-22 min = 1 unit; 23-37 min = 2  units; 38-52 min = 3 units; 53-67 min = 4 units; 68-82 min = 5 units   Total Total     TOTAL TREATMENT TIME:        52     [x]  Patient Education billed concurrently with other procedures   [x] Review HEP    [] Progressed/Changed HEP, detail:    [] Other detail:       Objective Information/Functional Measures/Assessment    Cueing for posture while ambulating. Instructed patient to bring [facility] next visit so that we could gait train using SPC versus RW. Added airex to static balance activities today; able to perform Rhomberg EO/EC x 30\" without LOB and MSR B without LOB. Patient will continue to benefit from skilled PT / OT services to modify and progress therapeutic interventions, analyze and address functional mobility deficits, analyze and address strength deficits, analyze and cue for proper movement patterns, analyze and modify for postural abnormalities, and analyze and address imbalance/dizziness to address functional deficits and attain remaining goals. Progress toward goals / Updated goals:  []  See Progress Note/Recertification    Short Term Goals: To be accomplished in 2 treatments:  1. Pt will report compliance and independence to HEP to help the pt manage their pain and symptoms. Eval: established   Current:reports compliance 2/28/23  Long Term Goals: To be accomplished in 36 treatments:  1. Pt will increase FOTO score to 56 points to improve ability to perform ADLs. Eval: 48 points  2. Pt will increase MMT B hip flex to 4-/5, B knee EXT to 4+/5 to improve ability to tolerate transfers. Eval: right hip flex 3/4, left hip flex 3+/5, right knee EXT 4-/5, left knee EXT 4/5  3. Pt will report at least 50% improvement in her pain and symptoms to improve ability to perform daily tasks. Eval: 0%  4. Pt will be able to ambulate 100ft in the clinic with LRAD, no LOB, SBA to improve gait quality with community ambulation.    Eval: ambulates with FWW, SBA, no LOB   Current:485' with RW without SOB or LOB 2/28/23; progressed to 582 ft with RW on 3/2/2023    PLAN  Yes  Continue plan of care  []  Upgrade activities as tolerated  []  Discharge due to :  []  Other:    Maxwell Cabrera PT, [facility]    3/7/2023    8:43 AM    Future Appointments   Date Time Provider Dash Suarez   3/7/2023 11:30 AM DIANNA Yang   3/9/2023 11:30 AM Reese Chinchilla PTA [facility]   3/14/2023 11:30 AM Reese Chinchilla PTA [facility]   3/16/2023 11:30 AM DIANNA Yang

## 2023-03-09 ENCOUNTER — TELEPHONE (OUTPATIENT)
Facility: HOSPITAL | Age: 73
End: 2023-03-09

## 2023-03-09 ENCOUNTER — APPOINTMENT (OUTPATIENT)
Facility: HOSPITAL | Age: 73
End: 2023-03-09
Payer: MEDICARE

## 2023-03-14 ENCOUNTER — HOSPITAL ENCOUNTER (OUTPATIENT)
Facility: HOSPITAL | Age: 73
Setting detail: RECURRING SERIES
Discharge: HOME | End: 2023-03-17
Payer: MEDICARE

## 2023-03-14 PROCEDURE — 97110 THERAPEUTIC EXERCISES: CPT

## 2023-03-14 PROCEDURE — 97535 SELF CARE MNGMENT TRAINING: CPT

## 2023-03-14 NOTE — PROGRESS NOTES
PHYSICAL / OCCUPATIONAL THERAPY - DAILY TREATMENT NOTE (updated )    Patient Name: Sim Mcclure    Date: 3/14/2023    : 1950  Insurance: Payor: MEDICARE / Plan: MEDICARE PART A AND B / Product Type: *No Product type* /      Patient  verified Yes     Visit #   Current / Total 6 44   Time   In / Out 11:35 12:10   Pain   In / Out 3 3   Subjective Functional Status/Changes: Pt states she had cataract and glaucoma surgery yesterday and is unsure if she has any restrictions. Changes to:  Meds, Allergies, Med Hx, Sx Hx? If yes, update Summary List no       TREATMENT AREA =  Low back pain, unspecified [M54.50]    OBJECTIVE    Therapeutic Procedures: Tx Min Billable or 1:1 Min (if diff from Tx Min) Procedure, Rationale, Specifics   25  11297 Therapeutic Exercise (timed):  increase ROM, strength, coordination, balance, and proprioception to improve patient's ability to progress to PLOF and address remaining functional goals. (see flow sheet as applicable)     Details if applicable:       10  59677 Self Care/Home Management (timed):  improve patient knowledge and understanding of home safety, activity modification, and physical therapy expectations, procedures and progression  to improve patient's ability to progress to PLOF and address remaining functional goals.   (see flow sheet as applicable)     Details if applicable:     28  [facility] Totals Reminder: bill using total billable min of TIMED therapeutic procedures (example: do not include dry needle or estim unattended, both untimed codes, in totals to left)  8-22 min = 1 unit; 23-37 min = 2 units; 38-52 min = 3 units; 53-67 min = 4 units; 68-82 min = 5 units   Total Total     TOTAL TREATMENT TIME:        35     [x]  Patient Education billed concurrently with other procedures   [x] Review HEP    [] Progressed/Changed HEP, detail:    [] Other detail:       Objective Information/Functional Measures/Assessment    Pt demonstrates fair progress toward goals with continued increased function per patient report. Pt does feel she is unable to perform any lifting, carrying, driving or other functional activities due to c/s mobility, LE weakness and requiring FWW to ambulate. Patient will continue to benefit from skilled PT / OT services to modify and progress therapeutic interventions, analyze and address functional mobility deficits, analyze and address ROM deficits, analyze and address strength deficits, analyze and address soft tissue restrictions, analyze and cue for proper movement patterns, and analyze and modify for postural abnormalities to address functional deficits and attain remaining goals. Progress toward goals / Updated goals:  []  See Progress Note/Recertification    Goals/Measure of Progress: To be achieved in 4 weeks:     1. Pt will increase FOTO score to 56 points to improve ability to perform ADLs. PN regressed to 44.   2. Pt will increase MMT B hip flex to 4-/5, B knee EXT to 4+/5 to improve ability to tolerate transfers. PN: Progressing: hip strength Grossly 3+/5; Right knee extension 4/5, left knee extension 4/5.   3. Pt will be able to ambulate 100ft in the clinic with LRAD, no LOB, SBA to improve gait quality with community ambulation. PN: progressed to 582 ft with RW.     PLAN  Yes  Continue plan of care  []  Upgrade activities as tolerated  []  Discharge due to :  []  Other:    Juliana Varela PTA    3/14/2023    11:47 AM    Future Appointments   Date Time Provider Dash Suarez   3/16/2023 11:30 AM Georgie Resendez, PT [facility] Dias

## 2023-03-14 NOTE — PROGRESS NOTES
[address] [place], [facility] [address] - Ph: [phone]   Fx: [phone]    PHYSICAL THERAPY PROGRESS NOTE      Patient name: Eneida Dsouza Start of Care: 2023   Referral source: Cade Carrero MD : 1950          Medical Diagnosis: Lumbar pain [M54.50]  Cervical spine pain [M54.2]  Payor: Nick Barahona / Plan: [place] MEDICARE PART A & B / Product Type: Medicare /  Onset Date: DOS 2022           Treatment Diagnosis: neck pain/weakness, right UE/LE weakness   Prior Hospitalization: see medical history Provider#: [id number]   Medications: Verified on Patient summary List    Comorbidities: L/S fusion L2-L3, L4-L5 and L5-S1 DOS 2022; DM; arthritis; HTN   Prior Level of Function: Ambulates with a FWW, has a hx of falls. Visits from Start of Care: 5    Missed Visits: 3    Goals/Measure of Progress: To be achieved in 4 weeks:    1. Pt will increase FOTO score to 56 points to improve ability to perform ADLs. PN regressed to 44.   2. Pt will increase MMT B hip flex to 4-/5, B knee EXT to 4+/5 to improve ability to tolerate transfers. PN: Progressing: hip strength Grossly 3+/5; Right knee extension 4/5, left knee extension 4/5.   3. Pt will be able to ambulate 100ft in the clinic with LRAD, no LOB, SBA to improve gait quality with community ambulation. PN: progressed to 582 ft with RW. Summary of Care/ Key Functional Changes:     Short Term Goals: To be accomplished in 2 treatments:  1. Pt will report compliance and independence to [facility] to help the pt manage their pain and symptoms. Eval: established   Current:reports compliance. Long Term Goals: To be accomplished in 36 treatments:  1. Pt will increase FOTO score to 56 points to improve ability to perform ADLs.   Eval: 48 points  Current: regressed to 44.   2. Pt will increase MMT B hip flex to 4-/5, B knee EXT to 4+/5 to improve ability to tolerate transfers. Eval: right hip flex 3/4, left hip flex 3+/5, right knee EXT 4-/5, left knee EXT 4/5  Current: Progressing: hip strength Grossly 3+/5; Right knee extension 4/5, left knee extension 4/5.   3. Pt will report at least 50% improvement in her pain and symptoms to improve ability to perform daily tasks. Eval: 0  Current: Met 50%. 4. Pt will be able to ambulate 100ft in the clinic with LRAD, no LOB, SBA to improve gait quality with community ambulation. Eval: ambulates with FWW, SBA, no LOB   Current:485' with RW without SOB or LOB 2/28/23; progressed to 582 ft with RW.    ASSESSMENT/RECOMMENDATIONS:    Pt demonstrates fair progress toward goals with continued increased function per patient report. Pt does feel she is unable to perform any lifting, carrying, driving or other functional activities due to c/s mobility, LE weakness and requiring FWW to ambulate. Patient will continue to benefit from skilled PT / OT services to modify and progress therapeutic interventions, analyze and address functional mobility deficits, analyze and address ROM deficits, analyze and address strength deficits, analyze and address soft tissue restrictions, analyze and cue for proper movement patterns, and analyze and modify for postural abnormalities to address functional deficits and attain remaining goals. Continue 2x/week for 4 weeks per plan of care.      Thank you for this referral.   Otoniel Bauman, PTA 3/14/2023 12:26 PM   Miller Ovalle, MATTEO, CMTPT

## 2023-03-16 ENCOUNTER — APPOINTMENT (OUTPATIENT)
Facility: HOSPITAL | Age: 73
End: 2023-03-16
Payer: MEDICARE

## 2023-03-24 ENCOUNTER — APPOINTMENT (OUTPATIENT)
Facility: HOSPITAL | Age: 73
End: 2023-03-24
Payer: MEDICARE

## 2023-03-30 ENCOUNTER — HOSPITAL ENCOUNTER (OUTPATIENT)
Facility: HOSPITAL | Age: 73
Setting detail: RECURRING SERIES
End: 2023-03-30
Payer: MEDICARE

## 2023-03-30 PROCEDURE — 97112 NEUROMUSCULAR REEDUCATION: CPT

## 2023-03-30 PROCEDURE — 97116 GAIT TRAINING THERAPY: CPT

## 2023-03-30 PROCEDURE — 97110 THERAPEUTIC EXERCISES: CPT

## 2023-03-30 NOTE — PROGRESS NOTES
38-52 min = 3 units; 53-67 min = 4 units; 68-82 min = 5 units   Total Total     TOTAL TREATMENT TIME:        54     [x]  Patient Education billed concurrently with other procedures   [x] Review HEP    [] Progressed/Changed HEP, detail:    [] Other detail:       Objective Information/Functional Measures/Assessment  Patient presents to therapy after 2 week hiatus due to vertigo. Discussed consistency of care and compliance with attendance with patient today. Discussed clinic's attendance policy and let patient know that she would be discharged if she missed any additional appointments. Also discussed vestibular therapy and holding off on C/S and balance therapy due to vertigo. Patient will continue to benefit from skilled PT / OT services to modify and progress therapeutic interventions, analyze and address functional mobility deficits, analyze and address strength deficits, analyze and cue for proper movement patterns, and analyze and address imbalance/dizziness to address functional deficits and attain remaining goals. Progress toward goals / Updated goals:  []  See Progress Note/Recertification    Short Term Goals: To be accomplished in 2 treatments:  1. Pt will report compliance and independence to HEP to help the pt manage their pain and symptoms. Eval: established   Long Term Goals: To be accomplished in 36 treatments:  1. Pt will increase FOTO score to 56 points to improve ability to perform ADLs. Eval: 48 points  2. Pt will increase MMT B hip flex to 4-/5, B knee EXT to 4+/5 to improve ability to tolerate transfers. Eval: right hip flex 3/4, left hip flex 3+/5, right knee EXT 4-/5, left knee EXT 4/5  3. Pt will report at least 50% improvement in her pain and symptoms to improve ability to perform daily tasks. Eval: 0%  4. Pt will be able to ambulate 100ft in the clinic with LRAD, no LOB, SBA to improve gait quality with community ambulation.    Eval: ambulates with FWW, SBA, no LOB

## 2023-04-06 ENCOUNTER — APPOINTMENT (OUTPATIENT)
Facility: HOSPITAL | Age: 73
End: 2023-04-06
Payer: MEDICARE

## 2023-04-26 ENCOUNTER — HOSPITAL ENCOUNTER (OUTPATIENT)
Facility: HOSPITAL | Age: 73
Setting detail: RECURRING SERIES
End: 2023-04-26
Payer: MEDICARE

## 2023-04-27 ENCOUNTER — HOSPITAL ENCOUNTER (OUTPATIENT)
Facility: HOSPITAL | Age: 73
Setting detail: RECURRING SERIES
Discharge: HOME | End: 2023-04-30
Payer: MEDICARE

## 2023-04-27 PROCEDURE — 97112 NEUROMUSCULAR REEDUCATION: CPT

## 2023-04-27 PROCEDURE — 97162 PT EVAL MOD COMPLEX 30 MIN: CPT

## 2023-04-27 PROCEDURE — 95992 CANALITH REPOSITIONING PROC: CPT

## 2023-05-23 ENCOUNTER — HOSPITAL ENCOUNTER (OUTPATIENT)
Facility: HOSPITAL | Age: 73
Setting detail: RECURRING SERIES
Discharge: HOME | End: 2023-05-26
Payer: MEDICARE

## 2023-05-23 PROCEDURE — 95992 CANALITH REPOSITIONING PROC: CPT

## 2023-05-23 PROCEDURE — 97112 NEUROMUSCULAR REEDUCATION: CPT

## 2023-05-23 NOTE — PROGRESS NOTES
PHYSICAL / OCCUPATIONAL THERAPY - DAILY TREATMENT NOTE (updated )    Patient Name: Danica Yancey    Date: 2023    : 1950  Insurance: Payor: MEDICARE / Plan: MEDICARE PART A AND B / Product Type: *No Product type* /      Patient  verified Yes     Visit #   Current / Total 2 8   Time   In / Out 11:00 11:45   Pain   In / Out 0, dizzy 0   Subjective Functional Status/Changes: Says she has had less frequent and severe episodes since start of care. Took a Meclizine about two days ago but had not had a severe episode until then. Feels about 50% improved. Changes to:  Meds, Allergies, Med Hx, Sx Hx? If yes, update Summary List no       TREATMENT AREA =  Benign paroxysmal vertigo, unspecified ear [H81.10]    OBJECTIVE    Therapeutic Procedures: Tx Min Billable or 1:1 Min (if diff from Tx Min) Procedure, Rationale, Specifics   6  96963 Canalith Repositioning (un-timed):  correct positional vertigo, decrease dizziness, improve balance to to improve patient's ability to progress to PLOF and address remaining functional goals. Details if applicable:  Right Epley maneuver     39  [id number] Neuromuscular Re-Education (timed):  improve balance, coordination, kinesthetic sense, posture, core stability and proprioception to improve patient's ability to develop conscious control of individual muscles and awareness of position of extremities in order to progress to PLOF and address remaining functional goals.  (see flow sheet as applicable)     Details if applicable:  VOR exercises, Floyde Mel Dahl    39  [address] Totals Reminder: bill using total billable min of TIMED therapeutic procedures (example: do not include dry needle or estim unattended, both untimed codes, in totals to left)  8-22 min = 1 unit; 23-37 min = 2 units; 38-52 min = 3 units; 53-67 min = 4 units; 68-82 min = 5 units   Total Total     TOTAL TREATMENT TIME:        45     [x]  Patient Education billed concurrently with other procedures   [x] Review

## 2023-05-25 ENCOUNTER — HOSPITAL ENCOUNTER (OUTPATIENT)
Facility: HOSPITAL | Age: 73
Setting detail: RECURRING SERIES
Discharge: HOME | End: 2023-05-28
Payer: MEDICARE

## 2023-05-25 PROCEDURE — 97530 THERAPEUTIC ACTIVITIES: CPT

## 2023-05-25 PROCEDURE — 97110 THERAPEUTIC EXERCISES: CPT

## 2023-05-25 PROCEDURE — 97112 NEUROMUSCULAR REEDUCATION: CPT

## 2023-06-01 ENCOUNTER — APPOINTMENT (OUTPATIENT)
Facility: HOSPITAL | Age: 73
End: 2023-06-01
Payer: MEDICARE

## 2023-06-06 ENCOUNTER — HOSPITAL ENCOUNTER (OUTPATIENT)
Facility: HOSPITAL | Age: 73
Setting detail: RECURRING SERIES
Discharge: HOME | End: 2023-06-09
Payer: MEDICARE

## 2023-06-06 PROCEDURE — 97112 NEUROMUSCULAR REEDUCATION: CPT

## 2023-06-06 PROCEDURE — 97530 THERAPEUTIC ACTIVITIES: CPT

## 2023-06-06 PROCEDURE — 97110 THERAPEUTIC EXERCISES: CPT

## 2023-06-06 NOTE — PROGRESS NOTES
PHYSICAL / OCCUPATIONAL THERAPY - DAILY TREATMENT NOTE (updated )    Patient Name: Michelle Buckley    Date: 2023    : 1950  Insurance: Payor: MEDICARE / Plan: MEDICARE PART A AND B / Product Type: *No Product type* /      Patient  verified Yes     Visit #   Current / Total 4 8   Time   In / Out 11:00 11:40   Pain   In / Out 0, dizzy 0   Subjective Functional Status/Changes: Had one episode last week requiring one dose of Meclizine. Episode lasted about one minute. Reporting 80% overall improvement since evaluation. Changes to:  Meds, Allergies, Med Hx, Sx Hx? If yes, update Summary List no       TREATMENT AREA =  Benign paroxysmal vertigo, unspecified ear [H81.10]    OBJECTIVE    Therapeutic Procedures: Tx Min Billable or 1:1 Min (if diff from Tx Min) Procedure, Rationale, Specifics   24  [id number] Neuromuscular Re-Education (timed):  improve balance, coordination, kinesthetic sense, posture, core stability and proprioception to improve patient's ability to develop conscious control of individual muscles and awareness of position of extremities in order to progress to PLOF and address remaining functional goals. (see flow sheet as applicable)     Details if applicable:       8  85019 Therapeutic Exercise (timed):  increase ROM, strength, coordination, balance, and proprioception to improve patient's ability to progress to PLOF and address remaining functional goals. (see flow sheet as applicable)     Details if applicable:     8  74104 Therapeutic Activity (timed):  use of dynamic activities replicating functional movements to increase ROM, strength, coordination, balance, and proprioception in order to improve patient's ability to progress to PLOF and address remaining functional goals.   (see flow sheet as applicable)     Details if applicable:     36  [facility] Totals Reminder: bill using total billable min of TIMED therapeutic procedures (example: do not include dry needle or estim unattended, both

## 2023-06-06 NOTE — PROGRESS NOTES
[facility] [address] [place], [address] - Ph: [phone]   Fx: [phone]    PHYSICAL THERAPY PROGRESS NOTE      Patient name: Mercedes Patel Start of Care: 2023   Referral source: Raisa Florence* : 1950               Medical Diagnosis: Benign paroxysmal vertigo, unspecified ear [H81.10]        Onset Date:2022               Treatment Diagnosis: H81.13  Benign paroxysmal vertigo, bilateral and R26.81  Unsteadiness on feet                                     Prior Hospitalization: see medical history Provider#: [id number]   Medications: Verified on Patient Summary List      Comorbidities: Hx of lumbar and cervical fusion, HTN, DM II  Prior Level of Function: No hx of dizziness prior to initial spinal surgery in 2022. Ambulates with straight cane/RW due to back pain. Visits from Start of Care: 5    Missed Visits: 0    Goals/Measure of Progress: To be achieved in 4 weeks:    Patient to demonstrate proper self canalith repositioning technique for independent management of symptoms and condition. PN: patient performing Sagrario Radha    Patient to be independent in HEP to maximize stability and balance in home and community navigation. PN: updated to be issued     Summary of Care/ Key Functional Changes: Patient showing no objective signs of continued BPPV though has reported one vertiginous episode in last 7 days. Patient showing improved overall vestibular function with interventions and reporting 80% subjective improvement. Would continue to benefit from skilled PT to further improve balance and stability as well as mitigate vertigo episodes to facilitate return to PT for low back. ASSESSMENT/RECOMMENDATIONS:    Continue per plan of care.      Thank you for this referral.   Gladys Medina, PT 2023 11:12 AM

## 2023-06-20 ENCOUNTER — HOSPITAL ENCOUNTER (OUTPATIENT)
Facility: HOSPITAL | Age: 73
Setting detail: RECURRING SERIES
Discharge: HOME | End: 2023-06-23
Payer: MEDICARE

## 2023-06-20 PROCEDURE — 97112 NEUROMUSCULAR REEDUCATION: CPT

## 2023-06-20 NOTE — PROGRESS NOTES
[facility]   [address]  [place], [address]  [phone] fax    Physical Therapy Discharge Summary     Patient name: Ros Hernandez Start of Care: 2023   Referral source: Kathy Florence* : 1950               Medical Diagnosis: Benign paroxysmal vertigo, unspecified ear [H81.10]        Onset Date:2022               Treatment Diagnosis: H81.13  Benign paroxysmal vertigo, bilateral and R26.81  Unsteadiness on feet                                     Prior Hospitalization: see medical history Provider#: [id number]   Medications: Verified on Patient Summary List      Comorbidities: Hx of lumbar and cervical fusion, HTN, DM II  Prior Level of Function: No hx of dizziness prior to initial spinal surgery in 2022. Ambulates with straight cane/RW due to back pain. Visits from Start of Care: 7    Missed Visits: 0    Reporting Period : 2023 to 2023    Goals/Measure of Progress:     Patient to demonstrate proper self canalith repositioning technique for independent management of symptoms and condition. PN: patient performing Deloris Araujo 76: Partially met - performing Huseyin Lagunas, CRT held due to back pain     Patient to be independent in HEP to maximize stability and balance in home and community navigation. PN: updated to be issued        DC: MET - HEP updated and issued    Assessment/ Summary of Care: Patient with significant improvement in vestibular symptoms since start of care. Demonstrates no nystagmus with oculomotor testing, Vivian Ellison, or Huseyin Lagunas. Patient stating she has not had any severe vertigo episodes since about one week after start of care; reports >90% improvement. Patient appropriate and ready for discharge at this time.      RECOMMENDATIONS:  [x]Discontinue therapy: [x]Patient has reached or is progressing toward set goals      []Patient is non-compliant or has abdicated      []Due to

## 2023-06-20 NOTE — PROGRESS NOTES
PHYSICAL / OCCUPATIONAL THERAPY - DAILY TREATMENT NOTE (updated )    Patient Name: Rita Aviles    Date: 2023    : 1950  Insurance: Payor: MEDICARE / Plan: MEDICARE PART A AND B / Product Type: *No Product type* /      Patient  verified Yes     Visit #   Current / Total 7 8   Time   In / Out 11:12 11:50   Pain   In / Out 0, dizzy  0, dizzy   Subjective Functional Status/Changes: Reports >90% improvement in regards to the dizziness, agreeable to discharge and initiating care for her low back. Changes to: Allergies, Med Hx, Sx Hx?   no       TREATMENT AREA =  Benign paroxysmal vertigo, unspecified ear [H81.10]  Unsteadiness on feet [R26.81]    OBJECTIVE    Modalities Rationale:     decrease pain to improve patient's ability to progress to PLOF and address remaining functional goals. 10   min  unbilled []  Ice     [x]  Heat    location/position: Long sit, low back       Therapeutic Procedures: Tx Min Billable or 1:1 Min (if diff from Tx Min) Procedure, Rationale, Specifics   28  [id number] Neuromuscular Re-Education (timed):  improve balance, coordination, kinesthetic sense, posture, core stability and proprioception to improve patient's ability to develop conscious control of individual muscles and awareness of position of extremities in order to progress to PLOF and address remaining functional goals.  (see flow sheet as applicable)     Details if applicable:       29  [facility] Totals Reminder: bill using total billable min of TIMED therapeutic procedures (example: do not include dry needle or estim unattended, both untimed codes, in totals to left)  8-22 min = 1 unit; 23-37 min = 2 units; 38-52 min = 3 units; 53-67 min = 4 units; 68-82 min = 5 units   Total Total     TOTAL TREATMENT TIME:        38     [x]  Patient Education billed concurrently with other procedures   [x] Review HEP    [] Progressed/Changed HEP, detail:    [] Other detail:       Objective Information/Functional

## 2023-06-20 NOTE — PROGRESS NOTES
Physical Therapy Discharge Instructions      [facility]  [address] [place] [address]  [place], [address]  [phone] fax    Patient: Evelyn Soto  : 1950      Continue Home Exercise Program 1 times per day for 2 weeks, then decrease to 3-4 times per week    Follow up with MD:     [] Upon completion of therapy     [x] As needed      Recommendations:     [x]   Return to activity with home program    []   Return to activity with the following modifications:       []Post Rehab Program    []Join Independent aquatic program     []Return to/join local gym        Additional Comments: Perform the repositioning maneuver as needed if you experience any vertigo episodes.        Linda Sanford, PT 2023 8:41 AM

## 2023-06-22 ENCOUNTER — APPOINTMENT (OUTPATIENT)
Facility: HOSPITAL | Age: 73
End: 2023-06-22
Payer: MEDICARE

## 2023-07-11 ENCOUNTER — TRANSCRIBE ORDERS (OUTPATIENT)
Dept: SCHEDULING | Age: 73
End: 2023-07-11

## 2023-07-11 DIAGNOSIS — R92.8 OTHER ABNORMAL AND INCONCLUSIVE FINDINGS ON DIAGNOSTIC IMAGING OF BREAST: Primary | ICD-10-CM

## 2023-07-24 ENCOUNTER — HOSPITAL ENCOUNTER (OUTPATIENT)
Facility: HOSPITAL | Age: 73
Setting detail: RECURRING SERIES
Discharge: HOME | End: 2023-07-27
Payer: MEDICARE

## 2023-07-24 PROCEDURE — 97110 THERAPEUTIC EXERCISES: CPT

## 2023-07-24 PROCEDURE — 97162 PT EVAL MOD COMPLEX 30 MIN: CPT

## 2023-07-24 NOTE — PROGRESS NOTES
PT DAILY TREATMENT NOTE/LUMBAR EVAL     Patient Name: Orestes Velarde    Date: 2023    : 1950  Insurance: Payor: MEDICARE / Plan: MEDICARE PART A AND B / Product Type: *No Product type* /      Patient  verified yes     Visit #   Current / Total 1 24   Time   In / Out 12:36 1:22   Pain   In / Out 3 2   Subjective Functional Status/Changes: See below        Treatment Area: Other low back pain [M54.59]  SUBJECTIVE  Pain Level (0-10 scale): 3/10 (avg pain level)   []constant []intermittent []improving []worsening []no change since onset    Any medication changes, allergies to medications, adverse drug reactions, diagnosis change, or new procedure performed?: [x] No    [] Yes (see summary sheet for update)  Subjective functional status/changes:     PLOF: Patient reports 6 months prior to the low back surgery was not using a RW, but occasionally would use a SPC. Limitations to PLOF: Patient exhibits decreased core stability, decreased LE strength, increased difficulty with sit to stand transfers with use of UE support, postural dysfunction consisting of forward trunk lean in standing, increased (B) hip flexor tightness, and decreased glut activation. Patient demonstrates potential to make functional gains within a reasonable time frame. Patient would benefit from skilled PT to address above deficits and assist with return to PLOF. Mechanism of Injury: Patient presents with LBP that has been there since Lumbar Fusion on 22 (per MRI report on 23: Circumferential fusion of the lumbar spine has been performed from L2 through S1. Pedicle screws are present as well as intervertebral hardware. ). Patient stated she wasn't able to really participate in PT following the surgery because she developed cervical issues that led to having a cervical fusion in 2022.  Patient then had surgeries on eyes and then later developed vertigo symptoms which she just finished PT for that and reports it is

## 2023-07-24 NOTE — PROGRESS NOTES
[address] [facility] [address] [facility] DV:[phone] Fx: [phone]    PLAN OF CARE/ Statement of Necessity for Physical Therapy Services           Patient name: Fernando Olivas Start of Care: 2023   Referral source: German Nyhan Courtne* : 1950    Medical Diagnosis: Other low back pain [M54.59]       Onset Date:DOS: 22    Treatment Diagnosis: M54.59  OTHER LOWER BACK PAIN                                     Prior Hospitalization: see medical history Provider#: [id number]   Medications: Verified on Patient Summary List     Comorbidities: Back pain, DM, HTN, Prior surgery, Visual Impairment, Cervical fusion-2022  Prior Level of Function: Patient reports 6 months prior to the low back surgery was not using a RW, but occasionally would use a SPC. The Plan of Care and following information is based on the information from the initial evaluation. Assessment / key information:   Patient presents with LBP that has been there since Lumbar Fusion on 22 (per MRI report on 23: Circumferential fusion of the lumbar spine has been performed from L2 through S1. Pedicle screws are present as well as intervertebral hardware. ). Patient stated she wasn't able to really participate in PT following the surgery because she developed cervical issues that led to having a cervical fusion in 2022. Patient then had surgeries on eyes and then later developed vertigo symptoms which she just finished PT for that and reports it is better now. Patient describes LBP as intermittent soreness across low back. Patient reports constant numbness/tingling in left foot that started prior to surgery and has gotten worse in the last year. Patient denies foot drop, no LE buckling, and no dysfunction with bowel or bladder. Patient does report weakness in left LE more then right.  Patient has increased difficulty with walking upright and

## 2023-07-26 ENCOUNTER — HOSPITAL ENCOUNTER (OUTPATIENT)
Facility: HOSPITAL | Age: 73
Setting detail: RECURRING SERIES
Discharge: HOME | End: 2023-07-29
Payer: MEDICARE

## 2023-07-26 PROCEDURE — 97112 NEUROMUSCULAR REEDUCATION: CPT

## 2023-07-26 PROCEDURE — 97110 THERAPEUTIC EXERCISES: CPT

## 2023-07-26 NOTE — PROGRESS NOTES
Upgrade activities as tolerated  []  Discharge due to :  []  Other:    Selwyn Balloon, PTA    7/26/2023    1:46 PM    Future Appointments   Date Time Provider [address]   7/26/2023  1:50 PM Selwyn Balloon, PTA [facility]   7/28/2023  9:30 AM [facility] RM 3 3D [facility] [place]   7/28/2023 10:30 AM [facility] US RM 1 [facility] [place]   8/1/2023  1:50 PM Brynda Drop, PT [facility]   8/3/2023  1:10 PM Karan Lalito, PT [facility] [place]   8/7/2023  1:50 PM Karan Lalito, PT [facility] [place]   8/9/2023  1:50 PM Brynda Drop, PT [facility]   8/14/2023  1:50 PM Selwyn Balloon, PTA [facility] [place]   8/16/2023  1:50 PM Selwyn Balloon, PTA [facility] [place]   8/21/2023  1:50 PM Karan Lalito, PT [facility]   8/23/2023  1:50 PM Selwyn Balloon, PTA [facility]

## 2023-07-28 ENCOUNTER — HOSPITAL ENCOUNTER (OUTPATIENT)
Facility: HOSPITAL | Age: 73
End: 2023-07-28
Payer: MEDICARE

## 2023-07-28 ENCOUNTER — HOSPITAL ENCOUNTER (OUTPATIENT)
Facility: HOSPITAL | Age: 73
Discharge: HOME | End: 2023-07-28
Payer: MEDICARE

## 2023-07-28 DIAGNOSIS — R92.8 OTHER ABNORMAL AND INCONCLUSIVE FINDINGS ON DIAGNOSTIC IMAGING OF BREAST: ICD-10-CM

## 2023-07-28 PROCEDURE — 76642 ULTRASOUND BREAST LIMITED: CPT

## 2023-07-28 PROCEDURE — G0279 TOMOSYNTHESIS, MAMMO: HCPCS

## 2023-08-01 ENCOUNTER — HOSPITAL ENCOUNTER (OUTPATIENT)
Facility: HOSPITAL | Age: 73
Setting detail: RECURRING SERIES
Discharge: HOME | End: 2023-08-04
Payer: MEDICARE

## 2023-08-01 PROCEDURE — 97112 NEUROMUSCULAR REEDUCATION: CPT

## 2023-08-01 PROCEDURE — 97110 THERAPEUTIC EXERCISES: CPT

## 2023-08-01 NOTE — PROGRESS NOTES
PHYSICAL / OCCUPATIONAL THERAPY - DAILY TREATMENT NOTE (updated )    Patient Name: Perri Zelaya    Date: 2023    : 1950  Insurance: Payor: MEDICARE / Plan: MEDICARE PART A AND B / Product Type: *No Product type* /      Patient  verified Yes     Visit #   Current / Total 3 24   Time   In / Out 1:42 2:27   Pain   In / Out 3 1   Subjective Functional Status/Changes: Pt reported feeling not bad today, just want to get stronger   Changes to: Allergies, Med Hx, Sx Hx?   no       TREATMENT AREA =  Other low back pain [M54.59]    OBJECTIVE    Mo  Therapeutic Procedures: Tx Min Billable or 1:1 Min (if diff from Tx Min) Procedure, Rationale, Specifics   25  06756 Therapeutic Exercise (timed):  increase ROM, strength, coordination, balance, and proprioception to improve patient's ability to progress to PLOF and address remaining functional goals. (see flow sheet as applicable)     Details if applicable:  Gait training performed to normalize the gait pattern     18  85488 Neuromuscular Re-Education (timed):  improve balance, coordination, kinesthetic sense, posture, core stability and proprioception to improve patient's ability to develop conscious control of individual muscles and awareness of position of extremities in order to progress to PLOF and address remaining functional goals.  (see flow sheet as applicable)     Details if applicable:     37  [facility] Totals Reminder: bill using total billable min of TIMED therapeutic procedures (example: do not include dry needle or estim unattended, both untimed codes, in totals to left)  8-22 min = 1 unit; 23-37 min = 2 units; 38-52 min = 3 units; 53-67 min = 4 units; 68-82 min = 5 units   Total Total     TOTAL TREATMENT TIME:        43     [x]  Patient Education billed concurrently with other procedures   [x] Review HEP    [] Progressed/Changed HEP, detail:    [] Other detail:       Objective Information/Functional Measures/Assessment    Pt is motivated to

## 2023-08-03 ENCOUNTER — HOSPITAL ENCOUNTER (OUTPATIENT)
Facility: HOSPITAL | Age: 73
Setting detail: RECURRING SERIES
Discharge: HOME | End: 2023-08-06
Payer: MEDICARE

## 2023-08-03 PROCEDURE — 97112 NEUROMUSCULAR REEDUCATION: CPT

## 2023-08-03 PROCEDURE — 97110 THERAPEUTIC EXERCISES: CPT

## 2023-08-03 NOTE — PROGRESS NOTES
Information/Functional Measures/Assessment    Therapist provided cues for correct technique and muscle activation with exercises. Initiated step ups onto 2 inch step and patient able to perform with good control. Patient relies on increased (B) UE support with standing exercises, but was able to perform 5 reps of alternating toe tap on 4inch step with only 1 HHA. Initiated balance activity of Romberg stance on floor with EO without HHA and patient was able to maintain position with focus on upright posture and TA activation without LOB. Patient has tenderness at proximal HS and ITB region, but reported less pain at end of the session. Patient will continue to benefit from skilled PT / OT services to modify and progress therapeutic interventions, analyze and address functional mobility deficits, analyze and address ROM deficits, analyze and address strength deficits, analyze and address soft tissue restrictions, analyze and cue for proper movement patterns, analyze and modify for postural abnormalities, and analyze and address imbalance/dizziness to address functional deficits and attain remaining goals. Progress toward goals / Updated goals:  []  See Progress Note/Recertification    Patient will be independent and compliant with HEP to increase ease with ADLs. Eval: HEP established   Current: Met 7/26/23 HEP understanding and compliance reported. 2. Patient will be able to perform 5 sit to stand transfers from chair height without use of UE support to increase ease with car transfers. Eval: Patient utilizes UE support with sit to stand transfers. Current: 10 sit to stand transfers w/o any UE support but required CGA, 08/01/2023  Long Term Goals: To be accomplished in 12 weeks  Patient will improve FOTO to at least 55 to demonstrate improved function. Eval; FOTO: 43  2.  Patient will ambulate 300 feet with SPC with SBA and without incidents of instability to increase ease with home

## 2023-08-04 NOTE — PROGRESS NOTES
Beside report given to [facility] from Jennifer Garcia. Svetlana to includes SBAR, Kardex, recent results, MAR, intake/output. The patient is a 32y Female complaining of abdominal pain.

## 2023-08-07 ENCOUNTER — HOSPITAL ENCOUNTER (OUTPATIENT)
Facility: HOSPITAL | Age: 73
Setting detail: RECURRING SERIES
Discharge: HOME | End: 2023-08-10
Payer: MEDICARE

## 2023-08-07 PROCEDURE — 97110 THERAPEUTIC EXERCISES: CPT

## 2023-08-07 PROCEDURE — 97112 NEUROMUSCULAR REEDUCATION: CPT

## 2023-08-07 NOTE — PROGRESS NOTES
PHYSICAL / OCCUPATIONAL THERAPY - DAILY TREATMENT NOTE (updated )    Patient Name: Ministerio Draft    Date: 2023    : 1950  Insurance: Payor: MEDICARE / Plan: MEDICARE PART A AND B / Product Type: *No Product type* /      Patient  verified Yes     Visit #   Current / Total 5 24   Time   In / Out 1:51 2:34   Pain   In / Out 3 2   Subjective Functional Status/Changes: Patient thinks PT has been helping. Changes to: Allergies, Med Hx, Sx Hx?   no       TREATMENT AREA =  Other low back pain [M54.59]    OBJECTIVE      Therapeutic Procedures: Tx Min Billable or 1:1 Min (if diff from Tx Min) Procedure, Rationale, Specifics   33 29 44561 Therapeutic Exercise (timed):  increase ROM, strength, coordination, balance, and proprioception to improve patient's ability to progress to PLOF and address remaining functional goals. (see flow sheet as applicable)     Details if applicable:       10 10 56046 Neuromuscular Re-Education (timed):  improve balance, coordination, kinesthetic sense, posture, core stability and proprioception to improve patient's ability to develop conscious control of individual muscles and awareness of position of extremities in order to progress to PLOF and address remaining functional goals.  (see flow sheet as applicable)     Details if applicable:            Details if applicable:            Details if applicable:            Details if applicable:     39 44 [facility] Totals Reminder: bill using total billable min of TIMED therapeutic procedures (example: do not include dry needle or estim unattended, both untimed codes, in totals to left)  8-22 min = 1 unit; 23-37 min = 2 units; 38-52 min = 3 units; 53-67 min = 4 units; 68-82 min = 5 units   Total Total     TOTAL TREATMENT TIME:        43     [x]  Patient Education billed concurrently with other procedures   [x] Review HEP    [] Progressed/Changed HEP, detail:    [] Other detail:       Objective Information/Functional

## 2023-08-09 ENCOUNTER — HOSPITAL ENCOUNTER (OUTPATIENT)
Facility: HOSPITAL | Age: 73
Setting detail: RECURRING SERIES
Discharge: HOME | End: 2023-08-12
Payer: MEDICARE

## 2023-08-09 PROCEDURE — 97112 NEUROMUSCULAR REEDUCATION: CPT

## 2023-08-09 PROCEDURE — 97110 THERAPEUTIC EXERCISES: CPT

## 2023-08-09 NOTE — PROGRESS NOTES
38-52 min = 3 units; 53-67 min = 4 units; 68-82 min = 5 units   Total Total     TOTAL TREATMENT TIME:        55     [x]  Patient Education billed concurrently with other procedures   [x] Review HEP    [] Progressed/Changed HEP, detail:    [] Other detail:       Objective Information/Functional Measures/Assessment    Secondary to pain in the left LE, pt was feeling balance impairments and hence required CGA with the hip 3-way exercises and Sit to stand tansfers. Required multiple seated rest breaks today. Reported some pain in the left piriformis, therefore stretching provided before CP application for pain relief. Patient will continue to benefit from skilled PT / OT services to modify and progress therapeutic interventions, analyze and address functional mobility deficits, analyze and address ROM deficits, analyze and address strength deficits, analyze and address soft tissue restrictions, analyze and cue for proper movement patterns, analyze and modify for postural abnormalities, and analyze and address imbalance/dizziness to address functional deficits and attain remaining goals. Progress toward goals / Updated goals:  []  See Progress Note/Recertification    Patient will be independent and compliant with HEP to increase ease with ADLs. Eval: HEP established   Current: Met 7/26/23 HEP understanding and compliance reported. 2. Patient will be able to perform 5 sit to stand transfers from chair height without use of UE support to increase ease with car transfers. Eval: Patient utilizes UE support with sit to stand transfers. Current: 10 sit to stand transfers w/o any UE support but required CGA, 08/01/2023  Current: Slowly progressing 10 sit to stand with CGA, 08/09/2023  Long Term Goals: To be accomplished in 12 weeks  Patient will improve FOTO to at least 55 to demonstrate improved function. Eval; FOTO: 43  2.  Patient will ambulate 300 feet with SPC with SBA and without incidents of

## 2023-08-14 ENCOUNTER — HOSPITAL ENCOUNTER (OUTPATIENT)
Facility: HOSPITAL | Age: 73
Setting detail: RECURRING SERIES
Discharge: HOME | End: 2023-08-17
Payer: MEDICARE

## 2023-08-14 PROCEDURE — 97530 THERAPEUTIC ACTIVITIES: CPT

## 2023-08-14 PROCEDURE — 97112 NEUROMUSCULAR REEDUCATION: CPT

## 2023-08-14 PROCEDURE — 97110 THERAPEUTIC EXERCISES: CPT

## 2023-08-14 NOTE — PROGRESS NOTES
discomfort with Wb'ing exercises. Pt notes fair relief with stretching exercises. Plan to initiate ambulation with a SPC and to proactive stair negotiation NV. Pt reports decreased pain post treatment. Patient will continue to benefit from skilled PT / OT services to modify and progress therapeutic interventions, analyze and address functional mobility deficits, analyze and address ROM deficits, analyze and address strength deficits, analyze and address soft tissue restrictions, analyze and cue for proper movement patterns, and analyze and modify for postural abnormalities to address functional deficits and attain remaining goals. Progress toward goals / Updated goals:  [x]  See Progress Note/Recertification  Patient will be independent and compliant with HEP to increase ease with ADLs. Eval: HEP established   Current: Met 7/26/23 HEP understanding and compliance reported. 2. Patient will be able to perform 5 sit to stand transfers from chair height without use of UE support to increase ease with car transfers. Eval: Patient utilizes UE support with sit to stand transfers. Current: Slowly progressing 10 sit to stand with CGA, 08/09/2023  Long Term Goals: To be accomplished in 12 weeks  Patient will improve FOTO to at least 55 to demonstrate improved function. Eval; FOTO: 43  2. Patient will ambulate 300 feet with SPC with SBA and without incidents of instability to increase ease with home navigation. Eval: Patient utilizes a RW with ambulation  3. Patient will be able to negotiate 4 stairs with a reciprocal gait to increase ease with community ADLs.               Eval: Patient uses a ramp at home              Current: progressing: Initiated step ups onto 4inch step (8/7/23)       PLAN  Yes  Continue plan of care  []  Upgrade activities as tolerated  []  Discharge due to :  []  Other:    Caitlin Gardner, HUBERT    8/14/2023    1:49 PM    Future Appointments   Date Time Provider

## 2023-08-15 ENCOUNTER — HOSPITAL ENCOUNTER (OUTPATIENT)
Facility: HOSPITAL | Age: 73
Discharge: HOME | End: 2023-08-18
Payer: MEDICARE

## 2023-08-15 DIAGNOSIS — R92.8 ABNORMAL MAMMOGRAM: ICD-10-CM

## 2023-08-15 DIAGNOSIS — N63.21 MASS OF UPPER OUTER QUADRANT OF LEFT BREAST: ICD-10-CM

## 2023-08-15 PROCEDURE — 2500000003 HC RX 250 WO HCPCS

## 2023-08-15 PROCEDURE — 77065 DX MAMMO INCL CAD UNI: CPT

## 2023-08-15 PROCEDURE — A4648 IMPLANTABLE TISSUE MARKER: HCPCS

## 2023-08-15 PROCEDURE — 88305 TISSUE EXAM BY PATHOLOGIST: CPT

## 2023-08-15 PROCEDURE — 19083 BX BREAST 1ST LESION US IMAG: CPT

## 2023-08-15 PROCEDURE — 19084 BX BREAST ADD LESION US IMAG: CPT

## 2023-08-15 RX ORDER — LIDOCAINE HYDROCHLORIDE AND EPINEPHRINE 10; 10 MG/ML; UG/ML
20 INJECTION, SOLUTION INFILTRATION; PERINEURAL ONCE
Status: COMPLETED | OUTPATIENT
Start: 2023-08-15 | End: 2023-08-15

## 2023-08-15 RX ORDER — LIDOCAINE HYDROCHLORIDE 10 MG/ML
10 INJECTION, SOLUTION EPIDURAL; INFILTRATION; INTRACAUDAL; PERINEURAL ONCE
Status: COMPLETED | OUTPATIENT
Start: 2023-08-15 | End: 2023-08-15

## 2023-08-15 RX ADMIN — LIDOCAINE HYDROCHLORIDE AND EPINEPHRINE 20 ML: 10; 10 INJECTION, SOLUTION INFILTRATION; PERINEURAL at 13:26

## 2023-08-15 RX ADMIN — LIDOCAINE HYDROCHLORIDE AND EPINEPHRINE 20 ML: 10; 10 INJECTION, SOLUTION INFILTRATION; PERINEURAL at 13:28

## 2023-08-15 RX ADMIN — LIDOCAINE HYDROCHLORIDE 10 ML: 10 INJECTION, SOLUTION EPIDURAL; INFILTRATION; INTRACAUDAL; PERINEURAL at 13:28

## 2023-08-15 RX ADMIN — LIDOCAINE HYDROCHLORIDE AND EPINEPHRINE 20 ML: 10; 10 INJECTION, SOLUTION INFILTRATION; PERINEURAL at 13:32

## 2023-08-15 RX ADMIN — LIDOCAINE HYDROCHLORIDE 10 ML: 10 INJECTION, SOLUTION EPIDURAL; INFILTRATION; INTRACAUDAL; PERINEURAL at 13:31

## 2023-08-15 RX ADMIN — LIDOCAINE HYDROCHLORIDE 10 ML: 10 INJECTION, SOLUTION EPIDURAL; INFILTRATION; INTRACAUDAL; PERINEURAL at 13:25

## 2023-08-16 ENCOUNTER — HOSPITAL ENCOUNTER (OUTPATIENT)
Facility: HOSPITAL | Age: 73
Setting detail: RECURRING SERIES
Discharge: HOME | End: 2023-08-19
Payer: MEDICARE

## 2023-08-16 PROCEDURE — 97116 GAIT TRAINING THERAPY: CPT

## 2023-08-16 PROCEDURE — 97110 THERAPEUTIC EXERCISES: CPT

## 2023-08-16 PROCEDURE — 97112 NEUROMUSCULAR REEDUCATION: CPT

## 2023-08-16 NOTE — PROGRESS NOTES
PHYSICAL / OCCUPATIONAL THERAPY - DAILY TREATMENT NOTE (updated )    Patient Name: Johana Matson    Date: 2023    : 1950  Insurance: Payor: MEDICARE / Plan: MEDICARE PART A AND B / Product Type: *No Product type* /      Patient  verified Yes     Visit #   Current / Total 8 24   Time   In / Out 150 238   Pain   In / Out 3 2   Subjective Functional Status/Changes: Pt reports she has really been working at home because she wants to get better and get back to work. Changes to: Allergies, Med Hx, Sx Hx?   no       TREATMENT AREA =  Other low back pain [M54.59]    OBJECTIVE    Therapeutic Procedures: Tx Min Billable or 1:1 Min (if diff from Tx Min) Procedure, Rationale, Specifics   21  97897 Therapeutic Exercise (timed):  increase ROM, strength, coordination, balance, and proprioception to improve patient's ability to progress to PLOF and address remaining functional goals. (see flow sheet as applicable)     Details if applicable:       15  63168 Neuromuscular Re-Education (timed):  improve balance, coordination, kinesthetic sense, posture, core stability and proprioception to improve patient's ability to develop conscious control of individual muscles and awareness of position of extremities in order to progress to PLOF and address remaining functional goals. (see flow sheet as applicable)     Details if applicable:     12  27242 Gait Training (timed):    100 feet with SPC (assistive device) over level surfaces with SBA/CGA level of assist. Cuing for wider stride. To improve safety and dynamic movement with household/community ambulation.   (see flow sheet as applicable)     Details if applicable:            Details if applicable:            Details if applicable:     50  [address] Totals Reminder: bill using total billable min of TIMED therapeutic procedures (example: do not include dry needle or estim unattended, both untimed codes, in totals to left)  8-22 min = 1 unit; 23-37 min = 2 units; 38-52 min

## 2023-08-18 ENCOUNTER — CLINICAL DOCUMENTATION (OUTPATIENT)
Age: 73
End: 2023-08-18

## 2023-08-18 NOTE — PROGRESS NOTES
[address] [facility]  Breast & Colorectal Oncology Nurse Navigator Encounter    Name: Sukhwinder Pathak  Age: 67 y.o.  : 1950  Diagnosis & Date: Left breast Mass 2023    Encounter type:  [x]Initial Navigator Encounter  []Patient Initiated  []Navigator Follow-up  []Other:       Initial meeting via phone to discuss recent breast biopsy results. Ms. Aquiles Phillips is diagnosed with benign left breast mass x 3. Dr. Billy Arias reviewed results benign and concordant. Recommend annual follow up.         Interdisciplinary Team:    Nurse Navigator: YARIEL House BSN, RN  Breast & Colorectal Cancer Nurse Navigator    [address] [facility]  Office number [phone]  [email or web address]  [facility] Help to Those in [facility]

## 2023-08-21 ENCOUNTER — APPOINTMENT (OUTPATIENT)
Facility: HOSPITAL | Age: 73
End: 2023-08-21
Payer: MEDICARE

## 2023-08-23 ENCOUNTER — HOSPITAL ENCOUNTER (OUTPATIENT)
Facility: HOSPITAL | Age: 73
Setting detail: RECURRING SERIES
Discharge: HOME | End: 2023-08-26
Payer: MEDICARE

## 2023-08-23 PROCEDURE — 97110 THERAPEUTIC EXERCISES: CPT

## 2023-08-23 PROCEDURE — 97116 GAIT TRAINING THERAPY: CPT

## 2023-08-23 PROCEDURE — 97112 NEUROMUSCULAR REEDUCATION: CPT

## 2023-08-23 NOTE — PROGRESS NOTES
[address] [facility] [address] [facility] - Ph: [phone]   Fx: [phone]    PHYSICAL THERAPY PROGRESS NOTE      Patient name: Devon Blanco Start of Care: 2023   Referral source: Arely Florence* : 1950    Medical Diagnosis: Other low back pain [M54.59]  Payor: Naomi Narvaez / Plan: MEDICARE PART A AND B / Product Type: *No Product type* /  Onset Date:DOS: 22                Treatment Diagnosis: : M54.59  OTHER LOWER BACK PAIN                                     Prior Hospitalization: see medical history Provider#: [id number]   Medications: Verified on Patient summary List   Comorbidities: Back pain, DM, HTN, Prior surgery, Visual Impairment, Cervical fusion-2022  Prior Level of Function: Patient reports 6 months prior to the low back surgery was not using a RW, but occasionally would use a SPC. Visits from Start of Care: 9    Missed Visits: 1    Goals/Measure of Progress: To be achieved in 8 weeks:  2. Patient will be able to perform 5 sit to stand transfers from chair height without use of UE support to increase ease with car transfers. Eval: Patient utilizes UE support with sit to stand transfers. PN: Slowly progressing 10 sit to stand with CGA,   Patient will improve FOTO to at least 55 to demonstrate improved function. Eval; FOTO: 43  PN: slight regression  FOTO score 41%  2. Patient will ambulate 300 feet with SPC with SBA and without incidents of instability to increase ease with home navigation. Eval: Patient utilizes a RW with ambulation              PN: progressing  100ft with SPC, GB and SBA with evidence of instability and fatigue. 3. Patient will be able to negotiate 4 stairs with a reciprocal gait to increase ease with community ADLs.               Eval: Patient uses a ramp at home              PN: progressing: Initiated step ups onto 4inch step     Patient will be

## 2023-08-23 NOTE — PROGRESS NOTES
PHYSICAL / OCCUPATIONAL THERAPY - DAILY TREATMENT NOTE (updated )    Patient Name: Michela Means    Date: 2023    : 1950  Insurance: Payor: MEDICARE / Plan: MEDICARE PART A AND B / Product Type: *No Product type* /      Patient  verified Yes     Visit #   Current / Total 9 24   Time   In / Out 150 230   Pain   In / Out 2 1   Subjective Functional Status/Changes: Pt reports she is feeling good today. Changes to: Allergies, Med Hx, Sx Hx?   no       TREATMENT AREA =  Other low back pain [M54.59]    OBJECTIVE    Therapeutic Procedures: Tx Min Billable or 1:1 Min (if diff from Tx Min) Procedure, Rationale, Specifics   14  56885 Therapeutic Exercise (timed):  increase ROM, strength, coordination, balance, and proprioception to improve patient's ability to progress to PLOF and address remaining functional goals. (see flow sheet as applicable)     Details if applicable:       18   Neuromuscular Re-Education (timed):  improve balance, coordination, kinesthetic sense, posture, core stability and proprioception to improve patient's ability to develop conscious control of individual muscles and awareness of position of extremities in order to progress to PLOF and address remaining functional goals. (see flow sheet as applicable)     Details if applicable:     8  50481 Gait Training (timed):    95 feet with SPC (assistive device) over level surfaces with CGA level of assist. Cuing for longer stride. To improve safety and dynamic movement with household/community ambulation.   (see flow sheet as applicable)     Details if applicable:            Details if applicable:            Details if applicable:     36  [facility] Totals Reminder: bill using total billable min of TIMED therapeutic procedures (example: do not include dry needle or estim unattended, both untimed codes, in totals to left)  8-22 min = 1 unit; 23-37 min = 2 units; 38-52 min = 3 units; 53-67 min = 4 units; 68-82 min = 5 units   Total Total

## 2023-08-25 ENCOUNTER — HOSPITAL ENCOUNTER (OUTPATIENT)
Facility: HOSPITAL | Age: 73
Setting detail: RECURRING SERIES
Discharge: HOME | End: 2023-08-28
Payer: MEDICARE

## 2023-08-25 PROCEDURE — 97110 THERAPEUTIC EXERCISES: CPT

## 2023-08-25 PROCEDURE — 97116 GAIT TRAINING THERAPY: CPT

## 2023-08-25 PROCEDURE — 97112 NEUROMUSCULAR REEDUCATION: CPT

## 2023-08-25 NOTE — PROGRESS NOTES
PHYSICAL / OCCUPATIONAL THERAPY - DAILY TREATMENT NOTE (updated )    Patient Name: Deanna Oglesby    Date: 2023    : 1950  Insurance: Payor: MEDICARE / Plan: MEDICARE PART A AND B / Product Type: *No Product type* /      Patient  verified Yes     Visit #   Current / Total 10 24   Time   In / Out 150 229   Pain   In / Out 2 2   Subjective Functional Status/Changes: No new complaints. Changes to:  Meds, Allergies, Med Hx, Sx Hx? If yes, update Summary List no       TREATMENT AREA =  Other low back pain [M54.59]    OBJECTIVE      Therapeutic Procedures: Tx Min Billable or 1:1 Min (if diff from Tx Min) Procedure, Rationale, Specifics   21  57935 Therapeutic Exercise (timed):  increase ROM, strength, coordination, balance, and proprioception to improve patient's ability to progress to PLOF and address remaining functional goals. (see flow sheet as applicable)     Details if applicable:       10  38414 Neuromuscular Re-Education (timed):  improve balance, coordination, kinesthetic sense, posture, core stability and proprioception to improve patient's ability to develop conscious control of individual muscles and awareness of position of extremities in order to progress to PLOF and address remaining functional goals. (see flow sheet as applicable)     Details if applicable:     8  81998 Gait Training (timed):    115 feet with SPC (assistive device) over level surfaces with CGA level of assist. Cuing for increase stride. To improve safety and dynamic movement with household/community ambulation.   (see flow sheet as applicable)     Details if applicable:            Details if applicable:            Details if applicable:     44  [address] Totals Reminder: bill using total billable min of TIMED therapeutic procedures (example: do not include dry needle or estim unattended, both untimed codes, in totals to left)  8-22 min = 1 unit; 23-37 min = 2 units; 38-52 min = 3 units; 53-67 min = 4 units; 68-82 min = 5

## 2023-08-30 ENCOUNTER — HOSPITAL ENCOUNTER (OUTPATIENT)
Facility: HOSPITAL | Age: 73
Setting detail: RECURRING SERIES
Discharge: HOME | End: 2023-09-02
Payer: MEDICARE

## 2023-08-30 PROCEDURE — 97530 THERAPEUTIC ACTIVITIES: CPT

## 2023-08-30 PROCEDURE — 97110 THERAPEUTIC EXERCISES: CPT

## 2023-08-30 NOTE — PROGRESS NOTES
28  11526 Therapeutic Exercise (timed):  increase ROM, strength, coordination, balance, and proprioception to improve patient's ability to progress to PLOF and address remaining functional goals. (see flow sheet as applicable)     Details if applicable:       15  98116 Therapeutic Activity (timed):  use of dynamic activities replicating functional movements to increase ROM, strength, coordination, balance, and proprioception in order to improve patient's ability to progress to PLOF and address remaining functional goals. (see flow sheet as applicable)     Details if applicable:            Details if applicable:            Details if applicable:            Details if applicable:     37  [facility] Totals Reminder: bill using total billable min of TIMED therapeutic procedures (example: do not include dry needle or estim unattended, both untimed codes, in totals to left)  8-22 min = 1 unit; 23-37 min = 2 units; 38-52 min = 3 units; 53-67 min = 4 units; 68-82 min = 5 units   Total Total     TOTAL TREATMENT TIME:        53     [x]  Patient Education billed concurrently with other procedures   [x] Review HEP    [] Progressed/Changed HEP, detail:    [] Other detail:       Objective Information/Functional Measures/Assessment  Increased left lateral LE pain and discomfort reported this visit that increased with Wb'ing exercises. Several Wb'ing exercises held. Fig 4 stretching initiated to aid with decreased discomfort with pt reporting a good stretch on the left. S/L clams and FH with an org band initiated to continue to strengthen the left hip. Stanton stretch held due to increased L/S pain with exercise this visit. Reassess NV and resume therex per pt tolerance.     Patient will continue to benefit from skilled PT / OT services to modify and progress therapeutic interventions, analyze and address functional mobility deficits, analyze and address ROM deficits, analyze and address strength deficits, analyze and address soft

## 2023-09-01 ENCOUNTER — HOSPITAL ENCOUNTER (OUTPATIENT)
Facility: HOSPITAL | Age: 73
Setting detail: RECURRING SERIES
Discharge: HOME | End: 2023-09-04
Payer: MEDICARE

## 2023-09-01 PROCEDURE — 97530 THERAPEUTIC ACTIVITIES: CPT

## 2023-09-01 PROCEDURE — 97116 GAIT TRAINING THERAPY: CPT

## 2023-09-01 PROCEDURE — 97140 MANUAL THERAPY 1/> REGIONS: CPT

## 2023-09-01 PROCEDURE — 97110 THERAPEUTIC EXERCISES: CPT

## 2023-09-01 NOTE — PROGRESS NOTES
PHYSICAL / OCCUPATIONAL THERAPY - DAILY TREATMENT NOTE (updated )    Patient Name: Michela Means    Date: 2023    : 1950  Insurance: Payor: MEDICARE / Plan: MEDICARE PART A AND B / Product Type: *No Product type* /      Patient  verified Yes     Visit #   Current / Total 12 40   Time   In / Out 1113 1156   Pain   In / Out 3 1-2   Subjective Functional Status/Changes: Pt reports she is still having left leg pain but it is not quite as bad as it was at her last session. Changes to: Allergies, Med Hx, Sx Hx?   no       TREATMENT AREA =  Other low back pain [M54.59]    OBJECTIVE    Therapeutic Procedures: Tx Min Billable or 1:1 Min (if diff from Tx Min) Procedure, Rationale, Specifics   22  20471 Therapeutic Exercise (timed):  increase ROM, strength, coordination, balance, and proprioception to improve patient's ability to progress to PLOF and address remaining functional goals. (see flow sheet as applicable)     Details if applicable:       8  74563 Manual Therapy (timed):  decrease pain, increase ROM, increase tissue extensibility, decrease edema, and decrease trigger points to improve patient's ability to progress to PLOF and address remaining functional goals. The manual therapy interventions were performed at a separate and distinct time from the therapeutic activities interventions . (see flow sheet as applicable)     Details if applicable:     8  74687 Gait Training (timed):    65 feet with SPC (assistive device) over level surfaces with CGA level of assist. Cuing for increased steo length. To improve safety and dynamic movement with household/community ambulation. (see flow sheet as applicable)     Details if applicable:     15  46715 Therapeutic Activity (timed):  use of dynamic activities replicating functional movements to increase ROM, strength, coordination, balance, and proprioception in order to improve patient's ability to progress to PLOF and address remaining functional goals.

## 2023-09-06 ENCOUNTER — HOSPITAL ENCOUNTER (OUTPATIENT)
Facility: HOSPITAL | Age: 73
Setting detail: RECURRING SERIES
Discharge: HOME | End: 2023-09-09
Payer: MEDICARE

## 2023-09-06 PROCEDURE — 97140 MANUAL THERAPY 1/> REGIONS: CPT

## 2023-09-06 PROCEDURE — 97110 THERAPEUTIC EXERCISES: CPT

## 2023-09-06 PROCEDURE — 97112 NEUROMUSCULAR REEDUCATION: CPT

## 2023-09-06 NOTE — PROGRESS NOTES
PHYSICAL / OCCUPATIONAL THERAPY - DAILY TREATMENT NOTE (updated )    Patient Name: Devon Catching    Date: 2023    : 1950  Insurance: Payor: MEDICARE / Plan: MEDICARE PART A AND B / Product Type: *No Product type* /      Patient  verified Yes     Visit #   Current / Total 13 40   Time   In / Out 110 210   Pain   In / Out 2-3 2   Subjective Functional Status/Changes: Pt reports the pain in her left leg is still there. Changes to: Allergies, Med Hx, Sx Hx?   no       TREATMENT AREA =  Other low back pain [M54.59]    OBJECTIVE    Modalities Rationale:     decrease pain and increase tissue extensibility to improve patient's ability to progress to PLOF and address remaining functional goals. min [] Estim Unattended, type/location:                                      []  w/ice    []  w/heat    min [] Estim Attended, type/location:                                     []  w/US     []  w/ice    []  w/heat    []  TENS insruct      min []  Mechanical Traction: type/lbs                   []  pro   []  sup   []  int   []  cont    []  before manual    []  after manual    min []  Ultrasound, settings/location:      min []  Iontophoresis w/ dexamethasone, location:                                               []  take home patch       []  in clinic     10   min  unbilled []  Ice     [x]  Heat    location/position: Right S/L, left hip/ITB    min []  Paraffin,  details:     min []  Vasopneumatic Device, press/temp:     min []  Sharion Delcid / Chely Laws: If using vaso (only need to measure limb vaso being performed on)      pre-treatment girth :       post-treatment girth :       measured at (landmark location) :      min []  Other:    Skin assessment post-treatment (if applicable):    [x]  intact    [x]  redness- no adverse reaction                 []redness - adverse reaction:         Therapeutic Procedures:   Tx Min Billable or 1:1 Min (if diff from Tx Min) Procedure, Rationale, Specifics   95 [id number] Therapeutic

## 2023-09-07 ENCOUNTER — HOSPITAL ENCOUNTER (OUTPATIENT)
Facility: HOSPITAL | Age: 73
Setting detail: RECURRING SERIES
Discharge: HOME | End: 2023-09-10
Payer: MEDICARE

## 2023-09-07 PROCEDURE — 97140 MANUAL THERAPY 1/> REGIONS: CPT

## 2023-09-07 PROCEDURE — 97110 THERAPEUTIC EXERCISES: CPT

## 2023-09-07 PROCEDURE — 97116 GAIT TRAINING THERAPY: CPT

## 2023-09-07 NOTE — PROGRESS NOTES
strength, coordination, balance, and proprioception to improve patient's ability to progress to PLOF and address remaining functional goals. (see flow sheet as applicable)    Details if applicable:       8  66319 Manual Therapy (timed):  decrease pain, increase ROM, and increase tissue extensibility to improve patient's ability to progress to PLOF and address remaining functional goals. The manual therapy interventions were performed at a separate and distinct time from the therapeutic activities interventions . Details: IASTM to left  ITB and glute with patient in sidelying    Details if applicable:     8  89665 Gait Training (timed):    91 feet with SPC (assistive device) over level surfaces with CGA level of assist. Cuing for step length. To improve safety and dynamic movement with household/community ambulation. (see flow sheet as applicable)     Details if applicable:     36  [facility] Totals Reminder: bill using total billable min of TIMED therapeutic procedures (example: do not include dry needle or estim unattended, both untimed codes, in totals to left)  8-22 min = 1 unit; 23-37 min = 2 units; 38-52 min = 3 units; 53-67 min = 4 units; 68-82 min = 5 units   Total Total     TOTAL TREATMENT TIME:        50     [x]  Patient Education billed concurrently with other procedures   [x] Review HEP    [] Progressed/Changed HEP, detail:    [] Other detail:       Objective Information/Functional Measures/Assessment    Pt is able to perform increased reps with many exercises and able to ambulate greater distance with LRAD demosntrating improved functional strength.      Patient will continue to benefit from skilled PT / OT services to modify and progress therapeutic interventions, analyze and address functional mobility deficits, analyze and address ROM deficits, analyze and address strength deficits, analyze and address soft tissue restrictions, analyze and cue for proper movement patterns, and analyze and modify for

## 2023-09-11 ENCOUNTER — HOSPITAL ENCOUNTER (OUTPATIENT)
Facility: HOSPITAL | Age: 73
Setting detail: RECURRING SERIES
Discharge: HOME | End: 2023-09-14
Payer: MEDICARE

## 2023-09-11 PROCEDURE — 97116 GAIT TRAINING THERAPY: CPT

## 2023-09-11 PROCEDURE — 97140 MANUAL THERAPY 1/> REGIONS: CPT

## 2023-09-11 PROCEDURE — 97110 THERAPEUTIC EXERCISES: CPT

## 2023-09-11 NOTE — PROGRESS NOTES
PHYSICAL / OCCUPATIONAL THERAPY - DAILY TREATMENT NOTE (updated )    Patient Name: Dolly Araiza    Date: 2023    : 1950  Insurance: Payor: MEDICARE / Plan: MEDICARE PART A AND B / Product Type: *No Product type* /      Patient  verified Yes     Visit #   Current / Total 15 40   Time   In / Out 11:10 12:02   Pain   In / Out 3 2   Subjective Functional Status/Changes: Pt reports no new complaints of pain. Pt reports compliance with HEP. Changes to: Allergies, Med Hx, Sx Hx?   no       TREATMENT AREA =  Other low back pain [M54.59]    OBJECTIVE    Modalities Rationale:     decrease pain and increase tissue extensibility to improve patient's ability to progress to PLOF and address remaining functional goals. min [] Estim Unattended, type/location:                                      []  w/ice    []  w/heat    min [] Estim Attended, type/location:                                     []  w/US     []  w/ice    []  w/heat    []  TENS insruct      min []  Mechanical Traction: type/lbs                   []  pro   []  sup   []  int   []  cont    []  before manual    []  after manual    min []  Ultrasound, settings/location:      min []  Iontophoresis w/ dexamethasone, location:                                               []  take home patch       []  in clinic     10   min  unbilled []  Ice     [x]  Heat    location/position: Left hip/right sidelying    min []  Paraffin,  details:     min []  Vasopneumatic Device, press/temp:     min []  August Farida / Dolly Benson: If using vaso (only need to measure limb vaso being performed on)      pre-treatment girth :       post-treatment girth :       measured at (landmark location) :      min []  Other:    Skin assessment post-treatment (if applicable):    []  intact    []  redness- no adverse reaction                 []redness - adverse reaction:         Therapeutic Procedures:   Tx Min Billable or 1:1 Min (if diff from [employer]) Procedure, Rationale, Specifics

## 2023-09-13 ENCOUNTER — HOSPITAL ENCOUNTER (OUTPATIENT)
Facility: HOSPITAL | Age: 73
Setting detail: RECURRING SERIES
Discharge: HOME | End: 2023-09-16
Payer: MEDICARE

## 2023-09-13 PROCEDURE — 97110 THERAPEUTIC EXERCISES: CPT

## 2023-09-13 PROCEDURE — 97530 THERAPEUTIC ACTIVITIES: CPT

## 2023-09-13 NOTE — PROGRESS NOTES
Collette More, HUBERT    9/13/2023    11:36 AM    Future Appointments   Date Time Provider Department Center   9/13/2023 11:50 AM [facility]   [facility]18[facility]2023 [facility]   9/20/2023 11:10 AM [facility]

## 2023-09-18 ENCOUNTER — HOSPITAL ENCOUNTER (OUTPATIENT)
Facility: HOSPITAL | Age: 73
Setting detail: RECURRING SERIES
Discharge: HOME | End: 2023-09-21
Payer: MEDICARE

## 2023-09-18 PROCEDURE — 97110 THERAPEUTIC EXERCISES: CPT

## 2023-09-18 PROCEDURE — 97530 THERAPEUTIC ACTIVITIES: CPT

## 2023-09-18 PROCEDURE — 97116 GAIT TRAINING THERAPY: CPT

## 2023-09-18 NOTE — PROGRESS NOTES
modify and progress therapeutic interventions, analyze and address functional mobility deficits, analyze and address ROM deficits, analyze and address strength deficits, analyze and address soft tissue restrictions, analyze and cue for proper movement patterns, and analyze and modify for postural abnormalities to address functional deficits and attain remaining goals. Progress toward goals / Updated goals:  [x]  See Progress Note/Recertification  Goals/Measure of Progress: To be achieved in 8 weeks:  2. Patient will be able to perform 5 sit to stand transfers from chair height without use of UE support to increase ease with car transfers. Eval: Patient utilizes UE support with sit to stand transfers. PN: Slowly progressing 10 sit to stand with CGA,   Current: progressing 9/18/23 sit to stands from 19.5\" height with no UE use. Patient will improve FOTO to at least 55 to demonstrate improved function. Eval; FOTO: 43  PN: slight regression  FOTO score 41%  2. Patient will ambulate 300 feet with SPC with SBA and without incidents of instability to increase ease with home navigation. Eval: Patient utilizes a RW with ambulation              PN: progressing  100ft with SPC, GB and SBA with evidence of instability and fatigue. 3. Patient will be able to negotiate 4 stairs with a reciprocal gait to increase ease with community ADLs. Eval: Patient uses a ramp at home              PN: progressing: Initiated step ups onto 4inch step    Current: Met 9/18/23 4 stair negotiation on 6\" steps in a reciprocal pattern with B UE assist.     Patient will be independent and compliant with HEP to increase ease with ADLs.   Eval: HEP established   PN: Met HEP understanding and compliance reported         PLAN  Yes  Continue plan of care  []  Upgrade activities as tolerated  []  Discharge due to :  []  Other:    Abbi Schulz PTA    9/18/2023    10:29 AM    Future Appointments   Date Time

## 2023-09-20 ENCOUNTER — HOSPITAL ENCOUNTER (OUTPATIENT)
Facility: HOSPITAL | Age: 73
Setting detail: RECURRING SERIES
Discharge: HOME | End: 2023-09-23
Payer: MEDICARE

## 2023-09-20 PROCEDURE — 97116 GAIT TRAINING THERAPY: CPT

## 2023-09-20 PROCEDURE — 97110 THERAPEUTIC EXERCISES: CPT

## 2023-09-20 PROCEDURE — 97530 THERAPEUTIC ACTIVITIES: CPT

## 2023-09-22 NOTE — PROGRESS NOTES
PHYSICAL / OCCUPATIONAL THERAPY - DAILY TREATMENT NOTE (updated )    Patient Name: Tamanna Burch    Date: 2023    : 1950  Insurance: Payor: MEDICARE / Plan: MEDICARE PART A AND B / Product Type: *No Product type* /      Patient  verified Yes     Visit #   Current / Total 18 40   Time   In / Out 1110 1210   Pain   In / Out 3 1   Subjective Functional Status/Changes: Pt reports she is feeling pretty good today. Changes to: Allergies, Med Hx, Sx Hx?   no       TREATMENT AREA =  Other low back pain [M54.59]    OBJECTIVE    Modalities Rationale:     decrease pain and increase tissue extensibility to improve patient's ability to progress to PLOF and address remaining functional goals. min [] Estim Unattended, type/location:                                      []  w/ice    []  w/heat    min [] Estim Attended, type/location:                                     []  w/US     []  w/ice    []  w/heat    []  TENS insruct      min []  Mechanical Traction: type/lbs                   []  pro   []  sup   []  int   []  cont    []  before manual    []  after manual    min []  Ultrasound, settings/location:      min []  Iontophoresis w/ dexamethasone, location:                                               []  take home patch       []  in clinic     10   min  unbilled []  Ice     [x]  Heat    location/position: Left S/L, right hip    min []  Paraffin,  details:     min []  Vasopneumatic Device, press/temp:     min []  Campos Savant / Robert Pounds: If using vaso (only need to measure limb vaso being performed on)      pre-treatment girth :       post-treatment girth :       measured at (landmark location) :      min []  Other:    Skin assessment post-treatment (if applicable):    [x]  intact    [x]  redness- no adverse reaction                 []redness - adverse reaction:         Therapeutic Procedures:   Tx Min Billable or 1:1 Min (if diff from Tx Min) Procedure, Rationale, Specifics   19  99814 Therapeutic Exercise
assist.   Patient will be independent and compliant with HEP to increase ease with ADLs. Eval: HEP established   PN: Met HEP understanding and compliance reported      Summary of Care/ Key Functional Changes:   Pt continues to display gradual progress in strength and functional mobility since [facility]. The pt has displayed an improved ambulation tolerance and is progressing with ambulating with a SPC. The pt does continue to ambulate with a forward flexed posture and notes increased pain in the L/S and right LE with standing with tall, upright posture. The pt also displays improved sit to stand transfers with the ability to perform for 10 reps at an 19.5\" table, the standard chair is about 19\". The pt continues to note little change in her pain remaining at about a 3/10 but has displayed an overall improvement in functional mobility. The pt is to benefit from continued treatment to continue to improve B hip and glute strength, core strength and ease with ambulation ADL's. ASSESSMENT/RECOMMENDATIONS:    Continue per plan of care.      Thank you for this referral.   Jennifer Rudd, PTA 9/22/2023 1:47 PM

## 2023-09-27 ENCOUNTER — APPOINTMENT (OUTPATIENT)
Facility: HOSPITAL | Age: 73
End: 2023-09-27
Payer: MEDICARE

## 2023-09-27 ENCOUNTER — TELEPHONE (OUTPATIENT)
Facility: HOSPITAL | Age: 73
End: 2023-09-27

## 2023-09-29 ENCOUNTER — HOSPITAL ENCOUNTER (OUTPATIENT)
Facility: HOSPITAL | Age: 73
Setting detail: RECURRING SERIES
End: 2023-09-29
Payer: MEDICARE

## 2023-09-29 PROCEDURE — 97116 GAIT TRAINING THERAPY: CPT

## 2023-09-29 PROCEDURE — 97110 THERAPEUTIC EXERCISES: CPT

## 2023-09-29 PROCEDURE — 97530 THERAPEUTIC ACTIVITIES: CPT

## 2023-09-29 NOTE — PROGRESS NOTES
PHYSICAL / OCCUPATIONAL THERAPY - DAILY TREATMENT NOTE (updated )    Patient Name: Fernando Olivas    Date: 2023    : 1950  Insurance: Payor: MEDICARE / Plan: MEDICARE PART A AND B / Product Type: *No Product type* /      Patient  verified Yes     Visit #   Current / Total 19 40   Time   In / Out 9:50  10:38   Pain   In / Out 3 2   Subjective Functional Status/Changes: Patient stated she went back to the surgeon and she is going to get more imaging done to ensure everything has healed well. Patient stated MD told her to continue with PT. Changes to: Allergies, Med Hx, Sx Hx?   no       TREATMENT AREA =  Other low back pain [M54.59]    OBJECTIVE    Therapeutic Procedures: Tx Min Billable or 1:1 Min (if diff from Tx Min) Procedure, Rationale, Specifics   25 15 [id number] Therapeutic Exercise (timed):  increase ROM, strength, coordination, balance, and proprioception to improve patient's ability to progress to PLOF and address remaining functional goals. (see flow sheet as applicable)    Details if applicable: In parallel bars_    15 15 12324 Therapeutic Activity (timed):  use of dynamic activities replicating functional movements to increase ROM, strength, coordination, balance, and proprioception in order to improve patient's ability to progress to PLOF and address remaining functional goals. (see flow sheet as applicable)    Details if applicable:     8 8 34994 Gait Training (timed):    15x2 feet with NO (assistive device) over level surfaces with SBA level of assist. Cuing for heel strike. To improve safety and dynamic movement with household/community ambulation.   (see flow sheet as applicable)     Details if applicable:  90 ft with SPC and then 60ft with SPC with SBA          Details if applicable:            Details if applicable:     48 45 [facility] Totals Reminder: bill using total billable min of TIMED therapeutic procedures (example: do not include dry needle or estim unattended, both untimed codes, in totals to left)  8-22 min = 1 unit; 23-37 min = 2 units; 38-52 min = 3 units; 53-67 min = 4 units; 68-82 min = 5 units   Total Total     TOTAL TREATMENT TIME:        48     [x]  Patient Education billed concurrently with other procedures   [x] Review HEP    [] Progressed/Changed HEP, detail:    [] Other detail:       Objective Information/Functional Measures/Assessment    Therapist provided cues for correct technique and muscle activation with exercises. Patient was able to ambulate 90 feet with SPC and then required a seated rest break. Then was able to ambulate an additional 60 feet with SPC. Patient exhibits decreased step and stride length and trunk flexed forward with ambulation. Patient was able to negotiate stairs 3x with (B) HHA with a reciprocal gait. Patient stated she feels like her endurance has improved. Patient reported decreased pain at end of the session. Patient will continue to benefit from skilled PT / OT services to modify and progress therapeutic interventions, analyze and address functional mobility deficits, analyze and address ROM deficits, analyze and address strength deficits, analyze and address soft tissue restrictions, analyze and cue for proper movement patterns, analyze and modify for postural abnormalities, and analyze and address imbalance/dizziness to address functional deficits and attain remaining goals. Progress toward goals / Updated goals:  []  See Progress Note/Recertification       Goals/Measure of Progress: To be achieved in 4 weeks:  2. Patient will be able to perform 5 sit to stand transfers from chair height without use of UE support to increase ease with car transfers. Eval: Patient utilizes UE support with sit to stand transfers. PN: progressing 10 sit to stands from 19.5\" height with no UE use. Patient will improve FOTO to at least 55 to demonstrate improved function. Eval; FOTO: 43  PN: FOTO score 49%  2.  Patient will

## 2023-10-02 ENCOUNTER — HOSPITAL ENCOUNTER (OUTPATIENT)
Facility: HOSPITAL | Age: 73
Discharge: HOME | End: 2023-10-05
Payer: MEDICARE

## 2023-10-02 DIAGNOSIS — S32.009K PSEUDOARTHROSIS OF LUMBAR SPINE: ICD-10-CM

## 2023-10-02 DIAGNOSIS — M54.10 RADICULOPATHY, UNSPECIFIED SPINAL REGION: ICD-10-CM

## 2023-10-02 DIAGNOSIS — Z98.1 S/P LUMBAR FUSION: ICD-10-CM

## 2023-10-02 PROCEDURE — 72131 CT LUMBAR SPINE W/O DYE: CPT

## 2023-10-02 PROCEDURE — 72110 X-RAY EXAM L-2 SPINE 4/>VWS: CPT

## 2023-10-03 ENCOUNTER — HOSPITAL ENCOUNTER (OUTPATIENT)
Facility: HOSPITAL | Age: 73
Setting detail: RECURRING SERIES
Discharge: HOME | End: 2023-10-06
Payer: MEDICARE

## 2023-10-03 PROCEDURE — 97112 NEUROMUSCULAR REEDUCATION: CPT

## 2023-10-03 PROCEDURE — 97530 THERAPEUTIC ACTIVITIES: CPT

## 2023-10-03 PROCEDURE — 97110 THERAPEUTIC EXERCISES: CPT

## 2023-10-05 ENCOUNTER — HOSPITAL ENCOUNTER (OUTPATIENT)
Facility: HOSPITAL | Age: 73
Setting detail: RECURRING SERIES
Discharge: HOME | End: 2023-10-08
Payer: MEDICARE

## 2023-10-05 PROCEDURE — 97110 THERAPEUTIC EXERCISES: CPT

## 2023-10-05 PROCEDURE — 97112 NEUROMUSCULAR REEDUCATION: CPT

## 2023-10-05 PROCEDURE — 97530 THERAPEUTIC ACTIVITIES: CPT

## 2023-10-05 NOTE — PROGRESS NOTES
postural abnormalities, and analyze and address imbalance/dizziness to address functional deficits and attain remaining goals. Progress toward goals / Updated goals:  []  See Progress Note/Recertification    Goals/Measure of Progress: To be achieved in 4 weeks:  2. Patient will be able to perform 5 sit to stand transfers from chair height without use of UE support to increase ease with car transfers. Eval: Patient utilizes UE support with sit to stand transfers. PN: progressing 10 sit to stands from 19.5\" height with no UE use. Patient will improve FOTO to at least 55 to demonstrate improved function. Eval; FOTO: 43  PN: FOTO score 49%  2. Patient will ambulate 300 feet with SPC with SBA and without incidents of instability to increase ease with home navigation. Eval: Patient utilizes a RW with ambulatioN                     PN: progressing 145 ft with SPC and SBA, one standing rest break. Current: Progressed, 176 feet with SPC, SBA and no rest break.  10/05/2023    PLAN  Yes  Continue plan of care  []  Upgrade activities as tolerated  []  Discharge due to :  []  Other:    Sveta Patterson PT    10/5/2023    11:30 AM    Future Appointments   Date Time Provider [address]  [facility]   10/5/2023 11:50 AM DIANNA Flores   10/10/2023 12:30 PM [facility]   10/12/2023 12:30 PM HUBERT Vo [place]

## 2023-10-10 ENCOUNTER — HOSPITAL ENCOUNTER (OUTPATIENT)
Facility: HOSPITAL | Age: 73
Setting detail: RECURRING SERIES
Discharge: HOME | End: 2023-10-13
Payer: MEDICARE

## 2023-10-10 PROCEDURE — 97110 THERAPEUTIC EXERCISES: CPT

## 2023-10-10 PROCEDURE — 97112 NEUROMUSCULAR REEDUCATION: CPT

## 2023-10-10 PROCEDURE — 97530 THERAPEUTIC ACTIVITIES: CPT

## 2023-10-10 NOTE — PROGRESS NOTES
PHYSICAL / OCCUPATIONAL THERAPY - DAILY TREATMENT NOTE (updated )    Patient Name: Corey Wong    Date: 10/10/2023    : 1950  Insurance: Payor: MEDICARE / Plan: MEDICARE PART A AND B / Product Type: *No Product type* /      Patient  verified Yes     Visit #   Current / Total 22 40   Time   In / Out 1230 124   Pain   In / Out 3 1   Subjective Functional Status/Changes: Pt reports she has a MD appointment in Nov to have all her labs read. Changes to: Allergies, Med Hx, Sx Hx?   no       TREATMENT AREA =  Other low back pain [M54.59]    OBJECTIVE    Modalities Rationale:     decrease pain and increase tissue extensibility to improve patient's ability to progress to PLOF and address remaining functional goals. min [] Estim Unattended, type/location:                                      []  w/ice    []  w/heat    min [] Estim Attended, type/location:                                     []  w/US     []  w/ice    []  w/heat    []  TENS insruct      min []  Mechanical Traction: type/lbs                   []  pro   []  sup   []  int   []  cont    []  before manual    []  after manual    min []  Ultrasound, settings/location:      min []  Iontophoresis w/ dexamethasone, location:                                               []  take home patch       []  in clinic     10   min  unbilled []  Ice     [x]  Heat    location/position: Right S/L, left hip/LE    min []  Paraffin,  details:     min []  Vasopneumatic Device, press/temp:     min []  Natalie Vaughan / Yael Christensen: If using vaso (only need to measure limb vaso being performed on)      pre-treatment girth :       post-treatment girth :       measured at (landmark location) :      min []  Other:    Skin assessment post-treatment (if applicable):    [x]  intact    [x]  redness- no adverse reaction                 []redness - adverse reaction:         Therapeutic Procedures:   Tx Min Billable or 1:1 Min (if diff from [employer]) Procedure, Rationale, Specifics   20

## 2023-10-12 ENCOUNTER — HOSPITAL ENCOUNTER (OUTPATIENT)
Facility: HOSPITAL | Age: 73
Setting detail: RECURRING SERIES
Discharge: HOME | End: 2023-10-15
Payer: MEDICARE

## 2023-10-12 PROCEDURE — 97530 THERAPEUTIC ACTIVITIES: CPT

## 2023-10-12 PROCEDURE — 97112 NEUROMUSCULAR REEDUCATION: CPT

## 2023-10-12 PROCEDURE — 97110 THERAPEUTIC EXERCISES: CPT

## 2023-10-12 NOTE — PROGRESS NOTES
Exercise (timed):  increase ROM, strength, coordination, balance, and proprioception to improve patient's ability to progress to PLOF and address remaining functional goals. (see flow sheet as applicable)    Details if applicable:       10  80573 Neuromuscular Re-Education (timed):  improve balance, coordination, kinesthetic sense, posture, core stability and proprioception to improve patient's ability to develop conscious control of individual muscles and awareness of position of extremities in order to progress to PLOF and address remaining functional goals. (see flow sheet as applicable)    Details if applicable:     15  78488 Therapeutic Activity (timed):  use of dynamic activities replicating functional movements to increase ROM, strength, coordination, balance, and proprioception in order to improve patient's ability to progress to PLOF and address remaining functional goals. (see flow sheet as applicable)     Details if applicable:           Details if applicable:            Details if applicable:     50  [facility] Totals Reminder: bill using total billable min of TIMED therapeutic procedures (example: do not include dry needle or estim unattended, both untimed codes, in totals to left)  8-22 min = 1 unit; 23-37 min = 2 units; 38-52 min = 3 units; 53-67 min = 4 units; 68-82 min = 5 units   Total Total     TOTAL TREATMENT TIME:        58     [x]  Patient Education billed concurrently with other procedures   [x] Review HEP    [] Progressed/Changed HEP, detail:    [] Other detail:       Objective Information/Functional Measures/Assessment  The pt displays the ability to perform sit to stand transfers from a chair height level however she requires at least 1 UE for support. The pt has demonstrated the ability to ambulate at least 176 ft with a SPC and about 25 ft with no AD, however her ability to perform these distances in ambulation is sporadic.  The pt reports good relief of L/s and left LE pain and discomfort in

## 2023-10-12 NOTE — PROGRESS NOTES
improved function. Eval; FOTO: 43  RE-CERT: progressing FOTO score 53%  2. Patient will ambulate 300 feet with SPC with SBA and without incidents of instability to increase ease with home navigation. Eval: Patient utilizes a RW with ambulatioN                                RE-CERT: Progressed, 326 feet with SPC, SBA and no rest break. Frequency / Duration: Patient to be seen 1 times per week for 2 weeks:    Assessment / Recommendations: The pt displays the ability to perform sit to stand transfers from a chair height level however she requires at least 1 UE for support. The pt has demonstrated the ability to ambulate at least 176 ft with a SPC and about 25 ft with no AD, however her ability to perform these distances in ambulation is sporadic. The pt reports good relief of L/s and left LE pain and discomfort in the clinic but reports poor carryover at home. The pt continues to report pain at a 3/10 into the left LE and continues to ambulate with a forward flexed posture. Due to hitting a plateau in treatment the pt is to finish out her appointments to continue to gain strength and improve functional mobility as well as prepare for discharge. New Certification Period: 10/16/23-11/14/23      Sheryl De Anda, PTA 10/12/2023 2:26 PM  MATTEO Urban, CMTPT    ________________________________________________________________________  I certify that the above Therapy Services are being furnished while the patient is under my care. I agree with the treatment plan and certify that this therapy is necessary. [] I have read the above and request that my patient continue as recommended.   [] I have read the above report and request that my patient continue therapy with the following changes/special instructions: _______________________________________  [] I have read the above report and request that my patient be discharged from therapy    Physician's Signature:____________________ Date:_________

## 2023-10-17 ENCOUNTER — HOSPITAL ENCOUNTER (OUTPATIENT)
Facility: HOSPITAL | Age: 73
Setting detail: RECURRING SERIES
Discharge: HOME | End: 2023-10-20
Payer: MEDICARE

## 2023-10-17 PROCEDURE — 97112 NEUROMUSCULAR REEDUCATION: CPT

## 2023-10-17 PROCEDURE — 97110 THERAPEUTIC EXERCISES: CPT

## 2023-10-17 PROCEDURE — 97530 THERAPEUTIC ACTIVITIES: CPT

## 2023-10-17 NOTE — PROGRESS NOTES
sheet as applicable)    Details if applicable:     10  36180 Therapeutic Activity (timed):  use of dynamic activities replicating functional movements to increase ROM, strength, coordination, balance, and proprioception in order to improve patient's ability to progress to PLOF and address remaining functional goals. (see flow sheet as applicable)     Details if applicable:     44  [facility] Totals Reminder: bill using total billable min of TIMED therapeutic procedures (example: do not include dry needle or estim unattended, both untimed codes, in totals to left)  8-22 min = 1 unit; 23-37 min = 2 units; 38-52 min = 3 units; 53-67 min = 4 units; 68-82 min = 5 units   Total Total     TOTAL TREATMENT TIME:        49     [x]  Patient Education billed concurrently with other procedures   [x] Review HEP    [] Progressed/Changed HEP, detail:    [] Other detail:       Objective Information/Functional Measures/Assessment    Pt tolerated the session well with no increase in symptoms or distress post session today. Patient will continue to benefit from skilled PT / OT services to modify and progress therapeutic interventions, analyze and address functional mobility deficits, analyze and address ROM deficits, analyze and address strength deficits, analyze and address soft tissue restrictions, analyze and cue for proper movement patterns, analyze and modify for postural abnormalities, and analyze and address imbalance/dizziness to address functional deficits and attain remaining goals. Progress toward goals / Updated goals:  []  See Progress Note/Recertification    Goals/Measure of Progress: To be achieved in 4 weeks:  2. Patient will be able to perform 5 sit to stand transfers from chair height without use of UE support to increase ease with car transfers. Eval: Patient utilizes UE support with sit to stand transfers. PN: progressing 10 sit to stands from 19.5\" height with no UE use.   Current: Progressing

## 2023-10-18 ENCOUNTER — APPOINTMENT (OUTPATIENT)
Facility: HOSPITAL | Age: 73
End: 2023-10-18
Payer: MEDICARE

## 2023-10-24 ENCOUNTER — HOSPITAL ENCOUNTER (OUTPATIENT)
Facility: HOSPITAL | Age: 73
Setting detail: RECURRING SERIES
Discharge: HOME | End: 2023-10-27
Payer: MEDICARE

## 2023-10-24 PROCEDURE — 97112 NEUROMUSCULAR REEDUCATION: CPT

## 2023-10-24 PROCEDURE — 97110 THERAPEUTIC EXERCISES: CPT

## 2023-10-24 NOTE — PROGRESS NOTES
[facility]  [address] [address] [place] [address] [address]  [facility]  [phone] fax    Physical Therapy Discharge Summary  Patient name: Josi Alejandro Start of Care: 2023   Referral source: Janae Florence* : 1950   Medical/Treatment Diagnosis: Other low back pain [M54.59]  Payor: MEDICARE / Plan: MEDICARE PART A AND B / Product Type: *No Product type* /  Onset Date:23     Prior Hospitalization: see medical history Provider#: [id number]   Medications: Verified on Patient Summary List    Comorbidities: Back pain, DM, HTN, Prior surgery, Visual Impairment, Cervical fusion-2022  Prior Level of Function: Patient reports 6 months prior to the low back surgery was not using a RW, but occasionally would use a SPC. Visits from Start of Care: 25    Missed Visits: 2    Reporting Period : 2023 to 10/24/23    Goals/Measure of Progress:  2. Patient will be able to perform 5 sit to stand transfers from chair height without use of UE support to increase ease with car transfers. Eval: Patient utilizes UE support with sit to stand transfers. PN: progressing 10 sit to stands from 19.5\" height with no UE use. DC: Progressing 12 sit to stands from 17\" chair height with 1UE use. Patient will improve FOTO to at least 55 to demonstrate improved function. Eval; FOTO: 43  PN: FOTO score 49%  DC: progressing FOTO score 53%  2. Patient will ambulate 300 feet with SPC with SBA and without incidents of instability to increase ease with home navigation. Eval: Patient utilizes a RW with ambulatioN                     PN: progressing 145 ft with SPC and SBA, one standing rest break. DC: Progressed, 176 feet with SPC, SBA and no rest break. Assessment/ Summary of Care: The Pt has displayed excellent improvements in ambulation tolerance as well as with stair negotiations since [facility]E.  The pt has also displayed

## 2023-10-24 NOTE — PROGRESS NOTES
PHYSICAL / OCCUPATIONAL THERAPY - DAILY TREATMENT NOTE (updated )    Patient Name: Michela Means    Date: 10/24/2023    : 1950  Insurance: Payor: MEDICARE / Plan: MEDICARE PART A AND B / Product Type: *No Product type* /      Patient  verified Yes     Visit #   Current / Total 25 42   Time   In / Out 1155 1238   Pain   In / Out 3 1   Subjective Functional Status/Changes: The pt reports no new changes. Changes to: Allergies, Med Hx, Sx Hx?   no       TREATMENT AREA =  Other low back pain [M54.59]    OBJECTIVE    Therapeutic Procedures: Tx Min Billable or 1:1 Min (if diff from Tx Min) Procedure, Rationale, Specifics   25  91546 Therapeutic Exercise (timed):  increase ROM, strength, coordination, balance, and proprioception to improve patient's ability to progress to PLOF and address remaining functional goals. (see flow sheet as applicable)    Details if applicable:       8  80506 Neuromuscular Re-Education (timed):  improve balance, coordination, kinesthetic sense, posture, core stability and proprioception to improve patient's ability to develop conscious control of individual muscles and awareness of position of extremities in order to progress to PLOF and address remaining functional goals. (see flow sheet as applicable)    Details if applicable:     10  99453 Self Care/Home Management (timed):  improve patient knowledge and understanding of pain reducing techniques, positioning, posture/ergonomics, home safety, and activity modification  to improve patient's ability to progress to PLOF and address remaining functional goals.   (see flow sheet as applicable)     Details if applicable:  FOTO, discharge instruction, HEP update and review          Details if applicable:            Details if applicable:     37  [facility] Totals Reminder: bill using total billable min of TIMED therapeutic procedures (example: do not include dry needle or estim unattended, both untimed codes, in totals to left)  8-22 min =

## 2023-11-21 ENCOUNTER — HOSPITAL ENCOUNTER (OUTPATIENT)
Facility: HOSPITAL | Age: 73
Discharge: HOME | End: 2023-11-24
Attending: ORTHOPAEDIC SURGERY
Payer: MEDICARE

## 2023-11-21 DIAGNOSIS — M54.10 RADICULOPATHY, UNSPECIFIED SPINAL REGION: ICD-10-CM

## 2023-11-21 DIAGNOSIS — G95.9 MYELOPATHY OF LUMBAR REGION (HCC): ICD-10-CM

## 2023-11-21 PROCEDURE — 72146 MRI CHEST SPINE W/O DYE: CPT

## 2024-01-17 ENCOUNTER — TRANSCRIBE ORDERS (OUTPATIENT)
Facility: HOSPITAL | Age: 74
End: 2024-01-17

## 2024-01-17 DIAGNOSIS — Z12.31 VISIT FOR SCREENING MAMMOGRAM: Primary | ICD-10-CM

## 2024-01-31 ENCOUNTER — HOSPITAL ENCOUNTER (OUTPATIENT)
Facility: HOSPITAL | Age: 74
Discharge: HOME | End: 2024-02-03
Attending: INTERNAL MEDICINE
Payer: MEDICARE

## 2024-01-31 DIAGNOSIS — Z12.31 VISIT FOR SCREENING MAMMOGRAM: ICD-10-CM

## 2024-01-31 PROCEDURE — 77063 BREAST TOMOSYNTHESIS BI: CPT

## 2024-04-15 NOTE — PROGRESS NOTES
[facility]  [address] [place] [address], [address]  [place] [address]  Phone: [phone]  Fax: [phone]     NEW PATIENT  Pt's YOB: 1950    ASSESSMENT   Lexis was seen today for lower back pain and leg pain.    Diagnoses and all orders for this visit:    Lumbar radiculopathy  -     MRI LUMBAR SPINE W CONTRAST; Future    Weakness of both lower extremities  -     MRI LUMBAR SPINE W CONTRAST; Future    Muscle spasm  -     MRI LUMBAR SPINE W CONTRAST; Future    Gait abnormality  -     MRI LUMBAR SPINE W CONTRAST; Future    Neuritis  -     DULoxetine (CYMBALTA) 20 MG extended release capsule; Take 1 capsule by mouth daily Take with dinner as directed  -     MRI LUMBAR SPINE W CONTRAST; Future         IMPRESSION AND PLAN:  Lexis Baker is a 73 y.o. RHD female with history of thoracic pain and presents to the office today as a new patient referred by Rabia Louis PA-C. She is taking Neurontin 300 mg, Flexeril 5 mg, and Tylenol 500 mg.    Ms. Baker has a reminder for a \"due or due soon\" health maintenance. I have asked that she contact her primary care provider, Soni Vega MD, for follow-up on this health maintenance.   demonstrated consistency with prescribing.   Discussed treatment options with the patient including medications, physical therapy, and injections.   Lumbar MRI was ordered to assess for stenosis and impingement  Pt may decrease gabapentin to 300/300/600 and begin Cymbalta 20 mg for better management of her neuropathic symptoms; pt is no longer taking the flexeril due to side effects.  Return in about 6 weeks (around 5/28/2024) for Medication follow up, Diagnostic follow up.      HISTORY OF PRESENT ILLNESS:  Lexis Baker is a 73 y.o. right hand dominant female with history of thoracic pain. Pt presents to the office today as a new patient referred by Rabia Louis PA-C.     Patient presents today with thoracic pain for 2+ years without

## 2024-04-16 ENCOUNTER — OFFICE VISIT (OUTPATIENT)
Age: 74
End: 2024-04-16
Payer: MEDICARE

## 2024-04-16 VITALS — WEIGHT: 151 LBS | BODY MASS INDEX: 24.27 KG/M2 | OXYGEN SATURATION: 100 % | HEART RATE: 70 BPM | HEIGHT: 66 IN

## 2024-04-16 DIAGNOSIS — R26.9 GAIT ABNORMALITY: ICD-10-CM

## 2024-04-16 DIAGNOSIS — M79.2 NEURITIS: ICD-10-CM

## 2024-04-16 DIAGNOSIS — M62.838 MUSCLE SPASM: ICD-10-CM

## 2024-04-16 DIAGNOSIS — M54.16 LUMBAR RADICULOPATHY: Primary | ICD-10-CM

## 2024-04-16 DIAGNOSIS — R29.898 WEAKNESS OF BOTH LOWER EXTREMITIES: ICD-10-CM

## 2024-04-16 PROCEDURE — 1123F ACP DISCUSS/DSCN MKR DOCD: CPT | Performed by: PHYSICAL MEDICINE & REHABILITATION

## 2024-04-16 PROCEDURE — G8420 CALC BMI NORM PARAMETERS: HCPCS | Performed by: PHYSICAL MEDICINE & REHABILITATION

## 2024-04-16 PROCEDURE — 99204 OFFICE O/P NEW MOD 45 MIN: CPT | Performed by: PHYSICAL MEDICINE & REHABILITATION

## 2024-04-16 PROCEDURE — G8427 DOCREV CUR MEDS BY ELIG CLIN: HCPCS | Performed by: PHYSICAL MEDICINE & REHABILITATION

## 2024-04-16 PROCEDURE — 1090F PRES/ABSN URINE INCON ASSESS: CPT | Performed by: PHYSICAL MEDICINE & REHABILITATION

## 2024-04-16 PROCEDURE — G8399 PT W/DXA RESULTS DOCUMENT: HCPCS | Performed by: PHYSICAL MEDICINE & REHABILITATION

## 2024-04-16 PROCEDURE — 1036F TOBACCO NON-USER: CPT | Performed by: PHYSICAL MEDICINE & REHABILITATION

## 2024-04-16 PROCEDURE — 3017F COLORECTAL CA SCREEN DOC REV: CPT | Performed by: PHYSICAL MEDICINE & REHABILITATION

## 2024-04-16 RX ORDER — COVID-19 ANTIGEN TEST
KIT MISCELLANEOUS 2 TIMES DAILY
COMMUNITY

## 2024-04-16 RX ORDER — ATORVASTATIN CALCIUM 40 MG/1
TABLET, FILM COATED ORAL
COMMUNITY

## 2024-04-16 RX ORDER — LATANOPROST 50 UG/ML
SOLUTION/ DROPS OPHTHALMIC
COMMUNITY
Start: 2024-02-11

## 2024-04-16 RX ORDER — DULOXETIN HYDROCHLORIDE 20 MG/1
20 CAPSULE, DELAYED RELEASE ORAL DAILY
Qty: 30 CAPSULE | Refills: 1 | Status: SHIPPED | OUTPATIENT
Start: 2024-04-16

## 2024-04-16 RX ORDER — MECLIZINE HYDROCHLORIDE 25 MG/1
TABLET ORAL
COMMUNITY

## 2024-04-16 RX ORDER — METOPROLOL TARTRATE 50 MG/1
TABLET, FILM COATED ORAL
COMMUNITY

## 2024-04-16 SDOH — HEALTH STABILITY: PHYSICAL HEALTH: ON AVERAGE, HOW MANY DAYS PER WEEK DO YOU ENGAGE IN MODERATE TO STRENUOUS EXERCISE (LIKE A BRISK WALK)?: 2 DAYS

## 2024-04-16 SDOH — HEALTH STABILITY: PHYSICAL HEALTH: ON AVERAGE, HOW MANY MINUTES DO YOU ENGAGE IN EXERCISE AT THIS LEVEL?: 30 MIN

## 2024-04-19 ENCOUNTER — HOSPITAL ENCOUNTER (OUTPATIENT)
Facility: HOSPITAL | Age: 74
End: 2024-04-19
Attending: PHYSICAL MEDICINE & REHABILITATION
Payer: MEDICARE

## 2024-04-19 DIAGNOSIS — M62.838 MUSCLE SPASM: ICD-10-CM

## 2024-04-19 DIAGNOSIS — M54.16 LUMBAR RADICULOPATHY: ICD-10-CM

## 2024-04-19 DIAGNOSIS — R29.898 WEAKNESS OF BOTH LOWER EXTREMITIES: ICD-10-CM

## 2024-04-19 DIAGNOSIS — M79.2 NEURITIS: ICD-10-CM

## 2024-04-19 DIAGNOSIS — R26.9 GAIT ABNORMALITY: ICD-10-CM

## 2024-04-19 LAB — CREAT UR-MCNC: 0.8 MG/DL (ref 0.6–1.3)

## 2024-04-19 PROCEDURE — A9577 INJ MULTIHANCE: HCPCS | Performed by: PHYSICAL MEDICINE & REHABILITATION

## 2024-04-19 PROCEDURE — 72158 MRI LUMBAR SPINE W/O & W/DYE: CPT

## 2024-04-19 PROCEDURE — 82565 ASSAY OF CREATININE: CPT

## 2024-04-19 PROCEDURE — 6360000004 HC RX CONTRAST MEDICATION: Performed by: PHYSICAL MEDICINE & REHABILITATION

## 2024-04-19 RX ADMIN — GADOBENATE DIMEGLUMINE 20 ML: 529 INJECTION, SOLUTION INTRAVENOUS at 10:44

## 2024-05-21 ENCOUNTER — OFFICE VISIT (OUTPATIENT)
Age: 74
End: 2024-05-21
Payer: MEDICARE

## 2024-05-21 VITALS
HEART RATE: 73 BPM | RESPIRATION RATE: 20 BRPM | WEIGHT: 148.6 LBS | BODY MASS INDEX: 23.88 KG/M2 | TEMPERATURE: 97.9 F | HEIGHT: 66 IN

## 2024-05-21 DIAGNOSIS — M51.26 HNP (HERNIATED NUCLEUS PULPOSUS), LUMBAR: Primary | ICD-10-CM

## 2024-05-21 DIAGNOSIS — M48.062 SPINAL STENOSIS OF LUMBAR REGION WITH NEUROGENIC CLAUDICATION: ICD-10-CM

## 2024-05-21 DIAGNOSIS — R26.9 GAIT ABNORMALITY: ICD-10-CM

## 2024-05-21 DIAGNOSIS — M79.2 NEURITIS: ICD-10-CM

## 2024-05-21 DIAGNOSIS — R29.898 WEAKNESS OF BOTH LOWER EXTREMITIES: ICD-10-CM

## 2024-05-21 PROCEDURE — 1090F PRES/ABSN URINE INCON ASSESS: CPT | Performed by: PHYSICAL MEDICINE & REHABILITATION

## 2024-05-21 PROCEDURE — 1036F TOBACCO NON-USER: CPT | Performed by: PHYSICAL MEDICINE & REHABILITATION

## 2024-05-21 PROCEDURE — G8427 DOCREV CUR MEDS BY ELIG CLIN: HCPCS | Performed by: PHYSICAL MEDICINE & REHABILITATION

## 2024-05-21 PROCEDURE — 99214 OFFICE O/P EST MOD 30 MIN: CPT | Performed by: PHYSICAL MEDICINE & REHABILITATION

## 2024-05-21 PROCEDURE — 1123F ACP DISCUSS/DSCN MKR DOCD: CPT | Performed by: PHYSICAL MEDICINE & REHABILITATION

## 2024-05-21 PROCEDURE — G8399 PT W/DXA RESULTS DOCUMENT: HCPCS | Performed by: PHYSICAL MEDICINE & REHABILITATION

## 2024-05-21 PROCEDURE — G8420 CALC BMI NORM PARAMETERS: HCPCS | Performed by: PHYSICAL MEDICINE & REHABILITATION

## 2024-05-21 PROCEDURE — 3017F COLORECTAL CA SCREEN DOC REV: CPT | Performed by: PHYSICAL MEDICINE & REHABILITATION

## 2024-05-21 RX ORDER — GABAPENTIN 300 MG/1
300 CAPSULE ORAL 3 TIMES DAILY
Qty: 270 CAPSULE | Refills: 1 | Status: SHIPPED | OUTPATIENT
Start: 2024-05-21 | End: 2024-11-17

## 2024-05-21 RX ORDER — ASPIRIN 325 MG
325 TABLET ORAL DAILY
COMMUNITY

## 2024-05-21 RX ORDER — DULOXETIN HYDROCHLORIDE 30 MG/1
CAPSULE, DELAYED RELEASE ORAL
Qty: 30 CAPSULE | Refills: 3 | Status: SHIPPED | OUTPATIENT
Start: 2024-05-21 | End: 2024-09-20

## 2024-05-21 NOTE — PROGRESS NOTES
generated.   Contrast Used: None     CT scans at this facility are performed using dose optimization technique as  appropriate with performed exam, to include automated exposure control,  adjustment of mA and/or kV according to patient's size (including appropriate matching for site-specific examinations), or use of iterative reconstruction technique.     COMPARISON: CT lumbar spine December 2022     FINDINGS:   L5-S1: Anterior fusion interbody fusion with retention screws anteriorly. Bony incorporation appears complete. Small fracture left L5 inferior articular process previously identified stable. Central canal is nonstenotic. Osteophyte and protrusion project into the right neuroforamina with mild  Stenosis. Left neuroforamina unremarkable. Right pedicle screw in good position. Left pedicle screw demonstrates increased lucency suggesting bone resorption see image 92 series 5. Marker placed  L4-5: Anterior interbody fusion with good bony incorporation. Pedicle screws in good position. Facets are degenerative and hypertrophic changes. Central canal is nonstenotic. Protrusion herniation into the right neuroforamina with moderate stenosis. Protrusion herniation into the left neuroforamina with moderate stenosis. Stable from prior exam. Degenerative subluxation of L4 on L5 3.3 mm stable from prior exam  L3-4: Annular body anterior fusion mass with good bony ankylosis. Facets are hypertrophic. Central canal is nonstenotic. Right neuroforamina demonstrates protrusion-type herniation into the neuroforamina left unremarkable. Exam stable from prior study. L4 contains no pedicle screws. L3 pedicle screws in good position  L2-3: Anterior interbody fusion present. There is still visualization of noncalcified space between the fusion and the endplates however no evidence of any subsidence no evidence of any progression of nonionic calcification. Facets are hypertrophic and degenerative. Central canal and neuroforamina

## 2024-06-19 ENCOUNTER — TRANSCRIBE ORDERS (OUTPATIENT)
Facility: HOSPITAL | Age: 74
End: 2024-06-19

## 2024-06-19 DIAGNOSIS — M48.061 SPINAL STENOSIS OF LUMBAR REGION, UNSPECIFIED WHETHER NEUROGENIC CLAUDICATION PRESENT: Primary | ICD-10-CM

## 2024-06-21 ENCOUNTER — HOSPITAL ENCOUNTER (OUTPATIENT)
Facility: HOSPITAL | Age: 74
End: 2024-06-21
Payer: MEDICARE

## 2024-06-21 DIAGNOSIS — M48.061 SPINAL STENOSIS OF LUMBAR REGION, UNSPECIFIED WHETHER NEUROGENIC CLAUDICATION PRESENT: ICD-10-CM

## 2024-06-21 PROCEDURE — 72110 X-RAY EXAM L-2 SPINE 4/>VWS: CPT

## 2024-06-21 PROCEDURE — 72131 CT LUMBAR SPINE W/O DYE: CPT

## 2024-06-27 ENCOUNTER — TELEPHONE (OUTPATIENT)
Age: 74
End: 2024-06-27

## 2024-06-27 DIAGNOSIS — M79.2 NEURITIS: ICD-10-CM

## 2024-06-27 DIAGNOSIS — M51.26 HNP (HERNIATED NUCLEUS PULPOSUS), LUMBAR: ICD-10-CM

## 2024-06-27 DIAGNOSIS — M48.062 SPINAL STENOSIS OF LUMBAR REGION WITH NEUROGENIC CLAUDICATION: ICD-10-CM

## 2024-06-27 RX ORDER — DULOXETIN HYDROCHLORIDE 30 MG/1
CAPSULE, DELAYED RELEASE ORAL
Qty: 30 CAPSULE | Refills: 2 | Status: SHIPPED | OUTPATIENT
Start: 2024-06-27 | End: 2024-10-27

## 2024-06-27 NOTE — TELEPHONE ENCOUNTER
Patient called today to inform us that her old pharmacy had a walk out and is closed currently. She is switching to [id number] [address] [place] [address] P [phone] - F [phone], updated in chart, and needs her prescriptions sent there from now on. She currently needs a refill of DULoxetine (CYMBALTA) 30 MG extended release capsule sent when possible, as she is almost out and could not  her refill today due to the walk out.    Please review and advise, [phone]

## 2024-07-22 ENCOUNTER — OFFICE VISIT (OUTPATIENT)
Age: 74
End: 2024-07-22
Payer: MEDICARE

## 2024-07-22 DIAGNOSIS — M43.26 FUSION OF LUMBAR SPINE: ICD-10-CM

## 2024-07-22 DIAGNOSIS — M48.062 SPINAL STENOSIS OF LUMBAR REGION WITH NEUROGENIC CLAUDICATION: Primary | ICD-10-CM

## 2024-07-22 DIAGNOSIS — R26.9 GAIT ABNORMALITY: ICD-10-CM

## 2024-07-22 DIAGNOSIS — R29.898 WEAKNESS OF BOTH LOWER EXTREMITIES: ICD-10-CM

## 2024-07-22 DIAGNOSIS — M51.26 HNP (HERNIATED NUCLEUS PULPOSUS), LUMBAR: ICD-10-CM

## 2024-07-22 PROCEDURE — G8399 PT W/DXA RESULTS DOCUMENT: HCPCS | Performed by: PHYSICAL MEDICINE & REHABILITATION

## 2024-07-22 PROCEDURE — 1090F PRES/ABSN URINE INCON ASSESS: CPT | Performed by: PHYSICAL MEDICINE & REHABILITATION

## 2024-07-22 PROCEDURE — 1123F ACP DISCUSS/DSCN MKR DOCD: CPT | Performed by: PHYSICAL MEDICINE & REHABILITATION

## 2024-07-22 PROCEDURE — 1036F TOBACCO NON-USER: CPT | Performed by: PHYSICAL MEDICINE & REHABILITATION

## 2024-07-22 PROCEDURE — 99214 OFFICE O/P EST MOD 30 MIN: CPT | Performed by: PHYSICAL MEDICINE & REHABILITATION

## 2024-07-22 PROCEDURE — G8420 CALC BMI NORM PARAMETERS: HCPCS | Performed by: PHYSICAL MEDICINE & REHABILITATION

## 2024-07-22 PROCEDURE — G8427 DOCREV CUR MEDS BY ELIG CLIN: HCPCS | Performed by: PHYSICAL MEDICINE & REHABILITATION

## 2024-07-22 PROCEDURE — 3017F COLORECTAL CA SCREEN DOC REV: CPT | Performed by: PHYSICAL MEDICINE & REHABILITATION

## 2024-07-22 RX ORDER — METHYLPREDNISOLONE 4 MG/1
TABLET ORAL
Qty: 1 KIT | Refills: 1 | Status: SHIPPED | OUTPATIENT
Start: 2024-07-22 | End: 2024-07-28

## 2024-07-22 NOTE — PROGRESS NOTES
flavum hypertrophy.  Extensive fusion hardware as described.  Please refer to the body of the report for a comprehensive segmental analysis of the lumbar spinal column.     Thoracic MRI from 11/25/23 was personally reviewed with the patient and demonstrated:  Narrative & Impression  EXAM: MRI THORACIC SPINE WO CONTRAST  HISTORY: Disease of spinal cord, unspecified; Radiculopathy, site unspecified; spinal stenosis on other imaging  COMPARISON: Partial comparison prior lumbar spine CT 10/2/2023  TECHNIQUE: Multi-sequence multiplanar MR imaging obtained centered on thoracic spine.     FINDINGS: Thoracic numbering determined by C2 vertebral body morphology.     Alignment: Intact kyphosis  Vertebral body height: Normal  Marrow signal: Unremarkable  Cord: There is no cord expansion or atrophy. There is mild cord impingement. Further discussed below where relevant.  Conus: Normal morphology conus at L2.  Pertinent axial imaging: T2-3: Disc osteophyte ridge formation with slight eccentric prominence to the right. There is minor abutment and flattening of the right ventral cord. Patent canal and foramina.     T4-5: Minimum bilobed disc bulging. Facet arthropathy. Slight flattening left  posterior cord. No direct contact. No significant spinal stenosis.  T6-7: Mild bilobed disc osteophyte complex formation. Facet arthropathy. Mild concentric spinal stenosis.  T7-8: Similar to T6-7 description.  T8-9: Slightly more pronounced facet arthropathy left more so than right. Slight flattening left dorsal cord. No cord edema. No significant spinal stenosis overall.  T10-11: Facet arthropathy more pronounced on the right than on the left. This does impinge upon the right side of the cord and causes mild flattening. There is possibly trace increase in signal in the cord on the right. An overall mild spinal stenosis. Axial T2 image 20 of series 8.  Other structures: Marginal visualization of moderately pronounced spinal  stenosis at L2-3

## 2024-07-22 NOTE — PROGRESS NOTES
flattening. There is possibly trace increase in signal in the cord on the right. An overall mild spinal stenosis. Axial T2 image 20 of series 8.  Other structures: Marginal visualization of moderately pronounced spinal  stenosis at L2-3 from combined disc and facet pathology with bulging epidural fat. This does distort the proximal cauda equina.     IMPRESSION:  1. Multilevel degenerative findings along the thoracic spine  -Facet arthropathy is more notable than disc pathology  -At T10-11 there is mild cord impingement with an overall mild spinal stenosis  -Marginal visualization of moderately severe multifactorial spinal stenosis at L2-3. It does appear that this is present at least in part on prior imaging.  Consider correlation with current lumbar spine MRI as deemed clinically useful.       Written by *** as dictated by Prabha Copeland MD.  I, Dr. Prabha Copeland confirm that all documentation is accurate.

## 2024-07-23 ENCOUNTER — HOSPITAL ENCOUNTER (OUTPATIENT)
Facility: HOSPITAL | Age: 74
Discharge: HOME | End: 2024-07-26
Payer: MEDICARE

## 2024-07-23 DIAGNOSIS — R10.9 STOMACH ACHE: ICD-10-CM

## 2024-07-23 LAB — CREAT UR-MCNC: 0.9 MG/DL (ref 0.6–1.3)

## 2024-07-23 PROCEDURE — 74177 CT ABD & PELVIS W/CONTRAST: CPT

## 2024-07-23 PROCEDURE — 82565 ASSAY OF CREATININE: CPT

## 2024-07-23 PROCEDURE — 6360000004 HC RX CONTRAST MEDICATION: Performed by: INTERNAL MEDICINE

## 2024-07-23 RX ADMIN — DIATRIZOATE MEGLUMINE AND DIATRIZOATE SODIUM 30 ML: 660; 100 LIQUID ORAL; RECTAL at 17:13

## 2024-07-23 RX ADMIN — IOPAMIDOL 100 ML: 612 INJECTION, SOLUTION INTRAVENOUS at 17:13

## 2024-08-01 ENCOUNTER — HOSPITAL ENCOUNTER (OUTPATIENT)
Facility: HOSPITAL | Age: 74
Discharge: HOME | End: 2024-08-01
Payer: MEDICARE

## 2024-08-01 DIAGNOSIS — R91.1 SOLITARY PULMONARY NODULE: ICD-10-CM

## 2024-08-01 PROCEDURE — 6360000004 HC RX CONTRAST MEDICATION: Performed by: INTERNAL MEDICINE

## 2024-08-01 PROCEDURE — 71260 CT THORAX DX C+: CPT

## 2024-08-01 PROCEDURE — 82565 ASSAY OF CREATININE: CPT

## 2024-08-01 RX ADMIN — IOPAMIDOL 80 ML: 612 INJECTION, SOLUTION INTRAVENOUS at 14:32

## 2024-08-02 LAB — CREAT UR-MCNC: 0.8 MG/DL (ref 0.6–1.3)

## 2024-09-16 ENCOUNTER — OFFICE VISIT (OUTPATIENT)
Age: 74
End: 2024-09-16
Payer: MEDICARE

## 2024-09-16 VITALS
OXYGEN SATURATION: 98 % | SYSTOLIC BLOOD PRESSURE: 150 MMHG | BODY MASS INDEX: 22.34 KG/M2 | HEIGHT: 66 IN | WEIGHT: 139 LBS | HEART RATE: 68 BPM | DIASTOLIC BLOOD PRESSURE: 80 MMHG | TEMPERATURE: 97.3 F

## 2024-09-16 DIAGNOSIS — M62.838 MUSCLE SPASM: ICD-10-CM

## 2024-09-16 DIAGNOSIS — R26.9 GAIT ABNORMALITY: ICD-10-CM

## 2024-09-16 DIAGNOSIS — R29.898 WEAKNESS OF BOTH LOWER EXTREMITIES: ICD-10-CM

## 2024-09-16 DIAGNOSIS — M54.16 LUMBAR RADICULOPATHY: ICD-10-CM

## 2024-09-16 DIAGNOSIS — M43.26 FUSION OF LUMBAR SPINE: ICD-10-CM

## 2024-09-16 DIAGNOSIS — M48.062 SPINAL STENOSIS OF LUMBAR REGION WITH NEUROGENIC CLAUDICATION: Primary | ICD-10-CM

## 2024-09-16 PROCEDURE — 99214 OFFICE O/P EST MOD 30 MIN: CPT | Performed by: PHYSICAL MEDICINE & REHABILITATION

## 2024-09-16 PROCEDURE — 3017F COLORECTAL CA SCREEN DOC REV: CPT | Performed by: PHYSICAL MEDICINE & REHABILITATION

## 2024-09-16 PROCEDURE — G8420 CALC BMI NORM PARAMETERS: HCPCS | Performed by: PHYSICAL MEDICINE & REHABILITATION

## 2024-09-16 PROCEDURE — 3077F SYST BP >= 140 MM HG: CPT | Performed by: PHYSICAL MEDICINE & REHABILITATION

## 2024-09-16 PROCEDURE — 1036F TOBACCO NON-USER: CPT | Performed by: PHYSICAL MEDICINE & REHABILITATION

## 2024-09-16 PROCEDURE — 1123F ACP DISCUSS/DSCN MKR DOCD: CPT | Performed by: PHYSICAL MEDICINE & REHABILITATION

## 2024-09-16 PROCEDURE — 1090F PRES/ABSN URINE INCON ASSESS: CPT | Performed by: PHYSICAL MEDICINE & REHABILITATION

## 2024-09-16 PROCEDURE — G8427 DOCREV CUR MEDS BY ELIG CLIN: HCPCS | Performed by: PHYSICAL MEDICINE & REHABILITATION

## 2024-09-16 PROCEDURE — 3079F DIAST BP 80-89 MM HG: CPT | Performed by: PHYSICAL MEDICINE & REHABILITATION

## 2024-09-16 PROCEDURE — G8399 PT W/DXA RESULTS DOCUMENT: HCPCS | Performed by: PHYSICAL MEDICINE & REHABILITATION

## 2024-09-16 RX ORDER — CYCLOBENZAPRINE HCL 5 MG
5 TABLET ORAL 2 TIMES DAILY PRN
COMMUNITY

## 2024-10-07 ENCOUNTER — APPOINTMENT (OUTPATIENT)
Facility: HOSPITAL | Age: 74
End: 2024-10-07
Payer: MEDICARE

## 2024-10-07 ENCOUNTER — HOSPITAL ENCOUNTER (INPATIENT)
Facility: HOSPITAL | Age: 74
LOS: 4 days | Discharge: ANOTHER HOSPITAL | End: 2024-10-11
Attending: STUDENT IN AN ORGANIZED HEALTH CARE EDUCATION/TRAINING PROGRAM | Admitting: STUDENT IN AN ORGANIZED HEALTH CARE EDUCATION/TRAINING PROGRAM
Payer: MEDICARE

## 2024-10-07 DIAGNOSIS — M48.062 SPINAL STENOSIS OF LUMBAR REGION WITH NEUROGENIC CLAUDICATION: Primary | ICD-10-CM

## 2024-10-07 PROBLEM — M48.00 SPINAL STENOSIS: Status: ACTIVE | Noted: 2024-10-07

## 2024-10-07 LAB
ALBUMIN SERPL-MCNC: 3.5 G/DL (ref 3.4–5)
ALBUMIN/GLOB SERPL: 1.1 (ref 0.8–1.7)
ALP SERPL-CCNC: 151 U/L (ref 45–117)
ALT SERPL-CCNC: 37 U/L (ref 13–56)
ANION GAP SERPL CALC-SCNC: 3 MMOL/L (ref 3–18)
APPEARANCE UR: CLEAR
AST SERPL-CCNC: 29 U/L (ref 10–38)
BASOPHILS # BLD: 0 K/UL (ref 0–0.1)
BASOPHILS NFR BLD: 1 % (ref 0–2)
BILIRUB SERPL-MCNC: 0.6 MG/DL (ref 0.2–1)
BILIRUB UR QL: NEGATIVE
BUN SERPL-MCNC: 13 MG/DL (ref 7–18)
BUN/CREAT SERPL: 17 (ref 12–20)
CALCIUM SERPL-MCNC: 9.6 MG/DL (ref 8.5–10.1)
CHLORIDE SERPL-SCNC: 108 MMOL/L (ref 100–111)
CO2 SERPL-SCNC: 32 MMOL/L (ref 21–32)
COLOR UR: YELLOW
CREAT SERPL-MCNC: 0.75 MG/DL (ref 0.6–1.3)
DIFFERENTIAL METHOD BLD: ABNORMAL
EKG ATRIAL RATE: 65 BPM
EKG DIAGNOSIS: NORMAL
EKG P AXIS: 70 DEGREES
EKG P-R INTERVAL: 168 MS
EKG Q-T INTERVAL: 396 MS
EKG QRS DURATION: 72 MS
EKG QTC CALCULATION (BAZETT): 411 MS
EKG R AXIS: -35 DEGREES
EKG T AXIS: 5 DEGREES
EKG VENTRICULAR RATE: 65 BPM
EOSINOPHIL # BLD: 0.1 K/UL (ref 0–0.4)
EOSINOPHIL NFR BLD: 2 % (ref 0–5)
ERYTHROCYTE [DISTWIDTH] IN BLOOD BY AUTOMATED COUNT: 13.8 % (ref 11.6–14.5)
GLOBULIN SER CALC-MCNC: 3.3 G/DL (ref 2–4)
GLUCOSE BLD STRIP.AUTO-MCNC: 65 MG/DL (ref 70–110)
GLUCOSE BLD STRIP.AUTO-MCNC: 84 MG/DL (ref 70–110)
GLUCOSE BLD STRIP.AUTO-MCNC: 88 MG/DL (ref 70–110)
GLUCOSE SERPL-MCNC: 101 MG/DL (ref 74–99)
GLUCOSE UR STRIP.AUTO-MCNC: NEGATIVE MG/DL
HCT VFR BLD AUTO: 36.9 % (ref 35–45)
HGB BLD-MCNC: 11.2 G/DL (ref 12–16)
HGB UR QL STRIP: NEGATIVE
IMM GRANULOCYTES # BLD AUTO: 0 K/UL (ref 0–0.04)
IMM GRANULOCYTES NFR BLD AUTO: 0 % (ref 0–0.5)
KETONES UR QL STRIP.AUTO: NEGATIVE MG/DL
LEUKOCYTE ESTERASE UR QL STRIP.AUTO: NEGATIVE
LIPASE SERPL-CCNC: 35 U/L (ref 13–75)
LYMPHOCYTES # BLD: 0.9 K/UL (ref 0.9–3.6)
LYMPHOCYTES NFR BLD: 21 % (ref 21–52)
MAGNESIUM SERPL-MCNC: 1.4 MG/DL (ref 1.6–2.6)
MCH RBC QN AUTO: 27.4 PG (ref 24–34)
MCHC RBC AUTO-ENTMCNC: 30.4 G/DL (ref 31–37)
MCV RBC AUTO: 90.2 FL (ref 78–100)
MONOCYTES # BLD: 0.6 K/UL (ref 0.05–1.2)
MONOCYTES NFR BLD: 15 % (ref 3–10)
NEUTS SEG # BLD: 2.7 K/UL (ref 1.8–8)
NEUTS SEG NFR BLD: 62 % (ref 40–73)
NITRITE UR QL STRIP.AUTO: NEGATIVE
NRBC # BLD: 0 K/UL (ref 0–0.01)
NRBC BLD-RTO: 0 PER 100 WBC
PH UR STRIP: 7.5 (ref 5–8)
PLATELET # BLD AUTO: 186 K/UL (ref 135–420)
PMV BLD AUTO: 10.1 FL (ref 9.2–11.8)
POTASSIUM SERPL-SCNC: 3.6 MMOL/L (ref 3.5–5.5)
PROT SERPL-MCNC: 6.8 G/DL (ref 6.4–8.2)
PROT UR STRIP-MCNC: NEGATIVE MG/DL
RBC # BLD AUTO: 4.09 M/UL (ref 4.2–5.3)
SODIUM SERPL-SCNC: 143 MMOL/L (ref 136–145)
SP GR UR REFRACTOMETRY: 1.01 (ref 1–1.03)
UROBILINOGEN UR QL STRIP.AUTO: 1 EU/DL (ref 0.2–1)
WBC # BLD AUTO: 4.4 K/UL (ref 4.6–13.2)

## 2024-10-07 PROCEDURE — 6360000004 HC RX CONTRAST MEDICATION: Performed by: PHYSICIAN ASSISTANT

## 2024-10-07 PROCEDURE — 82746 ASSAY OF FOLIC ACID SERUM: CPT

## 2024-10-07 PROCEDURE — 72146 MRI CHEST SPINE W/O DYE: CPT

## 2024-10-07 PROCEDURE — 99285 EMERGENCY DEPT VISIT HI MDM: CPT

## 2024-10-07 PROCEDURE — 73562 X-RAY EXAM OF KNEE 3: CPT

## 2024-10-07 PROCEDURE — 85025 COMPLETE CBC W/AUTO DIFF WBC: CPT

## 2024-10-07 PROCEDURE — 72100 X-RAY EXAM L-S SPINE 2/3 VWS: CPT

## 2024-10-07 PROCEDURE — 99223 1ST HOSP IP/OBS HIGH 75: CPT | Performed by: PSYCHIATRY & NEUROLOGY

## 2024-10-07 PROCEDURE — 73502 X-RAY EXAM HIP UNI 2-3 VIEWS: CPT

## 2024-10-07 PROCEDURE — 81003 URINALYSIS AUTO W/O SCOPE: CPT

## 2024-10-07 PROCEDURE — 6370000000 HC RX 637 (ALT 250 FOR IP): Performed by: STUDENT IN AN ORGANIZED HEALTH CARE EDUCATION/TRAINING PROGRAM

## 2024-10-07 PROCEDURE — 82607 VITAMIN B-12: CPT

## 2024-10-07 PROCEDURE — 99223 1ST HOSP IP/OBS HIGH 75: CPT | Performed by: STUDENT IN AN ORGANIZED HEALTH CARE EDUCATION/TRAINING PROGRAM

## 2024-10-07 PROCEDURE — 73590 X-RAY EXAM OF LOWER LEG: CPT

## 2024-10-07 PROCEDURE — 82962 GLUCOSE BLOOD TEST: CPT

## 2024-10-07 PROCEDURE — 1100000000 HC RM PRIVATE

## 2024-10-07 PROCEDURE — 93005 ELECTROCARDIOGRAM TRACING: CPT | Performed by: PHYSICIAN ASSISTANT

## 2024-10-07 PROCEDURE — 83690 ASSAY OF LIPASE: CPT

## 2024-10-07 PROCEDURE — 6360000002 HC RX W HCPCS: Performed by: ORTHOPAEDIC SURGERY

## 2024-10-07 PROCEDURE — 72141 MRI NECK SPINE W/O DYE: CPT

## 2024-10-07 PROCEDURE — 6360000002 HC RX W HCPCS: Performed by: STUDENT IN AN ORGANIZED HEALTH CARE EDUCATION/TRAINING PROGRAM

## 2024-10-07 PROCEDURE — A9577 INJ MULTIHANCE: HCPCS | Performed by: PHYSICIAN ASSISTANT

## 2024-10-07 PROCEDURE — 2580000003 HC RX 258: Performed by: STUDENT IN AN ORGANIZED HEALTH CARE EDUCATION/TRAINING PROGRAM

## 2024-10-07 PROCEDURE — 93010 ELECTROCARDIOGRAM REPORT: CPT | Performed by: INTERNAL MEDICINE

## 2024-10-07 PROCEDURE — 72158 MRI LUMBAR SPINE W/O & W/DYE: CPT

## 2024-10-07 PROCEDURE — 73700 CT LOWER EXTREMITY W/O DYE: CPT

## 2024-10-07 PROCEDURE — 80053 COMPREHEN METABOLIC PANEL: CPT

## 2024-10-07 PROCEDURE — 83735 ASSAY OF MAGNESIUM: CPT

## 2024-10-07 RX ORDER — DEXAMETHASONE SODIUM PHOSPHATE 4 MG/ML
10 INJECTION, SOLUTION INTRA-ARTICULAR; INTRALESIONAL; INTRAMUSCULAR; INTRAVENOUS; SOFT TISSUE EVERY 6 HOURS
Status: DISCONTINUED | OUTPATIENT
Start: 2024-10-07 | End: 2024-10-10

## 2024-10-07 RX ORDER — POTASSIUM CHLORIDE 7.45 MG/ML
10 INJECTION INTRAVENOUS PRN
Status: DISCONTINUED | OUTPATIENT
Start: 2024-10-07 | End: 2024-10-11 | Stop reason: HOSPADM

## 2024-10-07 RX ORDER — ACETAMINOPHEN 325 MG/1
650 TABLET ORAL EVERY 6 HOURS PRN
Status: DISCONTINUED | OUTPATIENT
Start: 2024-10-07 | End: 2024-10-11 | Stop reason: HOSPADM

## 2024-10-07 RX ORDER — SODIUM CHLORIDE 9 MG/ML
INJECTION, SOLUTION INTRAVENOUS PRN
Status: DISCONTINUED | OUTPATIENT
Start: 2024-10-07 | End: 2024-10-11 | Stop reason: HOSPADM

## 2024-10-07 RX ORDER — INSULIN LISPRO 100 [IU]/ML
0-4 INJECTION, SOLUTION INTRAVENOUS; SUBCUTANEOUS NIGHTLY
Status: DISCONTINUED | OUTPATIENT
Start: 2024-10-07 | End: 2024-10-11 | Stop reason: HOSPADM

## 2024-10-07 RX ORDER — POTASSIUM CHLORIDE 1500 MG/1
40 TABLET, EXTENDED RELEASE ORAL PRN
Status: DISCONTINUED | OUTPATIENT
Start: 2024-10-07 | End: 2024-10-11 | Stop reason: HOSPADM

## 2024-10-07 RX ORDER — DULOXETIN HYDROCHLORIDE 30 MG/1
30 CAPSULE, DELAYED RELEASE ORAL DAILY
Status: DISCONTINUED | OUTPATIENT
Start: 2024-10-08 | End: 2024-10-11 | Stop reason: HOSPADM

## 2024-10-07 RX ORDER — GABAPENTIN 300 MG/1
300 CAPSULE ORAL 3 TIMES DAILY
Status: DISCONTINUED | OUTPATIENT
Start: 2024-10-07 | End: 2024-10-11 | Stop reason: HOSPADM

## 2024-10-07 RX ORDER — CYCLOBENZAPRINE HCL 10 MG
5 TABLET ORAL 2 TIMES DAILY PRN
Status: DISCONTINUED | OUTPATIENT
Start: 2024-10-07 | End: 2024-10-11 | Stop reason: HOSPADM

## 2024-10-07 RX ORDER — ACETAMINOPHEN 650 MG/1
650 SUPPOSITORY RECTAL EVERY 6 HOURS PRN
Status: DISCONTINUED | OUTPATIENT
Start: 2024-10-07 | End: 2024-10-11 | Stop reason: HOSPADM

## 2024-10-07 RX ORDER — ENOXAPARIN SODIUM 100 MG/ML
40 INJECTION SUBCUTANEOUS DAILY
Status: DISCONTINUED | OUTPATIENT
Start: 2024-10-07 | End: 2024-10-11 | Stop reason: HOSPADM

## 2024-10-07 RX ORDER — ONDANSETRON 2 MG/ML
4 INJECTION INTRAMUSCULAR; INTRAVENOUS EVERY 6 HOURS PRN
Status: DISCONTINUED | OUTPATIENT
Start: 2024-10-07 | End: 2024-10-11 | Stop reason: HOSPADM

## 2024-10-07 RX ORDER — METOPROLOL TARTRATE 50 MG
50 TABLET ORAL 2 TIMES DAILY
Status: DISCONTINUED | OUTPATIENT
Start: 2024-10-07 | End: 2024-10-11 | Stop reason: HOSPADM

## 2024-10-07 RX ORDER — POLYETHYLENE GLYCOL 3350 17 G/17G
17 POWDER, FOR SOLUTION ORAL DAILY PRN
Status: DISCONTINUED | OUTPATIENT
Start: 2024-10-07 | End: 2024-10-11 | Stop reason: HOSPADM

## 2024-10-07 RX ORDER — DEXAMETHASONE SODIUM PHOSPHATE 10 MG/ML
10 INJECTION, SOLUTION INTRAMUSCULAR; INTRAVENOUS EVERY 6 HOURS
OUTPATIENT
Start: 2024-10-07 | End: 2024-10-10

## 2024-10-07 RX ORDER — SODIUM CHLORIDE 0.9 % (FLUSH) 0.9 %
5-40 SYRINGE (ML) INJECTION EVERY 12 HOURS SCHEDULED
Status: DISCONTINUED | OUTPATIENT
Start: 2024-10-07 | End: 2024-10-11 | Stop reason: HOSPADM

## 2024-10-07 RX ORDER — MAGNESIUM SULFATE IN WATER 40 MG/ML
2000 INJECTION, SOLUTION INTRAVENOUS PRN
Status: DISCONTINUED | OUTPATIENT
Start: 2024-10-07 | End: 2024-10-11 | Stop reason: HOSPADM

## 2024-10-07 RX ORDER — INSULIN LISPRO 100 [IU]/ML
0-4 INJECTION, SOLUTION INTRAVENOUS; SUBCUTANEOUS
Status: DISCONTINUED | OUTPATIENT
Start: 2024-10-08 | End: 2024-10-11 | Stop reason: HOSPADM

## 2024-10-07 RX ORDER — LANOLIN ALCOHOL/MO/W.PET/CERES
3 CREAM (GRAM) TOPICAL NIGHTLY PRN
Status: DISCONTINUED | OUTPATIENT
Start: 2024-10-07 | End: 2024-10-11 | Stop reason: HOSPADM

## 2024-10-07 RX ORDER — ONDANSETRON 4 MG/1
4 TABLET, ORALLY DISINTEGRATING ORAL EVERY 8 HOURS PRN
Status: DISCONTINUED | OUTPATIENT
Start: 2024-10-07 | End: 2024-10-11 | Stop reason: HOSPADM

## 2024-10-07 RX ORDER — SODIUM CHLORIDE 0.9 % (FLUSH) 0.9 %
5-40 SYRINGE (ML) INJECTION PRN
Status: DISCONTINUED | OUTPATIENT
Start: 2024-10-07 | End: 2024-10-11 | Stop reason: HOSPADM

## 2024-10-07 RX ORDER — DEXAMETHASONE SODIUM PHOSPHATE 10 MG/ML
10 INJECTION, SOLUTION INTRAMUSCULAR; INTRAVENOUS ONCE
OUTPATIENT
Start: 2024-10-07

## 2024-10-07 RX ADMIN — GADOBENATE DIMEGLUMINE 12 ML: 529 INJECTION, SOLUTION INTRAVENOUS at 17:40

## 2024-10-07 RX ADMIN — METOPROLOL TARTRATE 50 MG: 50 TABLET, FILM COATED ORAL at 22:47

## 2024-10-07 RX ADMIN — SODIUM CHLORIDE, PRESERVATIVE FREE 10 ML: 5 INJECTION INTRAVENOUS at 22:48

## 2024-10-07 RX ADMIN — DEXAMETHASONE SODIUM PHOSPHATE 10 MG: 4 INJECTION INTRA-ARTICULAR; INTRALESIONAL; INTRAMUSCULAR; INTRAVENOUS; SOFT TISSUE at 22:46

## 2024-10-07 RX ADMIN — GABAPENTIN 300 MG: 300 CAPSULE ORAL at 22:47

## 2024-10-07 RX ADMIN — ENOXAPARIN SODIUM 40 MG: 100 INJECTION SUBCUTANEOUS at 22:48

## 2024-10-07 RX ADMIN — CYCLOBENZAPRINE HYDROCHLORIDE 5 MG: 10 TABLET, FILM COATED ORAL at 22:47

## 2024-10-07 ASSESSMENT — ENCOUNTER SYMPTOMS
DIARRHEA: 0
EYE DISCHARGE: 0
SHORTNESS OF BREATH: 0
SORE THROAT: 0
ABDOMINAL DISTENTION: 0
NAUSEA: 0
WHEEZING: 0
VOMITING: 0

## 2024-10-07 ASSESSMENT — PAIN DESCRIPTION - PAIN TYPE: TYPE: ACUTE PAIN

## 2024-10-07 ASSESSMENT — PAIN DESCRIPTION - DESCRIPTORS
DESCRIPTORS: ACHING
DESCRIPTORS: ACHING

## 2024-10-07 ASSESSMENT — PAIN DESCRIPTION - LOCATION
LOCATION: NECK
LOCATION: NECK

## 2024-10-07 ASSESSMENT — PAIN - FUNCTIONAL ASSESSMENT
PAIN_FUNCTIONAL_ASSESSMENT: ACTIVITIES ARE NOT PREVENTED
PAIN_FUNCTIONAL_ASSESSMENT: 0-10
PAIN_FUNCTIONAL_ASSESSMENT: ACTIVITIES ARE NOT PREVENTED
PAIN_FUNCTIONAL_ASSESSMENT: 0-10

## 2024-10-07 ASSESSMENT — PAIN SCALES - GENERAL
PAINLEVEL_OUTOF10: 5
PAINLEVEL_OUTOF10: 6

## 2024-10-07 NOTE — ED PROVIDER NOTES
EMERGENCY DEPARTMENT HISTORY AND PHYSICAL EXAM    Date: 10/7/2024  Patient Name: Lexis Baker    History of Presenting Illness     Chief Complaint   Patient presents with    Fall         History Provided By: Patient       Additional History (Context): Lexis Baker is a 74 y.o. female with a history of severe spinal stenosis who presents today for fall.  Patient reports she is currently staying in a camper/RV situation at her daughter's house because she is unable to get up the steps at her house.  States that she got up to go to the bathroom and her right lower leg gave out on her.  Patient reports she fell onto her leg.  Denies any her head or LOC.  Patient reports that she has had a fusion of L4/L5 and of L5/S1 with Dr. Patiño in September 2022.  Reports she has had worsening symptoms since.  Was being followed by [place] orthopedic spine specialist and was referred to Dr. Patiño who subsequently referred her to neurosurgery at [facility].  Patient reports she is scheduled to have surgery at the end of this month.  Reports worsening right lower extremity weakness, worsening decreased sensation and worsening urinary incontinence.  Denies any difficulties speaking, facial droop or lightheadedness/dizziness.      PCP: Soni Vega MD    Current Facility-Administered Medications   Medication Dose Route Frequency Provider Last Rate Last Admin    dexAMETHasone (DECADRON) injection 10 mg  10 mg IntraVENous Q6H Mir Patiño MD        sodium chloride flush 0.9 % injection 5-40 mL  5-40 mL IntraVENous 2 times per day Leandro, Omoyemi Ajoke, DO        sodium chloride flush 0.9 % injection 5-40 mL  5-40 mL IntraVENous PRN Leandro, Omoyemi Ajobi, DO        0.9 % sodium chloride infusion   IntraVENous PRN Leandro, Omoyemi Ajoke, DO        potassium chloride (KLOR-CON M) extended release tablet 40 mEq  40 mEq Oral PRN Leandro, Omoyemi Ajoke, DO        Or    potassium bicarb-citric acid (EFFER-K) effervescent tablet 40 mEq  40 mEq Oral PRN  was due a critical illness that impaired one or more vital organ systems such that there was a high probability of imminent or life threatening deterioration in the patients condition. This care involved high complexity decision making to assess, manipulate, and support vital system functions, to treat this degreee vital organ system failure and to prevent further life threatening deterioration of the patient’s condition.      Orders as below:  Orders Placed This Encounter   Procedures    XR LUMBAR SPINE (2-3 VIEWS)    XR HIP 2-3 VW W PELVIS RIGHT    XR KNEE RIGHT (3 VIEWS)    XR TIBIA FIBULA RIGHT (2 VIEWS)    CT HIP RIGHT WO CONTRAST    MRI CERVICAL SPINE WO CONTRAST    MRI LUMBAR SPINE W WO CONTRAST    MRI THORACIC SPINE WO CONTRAST    MRI CERVICAL SPINE WITH CONTRAST    CBC with Auto Differential    CMP    Magnesium    Lipase    Urinalysis    Basic Metabolic Panel w/ Reflex to MG    CBC with Auto Differential    ADULT DIET; Regular    Weigh Patient    TELEMETRY MONITORING    Neuro checks    BLADDER SCAN    Vital signs per unit routine    Initiate Hypoglycemia Treatment    Admission/Observation order previously placed    Up with assistance    Full code    Initiate Oxygen Therapy Protocol    POCT Glucose    POCT Glucose    EKG 12 Lead    Saline lock IV    ADMIT TO INPATIENT          Disposition:  Admit         Medication List        ASK your doctor about these medications      acetaminophen 500 MG tablet  Commonly known as: TYLENOL     Aleve 220 MG Caps  Generic drug: Naproxen Sodium     aspirin 325 MG tablet     atorvastatin 40 MG tablet  Commonly known as: LIPITOR     cyclobenzaprine 5 MG tablet  Commonly known as: FLEXERIL     DULoxetine 30 MG extended release capsule  Commonly known as: Cymbalta  Take 1 capsule daily for neuropathic symptoms as directed     gabapentin 300 MG capsule  Commonly known as: NEURONTIN  Take 1 capsule by mouth 3 times daily for 180 days. Max Daily Amount: 900 mg     latanoprost 0.005

## 2024-10-07 NOTE — ED NOTES
Pt states she fell last night after her legs gave out on her. Pt states she was too weak to get up off of the floor.

## 2024-10-07 NOTE — PROGRESS NOTES
MRI screening form needs to be filled out and faxed to  [phone] BEFORE MRI can be scheduled.  If unable to obtain information from patient , [facility] needs to be contacted .   If patient is claustrophobic or will needs pain meds, please have ordered in advance in order to facilitate exam.      If POA is unavailable or unsure of patient history, screening X-rays will need to be ordered.

## 2024-10-08 ENCOUNTER — APPOINTMENT (OUTPATIENT)
Facility: HOSPITAL | Age: 74
End: 2024-10-08
Payer: MEDICARE

## 2024-10-08 LAB
ANION GAP SERPL CALC-SCNC: 5 MMOL/L (ref 3–18)
BASOPHILS # BLD: 0 K/UL (ref 0–0.1)
BASOPHILS NFR BLD: 1 % (ref 0–2)
BUN SERPL-MCNC: 14 MG/DL (ref 7–18)
BUN/CREAT SERPL: 18 (ref 12–20)
CALCIUM SERPL-MCNC: 9.3 MG/DL (ref 8.5–10.1)
CHLORIDE SERPL-SCNC: 108 MMOL/L (ref 100–111)
CO2 SERPL-SCNC: 29 MMOL/L (ref 21–32)
CREAT SERPL-MCNC: 0.78 MG/DL (ref 0.6–1.3)
DIFFERENTIAL METHOD BLD: ABNORMAL
EOSINOPHIL # BLD: 0 K/UL (ref 0–0.4)
EOSINOPHIL NFR BLD: 0 % (ref 0–5)
ERYTHROCYTE [DISTWIDTH] IN BLOOD BY AUTOMATED COUNT: 13.8 % (ref 11.6–14.5)
FOLATE SERPL-MCNC: >20 NG/ML (ref 3.1–17.5)
GLUCOSE BLD STRIP.AUTO-MCNC: 152 MG/DL (ref 70–110)
GLUCOSE BLD STRIP.AUTO-MCNC: 186 MG/DL (ref 70–110)
GLUCOSE BLD STRIP.AUTO-MCNC: 216 MG/DL (ref 70–110)
GLUCOSE BLD STRIP.AUTO-MCNC: 235 MG/DL (ref 70–110)
GLUCOSE SERPL-MCNC: 169 MG/DL (ref 74–99)
HCT VFR BLD AUTO: 39.3 % (ref 35–45)
HGB BLD-MCNC: 12 G/DL (ref 12–16)
IMM GRANULOCYTES # BLD AUTO: 0 K/UL (ref 0–0.04)
IMM GRANULOCYTES NFR BLD AUTO: 0 % (ref 0–0.5)
LYMPHOCYTES # BLD: 0.7 K/UL (ref 0.9–3.6)
LYMPHOCYTES NFR BLD: 15 % (ref 21–52)
MCH RBC QN AUTO: 27.6 PG (ref 24–34)
MCHC RBC AUTO-ENTMCNC: 30.5 G/DL (ref 31–37)
MCV RBC AUTO: 90.3 FL (ref 78–100)
MONOCYTES # BLD: 0 K/UL (ref 0.05–1.2)
MONOCYTES NFR BLD: 1 % (ref 3–10)
NEUTS SEG # BLD: 3.6 K/UL (ref 1.8–8)
NEUTS SEG NFR BLD: 83 % (ref 40–73)
NRBC # BLD: 0 K/UL (ref 0–0.01)
NRBC BLD-RTO: 0 PER 100 WBC
PLATELET # BLD AUTO: 202 K/UL (ref 135–420)
PMV BLD AUTO: 10.9 FL (ref 9.2–11.8)
POTASSIUM SERPL-SCNC: 3.9 MMOL/L (ref 3.5–5.5)
RBC # BLD AUTO: 4.35 M/UL (ref 4.2–5.3)
SODIUM SERPL-SCNC: 142 MMOL/L (ref 136–145)
VIT B12 SERPL-MCNC: 826 PG/ML (ref 211–911)
WBC # BLD AUTO: 4.4 K/UL (ref 4.6–13.2)

## 2024-10-08 PROCEDURE — 82962 GLUCOSE BLOOD TEST: CPT

## 2024-10-08 PROCEDURE — A9577 INJ MULTIHANCE: HCPCS | Performed by: PSYCHIATRY & NEUROLOGY

## 2024-10-08 PROCEDURE — 70553 MRI BRAIN STEM W/O & W/DYE: CPT

## 2024-10-08 PROCEDURE — 83516 IMMUNOASSAY NONANTIBODY: CPT

## 2024-10-08 PROCEDURE — 84165 PROTEIN E-PHORESIS SERUM: CPT

## 2024-10-08 PROCEDURE — 86146 BETA-2 GLYCOPROTEIN ANTIBODY: CPT

## 2024-10-08 PROCEDURE — 1100000000 HC RM PRIVATE

## 2024-10-08 PROCEDURE — 85670 THROMBIN TIME PLASMA: CPT

## 2024-10-08 PROCEDURE — 6360000004 HC RX CONTRAST MEDICATION: Performed by: PSYCHIATRY & NEUROLOGY

## 2024-10-08 PROCEDURE — 85025 COMPLETE CBC W/AUTO DIFF WBC: CPT

## 2024-10-08 PROCEDURE — 86225 DNA ANTIBODY NATIVE: CPT

## 2024-10-08 PROCEDURE — 72142 MRI NECK SPINE W/DYE: CPT

## 2024-10-08 PROCEDURE — 85302 CLOT INHIBIT PROT C ANTIGEN: CPT

## 2024-10-08 PROCEDURE — 81241 F5 GENE: CPT

## 2024-10-08 PROCEDURE — 86235 NUCLEAR ANTIGEN ANTIBODY: CPT

## 2024-10-08 PROCEDURE — 6360000002 HC RX W HCPCS: Performed by: ORTHOPAEDIC SURGERY

## 2024-10-08 PROCEDURE — 99233 SBSQ HOSP IP/OBS HIGH 50: CPT | Performed by: PSYCHIATRY & NEUROLOGY

## 2024-10-08 PROCEDURE — 86376 MICROSOMAL ANTIBODY EACH: CPT

## 2024-10-08 PROCEDURE — 86800 THYROGLOBULIN ANTIBODY: CPT

## 2024-10-08 PROCEDURE — 2580000003 HC RX 258: Performed by: STUDENT IN AN ORGANIZED HEALTH CARE EDUCATION/TRAINING PROGRAM

## 2024-10-08 PROCEDURE — 6370000000 HC RX 637 (ALT 250 FOR IP): Performed by: STUDENT IN AN ORGANIZED HEALTH CARE EDUCATION/TRAINING PROGRAM

## 2024-10-08 PROCEDURE — 94761 N-INVAS EAR/PLS OXIMETRY MLT: CPT

## 2024-10-08 PROCEDURE — 80048 BASIC METABOLIC PNL TOTAL CA: CPT

## 2024-10-08 PROCEDURE — 36415 COLL VENOUS BLD VENIPUNCTURE: CPT

## 2024-10-08 PROCEDURE — 85732 THROMBOPLASTIN TIME PARTIAL: CPT

## 2024-10-08 PROCEDURE — 85613 RUSSELL VIPER VENOM DILUTED: CPT

## 2024-10-08 PROCEDURE — 85303 CLOT INHIBIT PROT C ACTIVITY: CPT

## 2024-10-08 PROCEDURE — 84155 ASSAY OF PROTEIN SERUM: CPT

## 2024-10-08 PROCEDURE — 85705 THROMBOPLASTIN INHIBITION: CPT

## 2024-10-08 PROCEDURE — 6360000002 HC RX W HCPCS: Performed by: STUDENT IN AN ORGANIZED HEALTH CARE EDUCATION/TRAINING PROGRAM

## 2024-10-08 PROCEDURE — 86015 ACTIN ANTIBODY EACH: CPT

## 2024-10-08 RX ADMIN — SODIUM CHLORIDE, PRESERVATIVE FREE 10 ML: 5 INJECTION INTRAVENOUS at 08:30

## 2024-10-08 RX ADMIN — GADOBENATE DIMEGLUMINE 12 ML: 529 INJECTION, SOLUTION INTRAVENOUS at 18:44

## 2024-10-08 RX ADMIN — ENOXAPARIN SODIUM 40 MG: 100 INJECTION SUBCUTANEOUS at 21:56

## 2024-10-08 RX ADMIN — GABAPENTIN 300 MG: 300 CAPSULE ORAL at 13:53

## 2024-10-08 RX ADMIN — GABAPENTIN 300 MG: 300 CAPSULE ORAL at 21:56

## 2024-10-08 RX ADMIN — INSULIN LISPRO 1 UNITS: 100 INJECTION, SOLUTION INTRAVENOUS; SUBCUTANEOUS at 13:57

## 2024-10-08 RX ADMIN — METOPROLOL TARTRATE 50 MG: 50 TABLET, FILM COATED ORAL at 21:56

## 2024-10-08 RX ADMIN — DEXAMETHASONE SODIUM PHOSPHATE 10 MG: 4 INJECTION INTRA-ARTICULAR; INTRALESIONAL; INTRAMUSCULAR; INTRAVENOUS; SOFT TISSUE at 21:56

## 2024-10-08 RX ADMIN — DULOXETINE HYDROCHLORIDE 30 MG: 30 CAPSULE, DELAYED RELEASE ORAL at 08:29

## 2024-10-08 RX ADMIN — SODIUM CHLORIDE, PRESERVATIVE FREE 10 ML: 5 INJECTION INTRAVENOUS at 21:57

## 2024-10-08 RX ADMIN — DEXAMETHASONE SODIUM PHOSPHATE 10 MG: 4 INJECTION INTRA-ARTICULAR; INTRALESIONAL; INTRAMUSCULAR; INTRAVENOUS; SOFT TISSUE at 04:35

## 2024-10-08 RX ADMIN — DEXAMETHASONE SODIUM PHOSPHATE 10 MG: 4 INJECTION INTRA-ARTICULAR; INTRALESIONAL; INTRAMUSCULAR; INTRAVENOUS; SOFT TISSUE at 11:35

## 2024-10-08 RX ADMIN — DEXAMETHASONE SODIUM PHOSPHATE 10 MG: 4 INJECTION INTRA-ARTICULAR; INTRALESIONAL; INTRAMUSCULAR; INTRAVENOUS; SOFT TISSUE at 16:06

## 2024-10-08 RX ADMIN — METOPROLOL TARTRATE 50 MG: 50 TABLET, FILM COATED ORAL at 08:29

## 2024-10-08 RX ADMIN — GABAPENTIN 300 MG: 300 CAPSULE ORAL at 08:29

## 2024-10-08 ASSESSMENT — PAIN SCALES - GENERAL
PAINLEVEL_OUTOF10: 0

## 2024-10-08 ASSESSMENT — ENCOUNTER SYMPTOMS: BACK PAIN: 1

## 2024-10-08 NOTE — PROGRESS NOTES
[facility]  Progress Note    Patient: Lexis Baker Age: 74 y.o. : 1950 MR#: [id number] SSN: [id number]  Date/Time: 10/8/2024     Subjective:     Patient seen evaluated, lying in bed, no acute distress.    74-year-old female presents with worsening right leg weakness.  Patient is right arm also has tingling sensation and patient has a known history of lumbar spinal stenosis and seen by Dr. Patiño and evaluated at [facility].  MRI of the lumbar spine demonstrates existing L1-L2 stenosis which may be slightly worse but no significant changes with cord compression at that level.    Patient seen by neurology and neurologically cleared to proceed with lumbar spine surgery.        Assessment/Plan:     Lumbar spinal stenosis existing L1-L2 stenosis, spine surgery consulted and recommended neurology clearance.  Appreciate neurology input, patient has been neurologically cleared to proceed with lumbar spine surgery.  Continue IV steroids.  History of hypertension-resume home medications, continue to monitor blood pressure.  History of right lower extremity weakness due to lumbar spinal stenosis.  Resume home gabapentin and Cymbalta  DVT prophylaxis-Lovenox  Full code            Anticipated Discharge and Disposition:    10/12-TBD    Wil Robles MD  10/08/24      Case discussed with:  [x]Patient  []Family  []Nursing  []Case Management  DVT Prophylaxis:  [x]Lovenox  []Hep SQ  []SCDs  []Coumadin   []On Heparin gtt    Objective:   VS:   Vitals:    10/08/24 1119   BP: 113/67   Pulse: 68   Resp: 20   Temp: 97.5 °F (36.4 °C)   SpO2: 97%        Intake/Output Summary (Last 24 hours) at 10/8/2024 1346  Last data filed at 10/8/2024 1339  Gross per 24 hour   Intake 600 ml   Output 100 ml   Net 500 ml       General:  Alert, cooperative, no acute distress    Pulmonary:  CTA Bilaterally. No Wheezing/Rhonchi/Rales.  Cardiovascular: Regular rate and Rhythm.  GI:  Soft, Non distended, Non tender.     Differential Type AUTOMATED     CMP    Collection Time: 10/07/24 11:45 AM   Result Value Ref Range    Sodium 143 136 - 145 mmol/L    Potassium 3.6 3.5 - 5.5 mmol/L    Chloride 108 100 - 111 mmol/L    CO2 32 21 - 32 mmol/L    Anion Gap 3 3.0 - 18 mmol/L    Glucose 101 (H) 74 - 99 mg/dL    BUN 13 7.0 - 18 MG/DL    Creatinine 0.75 0.6 - 1.3 MG/DL    BUN/Creatinine Ratio 17 12 - 20      Est, Glom Filt Rate 83 >60 ml/min/1.73m2    Calcium 9.6 8.5 - 10.1 MG/DL    Total Bilirubin 0.6 0.2 - 1.0 MG/DL    ALT 37 13 - 56 U/L    AST 29 10 - 38 U/L    Alk Phosphatase 151 (H) 45 - 117 U/L    Total Protein 6.8 6.4 - 8.2 g/dL    Albumin 3.5 3.4 - 5.0 g/dL    Globulin 3.3 2.0 - 4.0 g/dL    Albumin/Globulin Ratio 1.1 0.8 - 1.7     Magnesium    Collection Time: 10/07/24 11:45 AM   Result Value Ref Range    Magnesium 1.4 (L) 1.6 - 2.6 mg/dL   Lipase    Collection Time: 10/07/24 11:45 AM   Result Value Ref Range    Lipase 35 13 - 75 U/L   Vitamin B12 & Folate    Collection Time: 10/07/24 11:45 AM   Result Value Ref Range    Vitamin B-12 826 211 - 911 pg/mL    Folate >20.0 (H) 3.10 - 17.50 ng/mL   EKG 12 Lead    Collection Time: 10/07/24 11:49 AM   Result Value Ref Range    Ventricular Rate 65 BPM    Atrial Rate 65 BPM    P-R Interval 168 ms    QRS Duration 72 ms    Q-T Interval 396 ms    QTc Calculation (Bazett) 411 ms    P Axis 70 degrees    R Axis -35 degrees    T Axis 5 degrees    Diagnosis       Normal sinus rhythm  Left axis deviation  Abnormal ECG  When compared with ECG of 13-DEC-2022 15:03,  No significant change was found  Confirmed by MD Javad, Bala ([id number]) on 10/7/2024 4:26:49 PM     Urinalysis    Collection Time: 10/07/24  2:14 PM   Result Value Ref Range    Color, UA YELLOW      Appearance CLEAR      Specific Gravity, UA 1.015 1.005 - 1.030      pH, Urine 7.5 5.0 - 8.0      Protein, UA Negative NEG mg/dL    Glucose, Ur Negative NEG mg/dL    Ketones, Urine Negative NEG mg/dL    Bilirubin, Urine Negative NEG

## 2024-10-08 NOTE — PLAN OF CARE
Problem: Chronic Conditions and Co-morbidities  Goal: Patient's chronic conditions and co-morbidity symptoms are monitored and maintained or improved  Outcome: Progressing  Flowsheets (Taken 10/8/2024 0231)  Care Plan - Patient's Chronic Conditions and Co-Morbidity Symptoms are Monitored and Maintained or Improved:   Monitor and assess patient's chronic conditions and comorbid symptoms for stability, deterioration, or improvement   Collaborate with multidisciplinary team to address chronic and comorbid conditions and prevent exacerbation or deterioration   Update acute care plan with appropriate goals if chronic or comorbid symptoms are exacerbated and prevent overall improvement and discharge  Note: Pt will have pain controlled and work with PT to do range of motion and strengthening exercises     Problem: Pain  Goal: Verbalizes/displays adequate comfort level or baseline comfort level  Outcome: Progressing  Flowsheets (Taken 10/8/2024 0231)  Verbalizes/displays adequate comfort level or baseline comfort level:   Encourage patient to monitor pain and request assistance   Assess pain using appropriate pain scale   Administer analgesics based on type and severity of pain and evaluate response   Implement non-pharmacological measures as appropriate and evaluate response   Consider cultural and social influences on pain and pain management   Notify Licensed Independent Practitioner if interventions unsuccessful or patient reports new pain  Note: Assess pain frequently and ensure pt uses adequate pain levels, catch pain early on and don't wait until it becomes intolerable     Problem: Skin/Tissue Integrity  Goal: Absence of new skin breakdown  Outcome: Progressing  Note: Pt will turn frequently to avoid skin breakdown. She turns very well on her own.

## 2024-10-08 NOTE — CONSULTS
Consult    Patient: Lexis Baker MRN: [id number]  SSN: [id number]    YOB: 1950  Age: 74 y.o.  Sex: female      Subjective:      Lexis Baker is a 74 y.o. female who is being seen for weakness in the arms and legs.  Patient is known to manage previously For cervical spine she has had a C3-C5 cervical fusion and area some gliosis from where from previous compression of her neck was stable in the last 100 and June.  Her main complaint at that point was junctional stenosis with back pain and radiculopathy.  She had evidence L1-2 stenosis above her previous L2 to the sacrum fusion.  Her sagittal balance was poor.  I felt he needed a osteotomy in addition to a decompression.  She is actually scheduled for back surgery with the department of neurosurgery at [facility] referred her to on the 23rd of this month.    Patient states she has had worsening back pain in the months since have seen her with some episodes of relative incontinence.  Review of the medical records from [facility] indicates they are planning on doing the simple decompression at L1-2 without extending her fusion and do not wish to proceed with an osteotomy at this setting.    Patient also states over the past couple weeks she has had increasing weakness in her right ankle leg and right hand with diffuse numbness in her fingers.  Some neck pain not a lot of neck pain.  She had several falls today because her right leg gave out.    On exam the patient has good strength of her deltoids biceps triceps intrinsics weakness in the right leg iliopsoas and quad weakness of tib ant and EHL right greater than left left leg seems pretty normal.    MRI of the lumbar spine was reviewed which demonstrates the previous existing L1-2 stenosis may be slightly worse but no dramatic changes with cord compression at that level due to facet ligamentous hypertrophy as well as disc protrusion.  No gross instability    MRI of her cervical spine however is not warranted

## 2024-10-08 NOTE — H&P
Hospitalist Admission History and Physical    NAME:  Lexis Baker   :   1950   MRN:   [id number]     PCP:  Soni Vega MD  Date/Time:  10/7/2024 8:56 PM  Subjective:   CHIEF COMPLAINT:  right leg weakness    HISTORY OF PRESENT ILLNESS:     Lexis Baker is a 74 y.o. female who presents with worsening right leg weakness. Patient's right arm also has tingling sensation. Patient has known lumbar stenosis and has been seen by Dr. Patiño and evaluated at [facility] in which she has surgery scheduled at [facility] on Oct 23. However, patient had worsening back pain and weakness that caused a fall.      Patient states she has had worsening back pain in the months since have seen her with some episodes of relative incontinence. Patient also states over the past couple weeks she has had increasing weakness in her right ankle leg and right hand with diffuse numbness in her fingers.  Some neck pain not a lot of neck pain.       MRI of the lumbar spine demonstrates previous existing L1-2 stenosis may be slightly worse but no dramatic changes with cord compression at that level due to facet ligamentous hypertrophy as well as disc protrusion.  No gross instability     MRI of her cervical spine however is not warranted with dramatic changes from the previous study.  Shows area of cord commotion from C2 down to T2.  Tremendous edema of the cord.  There is stenosis at the L C2-C3 with this disc protrusion and ligamentous hypertrophy causing stenosis at C2-3 at the area of signal change to the cord extends all the way to her thoracic spines diffuse.  The differential diagnosis provided by neurology includes intrinsic tumor transverse myelitis other inflammatory disease.       Radiology recommends a study with contrast of her neck.     Dr. Patiño had discussed with the emergency room staff transfer to [facility] neurosurgery where they are planning already on taking care of her low back.  However, patient wishes to stay here at this time.  ophthalmic solution   Yes No   meclizine (ANTIVERT) 25 MG tablet   Yes No   metFORMIN (GLUCOPHAGE) 500 MG tablet   Yes No   Si tablet with a meal Orally twice daily for 90 days   metoprolol tartrate (LOPRESSOR) 50 MG tablet   Yes No      Facility-Administered Medications: None       REVIEW OF SYSTEMS:     [] Unable to obtain  ROS due to  []mental status change  []sedated   []intubated   [x]Total of 12 systems reviewed as follows:  Constitutional: negative fever, negative chills, negative weight loss  Eyes:   negative visual changes  ENT:   negative sore throat, tongue or lip swelling  Respiratory:  negative cough, negative dyspnea  Cards:   negative for chest pain, palpitations, lower extremity edema  GI:   negative for nausea, vomiting, diarrhea, and abdominal pain  Genitourinary: negative for frequency, dysuria  Integument:  negative for rash and pruritus  Hematologic:  negative for easy bruising and gum/nose bleeding  Musculoskel: negative for myalgias,  back pain and muscle weakness  Neurological:  negative for headaches, dizziness, vertigo  Behavl/Psych: negative for feelings of anxiety, depression     Pertinent Positives include: right leg weakness, numbness and tingling in right extremities    Objective:     PHYSICAL EXAM:   Objective:  General Appearance:  Comfortable.    Vital signs: (most recent): Blood pressure (!) 175/85, pulse 73, temperature 97.1 °F (36.2 °C), temperature source Oral, resp. rate 18, height 1.676 m (5' 6\"), weight 63 kg (139 lb), last menstrual period 2007, SpO2 99%.    Output: Producing urine.    HEENT: Normal HEENT exam.    Lungs:  Normal effort and normal respiratory rate.  Breath sounds clear to auscultation.    Heart: Normal rate.  Regular rhythm.    Abdomen: Abdomen is soft and flat.  Bowel sounds are normal.   There is no abdominal tenderness.     Extremities: Normal range of motion.    Pulses: Distal pulses are intact.    Neurological: Patient is alert and oriented to

## 2024-10-08 NOTE — ED NOTES
Verbal shift change report given to Chevy (oncoming nurse) by Marisela (offgoing nurse). Report included the following information Nurse Handoff Report, ED Encounter Summary, ED SBAR, and MAR.

## 2024-10-08 NOTE — PROGRESS NOTES
Spiritual Health History and Assessment/Progress Note  [facility]    Spiritual/Emotional Needs,  ,  ,      Name: Lexis Baker MRN: [id number]    Age: 74 y.o.     Sex: female   Language: English   Taoism: Protestant   Spinal stenosis     Date: 10/8/2024            Total Time Calculated: 7 min              Spiritual Assessment began in [address] MEDICAL        Referral/Consult From: Rounding   Encounter Overview/Reason: Spiritual/Emotional Needs  Service Provided For: Patient    Erin, Belief, Meaning:   Patient has beliefs or practices that help with coping during difficult times  Family/Friends No family/friends present      Importance and Influence:  Patient has spiritual/personal beliefs that influence decisions regarding their health  Family/Friends No family/friends present    Community:  Patient feels well-supported. Support system includes: Children  Family/Friends No family/friends present    Assessment and Plan of Care:     Patient Interventions include: Affirmed coping skills/support systems  Family/Friends Interventions include: No family/friends present    Patient Plan of Care: No spiritual needs identified for follow-up  Family/Friends Plan of Care: No family/friends present    Electronically signed by JYOTI Cody on 10/8/2024 at 11:24 AM

## 2024-10-08 NOTE — ED NOTES
TRANSFER - OUT REPORT:    Verbal report given to April on Lexis Baker  being transferred to [address] [place] [address] for routine progression of patient care       Report consisted of patient's Situation, Background, Assessment and   Recommendations(SBAR).     Information from the following report(s) Nurse Handoff Report and MAR was reviewed with the receiving nurse.    [place] Fall Assessment:                           Lines:   Peripheral IV 10/07/24 Left Wrist (Active)        Opportunity for questions and clarification was provided.      Patient transported with:  Tech

## 2024-10-08 NOTE — PROGRESS NOTES
Vss afeb  Feels better (likey steroids)  Exam improved- subtle clonus, continued hyperreflexia, no cooper's  No bladder issues overnight.  Appreciate initial neurology note.  Plan    MRI brain/spinal axis with contrast  Possible lp and other studies per neurology  Continue neuro check  Continue steroids at direction of neurology  I believe patient will require decompression l1/2 as previously scheduled to be done at [facility].  Will also require decompression/ fusion C2-C3.  Would wait until overlying neurologic picture is defined  itself and stabilizes.

## 2024-10-08 NOTE — PROGRESS NOTES
Progress Note  Date:10/8/2024       Room:[facility]  Patient Name:Lexis Baker     YOB: 1950     Age:74 y.o.        Subjective    Subjective:  Symptoms:  She reports weakness.  (Continued right arm/leg weakness, worse in leg, bladder difficulties, back pain radiating into right buttocks).       Review of Systems   Musculoskeletal:  Positive for arthralgias, back pain, gait problem, myalgias and neck pain.   Neurological:  Positive for weakness.   All other systems reviewed and are negative.    Objective         Vitals Last 24 Hours:  TEMPERATURE:  Temp  Av °F (36.7 °C)  Min: 97.5 °F (36.4 °C)  Max: 98.5 °F (36.9 °C)  RESPIRATIONS RANGE: Resp  Av.4  Min: 17  Max: 20  PULSE OXIMETRY RANGE: SpO2  Av.3 %  Min: 97 %  Max: 98 %  PULSE RANGE: Pulse  Av.9  Min: 62  Max: 88  BLOOD PRESSURE RANGE: Systolic (24hrs), Av , Min:113 , Max:188   ; Diastolic (24hrs), Av, Min:67, Max:90    I/O (24Hr):    Intake/Output Summary (Last 24 hours) at 10/8/2024 1206  Last data filed at 10/8/2024 1119  Gross per 24 hour   Intake 480 ml   Output 100 ml   Net 380 ml     Objective  General: Awake, alert nad  Neuro: MS Aox3 language fluent  CN: 2-12 grossly intact  Motor 5/5 in left upper/lower ext  Right proximal upper ext 4+/4, 4-/5 right /finger ext 2/5 right lower ext, unable to lift against gravity  Sens intact to LT in all 4 ext  Labs/Imaging/Diagnostics    Labs:  CBC:  Recent Labs     10/07/24  1145 10/08/24  0602   WBC 4.4* 4.4*   RBC 4.09* 4.35   HGB 11.2* 12.0   HCT 36.9 39.3   MCV 90.2 90.3   RDW 13.8 13.8    202     CHEMISTRIES:  Recent Labs     10/07/24  1145 10/08/24  0602    142   K 3.6 3.9    108   CO2 32 29   BUN 13 14   CREATININE 0.75 0.78   GLUCOSE 101* 169*   MG 1.4*  --      PT/INR:No results for input(s): \"PROTIME\", \"INR\" in the last 72 hours.  APTT:No results for input(s): \"APTT\" in the last 72 hours.  LIVER PROFILE:  Recent Labs     10/07/24  1145   AST 29  spine. Joint fluid: None. Articulations: Mild to moderate osteoarthrosis of the right hip. Muscles: No intramuscular hematoma. Subcutaneous fat stranding along the lateral thigh and buttock which could represent a contusion. Soft tissue mass: Fibroid uterus with calcifications. Negative urinary bladder. No dilated bowel. Dystrophic calcifications in the left greater than right psoas musculature.     No acute fracture or dislocation of the right hip. Electronically signed by Monica Gupta    XR TIBIA FIBULA RIGHT (2 VIEWS)    Result Date: 10/7/2024  EXAM: XR TIBIA FIBULA RIGHT (2 VIEWS) CLINICAL HISTORY/INDICATION: fall COMPARISON: None. TECHNIQUE: 2 view(s) FINDINGS: 2 views of the right tibia and fibula demonstrate no fracture. No dislocation is seen at the knee or ankle joints. No radiopaque foreign body.     No acute bony abnormality. Electronically signed by Levy Cummins    XR LUMBAR SPINE (2-3 VIEWS)    Result Date: 10/7/2024  EXAM: XR LUMBAR SPINE (2-3 VIEWS) CLINICAL HISTORY/INDICATION: fall COMPARISON: 10/2/2023 TECHNIQUE: 3 view(s) FINDINGS: For purposes of this dictation 12th ribs are hypoplastic and there are 5 lumbar vertebral segments. Stable postoperative changes in the lumbar spine with fusion hardware extending from the L2-S1 levels with rods and interpedicular screws at the L2, L3, L5 and S1 levels. Minimal perihardware lucency about the left sacral screw is unchanged. Intervertebral disc spacers and disc hardware unchanged. No hardware complication. No acute fracture or change in alignment. Grade 1 anterolisthesis of L4 over L5 is unchanged.     Stable postoperative changes from L2-S1. No acute bony abnormality. Electronically signed by Levy Cummins    XR HIP 2-3 VW W PELVIS RIGHT    Result Date: 10/7/2024  EXAM: XR HIP 2-3 VW W PELVIS RIGHT CLINICAL HISTORY/INDICATION: fall COMPARISON: 12/13/2022 TECHNIQUE: 2 view(s) FINDINGS: Postoperative changes of fusion and extensive hardware is seen in the lumbar

## 2024-10-08 NOTE — PROGRESS NOTES
0155 am talked with phlebotomist Emily about labs ordered at 2230 and she informed me she would look them up, some are not the regular labs ordered daily.    0600 called phlebotomy and asked if they were drawn as I have no results, they have not been drawn yet.  This RN was told there was only one phlebotomist on all night.

## 2024-10-08 NOTE — CONSULTS
Consult Note            Date:10/7/2024        Patient Name:Lexis Baker     YOB: 1950     Age:74 y.o.    Consults    Chief Complaint     Chief Complaint   Patient presents with    Fall          History Obtained From   patient    History of Present Illness   Patient Presentation: Lexis Baker    Chief Complaint:  - Progressive weakness in her right leg and arm, along with numbness in her fingertips.Sx have been progressing for \"a while\"  - Worsening of weakness last night when her right leg collapsed, causing her to fall and remain on the floor for approximately 8 hours.Patient with previous cervical spine stenosis, s/p surgery.  She states the sx feels similar.  - Recent neck pain.    Associated Symptoms:  - Loss of bladder control.  - Denies problems with talking, swallowing, understanding people, or dizziness.  - Left arm and leg functioning normally.      Additional Notes:  - Denies any recent trauma, aside from the fall last night.  Past Medical History     Past Medical History:   Diagnosis Date    Arthritis     back and hands    Chronic pain     back    COVID-19     Summer of  2021    Diabetes (HCC)     NIDDM    HLD (hyperlipidemia)     Hypertension     Nausea & vomiting     Surgery only not colonoscopy        Past Surgical History     Past Surgical History:   Procedure Laterality Date    COLONOSCOPY      COLONOSCOPY N/A 6/24/2022    COLONOSCOPY performed by Edinson Ferrell MD at [facility] ENDOSCOPY    TUBAL LIGATION      US BREAST BIOPSY NEEDLE ADDITIONAL LEFT Left 8/15/2023    US BREAST BIOPSY NEEDLE ADDITIONAL LEFT 8/15/2023 [facility]    US BREAST BIOPSY NEEDLE ADDITIONAL LEFT Left 8/15/2023    US BREAST BIOPSY NEEDLE ADDITIONAL LEFT 8/15/2023 [facility] US    US BREAST BIOPSY W LOC DEVICE 1ST LESION LEFT Left 8/15/2023    US BREAST BIOPSY W LOC DEVICE 1ST LESION LEFT 8/15/2023 [facility]        Medications     Prior to Admission medications    Medication Sig Start Date End Date Taking? Authorizing  Provider   cyclobenzaprine (FLEXERIL) 5 MG tablet Take 1 tablet by mouth 2 times daily as needed    Arnoldo Eden MD   DULoxetine (CYMBALTA) 30 MG extended release capsule Take 1 capsule daily for neuropathic symptoms as directed 6/27/24 10/27/24  Larissa Huffman, APRN - NP   aspirin 325 MG tablet Take 1 tablet by mouth daily    Arnoldo Eden MD   gabapentin (NEURONTIN) 300 MG capsule Take 1 capsule by mouth 3 times daily for 180 days. Max Daily Amount: 900 mg 5/21/24 11/17/24  Prabha Copeland MD   atorvastatin (LIPITOR) 40 MG tablet     Arnoldo Eden MD   latanoprost (XALATAN) 0.005 % ophthalmic solution  2/11/24   Arnoldo Eden MD   meclizine (ANTIVERT) 25 MG tablet     Arnoldo Eden MD   metFORMIN (GLUCOPHAGE) 500 MG tablet 1 tablet with a meal Orally twice daily for 90 days    Arnoldo Eden MD   metoprolol tartrate (LOPRESSOR) 50 MG tablet     ProviderArnoldo MD   Naproxen Sodium (ALEVE) 220 MG CAPS Take by mouth 2 times daily    Arnoldo Eden MD   acetaminophen (TYLENOL) 500 MG tablet Take 1 tablet by mouth every 6 hours as needed for Fever 10/4/22   Automatic Reconciliation, Ar        dexAMETHasone (DECADRON) injection 10 mg, Q6H  sodium chloride flush 0.9 % injection 5-40 mL, 2 times per day  sodium chloride flush 0.9 % injection 5-40 mL, PRN  0.9 % sodium chloride infusion, PRN  potassium chloride (KLOR-CON M) extended release tablet 40 mEq, PRN   Or  potassium bicarb-citric acid (EFFER-K) effervescent tablet 40 mEq, PRN   Or  potassium chloride 10 mEq/100 mL IVPB (Peripheral Line), PRN  magnesium sulfate 2000 mg in 50 mL IVPB premix, PRN  enoxaparin (LOVENOX) injection 40 mg, Daily  ondansetron (ZOFRAN-ODT) disintegrating tablet 4 mg, Q8H PRN   Or  ondansetron (ZOFRAN) injection 4 mg, Q6H PRN  polyethylene glycol (GLYCOLAX) packet 17 g, Daily PRN  acetaminophen (TYLENOL) tablet 650 mg, Q6H PRN   Or  acetaminophen (TYLENOL) suppository 650 mg,

## 2024-10-08 NOTE — PROGRESS NOTES
73 Y/o female    Diagnosis: spinal stenosis of lumbar region with neurogenic claudication, spine lumbar hemilaminectomy w/ decompression, partial facetectomy, foraminotomy and discectomy surgery scheduled 10/23/24 [facility], recent onset of neck pain    Admitted 10/07/24 arrive c/o fall. Staying in a camper at her daughters house d/t being unable to get up the steps at her house. Got up to go to the restroom and her right leg gave out. C/o severe worsening weakness in right arm and leg. Reports decreased sensation and worsening urinary incontinence. She reports several falls d/t right leg weakness. Surgery scheduled the end of October     History: Spinal stenosis of lumbar with neurogenic claudication, pain in thoracic region with numbness and tingling of her left thigh. Fusion of L4/L5 and L5/S1 (2022), weakness of both lower extremities, lumbar radiculopathy, muscle spasms, gait abnormality    Neuro alert and oriented X 4, no fevers, rolls in bed on her own    Resp room air    Cardiac telemetry box 58    GI adult diet regular     pure wick    Muscle: abnormal heel to shin and leg coordination    Skin scattered surgical scars and slightly hyperpigmented sacrum    Lines 20 gauge Left wrist    Plan: MRI of brain, already had MRI of back this admission, possible Lumbar puncture to assess CSF, possibly surgery with Dr. Patiño D/C to rehab

## 2024-10-08 NOTE — PROGRESS NOTES
4 Eyes Skin Assessment     NAME:  Lexis Baker  YOB: 1950  MEDICAL RECORD NUMBER:  [id number]    The patient is being assessed for  Shift Handoff    I agree that at least one RN has performed a thorough Head to Toe Skin Assessment on the patient. ALL assessment sites listed below have been assessed.      Areas assessed by both nurses:    Head, Face, Ears, Shoulders, Back, Chest, Arms, Elbows, Hands, Sacrum. Buttock, Coccyx, Ischium, Legs. Feet and Heels, and Under Medical Devices         Does the Patient have a Wound? No noted wound(s)       Geovanni Prevention initiated by RN: Yes  Wound Care Orders initiated by RN: No    Pressure Injury (Stage 3,4, Unstageable, DTI, NWPT, and Complex wounds) if present, place Wound referral order by RN under : No    New Ostomies, if present place, Ostomy referral order under : No     Nurse 1 eSignature: Electronically signed by April Garcia RN on 10/8/24 at 7:20 AM EDT    **SHARE this note so that the co-signing nurse can place an eSignature**    Nurse 2 eSignature: Electronically signed by Cisco Tay RN on 10/8/24 at 8:06 AM EDT

## 2024-10-08 NOTE — PROGRESS NOTES
4 Eyes Skin Assessment     NAME:  Lexis Baker  YOB: 1950  MEDICAL RECORD NUMBER:  [id number]    The patient is being assessed for  Shift Handoff    I agree that at least one RN has performed a thorough Head to Toe Skin Assessment on the patient. ALL assessment sites listed below have been assessed.      Areas assessed by both nurses:    Head, Face, Ears, Shoulders, Back, Chest, Arms, Elbows, Hands, Sacrum. Buttock, Coccyx, Ischium, Legs. Feet and Heels, and Under Medical Devices         Does the Patient have a Wound? No noted wound(s)       Geovanni Prevention initiated by RN: Yes  Wound Care Orders initiated by RN: No    Pressure Injury (Stage 3,4, Unstageable, DTI, NWPT, and Complex wounds) if present, place Wound referral order by RN under : No    New Ostomies, if present place, Ostomy referral order under : No     Nurse 1 eSignature: Electronically signed by April Garcia RN on 10/8/24 at 7:21 AM EDT    **SHARE this note so that the co-signing nurse can place an eSignature**    Nurse 2 eSignature: Electronically signed by Cisco Tay RN on 10/8/24 at 8:06 AM EDT

## 2024-10-09 ENCOUNTER — APPOINTMENT (OUTPATIENT)
Facility: HOSPITAL | Age: 74
End: 2024-10-09
Payer: MEDICARE

## 2024-10-09 LAB
ANION GAP SERPL CALC-SCNC: 5 MMOL/L (ref 3–18)
APPEARANCE CSF: CLEAR
APTT PPP: 33.9 SEC (ref 23–36.4)
APTT SCREEN TO CONFIRM RATIO: 0.81 RATIO (ref 0–1.34)
AQP4 H2O CHANNEL IGG SERPL IA-ACNC: <1.5 U/ML (ref 0–3)
B2 GLYCOPROT1 IGG SER-ACNC: <9 GPI IGG UNITS (ref 0–20)
B2 GLYCOPROT1 IGM SER-ACNC: <9 GPI IGM UNITS (ref 0–32)
BASOPHILS # BLD: 0 K/UL (ref 0–0.1)
BASOPHILS NFR BLD: 0 % (ref 0–2)
BUN SERPL-MCNC: 26 MG/DL (ref 7–18)
BUN/CREAT SERPL: 29 (ref 12–20)
CALCIUM SERPL-MCNC: 8.8 MG/DL (ref 8.5–10.1)
CENTROMERE B AB SER-ACNC: <0.2 AI (ref 0–0.9)
CHLORIDE SERPL-SCNC: 105 MMOL/L (ref 100–111)
CHROMATIN AB SERPL-ACNC: <0.2 AI (ref 0–0.9)
CO2 SERPL-SCNC: 28 MMOL/L (ref 21–32)
COLOR CSF: COLORLESS
CONFIRM APTT/NORMAL: 30.8 SEC (ref 0–47.6)
CREAT SERPL-MCNC: 0.89 MG/DL (ref 0.6–1.3)
DIFFERENTIAL METHOD BLD: ABNORMAL
DSDNA AB SER-ACNC: <1 IU/ML (ref 0–9)
ENA JO1 AB SER-ACNC: <0.2 AI (ref 0–0.9)
ENA RNP AB SER-ACNC: <0.2 AI (ref 0–0.9)
ENA SCL70 AB SER-ACNC: <0.2 AI (ref 0–0.9)
ENA SM AB SER-ACNC: <0.2 AI (ref 0–0.9)
ENA SM+RNP AB SER-ACNC: <0.2 AI (ref 0–0.9)
ENA SS-A AB SER-ACNC: <0.2 AI (ref 0–0.9)
ENA SS-B AB SER-ACNC: <0.2 AI (ref 0–0.9)
EOSINOPHIL # BLD: 0 K/UL (ref 0–0.4)
EOSINOPHIL NFR BLD: 0 % (ref 0–5)
ERYTHROCYTE [DISTWIDTH] IN BLOOD BY AUTOMATED COUNT: 13.7 % (ref 11.6–14.5)
GLUCOSE BLD STRIP.AUTO-MCNC: 129 MG/DL (ref 70–110)
GLUCOSE BLD STRIP.AUTO-MCNC: 131 MG/DL (ref 70–110)
GLUCOSE BLD STRIP.AUTO-MCNC: 151 MG/DL (ref 70–110)
GLUCOSE BLD STRIP.AUTO-MCNC: 230 MG/DL (ref 70–110)
GLUCOSE CSF-MCNC: 104 MG/DL (ref 40–70)
GLUCOSE SERPL-MCNC: 170 MG/DL (ref 74–99)
HCT VFR BLD AUTO: 32.3 % (ref 35–45)
HGB BLD-MCNC: 10.1 G/DL (ref 12–16)
IMM GRANULOCYTES # BLD AUTO: 0 K/UL (ref 0–0.04)
IMM GRANULOCYTES NFR BLD AUTO: 0 % (ref 0–0.5)
INR PPP: 1 (ref 0.9–1.1)
LA 2 SCREEN W REFLEX-IMP: NORMAL
LYMPHOCYTES # BLD: 1 K/UL (ref 0.9–3.6)
LYMPHOCYTES NFR BLD: 11 % (ref 21–52)
MCH RBC QN AUTO: 28 PG (ref 24–34)
MCHC RBC AUTO-ENTMCNC: 31.3 G/DL (ref 31–37)
MCV RBC AUTO: 89.5 FL (ref 78–100)
MONOCYTES # BLD: 0.2 K/UL (ref 0.05–1.2)
MONOCYTES NFR BLD: 3 % (ref 3–10)
NEUTS SEG # BLD: 7.8 K/UL (ref 1.8–8)
NEUTS SEG NFR BLD: 86 % (ref 40–73)
NRBC # BLD: 0 K/UL (ref 0–0.01)
NRBC BLD-RTO: 0 PER 100 WBC
PLATELET # BLD AUTO: 197 K/UL (ref 135–420)
PMV BLD AUTO: 11.4 FL (ref 9.2–11.8)
POTASSIUM SERPL-SCNC: 4 MMOL/L (ref 3.5–5.5)
PROT C AG PPP IA-ACNC: 89 % (ref 60–150)
PROT C PPP-ACNC: 114 % (ref 73–180)
PROT CSF-MCNC: 190 MG/DL (ref 15–45)
PROTHROMBIN TIME: 13.8 SEC (ref 11.9–14.9)
RBC # BLD AUTO: 3.61 M/UL (ref 4.2–5.3)
RBC # CSF: 761 /CU MM
RHEUMATOID FACT SERPL-ACNC: 26 IU/ML
RIBOSOMAL P AB SER-ACNC: <0.2 AI (ref 0–0.9)
SCREEN APTT: 30.5 SEC (ref 0–43.5)
SCREEN DRVVT: 25.6 SEC (ref 0–47)
SEE BELOW:, 164879: NORMAL
SODIUM SERPL-SCNC: 138 MMOL/L (ref 136–145)
THROMBIN TIME: 16.8 SEC (ref 0–23)
THYROGLOB AB SERPL-ACNC: <1 IU/ML (ref 0–0.9)
THYROPEROXIDASE AB SERPL-ACNC: 115 IU/ML (ref 0–34)
TUBE # CSF: 1
TUBE # CSF: 1
TUBE # CSF: 2
WBC # BLD AUTO: 9.1 K/UL (ref 4.6–13.2)
WBC # CSF: 1 /CU MM (ref 0–5)

## 2024-10-09 PROCEDURE — 99233 SBSQ HOSP IP/OBS HIGH 50: CPT | Performed by: PSYCHIATRY & NEUROLOGY

## 2024-10-09 PROCEDURE — 82042 OTHER SOURCE ALBUMIN QUAN EA: CPT

## 2024-10-09 PROCEDURE — 97535 SELF CARE MNGMENT TRAINING: CPT

## 2024-10-09 PROCEDURE — 82040 ASSAY OF SERUM ALBUMIN: CPT

## 2024-10-09 PROCEDURE — 80048 BASIC METABOLIC PNL TOTAL CA: CPT

## 2024-10-09 PROCEDURE — 89050 BODY FLUID CELL COUNT: CPT

## 2024-10-09 PROCEDURE — 85730 THROMBOPLASTIN TIME PARTIAL: CPT

## 2024-10-09 PROCEDURE — 84157 ASSAY OF PROTEIN OTHER: CPT

## 2024-10-09 PROCEDURE — 86235 NUCLEAR ANTIGEN ANTIBODY: CPT

## 2024-10-09 PROCEDURE — 83873 ASSAY OF CSF PROTEIN: CPT

## 2024-10-09 PROCEDURE — 85610 PROTHROMBIN TIME: CPT

## 2024-10-09 PROCEDURE — 86431 RHEUMATOID FACTOR QUANT: CPT

## 2024-10-09 PROCEDURE — 81374 HLA I TYPING 1 ANTIGEN LR: CPT

## 2024-10-09 PROCEDURE — 36415 COLL VENOUS BLD VENIPUNCTURE: CPT

## 2024-10-09 PROCEDURE — 86200 CCP ANTIBODY: CPT

## 2024-10-09 PROCEDURE — 85025 COMPLETE CBC W/AUTO DIFF WBC: CPT

## 2024-10-09 PROCEDURE — 009U3ZX DRAINAGE OF SPINAL CANAL, PERCUTANEOUS APPROACH, DIAGNOSTIC: ICD-10-PCS | Performed by: HOSPITALIST

## 2024-10-09 PROCEDURE — 2580000003 HC RX 258: Performed by: STUDENT IN AN ORGANIZED HEALTH CARE EDUCATION/TRAINING PROGRAM

## 2024-10-09 PROCEDURE — 87070 CULTURE OTHR SPECIMN AEROBIC: CPT

## 2024-10-09 PROCEDURE — 1100000000 HC RM PRIVATE

## 2024-10-09 PROCEDURE — 82962 GLUCOSE BLOOD TEST: CPT

## 2024-10-09 PROCEDURE — 6370000000 HC RX 637 (ALT 250 FOR IP): Performed by: STUDENT IN AN ORGANIZED HEALTH CARE EDUCATION/TRAINING PROGRAM

## 2024-10-09 PROCEDURE — 83916 OLIGOCLONAL BANDS: CPT

## 2024-10-09 PROCEDURE — 94761 N-INVAS EAR/PLS OXIMETRY MLT: CPT

## 2024-10-09 PROCEDURE — 82945 GLUCOSE OTHER FLUID: CPT

## 2024-10-09 PROCEDURE — 86225 DNA ANTIBODY NATIVE: CPT

## 2024-10-09 PROCEDURE — 82784 ASSAY IGA/IGD/IGG/IGM EACH: CPT

## 2024-10-09 PROCEDURE — 6360000002 HC RX W HCPCS: Performed by: STUDENT IN AN ORGANIZED HEALTH CARE EDUCATION/TRAINING PROGRAM

## 2024-10-09 PROCEDURE — 86037 ANCA TITER EACH ANTIBODY: CPT

## 2024-10-09 PROCEDURE — 2709999900 FL LUMBAR PUNCTURE DIAG

## 2024-10-09 PROCEDURE — 6360000002 HC RX W HCPCS: Performed by: ORTHOPAEDIC SURGERY

## 2024-10-09 PROCEDURE — 97166 OT EVAL MOD COMPLEX 45 MIN: CPT

## 2024-10-09 PROCEDURE — 87205 SMEAR GRAM STAIN: CPT

## 2024-10-09 RX ADMIN — DEXAMETHASONE SODIUM PHOSPHATE 10 MG: 4 INJECTION INTRA-ARTICULAR; INTRALESIONAL; INTRAMUSCULAR; INTRAVENOUS; SOFT TISSUE at 14:08

## 2024-10-09 RX ADMIN — METOPROLOL TARTRATE 50 MG: 50 TABLET, FILM COATED ORAL at 08:55

## 2024-10-09 RX ADMIN — DEXAMETHASONE SODIUM PHOSPHATE 10 MG: 4 INJECTION INTRA-ARTICULAR; INTRALESIONAL; INTRAMUSCULAR; INTRAVENOUS; SOFT TISSUE at 08:55

## 2024-10-09 RX ADMIN — ENOXAPARIN SODIUM 40 MG: 100 INJECTION SUBCUTANEOUS at 23:36

## 2024-10-09 RX ADMIN — INSULIN LISPRO 1 UNITS: 100 INJECTION, SOLUTION INTRAVENOUS; SUBCUTANEOUS at 17:25

## 2024-10-09 RX ADMIN — GABAPENTIN 300 MG: 300 CAPSULE ORAL at 23:37

## 2024-10-09 RX ADMIN — SODIUM CHLORIDE, PRESERVATIVE FREE 10 ML: 5 INJECTION INTRAVENOUS at 08:56

## 2024-10-09 RX ADMIN — DEXAMETHASONE SODIUM PHOSPHATE 10 MG: 4 INJECTION INTRA-ARTICULAR; INTRALESIONAL; INTRAMUSCULAR; INTRAVENOUS; SOFT TISSUE at 03:17

## 2024-10-09 RX ADMIN — DEXAMETHASONE SODIUM PHOSPHATE 10 MG: 4 INJECTION INTRA-ARTICULAR; INTRALESIONAL; INTRAMUSCULAR; INTRAVENOUS; SOFT TISSUE at 23:37

## 2024-10-09 RX ADMIN — SODIUM CHLORIDE, PRESERVATIVE FREE 10 ML: 5 INJECTION INTRAVENOUS at 23:38

## 2024-10-09 RX ADMIN — GABAPENTIN 300 MG: 300 CAPSULE ORAL at 14:07

## 2024-10-09 RX ADMIN — GABAPENTIN 300 MG: 300 CAPSULE ORAL at 08:55

## 2024-10-09 RX ADMIN — DULOXETINE HYDROCHLORIDE 30 MG: 30 CAPSULE, DELAYED RELEASE ORAL at 08:55

## 2024-10-09 ASSESSMENT — PAIN SCALES - GENERAL
PAINLEVEL_OUTOF10: 0

## 2024-10-09 NOTE — PROGRESS NOTES
[facility]  Progress Note    Patient: Lexis Baker Age: 74 y.o. : 1950 MR#: [id number] SSN: [id number]  Date/Time: 10/9/2024     Subjective:     Patient seen evaluated, lying in bed, no acute distress.    74-year-old female presents with worsening right leg weakness.  Patient is right arm also has tingling sensation and patient has a known history of lumbar spinal stenosis and seen by Dr. Patiño and evaluated at [facility].  MRI of the lumbar spine demonstrates existing L1-L2 stenosis which may be slightly worse but no significant changes with cord compression at that level.    Patient seen by neurology and neurologically cleared to proceed with lumbar spine surgery.    10/9-patient seen evaluated, lying in bed, no acute distress.  Patient seen by neurology as well as orthopedic spine surgery.  Per spine surgery patient has a mass at the level of C2-C3 within the spinal cord, they are recommending that patient be transferred to a tertiary care center.  Transfer center contacted for patient to be transferred to [facility].  Awaiting callback and acceptance.  Continue to follow.    Assessment/Plan:     Lumbar spinal stenosis existing L1-L2 stenosis, spine surgery consulted and recommended neurology clearance.  Appreciate neurology input, patient has been neurologically cleared to proceed with lumbar spine surgery.  Continue IV steroids.  C2-3 cervical stenosis.  MRI C-spine reviewed, shows mass at the level of C2-C3 and C5-C6 within the spinal cord, spine surgery recommending transfer to a tertiary care center.  Patient was previously following at [facility].  Patient underwent lumbar puncture with IR and only 3.5 ml of slightly bloody CSF obtained.  History of hypertension-resume home medications, continue to monitor blood pressure.  History of right lower extremity weakness due to lumbar spinal stenosis.  Resume home gabapentin and Cymbalta  DVT prophylaxis-Lovenox  Full code       ondansetron (ZOFRAN) injection 4 mg  4 mg IntraVENous Q6H PRN    polyethylene glycol (GLYCOLAX) packet 17 g  17 g Oral Daily PRN    acetaminophen (TYLENOL) tablet 650 mg  650 mg Oral Q6H PRN    Or    acetaminophen (TYLENOL) suppository 650 mg  650 mg Rectal Q6H PRN    melatonin tablet 3 mg  3 mg Oral Nightly PRN    cyclobenzaprine (FLEXERIL) tablet 5 mg  5 mg Oral BID PRN    DULoxetine (CYMBALTA) extended release capsule 30 mg  30 mg Oral Daily    gabapentin (NEURONTIN) capsule 300 mg  300 mg Oral TID    metoprolol tartrate (LOPRESSOR) tablet 50 mg  50 mg Oral BID    insulin lispro (HUMALOG,ADMELOG) injection vial 0-4 Units  0-4 Units SubCUTAneous TID WC    insulin lispro (HUMALOG,ADMELOG) injection vial 0-4 Units  0-4 Units SubCUTAneous Nightly       Imaging:   MRI LUMBAR SPINE W WO CONTRAST    Result Date: 10/8/2024  No acute abnormalities. Circumferential fusion T2-S1 without high-grade spinal canal or foraminal stenosis. Essentially stable high-grade multifactorial central spinal canal stenosis at L1-2, combination of caudally migrated central disc extrusion, focally prominent dorsal epidural fat and facet joint arthrosis. Likely 5 mm superimposed synovial cyst as well. Concordant preliminary report was provided. Electronically signed by Abbey Norwood    MRI THORACIC SPINE WO CONTRAST    Result Date: 10/8/2024  Cervical to upper thoracic cord abnormality detailed on the cervical MRI report. Mild paraspinal soft tissue edema and bone edema in the right T1 articular pillar and adjacent soft tissues which could be an acute traumatic injury. Multilevel degenerative spinal canal stenoses, stable compared to 11/21/2023, most pronounced at T10-11 where there is a new short segment of T2 hyperintense cord signal abnormality, with differential of spondylotic myelopathy, developing myelomalacia or contusion. -Concordant with preliminary report. Incidental left thyroid nodule 1.2 cm, consider ultrasound thyroid follow-up  on outpatient basis if not previously documented elsewhere. Electronically signed by Abbey Norwood    MRI CERVICAL SPINE WO CONTRAST    Result Date: 10/8/2024  Interval ACDF C3-C6 5 with decompression of the spinal canal at these levels. Longitudinally extensive cord signal abnormality with cord expansion, spans lower C2-T2 cord, differential possibilities include but are not limited to transverse myelitis, ischemia, demyelination and contusion. Would recommend addition of postcontrast sequences to evaluate for underlying enhancing abnormality or mass. -No gradient susceptibility to suggest intramedullary hemorrhage. -No epidural fluid collection. Degenerative disc and facet joint disease above and below fusion levels. Moderate degenerative spinal canal stenosis at C2-3. Multilevel high-grade foraminal stenosis Concordant preliminary report was provided. Electronically signed by Abbey Norwood    CT HIP RIGHT WO CONTRAST    Result Date: 10/7/2024  No acute fracture or dislocation of the right hip. Electronically signed by DRO [employer]ystemsfauzia Candy    XR TIBIA FIBULA RIGHT (2 VIEWS)    Result Date: 10/7/2024  No acute bony abnormality. Electronically signed by Sandglaz CHANDAN [employer]    XR LUMBAR SPINE (2-3 VIEWS)    Result Date: 10/7/2024  Stable postoperative changes from L2-S1. No acute bony abnormality. Electronically signed by NeighborMD    MARLEE HIP 2-3 VW W PELVIS RIGHT    Result Date: 10/7/2024  No acute bony abnormality in the pelvis or right hip. Electronically signed by NeighborMD    XR KNEE RIGHT (3 VIEWS)    Result Date: 10/7/2024  Mild to moderate degenerative changes in the right knee. No acute bony abnormality. Electronically signed by Levy CHANDAN [employer]       Labs:    Recent Results (from the past 48 hour(s))   Urinalysis    Collection Time: 10/07/24  2:14 PM   Result Value Ref Range    Color, UA YELLOW      Appearance CLEAR      Specific Gravity, UA 1.015 1.005 - 1.030      pH, Urine 7.5 5.0 - 8.0      Protein, UA Negative NEG mg/dL

## 2024-10-09 NOTE — CARE COORDINATION
10/09/24 0950   Service Assessment   Patient Orientation Alert and Oriented   Cognition Alert   History Provided By Patient   Primary Caregiver Self   Support Systems Family Members;Children   PCP Verified by CM Yes   Last Visit to PCP Within last 3 months   Prior Functional Level Independent in ADLs/IADLs   Current Functional Level Independent in ADLs/IADLs   Can patient return to prior living arrangement Unknown at present   Ability to make needs known: Good   Family able to assist with home care needs: Yes   Would you like for me to discuss the discharge plan with any other family members/significant others, and if so, who? Yes   Financial Resources Medicare   Community Resources None   Social/Functional History   Lives With Significant other   Type of Home House   Home Layout Two level   Home Access Ramped entrance   Bathroom Shower/Tub Tub/Shower unit   Bathroom Toilet Standard   Bathroom Equipment None   Bathroom Accessibility Accessible   Home Equipment None   Receives Help From Family   ADL Assistance Independent   Homemaking Assistance Independent   Homemaking Responsibilities Yes   Ambulation Assistance Needs assistance   Transfer Assistance Needs assistance   Active  No   Patient's  Info family to transport   Mode of Transportation Car   Occupation Retired   Discharge Planning   Type of Residence House   Living Arrangements Family Members   Current Services Prior To Admission None   Potential Assistance Needed N/A   Potential Assistance Purchasing Medications No   Type of Home Care Services None   Patient expects to be discharged to: House   One/Two Story Residence Two story   # of Interior Steps 6   Interior Rails Both   Lift Chair Available No   History of falls? 0   Services At/After Discharge   Transition of Care Consult (CM Consult) N/A   Services At/After Discharge None    Resource Information Provided? No   Mode of Transport at Discharge   (family)   Confirm Follow Up

## 2024-10-09 NOTE — PROGRESS NOTES
ARU/IPR REFERRAL CONTACT NOTE  [facility]      Thank you for the opportunity to review this patient's case for admission to [facility].    Referral received: 10/9/2024 time:4:15pm    Based on our pre-admission screening:                          [ x] Our Team/Medical Director is following this case.   Comments:Referral received from provider. Will review with team.         Again, Thank you for this referral. Should you have any questions please do not hesitate to call.     Sincerely,  Rosa Leo    Clinical Liaison  [facility]  [phone]

## 2024-10-09 NOTE — PROGRESS NOTES
4 Eyes Skin Assessment     NAME:  Lexis Baker  YOB: 1950  MEDICAL RECORD NUMBER:  [id number]    The patient is being assessed for  Shift Handoff    I agree that at least one RN has performed a thorough Head to Toe Skin Assessment on the patient. ALL assessment sites listed below have been assessed.      Areas assessed by both nurses:    Head, Face, Ears, Shoulders, Back, Chest, Arms, Elbows, Hands, Sacrum. Buttock, Coccyx, Ischium, Legs. Feet and Heels, and Under Medical Devices         Does the Patient have a Wound? No noted wound(s)       Geovanni Prevention initiated by RN: No  Wound Care Orders initiated by RN: No    Pressure Injury (Stage 3,4, Unstageable, DTI, NWPT, and Complex wounds) if present, place Wound referral order by RN under : No    New Ostomies, if present place, Ostomy referral order under : No     Nurse 1 eSignature: Electronically signed by Petty Melton RN on 10/9/24 at 7:39 PM EDT    **SHARE this note so that the co-signing nurse can place an eSignature**    Nurse 2 eSignature: {Esignature:[id number]}

## 2024-10-09 NOTE — PLAN OF CARE
Problem: Occupational Therapy - Adult  Goal: By Discharge: Performs self-care activities at highest level of function for planned discharge setting.  See evaluation for individualized goals.  Description: Occupational Therapy Goals:  Initiated 10/9/2024 to be met within 7-10 days.    1.  Patient will perform grooming with supervision/set-up.   2.  Patient will perform bathing with supervision/set-up using adaptive equipment.  3.  Patient will perform upper body dressing and lower body dressing  with supervision/set-up using adaptive equipment.  4.  Patient will perform bedside commode transfers with supervision/set-up using RW with good balance.  5.  Patient will perform all aspects of toileting with supervision/set-up.  6.  Patient will participate in upper extremity therapeutic exercise/activities with supervision/set-up for 8-10 minutes to increase strength/endurance for ADLs.    7.  Patient will utilize energy conservation techniques during functional activities with min verbal cues.    PLOF: independent with ADLs and functional mobility      Outcome: Progressing   OCCUPATIONAL THERAPY EVALUATION    Patient: Lexis Baker (74 y.o. female)  Date: 10/9/2024  Primary Diagnosis: Spinal stenosis [M48.00]  Spinal stenosis of lumbar region with neurogenic claudication [M48.062]       Precautions: General Precautions, Fall Risk,  ,  ,  ,  ,  ,  ,      ASSESSMENT :  Bed mobility: cga supine <-> sit edge of bed. LB dress: dep doff and don slipper socks. STS with Min A, simulated bedside commode transfer using RW for sidestepping, forward & backward with Min A. Pt issued red foam tubey  to place over handle of eating utensils in order to build up handle for grading grasp easier with affected (R) hand incoordination in dominant (R) hand resulting in spillage and dropping items. Patient issued blue resistive block with demonstration and instruction provided for (R) hand gross grasp, pincer grasp and and digit adduction      Pain:  Intensity Pre-treatment: 4/10   Intensity Post-treatment: 3/10  Scale: Numeric Rating Scale  Location: Back  Quality: Aching  Intervention(s): Nurse notified, Repositioning , and Rest      Activity Tolerance:   Activity Tolerance: Patient limited by fatigue  Please refer to the flowsheet for vital signs taken during this treatment.    After treatment:   [] Patient left in no apparent distress sitting up in chair  [x] Patient left in no apparent distress in bed  [x] Call bell left within reach  [x] Nursing notified  [] Caregiver present  [x] Bed alarm activated    COMMUNICATION/EDUCATION:   Patient Education  Education Given To: Patient  Education Provided: Role of Therapy;Plan of Care;ADL Adaptive Strategies;Energy Conservation;Mobility Training;Fall Prevention Strategies;Transfer Training;Equipment  Education Method: Demonstration;Verbal;Teach Back  Barriers to Learning: None  Education Outcome: Verbalized understanding    Thank you for this referral.  Kenia Olguin OT  Minutes: 17    Eval Complexity: Decision Making: Medium Complexity

## 2024-10-09 NOTE — PROCEDURES
RADIOLOGY POST PROCEDURE NOTE     October 9, 2024       5:12 PM     Preoperative Diagnosis:  right sided weakness    Postoperative Diagnosis:  Same.    :  JAMARCUS Nelson    Assistant:  None.    Type of Anesthesia: 1% plain lidocaine    Procedure/Description:  Image-guided L2-3 lumbar puncture    Findings:   opening pressure 10 cm H2O. ~3.5 ml slightly bloody CSF obtained. Difficult procedure as patient's right leg moving a lot. Given movement some blood noted in CSF and unable to obtain additional CSF.     Estimated blood Loss:  Minimal    Specimen Removed:  yes    Blood transfusions:  None.    Implants:  None.    Complications: None    Condition: Stable    Blood thinning medications: OK to resume as clinically indicated    Discharge Plan:  continue present therapy    JAMARCUS Nelson     [id number]

## 2024-10-09 NOTE — PLAN OF CARE
Problem: Chronic Conditions and Co-morbidities  Goal: Patient's chronic conditions and co-morbidity symptoms are monitored and maintained or improved  Outcome: Progressing  Goal: Mobility level is maintained or improved  Outcome: Progressing     Problem: Pain  Goal: Verbalizes/displays adequate comfort level or baseline comfort level  Outcome: Progressing     Problem: Skin/Tissue Integrity  Goal: Absence of new skin breakdown  Description: 1.  Monitor for areas of redness and/or skin breakdown  2.  Assess vascular access sites hourly  3.  Every 4-6 hours minimum:  Change oxygen saturation probe site  4.  Every 4-6 hours:  If on nasal continuous positive airway pressure, respiratory therapy assess nares and determine need for appliance change or resting period.  Outcome: Progressing     Problem: ABCDS Injury Assessment  Goal: Absence of physical injury  Outcome: Progressing     Problem: Safety - Adult  Goal: Free from fall injury  Outcome: Progressing     Problem: Occupational Therapy - Adult  Goal: By Discharge: Performs self-care activities at highest level of function for planned discharge setting.  See evaluation for individualized goals.  Description: Occupational Therapy Goals:  Initiated 10/9/2024 to be met within 7-10 days.    1.  Patient will perform grooming with supervision/set-up.   2.  Patient will perform bathing with supervision/set-up using adaptive equipment.  3.  Patient will perform upper body dressing and lower body dressing  with supervision/set-up using adaptive equipment.  4.  Patient will perform bedside commode transfers with supervision/set-up using RW with good balance.  5.  Patient will perform all aspects of toileting with supervision/set-up.  6.  Patient will participate in upper extremity therapeutic exercise/activities with supervision/set-up for 8-10 minutes to increase strength/endurance for ADLs.    7.  Patient will utilize energy conservation techniques during functional

## 2024-10-09 NOTE — PROGRESS NOTES
-Patient walked to bathroom with walker and contact-guard assist. She tolerated well. She let me know that she feels like she has more control of her right leg now.  -Patient bathed herself using bathing wipes, brushed teeth and mouthwash after using the restroom.  -Linens and bed pad changed.

## 2024-10-09 NOTE — PROGRESS NOTES
A few nonenhancing small T2 hyperintense signal foci in the bifrontal white matter. The appearance is most suggestive of minimal chronic microvascular ischemic changes, not suggestive of demyelinating disease. Only minimally progressed when compared to the MRI brain from 2022. 3. Sagittal T1 images show the central spinal canal stenosis in the upper cervical spine at C2/C3. Better imaged on the dedicated MRI cervical spine 10/7/2024 and 10/8/2024. Electronically signed by David Alonzo    MRI CERVICAL SPINE WITH CONTRAST    Result Date: 10/9/2024  EXAM: MRI CERVICAL SPINE WITH CONTRAST CLINICAL INDICATION/HISTORY: just needs contrast sudy- non contrast study with large intrinsic cord abnormaility. COMPARISON: prior day cervical spine MRI TECHNIQUE: Limited T1 postcontrast imaging of the cervical spine. FINDINGS: Limited postcontrast images of the cervical spine demonstrate an enhancing spinal cord signal abnormality at the level of C5-C6 within the right lateral spinal cord measuring 11 x 6 x 6 mm. This corresponds to the central portion of the very large spinal cord signal abnormality described on prior day cervical spine MRI. A second smaller enhancing spinal cord signal abnormality is present more superiorly at the level of C5, also to the right of midline measuring 8 x 8 x 3 mm in greatest dimensions.     There are 2 areas of abnormal enhancement within the spinal cord, including at the level of C5 on the right and C5-C6 on the right as discussed above. This appearance is concerning for possible enhancing mass/neoplasm within the spinal cord. Electronically signed by Chandan Lao    MRI LUMBAR SPINE W WO CONTRAST    Result Date: 10/8/2024  MR lumbar spine with and without contrast HISTORY: Reason for exam:->Progressive right lower extremity weakness, worsening incontinence, Progressive right lower extremity weakness, worsening incontinence, history of recent fall COMPARISON: CT 6/21/2024, MRI 4/19/2024.  [facility]    MRI THORACIC SPINE WO CONTRAST    Result Date: 10/8/2024  Procedure: MRI of the thoracic spine without contrast. Indications: Right upper extremity weakness status post fall Comparisons: MRI 2023 Technique: T1 weighted, T2 weighted FSE, and FSE inversion recovery sagittal images of the thoracic spine are supplemented by T2 weighted FSE axial images. Findings: Cervical to upper thoracic cord abnormality detailed on the cervical MRI report. Normal thoracic spine alignment. Normal vertebral body heights. No suspicious bone marrow lesion or infiltrative bone marrow process. No evidence of fracture. Mild paraspinal soft tissue edema and bone edema in the right T1 articular pillar and adjacent soft tissues which could be an acute traumatic injury. Degenerative disc disease with osteophyte complex at multiple upper thoracic levels similar to comparison T1-T4. Multilevel ligamentum flavum thickening and facet hypertrophy similar to comparison. Multilevel degenerative spinal canal stenosis similar to prior. Most pronounced stenosis with compression and displacement due to right facet and ligamentum hypertrophy at T10-T11 where there is a new short segment of T2 hyperintense cord signal abnormality. Incidentally noted 1.2 cm left thyroid nodule.     Cervical to upper thoracic cord abnormality detailed on the cervical MRI report. Mild paraspinal soft tissue edema and bone edema in the right T1 articular pillar and adjacent soft tissues which could be an acute traumatic injury. Multilevel degenerative spinal canal stenoses, stable compared to 11/21/2023, most pronounced at T10-11 where there is a new short segment of T2 hyperintense cord signal abnormality, with differential of spondylotic myelopathy, developing myelomalacia or contusion. -Concordant with preliminary report. Incidental left thyroid nodule 1.2 cm, consider ultrasound thyroid follow-up on outpatient basis if not previously documented elsewhere.  acute fracture or dislocation of the right hip. Electronically signed by Monica Gupta    XR TIBIA FIBULA RIGHT (2 VIEWS)    Result Date: 10/7/2024  EXAM: XR TIBIA FIBULA RIGHT (2 VIEWS) CLINICAL HISTORY/INDICATION: fall COMPARISON: None. TECHNIQUE: 2 view(s) FINDINGS: 2 views of the right tibia and fibula demonstrate no fracture. No dislocation is seen at the knee or ankle joints. No radiopaque foreign body.     No acute bony abnormality. Electronically signed by Levy Cummins    XR LUMBAR SPINE (2-3 VIEWS)    Result Date: 10/7/2024  EXAM: XR LUMBAR SPINE (2-3 VIEWS) CLINICAL HISTORY/INDICATION: fall COMPARISON: 10/2/2023 TECHNIQUE: 3 view(s) FINDINGS: For purposes of this dictation 12th ribs are hypoplastic and there are 5 lumbar vertebral segments. Stable postoperative changes in the lumbar spine with fusion hardware extending from the L2-S1 levels with rods and interpedicular screws at the L2, L3, L5 and S1 levels. Minimal perihardware lucency about the left sacral screw is unchanged. Intervertebral disc spacers and disc hardware unchanged. No hardware complication. No acute fracture or change in alignment. Grade 1 anterolisthesis of L4 over L5 is unchanged.     Stable postoperative changes from L2-S1. No acute bony abnormality. Electronically signed by Levy Cummins    XR HIP 2-3 VW W PELVIS RIGHT    Result Date: 10/7/2024  EXAM: XR HIP 2-3 VW W PELVIS RIGHT CLINICAL HISTORY/INDICATION: fall COMPARISON: 12/13/2022 TECHNIQUE: 2 view(s) FINDINGS: Postoperative changes of fusion and extensive hardware is seen in the lumbar sacral spine, unchanged from previous exam. The pelvic ring is intact. Degenerative changes are seen in the SI joints and bilateral hips, in a symmetric fashion. Calcifications within the pelvis suggest fibroid calcifications. No evidence of acute fracture or dislocation in the right hip.     No acute bony abnormality in the pelvis or right hip. Electronically signed by Levy Cummins    XR KNEE RIGHT (3

## 2024-10-09 NOTE — PROGRESS NOTES
4 Eyes Skin Assessment     NAME:  Lexis Baker  YOB: 1950  MEDICAL RECORD NUMBER:  [id number]    The patient is being assessed for  Shift Handoff    I agree that at least one RN has performed a thorough Head to Toe Skin Assessment on the patient. ALL assessment sites listed below have been assessed.      Areas assessed by both nurses:    Head, Face, Ears, Shoulders, Back, Chest, Arms, Elbows, Hands, Sacrum. Buttock, Coccyx, Ischium, and Legs. Feet and Heels        Does the Patient have a Wound? No noted wound(s)    Pressure Injury (Stage 3,4, Unstageable, DTI, NWPT, and Complex wounds) if present, place Wound referral order by RN under : No    New Ostomies, if present place, Ostomy referral order under : No     Nurse 1 eSignature: Electronically signed by Arabella Finney RN on 10/8/24 at 8:19 PM EDT    **SHARE this note so that the co-signing nurse can place an eSignature**    Nurse 2 eSignature: {Esignature:[id number]}

## 2024-10-09 NOTE — PROGRESS NOTES
Afeb  Neuro intact  Improving with decadron  Has LP scheduled today  MRIs reviewed by Dr. Patiño  Patient would require multiple surgeries for stabilization  Full plan addressed in Dr. Patiño's recent note regarding surgical intervention

## 2024-10-09 NOTE — PLAN OF CARE
Problem: Chronic Conditions and Co-morbidities  Goal: Patient's chronic conditions and co-morbidity symptoms are monitored and maintained or improved  Outcome: Progressing     Problem: Pain  Goal: Verbalizes/displays adequate comfort level or baseline comfort level  10/8/2024 2303 by Vik Cox, RN  Outcome: Progressing  10/8/2024 1729 by Arabella Chavez, RN  Outcome: Progressing  Flowsheets (Taken 10/8/2024 0231 by April Garcai, RN)  Verbalizes/displays adequate comfort level or baseline comfort level:   Encourage patient to monitor pain and request assistance   Assess pain using appropriate pain scale   Administer analgesics based on type and severity of pain and evaluate response   Implement non-pharmacological measures as appropriate and evaluate response   Consider cultural and social influences on pain and pain management   Notify Licensed Independent Practitioner if interventions unsuccessful or patient reports new pain     Problem: Skin/Tissue Integrity  Goal: Absence of new skin breakdown  Description: 1.  Monitor for areas of redness and/or skin breakdown  2.  Assess vascular access sites hourly  3.  Every 4-6 hours minimum:  Change oxygen saturation probe site  4.  Every 4-6 hours:  If on nasal continuous positive airway pressure, respiratory therapy assess nares and determine need for appliance change or resting period.  Outcome: Progressing     Problem: ABCDS Injury Assessment  Goal: Absence of physical injury  Outcome: Progressing     Problem: Safety - Adult  Goal: Free from fall injury  Outcome: Progressing

## 2024-10-09 NOTE — PROGRESS NOTES
MRI with contrast of c spine with cervical intrinsic mass or tumor per radiology.  This is well outside the capabilities of this institution to evaluate an manage  Have recommended to hospitalist to transfer to tertiary center .   Has been seen by neurosurgery at [facility] and already had lumbar surgery scheduled there.  Neurology consult in concurrence  Currently improved on IV decadron. Stable for transfer.

## 2024-10-09 NOTE — PROGRESS NOTES
Reviewed test results  Mri brain- done not read yet    MRI C spine non contrast - severe stenosis c2/3.  Signal change with dramatic new cord edema from c2 - t2. This edema is not cw post traumatic edema or edema adjacent to stenosis. Etiology unknown.    MRI C spine with contrast- not read yet    MRI t spine synovitis, facet hypertrophy with cord compression at T10/11 on right. Signal change.       MRI LS spine severe stenosis at L1/2    Labs to assist in defining cause of cord edema still pending    Neurology indicates LP with fluid analysis may be helpful as well.    Patient appears to have improved with IV steroids    AP  Patients most concerning and critical issue is the edema to cervical cord. Would prefer to know etiology prior to embarking on surgery. Would also prefer if neurologic condition stabilized prior to surgery.    In current setting cervical stenosis at C2/3 is the most critical surgical pathology.  Would wish to address it prior to other levels. This would be a c2- 4 posterior cervical decompression and fusion.    The t10/ 11 stenosis is important given signal change to cord- it could be addressed with a t10/11 laminectomy +- fusion    The lumbar stenosis was scheduled for decompression at [facility]  with an L1/2 laminectomy.  I believe that a junctional stenosis above a previous fusion in the setting of a kyphotic spine with poor sagital balance requires a fusion as they will I my experience have progressive kyphosis if left unfused.  In the current setting a major osteotomy is contraindicated.     So overall the surgery this patient requires for both neurologic stabilization and pain is a posterior C2-4 laminectomy and fusion, a t10/11 laminectomy  and L1/2 laminectomy with a t10- L2 fusion extension.     This is a very extensive surgical intervention on this 74 year old with unanswered questions about her recent neurologic decline.    This is why I believe we need to be patient and thorough in her  current evaluation before embarking on surgery.

## 2024-10-10 LAB
ALBUMIN CSF-MCNC: 135 MG/DL (ref 10–46)
ALBUMIN SERPL ELPH-MCNC: 3.2 G/DL (ref 2.9–4.4)
ALBUMIN SERPL-MCNC: 3.9 G/DL (ref 3.8–4.8)
ALBUMIN/GLOB SERPL: 1.1 (ref 0.7–1.7)
ALPHA1 GLOB SERPL ELPH-MCNC: 0.3 G/DL (ref 0–0.4)
ALPHA2 GLOB SERPL ELPH-MCNC: 0.8 G/DL (ref 0.4–1)
ANION GAP SERPL CALC-SCNC: 7 MMOL/L (ref 3–18)
B-GLOBULIN SERPL ELPH-MCNC: 0.8 G/DL (ref 0.7–1.3)
BACTERIA SPEC CULT: NORMAL
BASOPHILS # BLD: 0 K/UL (ref 0–0.1)
BASOPHILS NFR BLD: 0 % (ref 0–2)
BUN SERPL-MCNC: 25 MG/DL (ref 7–18)
BUN/CREAT SERPL: 26 (ref 12–20)
C-ANCA TITR SER IF: NORMAL TITER
CALCIUM SERPL-MCNC: 8.8 MG/DL (ref 8.5–10.1)
CCP IGA+IGG SERPL IA-ACNC: 5 UNITS (ref 0–19)
CENTROMERE B AB SER-ACNC: <0.2 AI (ref 0–0.9)
CHLORIDE SERPL-SCNC: 107 MMOL/L (ref 100–111)
CO2 SERPL-SCNC: 28 MMOL/L (ref 21–32)
CREAT SERPL-MCNC: 0.95 MG/DL (ref 0.6–1.3)
DIFFERENTIAL METHOD BLD: ABNORMAL
DSDNA AB SER-ACNC: <1 IU/ML (ref 0–9)
ENA SM AB SER-ACNC: <0.2 AI (ref 0–0.9)
EOSINOPHIL # BLD: 0 K/UL (ref 0–0.4)
EOSINOPHIL NFR BLD: 0 % (ref 0–5)
ERYTHROCYTE [DISTWIDTH] IN BLOOD BY AUTOMATED COUNT: 14 % (ref 11.6–14.5)
GAMMA GLOB SERPL ELPH-MCNC: 0.9 G/DL (ref 0.4–1.8)
GLOBULIN SER CALC-MCNC: 2.8 G/DL (ref 2.2–3.9)
GLUCOSE BLD STRIP.AUTO-MCNC: 144 MG/DL (ref 70–110)
GLUCOSE BLD STRIP.AUTO-MCNC: 163 MG/DL (ref 70–110)
GLUCOSE BLD STRIP.AUTO-MCNC: 191 MG/DL (ref 70–110)
GLUCOSE BLD STRIP.AUTO-MCNC: 218 MG/DL (ref 70–110)
GLUCOSE SERPL-MCNC: 157 MG/DL (ref 74–99)
GRAM STN SPEC: NORMAL
HCT VFR BLD AUTO: 34.7 % (ref 35–45)
HGB BLD-MCNC: 10.7 G/DL (ref 12–16)
IGG CSF-MCNC: 20.2 MG/DL (ref 0–6.7)
IGG SERPL-MCNC: 941 MG/DL (ref 586–1602)
IGG/ALB CLEAR SER+CSF-RTO: 0.6 (ref 0–0.7)
IGG/ALB CSF: 0.15 (ref 0–0.25)
IMM GRANULOCYTES # BLD AUTO: 0 K/UL (ref 0–0.04)
IMM GRANULOCYTES NFR BLD AUTO: 0 % (ref 0–0.5)
LYMPHOCYTES # BLD: 0.8 K/UL (ref 0.9–3.6)
LYMPHOCYTES NFR BLD: 6 % (ref 21–52)
M PROTEIN SERPL ELPH-MCNC: NORMAL G/DL
MCH RBC QN AUTO: 27.4 PG (ref 24–34)
MCHC RBC AUTO-ENTMCNC: 30.8 G/DL (ref 31–37)
MCV RBC AUTO: 89 FL (ref 78–100)
MONOCYTES # BLD: 0.3 K/UL (ref 0.05–1.2)
MONOCYTES NFR BLD: 3 % (ref 3–10)
NEUTS SEG # BLD: 11.6 K/UL (ref 1.8–8)
NEUTS SEG NFR BLD: 91 % (ref 40–73)
NRBC # BLD: 0 K/UL (ref 0–0.01)
NRBC BLD-RTO: 0 PER 100 WBC
P-ANCA ATYPICAL TITR SER IF: NORMAL TITER
P-ANCA TITR SER IF: NORMAL TITER
PLATELET # BLD AUTO: 213 K/UL (ref 135–420)
PMV BLD AUTO: 11.4 FL (ref 9.2–11.8)
POTASSIUM SERPL-SCNC: 4.2 MMOL/L (ref 3.5–5.5)
PROT SERPL-MCNC: 6 G/DL (ref 6–8.5)
RBC # BLD AUTO: 3.9 M/UL (ref 4.2–5.3)
SERVICE CMNT-IMP: NORMAL
SODIUM SERPL-SCNC: 142 MMOL/L (ref 136–145)
WBC # BLD AUTO: 12.7 K/UL (ref 4.6–13.2)

## 2024-10-10 PROCEDURE — 2580000003 HC RX 258: Performed by: STUDENT IN AN ORGANIZED HEALTH CARE EDUCATION/TRAINING PROGRAM

## 2024-10-10 PROCEDURE — 99232 SBSQ HOSP IP/OBS MODERATE 35: CPT | Performed by: HOSPITALIST

## 2024-10-10 PROCEDURE — 36415 COLL VENOUS BLD VENIPUNCTURE: CPT

## 2024-10-10 PROCEDURE — 85025 COMPLETE CBC W/AUTO DIFF WBC: CPT

## 2024-10-10 PROCEDURE — 6370000000 HC RX 637 (ALT 250 FOR IP): Performed by: STUDENT IN AN ORGANIZED HEALTH CARE EDUCATION/TRAINING PROGRAM

## 2024-10-10 PROCEDURE — 97535 SELF CARE MNGMENT TRAINING: CPT

## 2024-10-10 PROCEDURE — 80048 BASIC METABOLIC PNL TOTAL CA: CPT

## 2024-10-10 PROCEDURE — 82962 GLUCOSE BLOOD TEST: CPT

## 2024-10-10 PROCEDURE — 97530 THERAPEUTIC ACTIVITIES: CPT

## 2024-10-10 PROCEDURE — 97110 THERAPEUTIC EXERCISES: CPT

## 2024-10-10 PROCEDURE — 94761 N-INVAS EAR/PLS OXIMETRY MLT: CPT

## 2024-10-10 PROCEDURE — 6360000002 HC RX W HCPCS: Performed by: STUDENT IN AN ORGANIZED HEALTH CARE EDUCATION/TRAINING PROGRAM

## 2024-10-10 PROCEDURE — 1100000000 HC RM PRIVATE

## 2024-10-10 PROCEDURE — 6360000002 HC RX W HCPCS: Performed by: HOSPITALIST

## 2024-10-10 PROCEDURE — 97162 PT EVAL MOD COMPLEX 30 MIN: CPT

## 2024-10-10 RX ORDER — DEXAMETHASONE SODIUM PHOSPHATE 4 MG/ML
10 INJECTION, SOLUTION INTRA-ARTICULAR; INTRALESIONAL; INTRAMUSCULAR; INTRAVENOUS; SOFT TISSUE EVERY 6 HOURS
Status: DISCONTINUED | OUTPATIENT
Start: 2024-10-10 | End: 2024-10-11 | Stop reason: HOSPADM

## 2024-10-10 RX ADMIN — GABAPENTIN 300 MG: 300 CAPSULE ORAL at 13:31

## 2024-10-10 RX ADMIN — GABAPENTIN 300 MG: 300 CAPSULE ORAL at 21:31

## 2024-10-10 RX ADMIN — DEXAMETHASONE SODIUM PHOSPHATE 10 MG: 4 INJECTION INTRA-ARTICULAR; INTRALESIONAL; INTRAMUSCULAR; INTRAVENOUS; SOFT TISSUE at 16:47

## 2024-10-10 RX ADMIN — DEXAMETHASONE SODIUM PHOSPHATE 10 MG: 4 INJECTION INTRA-ARTICULAR; INTRALESIONAL; INTRAMUSCULAR; INTRAVENOUS; SOFT TISSUE at 23:43

## 2024-10-10 RX ADMIN — DEXAMETHASONE SODIUM PHOSPHATE 10 MG: 4 INJECTION INTRA-ARTICULAR; INTRALESIONAL; INTRAMUSCULAR; INTRAVENOUS; SOFT TISSUE at 10:49

## 2024-10-10 RX ADMIN — SODIUM CHLORIDE, PRESERVATIVE FREE 10 ML: 5 INJECTION INTRAVENOUS at 21:33

## 2024-10-10 RX ADMIN — GABAPENTIN 300 MG: 300 CAPSULE ORAL at 09:01

## 2024-10-10 RX ADMIN — ENOXAPARIN SODIUM 40 MG: 100 INJECTION SUBCUTANEOUS at 21:32

## 2024-10-10 RX ADMIN — DULOXETINE HYDROCHLORIDE 30 MG: 30 CAPSULE, DELAYED RELEASE ORAL at 09:01

## 2024-10-10 RX ADMIN — DEXAMETHASONE SODIUM PHOSPHATE 10 MG: 4 INJECTION INTRA-ARTICULAR; INTRALESIONAL; INTRAMUSCULAR; INTRAVENOUS; SOFT TISSUE at 06:56

## 2024-10-10 RX ADMIN — METOPROLOL TARTRATE 50 MG: 50 TABLET, FILM COATED ORAL at 09:02

## 2024-10-10 RX ADMIN — SODIUM CHLORIDE, PRESERVATIVE FREE 10 ML: 5 INJECTION INTRAVENOUS at 09:02

## 2024-10-10 RX ADMIN — METOPROLOL TARTRATE 50 MG: 50 TABLET, FILM COATED ORAL at 21:31

## 2024-10-10 ASSESSMENT — PAIN SCALES - GENERAL
PAINLEVEL_OUTOF10: 0

## 2024-10-10 NOTE — PROGRESS NOTES
ARU/IPR REFERRAL CONTACT NOTE  [facility]      Thank you for the opportunity to review this patient's case for admission to [facility].    Based on our pre-admission screening:                          [x ] This patient does not meet criteria for admission to [facility] due to:    [ x] Other:Pt to transfer to tertiary care center for further medical treatment. Will sign off.      Again, Thank you for this referral. Should you have any questions please do not hesitate to call.     Sincerely,  Rosa Leo    Clinical Liaison  [facility]  [phone]

## 2024-10-10 NOTE — PROGRESS NOTES
I received a return call from the transfer center to be connected to [facility].  [facility] neurosurgery has accepted the patient.    Will transfer patient once bed is available.

## 2024-10-10 NOTE — PROGRESS NOTES
Transfer Center Called and stated Patient has a bed on [address]. Room 80.  Call Report to [phone].  [facility] will transport patient.  No Time scheduled at this time the latest will be tomorrow morning.  Will call back with definite time.

## 2024-10-10 NOTE — PLAN OF CARE
Problem: Chronic Conditions and Co-morbidities  Goal: Patient's chronic conditions and co-morbidity symptoms are monitored and maintained or improved  10/10/2024 0442 by Vik Cox RN  Outcome: Progressing  10/9/2024 1818 by Petty Cabrera RN  Outcome: Progressing  Goal: Mobility level is maintained or improved  10/10/2024 0442 by Vik Cox RN  Outcome: Progressing  10/9/2024 1818 by Petty Cabrera RN  Outcome: Progressing     Problem: Pain  Goal: Verbalizes/displays adequate comfort level or baseline comfort level  10/10/2024 0442 by Vik Cox RN  Outcome: Progressing  10/9/2024 1818 by Petty Cabrera RN  Outcome: Progressing     Problem: Skin/Tissue Integrity  Goal: Absence of new skin breakdown  Description: 1.  Monitor for areas of redness and/or skin breakdown  2.  Assess vascular access sites hourly  3.  Every 4-6 hours minimum:  Change oxygen saturation probe site  4.  Every 4-6 hours:  If on nasal continuous positive airway pressure, respiratory therapy assess nares and determine need for appliance change or resting period.  10/10/2024 0442 by Vik Cox RN  Outcome: Progressing  10/9/2024 1818 by Petty Cabrera RN  Outcome: Progressing     Problem: ABCDS Injury Assessment  Goal: Absence of physical injury  10/10/2024 0442 by Vik Cox RN  Outcome: Progressing  10/9/2024 1818 by Petty Cabrera RN  Outcome: Progressing     Problem: Safety - Adult  Goal: Free from fall injury  10/10/2024 0442 by Vik Cox RN  Outcome: Progressing  10/9/2024 1818 by Petty Cabrera RN  Outcome: Progressing     Problem: Occupational Therapy - Adult  Goal: By Discharge: Performs self-care activities at highest level of function for planned discharge setting.  See evaluation for individualized goals.  Description: Occupational Therapy Goals:  Initiated 10/9/2024 to be met within 7-10 days.    1.

## 2024-10-10 NOTE — PLAN OF CARE
noted  []         Mental ability/status  [x]         Medical condition  [x]         Home/family situation and support systems  []         Safety awareness  []         Pain tolerance/management  []         Other:      PLAN :  Recommendations and Planned Interventions:   [x]           Bed Mobility Training             [x]    Neuromuscular Re-Education  [x]           Transfer Training                   []    Orthotic/Prosthetic Training  [x]           Gait Training                          []    Modalities  [x]           Therapeutic Exercises           []    Edema Management/Control  [x]           Therapeutic Activities            [x]    Family Training/Education  [x]           Patient Education  []           Other (comment):    Frequency/Duration: Patient will be followed by physical therapy to address goals, 1-2 times per day/3-5 days per week to address goals.    Further Equipment Recommendations for Discharge: wheelchair 18 inch    AMPA: AM-PAC Inpatient Mobility Raw Score : 17      Current research shows that an AM-PAC score of 17 (13 without stairs) or less is not associated with a discharge to the patient's home setting and this patient demonstrates the potential endurance and/or tolerance for 3 hours of therapy each day at discharge.    This AMPAC score should be considered in conjunction with interdisciplinary team recommendations to determine the most appropriate discharge setting. Patient's social support, diagnosis, medical stability, and prior level of function should also be taken into consideration.     SUBJECTIVE:   Patient stated “I'm doing ok.”    OBJECTIVE DATA SUMMARY:     Past Medical History:   Diagnosis Date    Arthritis     back and hands    Chronic pain     back    COVID-19     Summer of  2021    Diabetes (HCC)     NIDDM    HLD (hyperlipidemia)     Hypertension     Nausea & vomiting     Surgery only not colonoscopy     Past Surgical History:   Procedure Laterality Date    COLONOSCOPY       COLONOSCOPY N/A 6/24/2022    COLONOSCOPY performed by dEinson Ferrell MD at [facility] ENDOSCOPY    TUBAL LIGATION      US BREAST BIOPSY NEEDLE ADDITIONAL LEFT Left 8/15/2023    US BREAST BIOPSY NEEDLE ADDITIONAL LEFT 8/15/2023 [facility]    US BREAST BIOPSY NEEDLE ADDITIONAL LEFT Left 8/15/2023    US BREAST BIOPSY NEEDLE ADDITIONAL LEFT 8/15/2023 [facility]    US BREAST BIOPSY W LOC DEVICE 1ST LESION LEFT Left 8/15/2023    US BREAST BIOPSY W LOC DEVICE 1ST LESION LEFT 8/15/2023 [facility]       Home Situation:  Social/Functional History  Lives With: Significant other  Type of Home: House  Home Layout: Two level  Home Access: Ramped entrance  Bathroom Shower/Tub: Walk-in shower  Bathroom Toilet: Standard  Bathroom Equipment: Shower chair, Grab bars in shower  Bathroom Accessibility: Accessible  Home Equipment: Cane, Walker - Rolling  Has the patient had two or more falls in the past year or any fall with injury in the past year?: Yes  Receives Help From: Family  ADL Assistance: Independent  Homemaking Assistance: Independent  Homemaking Responsibilities: Yes  Ambulation Assistance: Independent  Transfer Assistance: Independent  Active : No  Patient's  Info: family  Mode of Transportation: Car  Occupation: Retired  Critical Behavior:  Orientation  Overall Orientation Status: Within Normal Limits  Orientation Level: Oriented X4  Cognition  Overall Cognitive Status: WNL    Strength:    Strength:  (left LE 5/5, right LE 3+/5)    Tone & Sensation:   Tone: Normal       Range Of Motion:  AROM:  (WFL left LE and mildly decreased right LE)  PROM: Within functional limits    Functional Mobility:  Bed Mobility:  Bed Mobility Training  Supine to Sit: Stand-by assistance  Scooting: Supervision  Transfers:  Transfer Training  Sit to Stand: Contact-guard assistance  Stand to Sit: Contact-guard assistance  Bed to Chair: Contact-guard assistance;Additional time  Balance:   Balance  Sitting: Impaired;With support  Sitting -

## 2024-10-10 NOTE — DISCHARGE SUMMARY
Hospitalist Discharge Summary      Patient: Lexis Baker MRN: [id number]  [facility]: [id number]    YOB: 1950  Age: 74 y.o.  Sex: female    DOA: 10/7/2024 LOS:  LOS: 2 days   Discharge Date:     Admission Diagnoses: Spinal stenosis [M48.00]  Spinal stenosis of lumbar region with neurogenic claudication [M48.062]    Discharge Diagnoses:    Lumbar spinal stenosis existing L1-L2 stenosis with right leg weakness  Right upper extremity weakness with MRI C-spine showing C2-C3 cervical stenosis as well as a mass within the spinal cord at the level of C5-C6.  History of hypertension  Right lower extremity weakness secondary to a history of lumbar spinal stenosis      Discharge Condition: Fair    Discharge To: Transfer to [facility]    CODE STATUS: Full Code         PHYSICAL EXAM  Visit Vitals  /72   Pulse 76   Temp 97.4 °F (36.3 °C) (Oral)   Resp 20   Ht 1.676 m (5' 6\")   Wt 63.2 kg (139 lb 5.3 oz)   SpO2 99%   BMI 22.49 kg/m²       General: Alert, cooperative, no acute distress    Lungs:  CTA Bilaterally. No Wheezing/Rhonchi/Rales.  Heart:  Regular rate and Rhythm.  Abdomen: Soft, Non distended, Non tender. + Bowel sounds.  Extremities: No edema/ cyanosis/ clubbing  Neurologic:  AA oriented X 3.  Right lower extremity weakness-chronic                              HPI:    Lexis Baker is a 74 y.o. female who presents with worsening right leg weakness. Patient's right arm also has tingling sensation. Patient has known lumbar stenosis and has been seen by Dr. Patiño and evaluated at [facility] in which she has surgery scheduled at [facility] on Oct 23. However, patient had worsening back pain and weakness that caused a fall.      Patient states she has had worsening back pain in the months since have seen her with some episodes of relative incontinence. Patient also states over the past couple weeks she has had increasing weakness in her right ankle leg and right hand with diffuse numbness in her fingers.  Some neck pain not a lot  mEq  40 mEq Oral PRN Leandro, Omoyemi Ajoke, DO        Or    potassium bicarb-citric acid (EFFER-K) effervescent tablet 40 mEq  40 mEq Oral PRN Leandro, Omoyemi Ajoke, DO        Or    potassium chloride 10 mEq/100 mL IVPB (Peripheral Line)  10 mEq IntraVENous PRN Leandro, Omoyemi Ajoke, DO        magnesium sulfate 2000 mg in 50 mL IVPB premix  2,000 mg IntraVENous PRN Leandro, Omoyemi Ajoke, DO        enoxaparin (LOVENOX) injection 40 mg  40 mg SubCUTAneous Daily Leandro, Omoyemi Ajoke, DO   40 mg at 10/08/24 2156    ondansetron (ZOFRAN-ODT) disintegrating tablet 4 mg  4 mg Oral Q8H PRN Leandro, Omoyemi Ajoke, DO        Or    ondansetron (ZOFRAN) injection 4 mg  4 mg IntraVENous Q6H PRN Leandro, Omoyemi Ajoke, DO        polyethylene glycol (GLYCOLAX) packet 17 g  17 g Oral Daily PRN Leandro, Omoyemi Ajoke, DO        acetaminophen (TYLENOL) tablet 650 mg  650 mg Oral Q6H PRN Leandro, Omoyemi Ajoke, DO        Or    acetaminophen (TYLENOL) suppository 650 mg  650 mg Rectal Q6H PRN Leandro, Omoyemi Ajoke, DO        melatonin tablet 3 mg  3 mg Oral Nightly PRN Leandro, Omoyemi Ajoke, DO        cyclobenzaprine (FLEXERIL) tablet 5 mg  5 mg Oral BID PRN Leandro, Omoyemi Ajoke, DO   5 mg at 10/07/24 2247    DULoxetine (CYMBALTA) extended release capsule 30 mg  30 mg Oral Daily Leandro, Omoyemi Ajoke, DO   30 mg at 10/09/24 0855    gabapentin (NEURONTIN) capsule 300 mg  300 mg Oral TID Leandro, Omoyemi Ajoke, DO   300 mg at 10/09/24 1407    metoprolol tartrate (LOPRESSOR) tablet 50 mg  50 mg Oral BID Leandro, Omoyemi Ajoke, DO   50 mg at 10/09/24 0855    insulin lispro (HUMALOG,ADMELOG) injection vial 0-4 Units  0-4 Units SubCUTAneous TID WC Leandro, Omoyemi Ajoke, DO   1 Units at 10/09/24 1725    insulin lispro (HUMALOG,ADMELOG) injection vial 0-4 Units  0-4 Units SubCUTAneous Nightly Ethel Reeves DO              Activity: activity as tolerated    Diet: cardiac diet    Wound Care: none needed      iWl Robles MD  10/9/2024, 9:57 PM    Total  time spent 39 mins  Disclaimer: Sections of this note are dictated using utilizing voice recognition software.  Minor typographical errors may be present. If questions arise, please do not hesitate to contact me or call our department.

## 2024-10-10 NOTE — PROGRESS NOTES
[facility] Hospitalist Group  Progress Note    Patient: Lexis Baker Age: 74 y.o. : 1950 MR#: [id number] SSN: [id number]  Date/Time: 10/10/2024     Subjective:     Patient seen evaluated, lying in bed, no acute distress.    74-year-old female presents with worsening right leg weakness.  Patient is right arm also has tingling sensation and patient has a known history of lumbar spinal stenosis and seen by Dr. Patiño and evaluated at [facility].  MRI of the lumbar spine demonstrates existing L1-L2 stenosis which may be slightly worse but no significant changes with cord compression at that level.    Patient seen by neurology and neurologically cleared to proceed with lumbar spine surgery.    10/9-patient seen evaluated, lying in bed, no acute distress.  Patient seen by neurology as well as orthopedic spine surgery.  Per spine surgery patient has a mass at the level of C2-C3 within the spinal cord, they are recommending that patient be transferred to a tertiary care center.  Transfer center contacted for patient to be transferred to [facility].  Awaiting callback and acceptance.  Continue to follow.    10/10-patient seen evaluated, lying in bed, no acute distress.  Patient has been accepted to [facility], awaiting bed placement.  Patient continues to have right arm weakness which has improved since admission.  Continue current treatment plan, will follow.  Patient has been informed of her pending transfer.    Assessment/Plan:     Lumbar spinal stenosis existing L1-L2 stenosis, spine surgery consulted and recommended neurology clearance.  Appreciate neurology input, patient has been neurologically cleared to proceed with lumbar spine surgery.  Continue IV steroids.  C2-3 cervical stenosis.  MRI C-spine reviewed, shows mass at the level of C2-C3 and C5-C6 within the spinal cord, spine surgery recommending transfer to a tertiary care center.  Patient was previously following at [facility].  Patient underwent lumbar

## 2024-10-10 NOTE — PLAN OF CARE
Problem: Occupational Therapy - Adult  Goal: By Discharge: Performs self-care activities at highest level of function for planned discharge setting.  See evaluation for individualized goals.  Description: Occupational Therapy Goals:  Initiated 10/9/2024 to be met within 7-10 days.    1.  Patient will perform grooming with supervision/set-up.   2.  Patient will perform bathing with supervision/set-up using adaptive equipment.  3.  Patient will perform upper body dressing and lower body dressing  with supervision/set-up using adaptive equipment.  4.  Patient will perform bedside commode transfers with supervision/set-up using RW with good balance.  5.  Patient will perform all aspects of toileting with supervision/set-up.  6.  Patient will participate in upper extremity therapeutic exercise/activities with supervision/set-up for 8-10 minutes to increase strength/endurance for ADLs.    7.  Patient will utilize energy conservation techniques during functional activities with min verbal cues.    PLOF: independent with ADLs and functional mobility      Outcome: Progressing   OCCUPATIONAL THERAPY TREATMENT    Patient: Lexis Baker (74 y.o. female)  Date: 10/10/2024  Diagnosis: Spinal stenosis [M48.00]  Spinal stenosis of lumbar region with neurogenic claudication [M48.062] Spinal stenosis      Precautions: General Precautions, Fall Risk,  ,  ,  ,  ,  ,  ,      Chart, occupational therapy assessment, plan of care, and goals were reviewed.  ASSESSMENT:  Pt presents in bed agreeable to OT tx. Pt states her right hand is doing better, however she is having a hard time applying foam tubes into plastic utensils, however is able to feed self with increased time required. Pt demo bed mobility with SBA and bed to chair xfer using RW and CGA with min increased time required. Pt is mod I for combing hair and spraying hair with detangler, however requires increased time for task completion partially due to having conversation with

## 2024-10-10 NOTE — PLAN OF CARE
Problem: Chronic Conditions and Co-morbidities  Goal: Patient's chronic conditions and co-morbidity symptoms are monitored and maintained or improved  10/10/2024 1045 by Trice Aleman RN  Outcome: Progressing  Flowsheets (Taken 10/10/2024 0825)  Care Plan - Patient's Chronic Conditions and Co-Morbidity Symptoms are Monitored and Maintained or Improved:   Monitor and assess patient's chronic conditions and comorbid symptoms for stability, deterioration, or improvement   Collaborate with multidisciplinary team to address chronic and comorbid conditions and prevent exacerbation or deterioration   Update acute care plan with appropriate goals if chronic or comorbid symptoms are exacerbated and prevent overall improvement and discharge  10/10/2024 0442 by Vik Cox RN  Outcome: Progressing  Goal: Mobility level is maintained or improved  10/10/2024 1045 by Trice Aleman RN  Outcome: Progressing  10/10/2024 0442 by Vik Cox RN  Outcome: Progressing     Problem: Pain  Goal: Verbalizes/displays adequate comfort level or baseline comfort level  10/10/2024 1045 by Trice Aleman RN  Outcome: Progressing  Flowsheets (Taken 10/10/2024 1045)  Verbalizes/displays adequate comfort level or baseline comfort level:   Encourage patient to monitor pain and request assistance   Assess pain using appropriate pain scale   Implement non-pharmacological measures as appropriate and evaluate response   Consider cultural and social influences on pain and pain management  10/10/2024 0442 by Vik Cox RN  Outcome: Progressing     Problem: Skin/Tissue Integrity  Goal: Absence of new skin breakdown  Description: 1.  Monitor for areas of redness and/or skin breakdown  2.  Assess vascular access sites hourly  3.  Every 4-6 hours minimum:  Change oxygen saturation probe site  4.  Every 4-6 hours:  If on nasal continuous positive airway pressure, respiratory therapy assess nares and determine need for appliance

## 2024-10-10 NOTE — PROGRESS NOTES
Vss  Neuro intact  MRI cervical spine w contrast with mass/neoplasm within the spinal cord per radiology  Recommended transfer to tertiary care center   Patient has been accepted at [facility] neurosurgery  Awaiting bed and transfer

## 2024-10-11 VITALS
DIASTOLIC BLOOD PRESSURE: 73 MMHG | OXYGEN SATURATION: 97 % | BODY MASS INDEX: 22.39 KG/M2 | SYSTOLIC BLOOD PRESSURE: 143 MMHG | RESPIRATION RATE: 18 BRPM | TEMPERATURE: 97.5 F | HEIGHT: 66 IN | HEART RATE: 59 BPM | WEIGHT: 139.33 LBS

## 2024-10-11 LAB
GLUCOSE BLD STRIP.AUTO-MCNC: 166 MG/DL (ref 70–110)
GLUCOSE BLD STRIP.AUTO-MCNC: 266 MG/DL (ref 70–110)

## 2024-10-11 PROCEDURE — 6370000000 HC RX 637 (ALT 250 FOR IP): Performed by: STUDENT IN AN ORGANIZED HEALTH CARE EDUCATION/TRAINING PROGRAM

## 2024-10-11 PROCEDURE — 2580000003 HC RX 258: Performed by: STUDENT IN AN ORGANIZED HEALTH CARE EDUCATION/TRAINING PROGRAM

## 2024-10-11 PROCEDURE — 82962 GLUCOSE BLOOD TEST: CPT

## 2024-10-11 PROCEDURE — 6360000002 HC RX W HCPCS: Performed by: HOSPITALIST

## 2024-10-11 RX ADMIN — INSULIN LISPRO 2 UNITS: 100 INJECTION, SOLUTION INTRAVENOUS; SUBCUTANEOUS at 12:13

## 2024-10-11 RX ADMIN — GABAPENTIN 300 MG: 300 CAPSULE ORAL at 10:12

## 2024-10-11 RX ADMIN — DULOXETINE HYDROCHLORIDE 30 MG: 30 CAPSULE, DELAYED RELEASE ORAL at 10:12

## 2024-10-11 RX ADMIN — DEXAMETHASONE SODIUM PHOSPHATE 10 MG: 4 INJECTION INTRA-ARTICULAR; INTRALESIONAL; INTRAMUSCULAR; INTRAVENOUS; SOFT TISSUE at 06:07

## 2024-10-11 RX ADMIN — METOPROLOL TARTRATE 50 MG: 50 TABLET, FILM COATED ORAL at 10:12

## 2024-10-11 RX ADMIN — SODIUM CHLORIDE, PRESERVATIVE FREE 10 ML: 5 INJECTION INTRAVENOUS at 10:13

## 2024-10-11 RX ADMIN — DEXAMETHASONE SODIUM PHOSPHATE 10 MG: 4 INJECTION INTRA-ARTICULAR; INTRALESIONAL; INTRAMUSCULAR; INTRAVENOUS; SOFT TISSUE at 10:13

## 2024-10-11 ASSESSMENT — PAIN SCALES - GENERAL
PAINLEVEL_OUTOF10: 0

## 2024-10-11 NOTE — PROGRESS NOTES
Prepared patient for discharge. PIV removed, AVS given and instructed. Report given to YARIEL Canchola of [facility] using SBAR. All his questions answered. EMTALA completed  and was signed by AP. All her personal belongings given to her. Transferred safely in the stretcher, Left in good disposition. AOX4 on room air, not in distress.

## 2024-10-11 NOTE — PLAN OF CARE
Problem: Chronic Conditions and Co-morbidities  Goal: Patient's chronic conditions and co-morbidity symptoms are monitored and maintained or improved  Outcome: Adequate for Discharge  Flowsheets (Taken 10/11/2024 7928)  Care Plan - Patient's Chronic Conditions and Co-Morbidity Symptoms are Monitored and Maintained or Improved: Monitor and assess patient's chronic conditions and comorbid symptoms for stability, deterioration, or improvement  Goal: Mobility level is maintained or improved  Outcome: Adequate for Discharge     Problem: Pain  Goal: Verbalizes/displays adequate comfort level or baseline comfort level  Outcome: Adequate for Discharge     Problem: Skin/Tissue Integrity  Goal: Absence of new skin breakdown  Description: 1.  Monitor for areas of redness and/or skin breakdown  2.  Assess vascular access sites hourly  3.  Every 4-6 hours minimum:  Change oxygen saturation probe site  4.  Every 4-6 hours:  If on nasal continuous positive airway pressure, respiratory therapy assess nares and determine need for appliance change or resting period.  Outcome: Adequate for Discharge     Problem: ABCDS Injury Assessment  Goal: Absence of physical injury  Outcome: Adequate for Discharge     Problem: Safety - Adult  Goal: Free from fall injury  Outcome: Adequate for Discharge

## 2024-10-14 LAB
HLA-B27 QL NAA+PROBE: NEGATIVE
MBP CSF-MCNC: 10.4 NG/ML (ref 0–5.6)
OLIGOCLONAL BANDS.IT SER+CSF QL: NORMAL

## 2024-10-15 LAB
F5 P.R506Q BLD/T QL: NORMAL
IMP & REVIEW OF LAB RESULTS: NORMAL

## 2024-10-16 LAB
BACTERIA SPEC CULT: NORMAL
GRAM STN SPEC: NORMAL
SERVICE CMNT-IMP: NORMAL

## 2025-01-07 NOTE — PROGRESS NOTES
Problem: Patient Education: Go to Patient Education Activity  Goal: Patient/Family Education  12/19/2022 0521 by Wali Vanegas RN  Outcome: Progressing Towards Goal  12/19/2022 0521 by Wali Vanegas RN  Outcome: Progressing Towards Goal     Problem: Falls - Risk of  Goal: *Absence of Falls  Description: Document [place] Skill Fall Risk and appropriate interventions in the flowsheet.   12/19/2022 0521 by Wali Vanegas RN  Outcome: Progressing Towards Goal  Note: Fall Risk Interventions:  Mobility Interventions: Utilize walker, cane, or other assistive device         Medication Interventions: Patient to call before getting OOB, Teach patient to arise slowly    Elimination Interventions: Call light in reach, Patient to call for help with toileting needs    History of Falls Interventions: Evaluate medications/consider consulting pharmacy, Room close to nurse's station      12/19/2022 0521 by Wali Vanegas RN  Outcome: Progressing Towards Goal  Note: Fall Risk Interventions:  Mobility Interventions: Utilize walker, cane, or other assistive device         Medication Interventions: Patient to call before getting OOB, Teach patient to arise slowly    Elimination Interventions: Call light in reach, Patient to call for help with toileting needs    History of Falls Interventions: Evaluate medications/consider consulting pharmacy, Room close to nurse's station         Problem: Patient Education: Go to Patient Education Activity  Goal: Patient/Family Education  12/19/2022 0521 by Wali Vanegas RN  Outcome: Progressing Towards Goal  12/19/2022 0521 by Wali Vanegas RN  Outcome: Progressing Towards Goal     Problem: Injury - Risk of, Adverse Drug Event  Goal: *Absence of adverse drug events  12/19/2022 0521 by Wali Vanegas RN  Outcome: Progressing Towards Goal  12/19/2022 0521 by Wali Vanegas RN  Outcome: Progressing Towards Goal  Goal: *Absence of medication errors  12/19/2022 0521 by Wali Vanegas RN  Outcome: Progressing Towards Goal  12/19/2022 0521 by Rachel Ayala RN  Outcome: Progressing Towards Goal  Goal: *Knowledge of prescribed medications  12/19/2022 0521 by Rachel Ayala RN  Outcome: Progressing Towards Goal  12/19/2022 0521 by Rachel Ayala RN  Outcome: Progressing Towards Goal     Problem: Patient Education: Go to Patient Education Activity  Goal: Patient/Family Education  12/19/2022 0521 by Rachel Ayala RN  Outcome: Progressing Towards Goal  12/19/2022 0521 by Rachel Ayala RN  Outcome: Progressing Towards Goal     Problem: Patient Education: Go to Patient Education Activity  Goal: Patient/Family Education  12/19/2022 0521 by Rachel Ayala RN  Outcome: Progressing Towards Goal  12/19/2022 0521 by Rachel Ayala RN  Outcome: Progressing Towards Goal     Problem: Patient Education: Go to Patient Education Activity  Goal: Patient/Family Education  12/19/2022 0521 by Rachel Ayala RN  Outcome: Progressing Towards Goal  12/19/2022 0521 by Rachel Ayala RN  Outcome: Progressing Towards Goal Oriented - self; Oriented - place; Oriented - time

## 2025-05-27 ENCOUNTER — HOSPITAL ENCOUNTER (EMERGENCY)
Facility: HOSPITAL | Age: 75
Discharge: HOME | End: 2025-05-28
Attending: EMERGENCY MEDICINE
Payer: MEDICARE

## 2025-05-27 ENCOUNTER — APPOINTMENT (OUTPATIENT)
Facility: HOSPITAL | Age: 75
End: 2025-05-27
Payer: MEDICARE

## 2025-05-27 DIAGNOSIS — Z79.899 POLYPHARMACY: ICD-10-CM

## 2025-05-27 DIAGNOSIS — N39.0 URINARY TRACT INFECTION WITHOUT HEMATURIA, SITE UNSPECIFIED: ICD-10-CM

## 2025-05-27 DIAGNOSIS — R42 DIZZINESS: Primary | ICD-10-CM

## 2025-05-27 LAB
ANION GAP SERPL CALC-SCNC: 9 MMOL/L (ref 2–12)
BASOPHILS # BLD: 0.04 K/UL (ref 0–0.1)
BASOPHILS NFR BLD: 0.7 % (ref 0–1)
BUN SERPL-MCNC: 18 MG/DL (ref 6–20)
BUN/CREAT SERPL: 18 (ref 12–20)
CALCIUM SERPL-MCNC: 9.5 MG/DL (ref 8.5–10.1)
CHLORIDE SERPL-SCNC: 102 MMOL/L (ref 97–108)
CO2 SERPL-SCNC: 33 MMOL/L (ref 21–32)
CREAT SERPL-MCNC: 0.99 MG/DL (ref 0.55–1.02)
DIFFERENTIAL METHOD BLD: ABNORMAL
EOSINOPHIL # BLD: 0.17 K/UL (ref 0–0.4)
EOSINOPHIL NFR BLD: 3 % (ref 0–7)
ERYTHROCYTE [DISTWIDTH] IN BLOOD BY AUTOMATED COUNT: 14.4 % (ref 11.5–14.5)
GLUCOSE SERPL-MCNC: 72 MG/DL (ref 65–100)
HCT VFR BLD AUTO: 39 % (ref 35–47)
HGB BLD-MCNC: 11.5 G/DL (ref 11.5–16)
IMM GRANULOCYTES # BLD AUTO: 0.01 K/UL (ref 0–0.04)
IMM GRANULOCYTES NFR BLD AUTO: 0.2 % (ref 0–0.5)
LYMPHOCYTES # BLD: 0.94 K/UL (ref 0.8–3.5)
LYMPHOCYTES NFR BLD: 16.6 % (ref 12–49)
MCH RBC QN AUTO: 26.5 PG (ref 26–34)
MCHC RBC AUTO-ENTMCNC: 29.5 G/DL (ref 30–36.5)
MCV RBC AUTO: 89.9 FL (ref 80–99)
MONOCYTES # BLD: 0.89 K/UL (ref 0–1)
MONOCYTES NFR BLD: 15.8 % (ref 5–13)
NEUTS SEG # BLD: 3.6 K/UL (ref 1.8–8)
NEUTS SEG NFR BLD: 63.7 % (ref 32–75)
NRBC # BLD: 0 K/UL (ref 0–0.01)
NRBC BLD-RTO: 0 PER 100 WBC
PLATELET # BLD AUTO: 189 K/UL (ref 150–400)
PMV BLD AUTO: 10.3 FL (ref 8.9–12.9)
POTASSIUM SERPL-SCNC: 3.8 MMOL/L (ref 3.5–5.1)
RBC # BLD AUTO: 4.34 M/UL (ref 3.8–5.2)
SODIUM SERPL-SCNC: 144 MMOL/L (ref 136–145)
TROPONIN I SERPL HS-MCNC: 10 NG/L (ref 0–51)
WBC # BLD AUTO: 5.7 K/UL (ref 3.6–11)

## 2025-05-27 PROCEDURE — 87186 SC STD MICRODIL/AGAR DIL: CPT

## 2025-05-27 PROCEDURE — 99284 EMERGENCY DEPT VISIT MOD MDM: CPT

## 2025-05-27 PROCEDURE — 87086 URINE CULTURE/COLONY COUNT: CPT

## 2025-05-27 PROCEDURE — 93005 ELECTROCARDIOGRAM TRACING: CPT | Performed by: EMERGENCY MEDICINE

## 2025-05-27 PROCEDURE — 80048 BASIC METABOLIC PNL TOTAL CA: CPT

## 2025-05-27 PROCEDURE — 85025 COMPLETE CBC W/AUTO DIFF WBC: CPT

## 2025-05-27 PROCEDURE — 81001 URINALYSIS AUTO W/SCOPE: CPT

## 2025-05-27 PROCEDURE — 87088 URINE BACTERIA CULTURE: CPT

## 2025-05-27 PROCEDURE — 96361 HYDRATE IV INFUSION ADD-ON: CPT

## 2025-05-27 PROCEDURE — 2580000003 HC RX 258: Performed by: EMERGENCY MEDICINE

## 2025-05-27 PROCEDURE — 84484 ASSAY OF TROPONIN QUANT: CPT

## 2025-05-27 PROCEDURE — 36415 COLL VENOUS BLD VENIPUNCTURE: CPT

## 2025-05-27 PROCEDURE — 70450 CT HEAD/BRAIN W/O DYE: CPT

## 2025-05-27 RX ORDER — METHOCARBAMOL 750 MG/1
750 TABLET, FILM COATED ORAL 4 TIMES DAILY
COMMUNITY
Start: 2025-04-14

## 2025-05-27 RX ORDER — 0.9 % SODIUM CHLORIDE 0.9 %
1000 INTRAVENOUS SOLUTION INTRAVENOUS ONCE
Status: COMPLETED | OUTPATIENT
Start: 2025-05-27 | End: 2025-05-27

## 2025-05-27 RX ORDER — BUPRENORPHINE 5 UG/H
1 PATCH TRANSDERMAL
COMMUNITY
Start: 2025-05-16

## 2025-05-27 RX ADMIN — SODIUM CHLORIDE 1000 ML: 0.9 INJECTION, SOLUTION INTRAVENOUS at 22:20

## 2025-05-27 ASSESSMENT — ENCOUNTER SYMPTOMS
ABDOMINAL DISTENTION: 0
VOMITING: 0
ANAL BLEEDING: 0
ABDOMINAL PAIN: 0
SHORTNESS OF BREATH: 0
COUGH: 0
DIARRHEA: 0
NAUSEA: 1
BLOOD IN STOOL: 0

## 2025-05-28 VITALS
HEART RATE: 75 BPM | RESPIRATION RATE: 10 BRPM | WEIGHT: 149.91 LBS | TEMPERATURE: 97.6 F | OXYGEN SATURATION: 96 % | DIASTOLIC BLOOD PRESSURE: 72 MMHG | BODY MASS INDEX: 24.2 KG/M2 | SYSTOLIC BLOOD PRESSURE: 153 MMHG

## 2025-05-28 LAB
APPEARANCE UR: ABNORMAL
BACTERIA URNS QL MICRO: ABNORMAL /HPF
BILIRUB UR QL: NEGATIVE
COLOR UR: ABNORMAL
EKG ATRIAL RATE: 74 BPM
EKG DIAGNOSIS: NORMAL
EKG P AXIS: 69 DEGREES
EKG P-R INTERVAL: 180 MS
EKG Q-T INTERVAL: 400 MS
EKG QRS DURATION: 70 MS
EKG QTC CALCULATION (BAZETT): 444 MS
EKG R AXIS: -22 DEGREES
EKG T AXIS: 32 DEGREES
EKG VENTRICULAR RATE: 74 BPM
EPITH CASTS URNS QL MICRO: ABNORMAL /LPF
GLUCOSE UR STRIP.AUTO-MCNC: NEGATIVE MG/DL
HGB UR QL STRIP: ABNORMAL
KETONES UR QL STRIP.AUTO: NEGATIVE MG/DL
LEUKOCYTE ESTERASE UR QL STRIP.AUTO: ABNORMAL
NITRITE UR QL STRIP.AUTO: POSITIVE
PH UR STRIP: 6 (ref 5–8)
PROT UR STRIP-MCNC: NEGATIVE MG/DL
RBC #/AREA URNS HPF: ABNORMAL /HPF (ref 0–5)
SP GR UR REFRACTOMETRY: 1.01 (ref 1–1.03)
URINE CULTURE IF INDICATED: ABNORMAL
UROBILINOGEN UR QL STRIP.AUTO: 0.2 EU/DL (ref 0.2–1)
WBC URNS QL MICRO: >100 /HPF (ref 0–4)

## 2025-05-28 PROCEDURE — 6360000002 HC RX W HCPCS: Performed by: EMERGENCY MEDICINE

## 2025-05-28 PROCEDURE — 2500000003 HC RX 250 WO HCPCS: Performed by: EMERGENCY MEDICINE

## 2025-05-28 PROCEDURE — 96374 THER/PROPH/DIAG INJ IV PUSH: CPT

## 2025-05-28 PROCEDURE — 93010 ELECTROCARDIOGRAM REPORT: CPT | Performed by: SPECIALIST

## 2025-05-28 RX ORDER — CEFDINIR 300 MG/1
300 CAPSULE ORAL 2 TIMES DAILY
Qty: 14 CAPSULE | Refills: 0 | Status: SHIPPED | OUTPATIENT
Start: 2025-05-28 | End: 2025-06-04

## 2025-05-28 RX ADMIN — WATER 1000 MG: 1 INJECTION INTRAMUSCULAR; INTRAVENOUS; SUBCUTANEOUS at 00:34

## 2025-05-28 NOTE — ED NOTES
Condition Stable  Patient discharged to home  Patient education was completed  Education taught to patient and niece  Teaching method used was handout and verbal  Understanding of teaching was good  Patient was [facility] with [facility]  Discharged with [facility]  Valuables were given to patient remained in possession of belongings during stay    [facility] ETA 0104

## 2025-05-28 NOTE — ED PROVIDER NOTES
[facility] DEPARTMENT  EMERGENCY DEPARTMENT ENCOUNTER      Pt Name: Lexis Baker  MRN: [id number]  Birthdate 1950  Date of evaluation: 5/27/2025  Provider: James Ashby MD    CHIEF COMPLAINT       Chief Complaint   Patient presents with    Dizziness         HISTORY OF PRESENT ILLNESS   (Location/Symptom, Timing/Onset, Context/Setting, Quality, Duration, Modifying Factors, Severity)  Note limiting factors.   74F w/ hx chronic pain, CVA, DM, HTN, HLD, spinal stenosis p/w 1d lightheadedness. Pt reports feeling lightheaded after standing up from the toilet. No room spinning dizziness/vertigo, syncope/LOC or head/neck pain. Earlier today also felt blurry vision. Also has chronic right upper arm pain and paresthesias that are chronic and unchanged. No speech changes, vision loss, new ext weakness/numbness. Feels nausea but no vomiting, diarrhea, rectal bleeding or urinary symptoms. No CP/SOB. Recently started using buprenorphine patches for chronic pain in additional to her oral opioids. No drugs/etoh.            Review of External Medical Records:     Nursing Notes were reviewed.    REVIEW OF SYSTEMS    (2-9 systems for level 4, 10 or more for level 5)     Review of Systems   Constitutional:  Negative for diaphoresis and fever.   HENT:  Negative for nosebleeds.    Eyes:  Negative for visual disturbance.   Respiratory:  Negative for cough and shortness of breath.    Cardiovascular:  Negative for chest pain, palpitations and leg swelling.   Gastrointestinal:  Positive for nausea. Negative for abdominal distention, abdominal pain, anal bleeding, blood in stool, diarrhea and vomiting.   Endocrine: Negative for polyuria.   Genitourinary:  Negative for difficulty urinating, dysuria, frequency and hematuria.   Musculoskeletal:  Negative for joint swelling.   Skin:  Negative for wound.   Allergic/Immunologic: Negative for immunocompromised state.   Neurological:  Positive for light-headedness. Negative for

## 2025-05-28 NOTE — ED TRIAGE NOTES
Patient arrived from home via EMS with complaint of dizziness after getting up from toilet.  Patient also endorses intermittent right shoulder pain radiating down to elbow - hx of sarcoma stage 4 - followed at [facility].

## 2025-05-29 ENCOUNTER — RESULTS FOLLOW-UP (OUTPATIENT)
Facility: HOSPITAL | Age: 75
End: 2025-05-29

## 2025-05-30 LAB
BACTERIA SPEC CULT: ABNORMAL
CC UR VC: ABNORMAL
SERVICE CMNT-IMP: ABNORMAL

## 2025-06-10 ENCOUNTER — CLINICAL DOCUMENTATION (OUTPATIENT)
Age: 75
End: 2025-06-10

## 2025-06-10 NOTE — PROGRESS NOTES
Spoke to Dr. Mcmillan  Patient has sarcoma and requesting me to comanage  Has a paraspinal mass and seeing rad onc  Likely will need follow up with MRI to primary and CT lung    Please schedule for new patient visist in 2-3 weeks

## 2025-06-11 ENCOUNTER — TELEPHONE (OUTPATIENT)
Age: 75
End: 2025-06-11

## 2025-06-19 ENCOUNTER — CLINICAL DOCUMENTATION (OUTPATIENT)
Facility: HOSPITAL | Age: 75
End: 2025-06-19

## 2025-06-19 ENCOUNTER — HOSPITAL ENCOUNTER (OUTPATIENT)
Facility: HOSPITAL | Age: 75
Discharge: HOME | End: 2025-06-22
Payer: MEDICARE

## 2025-06-19 VITALS
WEIGHT: 147 LBS | SYSTOLIC BLOOD PRESSURE: 126 MMHG | RESPIRATION RATE: 18 BRPM | HEART RATE: 61 BPM | BODY MASS INDEX: 23.63 KG/M2 | HEIGHT: 66 IN | DIASTOLIC BLOOD PRESSURE: 67 MMHG

## 2025-06-19 DIAGNOSIS — C49.6 SARCOMA OF PARASPINAL REGION (HCC): Primary | ICD-10-CM

## 2025-06-19 PROCEDURE — 99204 OFFICE O/P NEW MOD 45 MIN: CPT

## 2025-06-19 RX ORDER — MULTIVIT-MIN/IRON FUM/FOLIC AC 7.5 MG-4
1 TABLET ORAL DAILY
COMMUNITY
Start: 2024-11-30

## 2025-06-19 RX ORDER — DEXAMETHASONE 4 MG/1
4 TABLET ORAL 2 TIMES DAILY WITH MEALS
Qty: 30 TABLET | Refills: 0 | Status: SHIPPED | OUTPATIENT
Start: 2025-06-19 | End: 2025-06-25 | Stop reason: ALTCHOICE

## 2025-06-19 RX ORDER — OXYCODONE HYDROCHLORIDE 5 MG/1
5 TABLET ORAL EVERY 4 HOURS PRN
COMMUNITY
Start: 2025-06-10

## 2025-06-19 ASSESSMENT — PAIN DESCRIPTION - ONSET: ONSET: ON-GOING

## 2025-06-19 ASSESSMENT — PAIN DESCRIPTION - FREQUENCY: FREQUENCY: CONTINUOUS

## 2025-06-19 ASSESSMENT — PAIN DESCRIPTION - DESCRIPTORS: DESCRIPTORS: ACHING

## 2025-06-19 ASSESSMENT — PAIN DESCRIPTION - ORIENTATION: ORIENTATION: RIGHT;LEFT;UPPER

## 2025-06-19 ASSESSMENT — PAIN - FUNCTIONAL ASSESSMENT: PAIN_FUNCTIONAL_ASSESSMENT: PREVENTS OR INTERFERES WITH MANY ACTIVE NOT PASSIVE ACTIVITIES

## 2025-06-19 ASSESSMENT — PAIN SCALES - GENERAL: PAINLEVEL_OUTOF10: 6

## 2025-06-19 ASSESSMENT — PAIN DESCRIPTION - PAIN TYPE: TYPE: CHRONIC PAIN;NEUROPATHIC PAIN

## 2025-06-19 NOTE — PROGRESS NOTES
NCCN Distress Thermometer    Radiation Oncology at [facility]    Date Screening Completed: 6/19/2025    Screening Declined:  [] Yes    Number that best describes how much distress you've experienced in the past week, including today?  0 [] - No distress 1 []      2 []      3 []      4 []       5 []       6 [x]      7 []      8 []      9 []       10 [] - Extreme distress    PROBLEM LIST  Have you had concerns about any of the items below in the past week, including today?      Physical Concerns Practical Concerns   [x] Pain [x] Taking care of myself    [x] Sleep [] Taking care of others    [] Fatigue [] Safety   [] Tobacco use  [] Work   [] Substance use  [] School   [] Memory or concentration [] Housing/Utilities   [] Sexual health [] Finances   [x] Changes in eating  [] Insurance   [x] Loss or change of physical abilities  [] Transportation    []    Emotional Concerns [] Having enough food   [] Worry or anxiety [] Access to medicine   [] Sadness or depression [x] Treatment decisions   [] Loss of interest or enjoyment     [] Grief or loss  Spiritual or Baptist Concerns   [] Fear [] Sense of meaning or purpose   [] Loneliness  [] Changes in rossi or beliefs   [] Anger [] Death, dying, or afterlife   [x] Changes in appearance [] Conflict between beliefs and cancer treatments    [] Feelings of worthlessness or being a burden [] Relationship with the sacred    [] Ritual or dietary needs    Social Concerns     [] Relationship with spouse or partner     [] Relationship with children    [] Relationship with family members     [] Relationship with friends or coworkers     [] Communication with health care team     [] Ability to have children     [] Prejudice or discrimination        Other Concerns: \"Can I take vitamins to prevent hair loss?\"

## 2025-06-19 NOTE — PROGRESS NOTES
Education was provided to the patient in the form of the following printed JEFF booklet or booklets: Radiation Treatment for Cancer.   Total time spent on education with patient: minutes: 5-10 minutes

## 2025-06-19 NOTE — PROGRESS NOTES
[facility] Oncology Social Work   Psychosocial Assessment      Location: Radiation Oncology at [facility]  Patient: Lexis Baker (1950)    Reason for Assessment: Initial Assessment    Advance Care Planning: ACP conversation deferred at this time    Sources of Information: Patient    Mental Status: Alert, Oriented to Person, Oriented to Place, Oriented to Time, Oriented to Situation    Relationship Status: Single    Living Circumstances: Lives Alone, Other: Lives alone in [place], [place] but staying with her daughter in [place] while in treatment.     Employment Status: Retired    Transportation Resources: Family/Friends, Other: Has been using [employer] and her daughter also drives her to appointments as well. For treatment Department Ly[employer] for 2-3 rides a week and her daughter helping the other days or patient's daughter could take FMLA and help her through entirety of tx. We will discuss more after she has her start date. Will have start date by tomorrow 6/20 - has IMRT Sim scheduled for 6/20 @ 2:45pm.     Barriers to Learning: No Barriers Identified    Financial/Legal Concerns: No Concerns Identified    Temple/Spiritual/Existential: Conversation deferred    Support System: Fair Support: The patient has a moderate support network that provides occasional or inconsistent assistance, meeting some but not all of their emotional and practical needs.  Patient's Primary Support Person/Group: Has family around her and with her. Daughter and grandson helpful. Grand son joined her to consult visit. Has been taking [employer] because daughter works. Also her medical oncologist in at [facility] in [facility] which is hours away from her home and her daughters home.     History of Mental Health / Substance Use Disorders: Unknown  Conversation deferred    Coping with Illness: Coping Well, Situational Anxiety           Concerns/Barriers to Care: Transportation    Narrative: Patient arrived alone, although her

## 2025-06-20 NOTE — PROGRESS NOTES
[facility] [place] at [facility]  [address], [address] [place] [id number]  W: [phone]  F: [phone]    Reason for Visit:   Lexis Baker is a 74 y.o. female who is seen in consultation at the request of Dr. 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 1.1 cm) but no pulmonary parenchymal disease or osseous lesions. No abdominopelvic metastatic disease. 12 mm L thyroid nodule.   10/13/24, 11/12/24 - MRI C spine - 0.6 cm C5 intramedullary irregular contrast enhancing lesion and 0.7 cm C6 intramedullary irregular contrast enhancing lesion. Associated spinal cord edema most limited to C5-C6 level.  11/25/24 - C2-C6 laminectomies with posterolateral arthrodesis - No pathologic specimens  1/14/25 - MRI C spine - Interval C2-C6 posterior decompression but persistent cord edema extending between C2-T1/2. No significant change in C5 and C6 intramedullary lesions.  1/23/25 - CT chest - Persistent multistation mediastinal LAD (i.e. 1.1 cm R paratracheal LN) but no pathologic hilar LAD. New RUL sub-cm solid nodules. New splenic hypodensities. No osseous lesions.   3/4/25 - CT-guided CNBx of R posterior T1 region intramuscular mass - MDM2-amplified sarcoma with osteoid formation (parosteal osteosarcoma vs dedifferentiated LPS, Ki67 20%)  3/5/25 - Tempus xT and xR - CDK4 amp, MDM2 amp, ATRX  4/22/25 - MRI C spine - Increased size of now 5.5 cm R paraspinal mass spanning C6-T2 with extension between T1 and T2 ribs abutting the pleural margin  PET/CT - 5.9 cm heterogeneously FDG avid mass in R paraspinal soft tissues with inferior margin abutting pleura of R lung apex. Increased size/number of pulm nodules. Multistation FDG avid LNs in neck, chest, and abdomen. Multiple FDG avid lesions

## 2025-06-23 ENCOUNTER — HOSPITAL ENCOUNTER (OUTPATIENT)
Facility: HOSPITAL | Age: 75
Discharge: HOME | End: 2025-06-26
Payer: MEDICARE

## 2025-06-23 DIAGNOSIS — C49.6 SARCOMA OF PARASPINAL REGION (HCC): ICD-10-CM

## 2025-06-23 PROCEDURE — 3430000000 HC RX DIAGNOSTIC RADIOPHARMACEUTICAL: Performed by: RADIOLOGY

## 2025-06-23 PROCEDURE — 78815 PET IMAGE W/CT SKULL-THIGH: CPT

## 2025-06-23 PROCEDURE — A9609 HC RX DIAGNOSTIC RADIOPHARMACEUTICAL: HCPCS | Performed by: RADIOLOGY

## 2025-06-23 RX ADMIN — FLUDEOXYGLUCOSE F-18 9.44 MILLICURIE: 500 INJECTION INTRAVENOUS at 15:36

## 2025-06-24 RX ORDER — FLUDEOXYGLUCOSE F-18 500 MCI/ML
9.44 INJECTION INTRAVENOUS
Status: COMPLETED | OUTPATIENT
Start: 2025-06-23 | End: 2025-06-23

## 2025-06-25 ENCOUNTER — INITIAL CONSULT (OUTPATIENT)
Age: 75
End: 2025-06-25
Payer: MEDICARE

## 2025-06-25 ENCOUNTER — CLINICAL DOCUMENTATION (OUTPATIENT)
Age: 75
End: 2025-06-25

## 2025-06-25 VITALS
OXYGEN SATURATION: 95 % | BODY MASS INDEX: 24.2 KG/M2 | SYSTOLIC BLOOD PRESSURE: 136 MMHG | HEIGHT: 66 IN | DIASTOLIC BLOOD PRESSURE: 71 MMHG | RESPIRATION RATE: 19 BRPM | HEART RATE: 67 BPM | TEMPERATURE: 98.3 F | WEIGHT: 150.6 LBS

## 2025-06-25 DIAGNOSIS — C49.6 SARCOMA OF PARASPINAL REGION (HCC): Primary | ICD-10-CM

## 2025-06-25 DIAGNOSIS — R06.02 SHORTNESS OF BREATH: ICD-10-CM

## 2025-06-25 PROCEDURE — G8428 CUR MEDS NOT DOCUMENT: HCPCS | Performed by: INTERNAL MEDICINE

## 2025-06-25 PROCEDURE — 3075F SYST BP GE 130 - 139MM HG: CPT | Performed by: INTERNAL MEDICINE

## 2025-06-25 PROCEDURE — G8399 PT W/DXA RESULTS DOCUMENT: HCPCS | Performed by: INTERNAL MEDICINE

## 2025-06-25 PROCEDURE — 3017F COLORECTAL CA SCREEN DOC REV: CPT | Performed by: INTERNAL MEDICINE

## 2025-06-25 PROCEDURE — 99205 OFFICE O/P NEW HI 60 MIN: CPT | Performed by: INTERNAL MEDICINE

## 2025-06-25 PROCEDURE — 1090F PRES/ABSN URINE INCON ASSESS: CPT | Performed by: INTERNAL MEDICINE

## 2025-06-25 PROCEDURE — 1123F ACP DISCUSS/DSCN MKR DOCD: CPT | Performed by: INTERNAL MEDICINE

## 2025-06-25 PROCEDURE — 1036F TOBACCO NON-USER: CPT | Performed by: INTERNAL MEDICINE

## 2025-06-25 PROCEDURE — 1125F AMNT PAIN NOTED PAIN PRSNT: CPT | Performed by: INTERNAL MEDICINE

## 2025-06-25 PROCEDURE — 3078F DIAST BP <80 MM HG: CPT | Performed by: INTERNAL MEDICINE

## 2025-06-25 PROCEDURE — G8420 CALC BMI NORM PARAMETERS: HCPCS | Performed by: INTERNAL MEDICINE

## 2025-06-25 RX ORDER — DEXAMETHASONE 4 MG/1
2 TABLET ORAL 2 TIMES DAILY WITH MEALS
Qty: 60 TABLET | Refills: 0 | Status: SHIPPED | OUTPATIENT
Start: 2025-06-25 | End: 2025-06-25

## 2025-06-25 RX ORDER — DEXAMETHASONE 4 MG/1
2 TABLET ORAL 2 TIMES DAILY WITH MEALS
Qty: 60 TABLET | Refills: 0 | Status: SHIPPED | OUTPATIENT
Start: 2025-06-25

## 2025-06-25 ASSESSMENT — PATIENT HEALTH QUESTIONNAIRE - PHQ9
1. LITTLE INTEREST OR PLEASURE IN DOING THINGS: NOT AT ALL
SUM OF ALL RESPONSES TO PHQ QUESTIONS 1-9: 0
2. FEELING DOWN, DEPRESSED OR HOPELESS: NOT AT ALL
SUM OF ALL RESPONSES TO PHQ QUESTIONS 1-9: 0

## 2025-06-25 NOTE — PROGRESS NOTES
Lexis Baker is a 74 y.o. 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 .            1. Have you been to the ER, urgent care clinic since your last visit?  Hospitalized since your last visit?No    2. Have you seen or consulted any other health care providers outside of the [facility] since your last visit?  Include any pap smears or colon screening. Oncology at [facility]. Dr. Ramirez at [facility] neurosurgeon.

## 2025-06-26 ENCOUNTER — HOSPITAL ENCOUNTER (OUTPATIENT)
Age: 75
Discharge: HOME | End: 2025-06-29
Payer: MEDICARE

## 2025-06-26 ENCOUNTER — HOSPITAL ENCOUNTER (OUTPATIENT)
Facility: HOSPITAL | Age: 75
Discharge: HOME | End: 2025-06-29
Attending: INTERNAL MEDICINE
Payer: MEDICARE

## 2025-06-26 ENCOUNTER — TELEPHONE (OUTPATIENT)
Age: 75
End: 2025-06-26

## 2025-06-26 ENCOUNTER — CLINICAL DOCUMENTATION (OUTPATIENT)
Age: 75
End: 2025-06-26

## 2025-06-26 DIAGNOSIS — C49.6 SARCOMA OF PARASPINAL REGION (HCC): ICD-10-CM

## 2025-06-26 PROCEDURE — 71260 CT THORAX DX C+: CPT

## 2025-06-26 PROCEDURE — 93970 EXTREMITY STUDY: CPT

## 2025-06-26 PROCEDURE — 6360000004 HC RX CONTRAST MEDICATION: Performed by: RADIOLOGY

## 2025-06-26 RX ORDER — IOPAMIDOL 755 MG/ML
100 INJECTION, SOLUTION INTRAVASCULAR
Status: COMPLETED | OUTPATIENT
Start: 2025-06-26 | End: 2025-06-26

## 2025-06-26 RX ADMIN — IOPAMIDOL 100 ML: 755 INJECTION, SOLUTION INTRAVENOUS at 09:28

## 2025-06-26 NOTE — TELEPHONE ENCOUNTER
1539   Called pt and left vm to r/t call to the office to discuss updates from Dr. Remy. Number to office left on vm.    Dr. Remy discussed her care with Dr. Delcid and Dr. Alexandra     The lung nodules are small and we dont know that they are cancerous  Chemotherapy will not shrink the tumor in most patients and will not add to radiation     Hence recommendation is to proceed with radiation for now     I will see her after she completes radiation to see if we need to just watch the cancer/ start chemotherapy or consider a pill called abemaciclib      [id number]   Pt r/t call, information provided. Pt voiced understanding and will await a call from RT

## 2025-06-26 NOTE — PROGRESS NOTES
Please let her know that I have discussed her care with Dr. Delcid and Dr. Alexandra    The lung nodules are small and we dont know that they are cancerous  Chemotherapy will not shrink the tumor in most patients and will not add to radiation    Hence recommendation is to proceed with radiation for now    I will see her after she completes radiation to see if we need to just watch the cancer/ start chemotherapy or consider a pill called abemaciclib     Kelsi please call her  CC to Dr. Farhana Smith schedule her for a visit with me end of august

## 2025-06-27 ENCOUNTER — TELEPHONE (OUTPATIENT)
Age: 75
End: 2025-06-27

## 2025-06-27 ENCOUNTER — HOSPITAL ENCOUNTER (OUTPATIENT)
Facility: HOSPITAL | Age: 75
Discharge: HOME | End: 2025-06-30
Attending: RADIOLOGY
Payer: MEDICARE

## 2025-06-27 DIAGNOSIS — C49.6 SARCOMA OF PARASPINAL REGION (HCC): ICD-10-CM

## 2025-06-27 PROCEDURE — A9579 GAD-BASE MR CONTRAST NOS,1ML: HCPCS | Performed by: RADIOLOGY

## 2025-06-27 PROCEDURE — 72157 MRI CHEST SPINE W/O & W/DYE: CPT

## 2025-06-27 PROCEDURE — 72156 MRI NECK SPINE W/O & W/DYE: CPT

## 2025-06-27 PROCEDURE — 6360000004 HC RX CONTRAST MEDICATION: Performed by: RADIOLOGY

## 2025-06-27 RX ORDER — GADOTERIDOL 279.3 MG/ML
15 INJECTION INTRAVENOUS
Status: COMPLETED | OUTPATIENT
Start: 2025-06-27 | End: 2025-06-27

## 2025-06-27 RX ADMIN — GADOTERIDOL 15 ML: 279.3 INJECTION, SOLUTION INTRAVENOUS at 14:07

## 2025-06-27 NOTE — TELEPHONE ENCOUNTER
1500  Called pt and left vm notifying that Dr. Remy reviewed her doppler scans and there are no blood clots noted.  Pt is to contact the office with any questions or concerns.

## 2025-06-30 ENCOUNTER — HOSPITAL ENCOUNTER (OUTPATIENT)
Facility: HOSPITAL | Age: 75
Discharge: HOME | End: 2025-07-02
Attending: INTERNAL MEDICINE
Payer: MEDICARE

## 2025-06-30 VITALS
DIASTOLIC BLOOD PRESSURE: 63 MMHG | BODY MASS INDEX: 23.78 KG/M2 | WEIGHT: 148 LBS | SYSTOLIC BLOOD PRESSURE: 145 MMHG | HEART RATE: 67 BPM | HEIGHT: 66 IN

## 2025-06-30 DIAGNOSIS — R06.02 SHORTNESS OF BREATH: ICD-10-CM

## 2025-06-30 PROCEDURE — 93306 TTE W/DOPPLER COMPLETE: CPT

## 2025-07-01 LAB
ECHO AO ROOT DIAM: 3.1 CM
ECHO AO ROOT INDEX: 1.76 CM/M2
ECHO AV AREA PEAK VELOCITY: 1.6 CM2
ECHO AV AREA VTI: 1.4 CM2
ECHO AV AREA/BSA PEAK VELOCITY: 0.9 CM2/M2
ECHO AV AREA/BSA VTI: 0.8 CM2/M2
ECHO AV MEAN GRADIENT: 7 MMHG
ECHO AV MEAN VELOCITY: 1.2 M/S
ECHO AV PEAK GRADIENT: 13 MMHG
ECHO AV PEAK VELOCITY: 1.8 M/S
ECHO AV VELOCITY RATIO: 0.61
ECHO AV VTI: 41.8 CM
ECHO BSA: 1.77 M2
ECHO EST RA PRESSURE: 5 MMHG
ECHO LA DIAMETER INDEX: 2.27 CM/M2
ECHO LA DIAMETER: 4 CM
ECHO LA TO AORTIC ROOT RATIO: 1.29
ECHO LA VOL A-L A2C: 45 ML (ref 22–52)
ECHO LA VOL A-L A4C: 40 ML (ref 22–52)
ECHO LA VOL BP: 41 ML (ref 22–52)
ECHO LA VOL MOD A2C: 43 ML (ref 22–52)
ECHO LA VOL MOD A4C: 38 ML (ref 22–52)
ECHO LA VOL/BSA BIPLANE: 23 ML/M2 (ref 16–34)
ECHO LA VOLUME AREA LENGTH: 44 ML
ECHO LA VOLUME INDEX A-L A2C: 26 ML/M2 (ref 16–34)
ECHO LA VOLUME INDEX A-L A4C: 23 ML/M2 (ref 16–34)
ECHO LA VOLUME INDEX AREA LENGTH: 25 ML/M2 (ref 16–34)
ECHO LA VOLUME INDEX MOD A2C: 24 ML/M2 (ref 16–34)
ECHO LA VOLUME INDEX MOD A4C: 22 ML/M2 (ref 16–34)
ECHO LV EDV A4C: 39 ML
ECHO LV EDV INDEX A4C: 22 ML/M2
ECHO LV EF PHYSICIAN: 60 %
ECHO LV EJECTION FRACTION A4C: 69 %
ECHO LV ESV A4C: 12 ML
ECHO LV ESV INDEX A4C: 7 ML/M2
ECHO LV FRACTIONAL SHORTENING: 50 % (ref 28–44)
ECHO LV INTERNAL DIMENSION DIASTOLE INDEX: 1.93 CM/M2
ECHO LV INTERNAL DIMENSION DIASTOLIC: 3.4 CM (ref 3.9–5.3)
ECHO LV INTERNAL DIMENSION SYSTOLIC INDEX: 0.97 CM/M2
ECHO LV INTERNAL DIMENSION SYSTOLIC: 1.7 CM
ECHO LV IVSD: 1 CM (ref 0.6–0.9)
ECHO LV MASS 2D: 98.9 G (ref 67–162)
ECHO LV MASS INDEX 2D: 56.2 G/M2 (ref 43–95)
ECHO LV POSTERIOR WALL DIASTOLIC: 1 CM (ref 0.6–0.9)
ECHO LV RELATIVE WALL THICKNESS RATIO: 0.59
ECHO LVOT AREA: 2.5 CM2
ECHO LVOT AV VTI INDEX: 0.55
ECHO LVOT DIAM: 1.8 CM
ECHO LVOT MEAN GRADIENT: 3 MMHG
ECHO LVOT PEAK GRADIENT: 5 MMHG
ECHO LVOT PEAK VELOCITY: 1.1 M/S
ECHO LVOT STROKE VOLUME INDEX: 33.4 ML/M2
ECHO LVOT SV: 58.8 ML
ECHO LVOT VTI: 23.1 CM
ECHO MV A VELOCITY: 0.9 M/S
ECHO MV E DECELERATION TIME (DT): 240 MS
ECHO MV E VELOCITY: 0.65 M/S
ECHO MV E/A RATIO: 0.72
ECHO PV MAX VELOCITY: 0.8 M/S
ECHO PV PEAK GRADIENT: 2 MMHG
ECHO RIGHT VENTRICULAR SYSTOLIC PRESSURE (RVSP): 36 MMHG
ECHO RV INTERNAL DIMENSION: 2.1 CM
ECHO TV REGURGITANT MAX VELOCITY: 2.8 M/S
ECHO TV REGURGITANT PEAK GRADIENT: 31 MMHG

## 2025-07-01 PROCEDURE — 93306 TTE W/DOPPLER COMPLETE: CPT | Performed by: SPECIALIST

## 2025-07-07 ENCOUNTER — HOSPITAL ENCOUNTER (OUTPATIENT)
Facility: HOSPITAL | Age: 75
Discharge: HOME | End: 2025-07-10
Attending: RADIOLOGY

## 2025-07-07 LAB
RAD ONC ARIA COURSE FIRST TREATMENT DATE: NORMAL
RAD ONC ARIA COURSE ID: NORMAL
RAD ONC ARIA COURSE INTENT: NORMAL
RAD ONC ARIA COURSE LAST TREATMENT DATE: NORMAL
RAD ONC ARIA COURSE SESSION NUMBER: 1
RAD ONC ARIA COURSE START DATE: NORMAL
RAD ONC ARIA COURSE TREATMENT ELAPSED DAYS: 0
RAD ONC ARIA PLAN FRACTIONS TREATED TO DATE: 1
RAD ONC ARIA PLAN ID: NORMAL
RAD ONC ARIA PLAN PRESCRIBED DOSE PER FRACTION: 2 GY
RAD ONC ARIA PLAN PRIMARY REFERENCE POINT: NORMAL
RAD ONC ARIA PLAN TOTAL FRACTIONS PRESCRIBED: 32
RAD ONC ARIA PLAN TOTAL PRESCRIBED DOSE: 6400 CGY
RAD ONC ARIA REFERENCE POINT DOSAGE GIVEN TO DATE: 2 GY
RAD ONC ARIA REFERENCE POINT DOSAGE GIVEN TO DATE: 2.09 GY
RAD ONC ARIA REFERENCE POINT ID: NORMAL
RAD ONC ARIA REFERENCE POINT ID: NORMAL
RAD ONC ARIA REFERENCE POINT SESSION DOSAGE GIVEN: 2 GY
RAD ONC ARIA REFERENCE POINT SESSION DOSAGE GIVEN: 2.09 GY

## 2025-07-08 ENCOUNTER — HOSPITAL ENCOUNTER (OUTPATIENT)
Facility: HOSPITAL | Age: 75
Discharge: HOME | End: 2025-07-11
Attending: RADIOLOGY

## 2025-07-08 LAB
RAD ONC ARIA COURSE FIRST TREATMENT DATE: NORMAL
RAD ONC ARIA COURSE ID: NORMAL
RAD ONC ARIA COURSE INTENT: NORMAL
RAD ONC ARIA COURSE LAST TREATMENT DATE: NORMAL
RAD ONC ARIA COURSE SESSION NUMBER: 2
RAD ONC ARIA COURSE START DATE: NORMAL
RAD ONC ARIA COURSE TREATMENT ELAPSED DAYS: 1
RAD ONC ARIA PLAN FRACTIONS TREATED TO DATE: 2
RAD ONC ARIA PLAN ID: NORMAL
RAD ONC ARIA PLAN PRESCRIBED DOSE PER FRACTION: 2 GY
RAD ONC ARIA PLAN PRIMARY REFERENCE POINT: NORMAL
RAD ONC ARIA PLAN TOTAL FRACTIONS PRESCRIBED: 32
RAD ONC ARIA PLAN TOTAL PRESCRIBED DOSE: 6400 CGY
RAD ONC ARIA REFERENCE POINT DOSAGE GIVEN TO DATE: 4 GY
RAD ONC ARIA REFERENCE POINT DOSAGE GIVEN TO DATE: 4.17 GY
RAD ONC ARIA REFERENCE POINT ID: NORMAL
RAD ONC ARIA REFERENCE POINT ID: NORMAL
RAD ONC ARIA REFERENCE POINT SESSION DOSAGE GIVEN: 2 GY
RAD ONC ARIA REFERENCE POINT SESSION DOSAGE GIVEN: 2.09 GY

## 2025-07-08 NOTE — ON TREATMENT VISIT
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    Interval History   Ms. Baker is a 74 y.o. female seen today for her weekly on-treatment evaluation    7/8/25: Doing well, feels the steroids have helped her strength somewhat and appetite improve. Still difficulty flexing her wrist and closing fist. 2-3/5 strength. No overt pain today. We reviewed all possible side effects with radiation. Discussed risk of plexopathy and myelopathy. She ran out of steroids but we discussed she has an Rx sent in by Dr. Remy and she will pick this up today.     Review of Systems:  A complete review of systems was obtained and negative except as described above.    Treatment Details:     Treatment Site Dose/Fx (cGy) #Fx Current Dose (cGy) Total Planned Dose (cGy) Start Date Most Recent Treatment Date   R shoulder sarcoma 200 2/32 400 6400 7/7/25 07/08/25     Concurrent Therapy: No concurrent systemic therapy      Allergies and Medications     Allergies   Allergen Reactions    Penicillin G Swelling    Codeine Nausea And Vomiting       Current Outpatient Medications   Medication Instructions    acetaminophen (TYLENOL) 500 mg, EVERY 6 HOURS PRN    aspirin 325 mg, DAILY    atorvastatin (LIPITOR) 40 MG tablet     buprenorphine (BUPRENEX) 5 MCG/HR PTWK 1 patch, EVERY 7 DAYS    cyclobenzaprine (FLEXERIL) 5 mg, 2 TIMES DAILY PRN    dexAMETHasone (DECADRON) 2 mg, Oral, 2 TIMES DAILY WITH MEALS    DULoxetine (CYMBALTA) 30 MG extended release capsule

## 2025-07-09 ENCOUNTER — HOSPITAL ENCOUNTER (OUTPATIENT)
Facility: HOSPITAL | Age: 75
Discharge: HOME | End: 2025-07-12
Attending: RADIOLOGY

## 2025-07-09 LAB
RAD ONC ARIA COURSE FIRST TREATMENT DATE: NORMAL
RAD ONC ARIA COURSE ID: NORMAL
RAD ONC ARIA COURSE INTENT: NORMAL
RAD ONC ARIA COURSE LAST TREATMENT DATE: NORMAL
RAD ONC ARIA COURSE SESSION NUMBER: 3
RAD ONC ARIA COURSE START DATE: NORMAL
RAD ONC ARIA COURSE TREATMENT ELAPSED DAYS: 2
RAD ONC ARIA PLAN FRACTIONS TREATED TO DATE: 3
RAD ONC ARIA PLAN ID: NORMAL
RAD ONC ARIA PLAN PRESCRIBED DOSE PER FRACTION: 2 GY
RAD ONC ARIA PLAN PRIMARY REFERENCE POINT: NORMAL
RAD ONC ARIA PLAN TOTAL FRACTIONS PRESCRIBED: 32
RAD ONC ARIA PLAN TOTAL PRESCRIBED DOSE: 6400 CGY
RAD ONC ARIA REFERENCE POINT DOSAGE GIVEN TO DATE: 6 GY
RAD ONC ARIA REFERENCE POINT DOSAGE GIVEN TO DATE: 6.26 GY
RAD ONC ARIA REFERENCE POINT ID: NORMAL
RAD ONC ARIA REFERENCE POINT ID: NORMAL
RAD ONC ARIA REFERENCE POINT SESSION DOSAGE GIVEN: 2 GY
RAD ONC ARIA REFERENCE POINT SESSION DOSAGE GIVEN: 2.09 GY

## 2025-07-10 ENCOUNTER — HOSPITAL ENCOUNTER (OUTPATIENT)
Facility: HOSPITAL | Age: 75
Discharge: HOME | End: 2025-07-13
Attending: RADIOLOGY

## 2025-07-10 LAB
RAD ONC ARIA COURSE FIRST TREATMENT DATE: NORMAL
RAD ONC ARIA COURSE ID: NORMAL
RAD ONC ARIA COURSE INTENT: NORMAL
RAD ONC ARIA COURSE LAST TREATMENT DATE: NORMAL
RAD ONC ARIA COURSE SESSION NUMBER: 4
RAD ONC ARIA COURSE START DATE: NORMAL
RAD ONC ARIA COURSE TREATMENT ELAPSED DAYS: 3
RAD ONC ARIA PLAN FRACTIONS TREATED TO DATE: 4
RAD ONC ARIA PLAN ID: NORMAL
RAD ONC ARIA PLAN PRESCRIBED DOSE PER FRACTION: 2 GY
RAD ONC ARIA PLAN PRIMARY REFERENCE POINT: NORMAL
RAD ONC ARIA PLAN TOTAL FRACTIONS PRESCRIBED: 32
RAD ONC ARIA PLAN TOTAL PRESCRIBED DOSE: 6400 CGY
RAD ONC ARIA REFERENCE POINT DOSAGE GIVEN TO DATE: 8 GY
RAD ONC ARIA REFERENCE POINT DOSAGE GIVEN TO DATE: 8.34 GY
RAD ONC ARIA REFERENCE POINT ID: NORMAL
RAD ONC ARIA REFERENCE POINT ID: NORMAL
RAD ONC ARIA REFERENCE POINT SESSION DOSAGE GIVEN: 2 GY
RAD ONC ARIA REFERENCE POINT SESSION DOSAGE GIVEN: 2.09 GY

## 2025-07-11 ENCOUNTER — HOSPITAL ENCOUNTER (OUTPATIENT)
Facility: HOSPITAL | Age: 75
Discharge: HOME | End: 2025-07-14
Attending: RADIOLOGY

## 2025-07-11 LAB
RAD ONC ARIA COURSE FIRST TREATMENT DATE: NORMAL
RAD ONC ARIA COURSE ID: NORMAL
RAD ONC ARIA COURSE INTENT: NORMAL
RAD ONC ARIA COURSE LAST TREATMENT DATE: NORMAL
RAD ONC ARIA COURSE SESSION NUMBER: 5
RAD ONC ARIA COURSE START DATE: NORMAL
RAD ONC ARIA COURSE TREATMENT ELAPSED DAYS: 4
RAD ONC ARIA PLAN FRACTIONS TREATED TO DATE: 5
RAD ONC ARIA PLAN ID: NORMAL
RAD ONC ARIA PLAN PRESCRIBED DOSE PER FRACTION: 2 GY
RAD ONC ARIA PLAN PRIMARY REFERENCE POINT: NORMAL
RAD ONC ARIA PLAN TOTAL FRACTIONS PRESCRIBED: 32
RAD ONC ARIA PLAN TOTAL PRESCRIBED DOSE: 6400 CGY
RAD ONC ARIA REFERENCE POINT DOSAGE GIVEN TO DATE: 10 GY
RAD ONC ARIA REFERENCE POINT DOSAGE GIVEN TO DATE: 10.43 GY
RAD ONC ARIA REFERENCE POINT ID: NORMAL
RAD ONC ARIA REFERENCE POINT ID: NORMAL
RAD ONC ARIA REFERENCE POINT SESSION DOSAGE GIVEN: 2 GY
RAD ONC ARIA REFERENCE POINT SESSION DOSAGE GIVEN: 2.09 GY

## 2025-07-14 ENCOUNTER — HOSPITAL ENCOUNTER (OUTPATIENT)
Facility: HOSPITAL | Age: 75
Discharge: HOME | End: 2025-07-17
Attending: RADIOLOGY

## 2025-07-14 LAB
RAD ONC ARIA COURSE FIRST TREATMENT DATE: NORMAL
RAD ONC ARIA COURSE ID: NORMAL
RAD ONC ARIA COURSE INTENT: NORMAL
RAD ONC ARIA COURSE LAST TREATMENT DATE: NORMAL
RAD ONC ARIA COURSE SESSION NUMBER: 6
RAD ONC ARIA COURSE START DATE: NORMAL
RAD ONC ARIA COURSE TREATMENT ELAPSED DAYS: 7
RAD ONC ARIA PLAN FRACTIONS TREATED TO DATE: 6
RAD ONC ARIA PLAN ID: NORMAL
RAD ONC ARIA PLAN PRESCRIBED DOSE PER FRACTION: 2 GY
RAD ONC ARIA PLAN PRIMARY REFERENCE POINT: NORMAL
RAD ONC ARIA PLAN TOTAL FRACTIONS PRESCRIBED: 32
RAD ONC ARIA PLAN TOTAL PRESCRIBED DOSE: 6400 CGY
RAD ONC ARIA REFERENCE POINT DOSAGE GIVEN TO DATE: 12 GY
RAD ONC ARIA REFERENCE POINT DOSAGE GIVEN TO DATE: 12.51 GY
RAD ONC ARIA REFERENCE POINT ID: NORMAL
RAD ONC ARIA REFERENCE POINT ID: NORMAL
RAD ONC ARIA REFERENCE POINT SESSION DOSAGE GIVEN: 2 GY
RAD ONC ARIA REFERENCE POINT SESSION DOSAGE GIVEN: 2.09 GY

## 2025-07-15 ENCOUNTER — HOSPITAL ENCOUNTER (OUTPATIENT)
Facility: HOSPITAL | Age: 75
Discharge: HOME | End: 2025-07-18
Attending: RADIOLOGY

## 2025-07-15 LAB
RAD ONC ARIA COURSE FIRST TREATMENT DATE: NORMAL
RAD ONC ARIA COURSE ID: NORMAL
RAD ONC ARIA COURSE INTENT: NORMAL
RAD ONC ARIA COURSE LAST TREATMENT DATE: NORMAL
RAD ONC ARIA COURSE SESSION NUMBER: 7
RAD ONC ARIA COURSE START DATE: NORMAL
RAD ONC ARIA COURSE TREATMENT ELAPSED DAYS: 8
RAD ONC ARIA PLAN FRACTIONS TREATED TO DATE: 7
RAD ONC ARIA PLAN ID: NORMAL
RAD ONC ARIA PLAN PRESCRIBED DOSE PER FRACTION: 2 GY
RAD ONC ARIA PLAN PRIMARY REFERENCE POINT: NORMAL
RAD ONC ARIA PLAN TOTAL FRACTIONS PRESCRIBED: 32
RAD ONC ARIA PLAN TOTAL PRESCRIBED DOSE: 6400 CGY
RAD ONC ARIA REFERENCE POINT DOSAGE GIVEN TO DATE: 14 GY
RAD ONC ARIA REFERENCE POINT DOSAGE GIVEN TO DATE: 14.6 GY
RAD ONC ARIA REFERENCE POINT ID: NORMAL
RAD ONC ARIA REFERENCE POINT ID: NORMAL
RAD ONC ARIA REFERENCE POINT SESSION DOSAGE GIVEN: 2 GY
RAD ONC ARIA REFERENCE POINT SESSION DOSAGE GIVEN: 2.09 GY

## 2025-07-15 NOTE — ON TREATMENT VISIT
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    Interval History   Ms. Baker is a 74 y.o. female seen today for her weekly on-treatment evaluation    7/8/25: Doing well, feels the steroids have helped her strength somewhat and appetite improve. Still difficulty flexing her wrist and closing fist. 2-3/5 strength. No overt pain today. We reviewed all possible side effects with radiation. Discussed risk of plexopathy and myelopathy. She ran out of steroids but we discussed she has an Rx sent in by Dr. Remy and she will pick this up today.       Review of Systems:  A complete review of systems was obtained and negative except as described above.    Treatment Details:     Treatment Site Dose/Fx (cGy) #Fx Current Dose (cGy) Total Planned Dose (cGy) Start Date Most Recent Treatment Date   R shoulder sarcoma 200 7/32 1400 6400 7/7/25 07/15/25     Concurrent Therapy: No concurrent systemic therapy      Allergies and Medications     Allergies   Allergen Reactions    Penicillin G Swelling    Codeine Nausea And Vomiting       Current Outpatient Medications   Medication Instructions    acetaminophen (TYLENOL) 500 mg, EVERY 6 HOURS PRN    aspirin 325 mg, DAILY    atorvastatin (LIPITOR) 40 MG tablet     buprenorphine (BUPRENEX) 5 MCG/HR PTWK 1 patch, EVERY 7 DAYS    cyclobenzaprine (FLEXERIL) 5 mg, 2 TIMES DAILY PRN    dexAMETHasone (DECADRON) 2 mg, Oral, 2 TIMES DAILY WITH MEALS    DULoxetine (CYMBALTA) 30 MG extended release

## 2025-07-16 ENCOUNTER — HOSPITAL ENCOUNTER (OUTPATIENT)
Facility: HOSPITAL | Age: 75
Discharge: HOME | End: 2025-07-19
Attending: RADIOLOGY

## 2025-07-16 LAB
RAD ONC ARIA COURSE FIRST TREATMENT DATE: NORMAL
RAD ONC ARIA COURSE ID: NORMAL
RAD ONC ARIA COURSE INTENT: NORMAL
RAD ONC ARIA COURSE LAST TREATMENT DATE: NORMAL
RAD ONC ARIA COURSE SESSION NUMBER: 8
RAD ONC ARIA COURSE START DATE: NORMAL
RAD ONC ARIA COURSE TREATMENT ELAPSED DAYS: 9
RAD ONC ARIA PLAN FRACTIONS TREATED TO DATE: 8
RAD ONC ARIA PLAN ID: NORMAL
RAD ONC ARIA PLAN PRESCRIBED DOSE PER FRACTION: 2 GY
RAD ONC ARIA PLAN PRIMARY REFERENCE POINT: NORMAL
RAD ONC ARIA PLAN TOTAL FRACTIONS PRESCRIBED: 32
RAD ONC ARIA PLAN TOTAL PRESCRIBED DOSE: 6400 CGY
RAD ONC ARIA REFERENCE POINT DOSAGE GIVEN TO DATE: 16 GY
RAD ONC ARIA REFERENCE POINT DOSAGE GIVEN TO DATE: 16.68 GY
RAD ONC ARIA REFERENCE POINT ID: NORMAL
RAD ONC ARIA REFERENCE POINT ID: NORMAL
RAD ONC ARIA REFERENCE POINT SESSION DOSAGE GIVEN: 2 GY
RAD ONC ARIA REFERENCE POINT SESSION DOSAGE GIVEN: 2.09 GY

## 2025-07-17 ENCOUNTER — HOSPITAL ENCOUNTER (OUTPATIENT)
Facility: HOSPITAL | Age: 75
Discharge: HOME | End: 2025-07-20
Attending: RADIOLOGY

## 2025-07-17 LAB
RAD ONC ARIA COURSE FIRST TREATMENT DATE: NORMAL
RAD ONC ARIA COURSE ID: NORMAL
RAD ONC ARIA COURSE INTENT: NORMAL
RAD ONC ARIA COURSE LAST TREATMENT DATE: NORMAL
RAD ONC ARIA COURSE SESSION NUMBER: 9
RAD ONC ARIA COURSE START DATE: NORMAL
RAD ONC ARIA COURSE TREATMENT ELAPSED DAYS: 10
RAD ONC ARIA PLAN FRACTIONS TREATED TO DATE: 9
RAD ONC ARIA PLAN ID: NORMAL
RAD ONC ARIA PLAN PRESCRIBED DOSE PER FRACTION: 2 GY
RAD ONC ARIA PLAN PRIMARY REFERENCE POINT: NORMAL
RAD ONC ARIA PLAN TOTAL FRACTIONS PRESCRIBED: 32
RAD ONC ARIA PLAN TOTAL PRESCRIBED DOSE: 6400 CGY
RAD ONC ARIA REFERENCE POINT DOSAGE GIVEN TO DATE: 18 GY
RAD ONC ARIA REFERENCE POINT DOSAGE GIVEN TO DATE: 18.77 GY
RAD ONC ARIA REFERENCE POINT ID: NORMAL
RAD ONC ARIA REFERENCE POINT ID: NORMAL
RAD ONC ARIA REFERENCE POINT SESSION DOSAGE GIVEN: 2 GY
RAD ONC ARIA REFERENCE POINT SESSION DOSAGE GIVEN: 2.09 GY

## 2025-07-18 ENCOUNTER — HOSPITAL ENCOUNTER (OUTPATIENT)
Facility: HOSPITAL | Age: 75
Discharge: HOME | End: 2025-07-21
Attending: RADIOLOGY

## 2025-07-18 LAB
RAD ONC ARIA COURSE FIRST TREATMENT DATE: NORMAL
RAD ONC ARIA COURSE ID: NORMAL
RAD ONC ARIA COURSE INTENT: NORMAL
RAD ONC ARIA COURSE LAST TREATMENT DATE: NORMAL
RAD ONC ARIA COURSE SESSION NUMBER: 10
RAD ONC ARIA COURSE START DATE: NORMAL
RAD ONC ARIA COURSE TREATMENT ELAPSED DAYS: 11
RAD ONC ARIA PLAN FRACTIONS TREATED TO DATE: 10
RAD ONC ARIA PLAN ID: NORMAL
RAD ONC ARIA PLAN PRESCRIBED DOSE PER FRACTION: 2 GY
RAD ONC ARIA PLAN PRIMARY REFERENCE POINT: NORMAL
RAD ONC ARIA PLAN TOTAL FRACTIONS PRESCRIBED: 32
RAD ONC ARIA PLAN TOTAL PRESCRIBED DOSE: 6400 CGY
RAD ONC ARIA REFERENCE POINT DOSAGE GIVEN TO DATE: 20 GY
RAD ONC ARIA REFERENCE POINT DOSAGE GIVEN TO DATE: 20.85 GY
RAD ONC ARIA REFERENCE POINT ID: NORMAL
RAD ONC ARIA REFERENCE POINT ID: NORMAL
RAD ONC ARIA REFERENCE POINT SESSION DOSAGE GIVEN: 2 GY
RAD ONC ARIA REFERENCE POINT SESSION DOSAGE GIVEN: 2.09 GY

## 2025-07-21 ENCOUNTER — HOSPITAL ENCOUNTER (OUTPATIENT)
Facility: HOSPITAL | Age: 75
Discharge: HOME | End: 2025-07-24
Attending: RADIOLOGY

## 2025-07-21 LAB
RAD ONC ARIA COURSE FIRST TREATMENT DATE: NORMAL
RAD ONC ARIA COURSE ID: NORMAL
RAD ONC ARIA COURSE INTENT: NORMAL
RAD ONC ARIA COURSE LAST TREATMENT DATE: NORMAL
RAD ONC ARIA COURSE SESSION NUMBER: 11
RAD ONC ARIA COURSE START DATE: NORMAL
RAD ONC ARIA COURSE TREATMENT ELAPSED DAYS: 14
RAD ONC ARIA PLAN FRACTIONS TREATED TO DATE: 11
RAD ONC ARIA PLAN ID: NORMAL
RAD ONC ARIA PLAN PRESCRIBED DOSE PER FRACTION: 2 GY
RAD ONC ARIA PLAN PRIMARY REFERENCE POINT: NORMAL
RAD ONC ARIA PLAN TOTAL FRACTIONS PRESCRIBED: 32
RAD ONC ARIA PLAN TOTAL PRESCRIBED DOSE: 6400 CGY
RAD ONC ARIA REFERENCE POINT DOSAGE GIVEN TO DATE: 22 GY
RAD ONC ARIA REFERENCE POINT DOSAGE GIVEN TO DATE: 22.94 GY
RAD ONC ARIA REFERENCE POINT ID: NORMAL
RAD ONC ARIA REFERENCE POINT ID: NORMAL
RAD ONC ARIA REFERENCE POINT SESSION DOSAGE GIVEN: 2 GY
RAD ONC ARIA REFERENCE POINT SESSION DOSAGE GIVEN: 2.09 GY

## 2025-07-22 ENCOUNTER — HOSPITAL ENCOUNTER (OUTPATIENT)
Facility: HOSPITAL | Age: 75
Discharge: HOME | End: 2025-07-25
Attending: RADIOLOGY

## 2025-07-22 DIAGNOSIS — C49.6 SARCOMA OF PARASPINAL REGION (HCC): Primary | ICD-10-CM

## 2025-07-22 LAB
RAD ONC ARIA COURSE FIRST TREATMENT DATE: NORMAL
RAD ONC ARIA COURSE ID: NORMAL
RAD ONC ARIA COURSE INTENT: NORMAL
RAD ONC ARIA COURSE LAST TREATMENT DATE: NORMAL
RAD ONC ARIA COURSE SESSION NUMBER: 12
RAD ONC ARIA COURSE START DATE: NORMAL
RAD ONC ARIA COURSE TREATMENT ELAPSED DAYS: 15
RAD ONC ARIA PLAN FRACTIONS TREATED TO DATE: 12
RAD ONC ARIA PLAN ID: NORMAL
RAD ONC ARIA PLAN PRESCRIBED DOSE PER FRACTION: 2 GY
RAD ONC ARIA PLAN PRIMARY REFERENCE POINT: NORMAL
RAD ONC ARIA PLAN TOTAL FRACTIONS PRESCRIBED: 32
RAD ONC ARIA PLAN TOTAL PRESCRIBED DOSE: 6400 CGY
RAD ONC ARIA REFERENCE POINT DOSAGE GIVEN TO DATE: 24 GY
RAD ONC ARIA REFERENCE POINT DOSAGE GIVEN TO DATE: 25.02 GY
RAD ONC ARIA REFERENCE POINT ID: NORMAL
RAD ONC ARIA REFERENCE POINT ID: NORMAL
RAD ONC ARIA REFERENCE POINT SESSION DOSAGE GIVEN: 2 GY
RAD ONC ARIA REFERENCE POINT SESSION DOSAGE GIVEN: 2.09 GY

## 2025-07-22 ASSESSMENT — PAIN SCALES - GENERAL: PAINLEVEL_OUTOF10: 0

## 2025-07-22 NOTE — ON TREATMENT VISIT
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    Interval History   Ms. Baker is a 74 y.o. female seen today for her weekly on-treatment evaluation    7/8/25: Doing well, feels the steroids have helped her strength somewhat and appetite improve. Still difficulty flexing her wrist and closing fist. 2-3/5 strength. No overt pain today. We reviewed all possible side effects with radiation. Discussed risk of plexopathy and myelopathy. She ran out of steroids but we discussed she has an Rx sent in by Dr. Remy and she will pick this up today.   7/22/25: at fraction 12, states she is doing much better today, she states her pain is down to a 3/10 in the right upper back, has better ROM in the RUE though still has some numbness/tingling in the digits with weakness. She is having some LE edema related to steroids which she continues to take.    Review of Systems:  A complete review of systems was obtained and negative except as described above.    Treatment Details:     Treatment Site Dose/Fx (cGy) #Fx Current Dose (cGy) Total Planned Dose (cGy) Start Date Most Recent Treatment Date   R shoulder sarcoma 200 12/32 2400 6400 7/7/25 07/22/25     Concurrent Therapy: No concurrent systemic therapy      Allergies and Medications     Allergies   Allergen Reactions    Penicillin G Swelling    Codeine Nausea And Vomiting       Current Outpatient Medications   Medication Instructions    acetaminophen (TYLENOL) 500 mg, EVERY 6 HOURS PRN

## 2025-07-23 ENCOUNTER — HOSPITAL ENCOUNTER (OUTPATIENT)
Facility: HOSPITAL | Age: 75
Discharge: HOME | End: 2025-07-26
Attending: RADIOLOGY

## 2025-07-23 LAB
RAD ONC ARIA COURSE FIRST TREATMENT DATE: NORMAL
RAD ONC ARIA COURSE ID: NORMAL
RAD ONC ARIA COURSE INTENT: NORMAL
RAD ONC ARIA COURSE LAST TREATMENT DATE: NORMAL
RAD ONC ARIA COURSE SESSION NUMBER: 13
RAD ONC ARIA COURSE START DATE: NORMAL
RAD ONC ARIA COURSE TREATMENT ELAPSED DAYS: 16
RAD ONC ARIA PLAN FRACTIONS TREATED TO DATE: 13
RAD ONC ARIA PLAN ID: NORMAL
RAD ONC ARIA PLAN PRESCRIBED DOSE PER FRACTION: 2 GY
RAD ONC ARIA PLAN PRIMARY REFERENCE POINT: NORMAL
RAD ONC ARIA PLAN TOTAL FRACTIONS PRESCRIBED: 32
RAD ONC ARIA PLAN TOTAL PRESCRIBED DOSE: 6400 CGY
RAD ONC ARIA REFERENCE POINT DOSAGE GIVEN TO DATE: 26 GY
RAD ONC ARIA REFERENCE POINT DOSAGE GIVEN TO DATE: 27.11 GY
RAD ONC ARIA REFERENCE POINT ID: NORMAL
RAD ONC ARIA REFERENCE POINT ID: NORMAL
RAD ONC ARIA REFERENCE POINT SESSION DOSAGE GIVEN: 2 GY
RAD ONC ARIA REFERENCE POINT SESSION DOSAGE GIVEN: 2.09 GY

## 2025-07-24 ENCOUNTER — HOSPITAL ENCOUNTER (OUTPATIENT)
Facility: HOSPITAL | Age: 75
Discharge: HOME | End: 2025-07-27
Attending: RADIOLOGY

## 2025-07-24 LAB
RAD ONC ARIA COURSE FIRST TREATMENT DATE: NORMAL
RAD ONC ARIA COURSE ID: NORMAL
RAD ONC ARIA COURSE INTENT: NORMAL
RAD ONC ARIA COURSE LAST TREATMENT DATE: NORMAL
RAD ONC ARIA COURSE SESSION NUMBER: 14
RAD ONC ARIA COURSE START DATE: NORMAL
RAD ONC ARIA COURSE TREATMENT ELAPSED DAYS: 17
RAD ONC ARIA PLAN FRACTIONS TREATED TO DATE: 14
RAD ONC ARIA PLAN ID: NORMAL
RAD ONC ARIA PLAN PRESCRIBED DOSE PER FRACTION: 2 GY
RAD ONC ARIA PLAN PRIMARY REFERENCE POINT: NORMAL
RAD ONC ARIA PLAN TOTAL FRACTIONS PRESCRIBED: 32
RAD ONC ARIA PLAN TOTAL PRESCRIBED DOSE: 6400 CGY
RAD ONC ARIA REFERENCE POINT DOSAGE GIVEN TO DATE: 28 GY
RAD ONC ARIA REFERENCE POINT DOSAGE GIVEN TO DATE: 29.19 GY
RAD ONC ARIA REFERENCE POINT ID: NORMAL
RAD ONC ARIA REFERENCE POINT ID: NORMAL
RAD ONC ARIA REFERENCE POINT SESSION DOSAGE GIVEN: 2 GY
RAD ONC ARIA REFERENCE POINT SESSION DOSAGE GIVEN: 2.09 GY

## 2025-07-25 ENCOUNTER — HOSPITAL ENCOUNTER (OUTPATIENT)
Facility: HOSPITAL | Age: 75
Discharge: HOME | End: 2025-07-28
Attending: RADIOLOGY

## 2025-07-25 LAB
RAD ONC ARIA COURSE FIRST TREATMENT DATE: NORMAL
RAD ONC ARIA COURSE ID: NORMAL
RAD ONC ARIA COURSE INTENT: NORMAL
RAD ONC ARIA COURSE LAST TREATMENT DATE: NORMAL
RAD ONC ARIA COURSE SESSION NUMBER: 15
RAD ONC ARIA COURSE START DATE: NORMAL
RAD ONC ARIA COURSE TREATMENT ELAPSED DAYS: 18
RAD ONC ARIA PLAN FRACTIONS TREATED TO DATE: 15
RAD ONC ARIA PLAN ID: NORMAL
RAD ONC ARIA PLAN PRESCRIBED DOSE PER FRACTION: 2 GY
RAD ONC ARIA PLAN PRIMARY REFERENCE POINT: NORMAL
RAD ONC ARIA PLAN TOTAL FRACTIONS PRESCRIBED: 32
RAD ONC ARIA PLAN TOTAL PRESCRIBED DOSE: 6400 CGY
RAD ONC ARIA REFERENCE POINT DOSAGE GIVEN TO DATE: 30 GY
RAD ONC ARIA REFERENCE POINT DOSAGE GIVEN TO DATE: 31.28 GY
RAD ONC ARIA REFERENCE POINT ID: NORMAL
RAD ONC ARIA REFERENCE POINT ID: NORMAL
RAD ONC ARIA REFERENCE POINT SESSION DOSAGE GIVEN: 2 GY
RAD ONC ARIA REFERENCE POINT SESSION DOSAGE GIVEN: 2.09 GY

## 2025-07-28 ENCOUNTER — HOSPITAL ENCOUNTER (OUTPATIENT)
Facility: HOSPITAL | Age: 75
Discharge: HOME | End: 2025-07-31
Attending: RADIOLOGY

## 2025-07-28 PROBLEM — Z86.73 HISTORY OF STROKE: Status: ACTIVE | Noted: 2022-12-13

## 2025-07-28 LAB
RAD ONC ARIA COURSE FIRST TREATMENT DATE: NORMAL
RAD ONC ARIA COURSE ID: NORMAL
RAD ONC ARIA COURSE INTENT: NORMAL
RAD ONC ARIA COURSE LAST TREATMENT DATE: NORMAL
RAD ONC ARIA COURSE SESSION NUMBER: 16
RAD ONC ARIA COURSE START DATE: NORMAL
RAD ONC ARIA COURSE TREATMENT ELAPSED DAYS: 21
RAD ONC ARIA PLAN FRACTIONS TREATED TO DATE: 16
RAD ONC ARIA PLAN ID: NORMAL
RAD ONC ARIA PLAN PRESCRIBED DOSE PER FRACTION: 2 GY
RAD ONC ARIA PLAN PRIMARY REFERENCE POINT: NORMAL
RAD ONC ARIA PLAN TOTAL FRACTIONS PRESCRIBED: 32
RAD ONC ARIA PLAN TOTAL PRESCRIBED DOSE: 6400 CGY
RAD ONC ARIA REFERENCE POINT DOSAGE GIVEN TO DATE: 32 GY
RAD ONC ARIA REFERENCE POINT DOSAGE GIVEN TO DATE: 33.36 GY
RAD ONC ARIA REFERENCE POINT ID: NORMAL
RAD ONC ARIA REFERENCE POINT ID: NORMAL
RAD ONC ARIA REFERENCE POINT SESSION DOSAGE GIVEN: 2 GY
RAD ONC ARIA REFERENCE POINT SESSION DOSAGE GIVEN: 2.09 GY

## 2025-07-29 ENCOUNTER — HOSPITAL ENCOUNTER (OUTPATIENT)
Facility: HOSPITAL | Age: 75
Discharge: HOME | End: 2025-08-01
Attending: RADIOLOGY

## 2025-07-29 LAB
RAD ONC ARIA COURSE FIRST TREATMENT DATE: NORMAL
RAD ONC ARIA COURSE ID: NORMAL
RAD ONC ARIA COURSE INTENT: NORMAL
RAD ONC ARIA COURSE LAST TREATMENT DATE: NORMAL
RAD ONC ARIA COURSE SESSION NUMBER: 17
RAD ONC ARIA COURSE START DATE: NORMAL
RAD ONC ARIA COURSE TREATMENT ELAPSED DAYS: 22
RAD ONC ARIA PLAN FRACTIONS TREATED TO DATE: 17
RAD ONC ARIA PLAN ID: NORMAL
RAD ONC ARIA PLAN PRESCRIBED DOSE PER FRACTION: 2 GY
RAD ONC ARIA PLAN PRIMARY REFERENCE POINT: NORMAL
RAD ONC ARIA PLAN TOTAL FRACTIONS PRESCRIBED: 32
RAD ONC ARIA PLAN TOTAL PRESCRIBED DOSE: 6400 CGY
RAD ONC ARIA REFERENCE POINT DOSAGE GIVEN TO DATE: 34 GY
RAD ONC ARIA REFERENCE POINT DOSAGE GIVEN TO DATE: 35.45 GY
RAD ONC ARIA REFERENCE POINT ID: NORMAL
RAD ONC ARIA REFERENCE POINT ID: NORMAL
RAD ONC ARIA REFERENCE POINT SESSION DOSAGE GIVEN: 2 GY
RAD ONC ARIA REFERENCE POINT SESSION DOSAGE GIVEN: 2.09 GY

## 2025-07-29 NOTE — ON TREATMENT VISIT
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    Interval History   Ms. Baker is a 74 y.o. female seen today for her weekly on-treatment evaluation    7/8/25: Doing well, feels the steroids have helped her strength somewhat and appetite improve. Still difficulty flexing her wrist and closing fist. 2-3/5 strength. No overt pain today. We reviewed all possible side effects with radiation. Discussed risk of plexopathy and myelopathy. She ran out of steroids but we discussed she has an Rx sent in by Dr. Remy and she will pick this up today.   7/22/25: at fraction 12, states she is doing much better today, she states her pain is down to a 3/10 in the right upper back, has better ROM in the RUE though still has some numbness/tingling in the digits with weakness. She is having some LE edema related to steroids which she continues to take.  7/29/25: Doing well, no pain in the shoulder. Gaining some strength back. No skin changes. Electric shock sensation in right arm/hand have turned into just numbness. Only pain in lower back.     Review of Systems:  A complete review of systems was obtained and negative except as described above.    Treatment Details:     Treatment Site Dose/Fx (cGy) #Fx Current Dose (cGy) Total Planned Dose (cGy) Start Date Most Recent Treatment Date   R shoulder sarcoma 200 17/32 3400 6400 7/7/25 07/29/25     Concurrent Therapy: No concurrent systemic therapy      Allergies and Medications     Allergies

## 2025-07-30 ENCOUNTER — HOSPITAL ENCOUNTER (OUTPATIENT)
Facility: HOSPITAL | Age: 75
Discharge: HOME | End: 2025-08-02
Attending: RADIOLOGY

## 2025-07-30 LAB
RAD ONC ARIA COURSE FIRST TREATMENT DATE: NORMAL
RAD ONC ARIA COURSE ID: NORMAL
RAD ONC ARIA COURSE INTENT: NORMAL
RAD ONC ARIA COURSE LAST TREATMENT DATE: NORMAL
RAD ONC ARIA COURSE SESSION NUMBER: 18
RAD ONC ARIA COURSE START DATE: NORMAL
RAD ONC ARIA COURSE TREATMENT ELAPSED DAYS: 23
RAD ONC ARIA PLAN FRACTIONS TREATED TO DATE: 18
RAD ONC ARIA PLAN ID: NORMAL
RAD ONC ARIA PLAN PRESCRIBED DOSE PER FRACTION: 2 GY
RAD ONC ARIA PLAN PRIMARY REFERENCE POINT: NORMAL
RAD ONC ARIA PLAN TOTAL FRACTIONS PRESCRIBED: 32
RAD ONC ARIA PLAN TOTAL PRESCRIBED DOSE: 6400 CGY
RAD ONC ARIA REFERENCE POINT DOSAGE GIVEN TO DATE: 36 GY
RAD ONC ARIA REFERENCE POINT DOSAGE GIVEN TO DATE: 37.53 GY
RAD ONC ARIA REFERENCE POINT ID: NORMAL
RAD ONC ARIA REFERENCE POINT ID: NORMAL
RAD ONC ARIA REFERENCE POINT SESSION DOSAGE GIVEN: 2 GY
RAD ONC ARIA REFERENCE POINT SESSION DOSAGE GIVEN: 2.09 GY

## 2025-07-31 ENCOUNTER — HOSPITAL ENCOUNTER (OUTPATIENT)
Facility: HOSPITAL | Age: 75
Discharge: HOME | End: 2025-08-03
Attending: RADIOLOGY

## 2025-07-31 LAB
RAD ONC ARIA COURSE FIRST TREATMENT DATE: NORMAL
RAD ONC ARIA COURSE ID: NORMAL
RAD ONC ARIA COURSE INTENT: NORMAL
RAD ONC ARIA COURSE LAST TREATMENT DATE: NORMAL
RAD ONC ARIA COURSE SESSION NUMBER: 19
RAD ONC ARIA COURSE START DATE: NORMAL
RAD ONC ARIA COURSE TREATMENT ELAPSED DAYS: 24
RAD ONC ARIA PLAN FRACTIONS TREATED TO DATE: 19
RAD ONC ARIA PLAN ID: NORMAL
RAD ONC ARIA PLAN PRESCRIBED DOSE PER FRACTION: 2 GY
RAD ONC ARIA PLAN PRIMARY REFERENCE POINT: NORMAL
RAD ONC ARIA PLAN TOTAL FRACTIONS PRESCRIBED: 32
RAD ONC ARIA PLAN TOTAL PRESCRIBED DOSE: 6400 CGY
RAD ONC ARIA REFERENCE POINT DOSAGE GIVEN TO DATE: 38 GY
RAD ONC ARIA REFERENCE POINT DOSAGE GIVEN TO DATE: 39.62 GY
RAD ONC ARIA REFERENCE POINT ID: NORMAL
RAD ONC ARIA REFERENCE POINT ID: NORMAL
RAD ONC ARIA REFERENCE POINT SESSION DOSAGE GIVEN: 2 GY
RAD ONC ARIA REFERENCE POINT SESSION DOSAGE GIVEN: 2.09 GY

## 2025-08-01 ENCOUNTER — HOSPITAL ENCOUNTER (OUTPATIENT)
Facility: HOSPITAL | Age: 75
Discharge: HOME | End: 2025-08-04
Attending: RADIOLOGY

## 2025-08-01 LAB
RAD ONC ARIA COURSE FIRST TREATMENT DATE: NORMAL
RAD ONC ARIA COURSE ID: NORMAL
RAD ONC ARIA COURSE INTENT: NORMAL
RAD ONC ARIA COURSE LAST TREATMENT DATE: NORMAL
RAD ONC ARIA COURSE SESSION NUMBER: 20
RAD ONC ARIA COURSE START DATE: NORMAL
RAD ONC ARIA COURSE TREATMENT ELAPSED DAYS: 25
RAD ONC ARIA PLAN FRACTIONS TREATED TO DATE: 20
RAD ONC ARIA PLAN ID: NORMAL
RAD ONC ARIA PLAN PRESCRIBED DOSE PER FRACTION: 2 GY
RAD ONC ARIA PLAN PRIMARY REFERENCE POINT: NORMAL
RAD ONC ARIA PLAN TOTAL FRACTIONS PRESCRIBED: 32
RAD ONC ARIA PLAN TOTAL PRESCRIBED DOSE: 6400 CGY
RAD ONC ARIA REFERENCE POINT DOSAGE GIVEN TO DATE: 40 GY
RAD ONC ARIA REFERENCE POINT DOSAGE GIVEN TO DATE: 41.7 GY
RAD ONC ARIA REFERENCE POINT ID: NORMAL
RAD ONC ARIA REFERENCE POINT ID: NORMAL
RAD ONC ARIA REFERENCE POINT SESSION DOSAGE GIVEN: 2 GY
RAD ONC ARIA REFERENCE POINT SESSION DOSAGE GIVEN: 2.09 GY

## 2025-08-04 ENCOUNTER — HOSPITAL ENCOUNTER (OUTPATIENT)
Facility: HOSPITAL | Age: 75
Discharge: HOME | End: 2025-08-07
Attending: RADIOLOGY

## 2025-08-04 LAB
RAD ONC ARIA COURSE FIRST TREATMENT DATE: NORMAL
RAD ONC ARIA COURSE ID: NORMAL
RAD ONC ARIA COURSE INTENT: NORMAL
RAD ONC ARIA COURSE LAST TREATMENT DATE: NORMAL
RAD ONC ARIA COURSE SESSION NUMBER: 21
RAD ONC ARIA COURSE START DATE: NORMAL
RAD ONC ARIA COURSE TREATMENT ELAPSED DAYS: 28
RAD ONC ARIA PLAN FRACTIONS TREATED TO DATE: 21
RAD ONC ARIA PLAN ID: NORMAL
RAD ONC ARIA PLAN PRESCRIBED DOSE PER FRACTION: 2 GY
RAD ONC ARIA PLAN PRIMARY REFERENCE POINT: NORMAL
RAD ONC ARIA PLAN TOTAL FRACTIONS PRESCRIBED: 32
RAD ONC ARIA PLAN TOTAL PRESCRIBED DOSE: 6400 CGY
RAD ONC ARIA REFERENCE POINT DOSAGE GIVEN TO DATE: 42 GY
RAD ONC ARIA REFERENCE POINT DOSAGE GIVEN TO DATE: 43.79 GY
RAD ONC ARIA REFERENCE POINT ID: NORMAL
RAD ONC ARIA REFERENCE POINT ID: NORMAL
RAD ONC ARIA REFERENCE POINT SESSION DOSAGE GIVEN: 2 GY
RAD ONC ARIA REFERENCE POINT SESSION DOSAGE GIVEN: 2.09 GY

## 2025-08-05 ENCOUNTER — HOSPITAL ENCOUNTER (OUTPATIENT)
Facility: HOSPITAL | Age: 75
Discharge: HOME | End: 2025-08-08
Attending: RADIOLOGY

## 2025-08-05 DIAGNOSIS — C49.6 SARCOMA OF PARASPINAL REGION (HCC): Primary | ICD-10-CM

## 2025-08-05 LAB
RAD ONC ARIA COURSE FIRST TREATMENT DATE: NORMAL
RAD ONC ARIA COURSE ID: NORMAL
RAD ONC ARIA COURSE INTENT: NORMAL
RAD ONC ARIA COURSE LAST TREATMENT DATE: NORMAL
RAD ONC ARIA COURSE SESSION NUMBER: 22
RAD ONC ARIA COURSE START DATE: NORMAL
RAD ONC ARIA COURSE TREATMENT ELAPSED DAYS: 29
RAD ONC ARIA PLAN FRACTIONS TREATED TO DATE: 22
RAD ONC ARIA PLAN ID: NORMAL
RAD ONC ARIA PLAN PRESCRIBED DOSE PER FRACTION: 2 GY
RAD ONC ARIA PLAN PRIMARY REFERENCE POINT: NORMAL
RAD ONC ARIA PLAN TOTAL FRACTIONS PRESCRIBED: 32
RAD ONC ARIA PLAN TOTAL PRESCRIBED DOSE: 6400 CGY
RAD ONC ARIA REFERENCE POINT DOSAGE GIVEN TO DATE: 44 GY
RAD ONC ARIA REFERENCE POINT DOSAGE GIVEN TO DATE: 45.87 GY
RAD ONC ARIA REFERENCE POINT ID: NORMAL
RAD ONC ARIA REFERENCE POINT ID: NORMAL
RAD ONC ARIA REFERENCE POINT SESSION DOSAGE GIVEN: 2 GY
RAD ONC ARIA REFERENCE POINT SESSION DOSAGE GIVEN: 2.09 GY

## 2025-08-06 ENCOUNTER — HOSPITAL ENCOUNTER (OUTPATIENT)
Facility: HOSPITAL | Age: 75
Discharge: HOME | End: 2025-08-09
Attending: RADIOLOGY

## 2025-08-06 LAB
RAD ONC ARIA COURSE FIRST TREATMENT DATE: NORMAL
RAD ONC ARIA COURSE ID: NORMAL
RAD ONC ARIA COURSE INTENT: NORMAL
RAD ONC ARIA COURSE LAST TREATMENT DATE: NORMAL
RAD ONC ARIA COURSE SESSION NUMBER: 23
RAD ONC ARIA COURSE START DATE: NORMAL
RAD ONC ARIA COURSE TREATMENT ELAPSED DAYS: 30
RAD ONC ARIA PLAN FRACTIONS TREATED TO DATE: 23
RAD ONC ARIA PLAN ID: NORMAL
RAD ONC ARIA PLAN PRESCRIBED DOSE PER FRACTION: 2 GY
RAD ONC ARIA PLAN PRIMARY REFERENCE POINT: NORMAL
RAD ONC ARIA PLAN TOTAL FRACTIONS PRESCRIBED: 32
RAD ONC ARIA PLAN TOTAL PRESCRIBED DOSE: 6400 CGY
RAD ONC ARIA REFERENCE POINT DOSAGE GIVEN TO DATE: 46 GY
RAD ONC ARIA REFERENCE POINT DOSAGE GIVEN TO DATE: 47.96 GY
RAD ONC ARIA REFERENCE POINT ID: NORMAL
RAD ONC ARIA REFERENCE POINT ID: NORMAL
RAD ONC ARIA REFERENCE POINT SESSION DOSAGE GIVEN: 2 GY
RAD ONC ARIA REFERENCE POINT SESSION DOSAGE GIVEN: 2.09 GY

## 2025-08-07 ENCOUNTER — HOSPITAL ENCOUNTER (OUTPATIENT)
Facility: HOSPITAL | Age: 75
Discharge: HOME | End: 2025-08-10
Attending: RADIOLOGY

## 2025-08-07 LAB
RAD ONC ARIA COURSE FIRST TREATMENT DATE: NORMAL
RAD ONC ARIA COURSE ID: NORMAL
RAD ONC ARIA COURSE INTENT: NORMAL
RAD ONC ARIA COURSE LAST TREATMENT DATE: NORMAL
RAD ONC ARIA COURSE SESSION NUMBER: 24
RAD ONC ARIA COURSE START DATE: NORMAL
RAD ONC ARIA COURSE TREATMENT ELAPSED DAYS: 31
RAD ONC ARIA PLAN FRACTIONS TREATED TO DATE: 24
RAD ONC ARIA PLAN ID: NORMAL
RAD ONC ARIA PLAN PRESCRIBED DOSE PER FRACTION: 2 GY
RAD ONC ARIA PLAN PRIMARY REFERENCE POINT: NORMAL
RAD ONC ARIA PLAN TOTAL FRACTIONS PRESCRIBED: 32
RAD ONC ARIA PLAN TOTAL PRESCRIBED DOSE: 6400 CGY
RAD ONC ARIA REFERENCE POINT DOSAGE GIVEN TO DATE: 48 GY
RAD ONC ARIA REFERENCE POINT DOSAGE GIVEN TO DATE: 50.04 GY
RAD ONC ARIA REFERENCE POINT ID: NORMAL
RAD ONC ARIA REFERENCE POINT ID: NORMAL
RAD ONC ARIA REFERENCE POINT SESSION DOSAGE GIVEN: 2 GY
RAD ONC ARIA REFERENCE POINT SESSION DOSAGE GIVEN: 2.09 GY

## 2025-08-08 ENCOUNTER — HOSPITAL ENCOUNTER (OUTPATIENT)
Facility: HOSPITAL | Age: 75
Discharge: HOME | End: 2025-08-11
Attending: RADIOLOGY

## 2025-08-08 LAB
RAD ONC ARIA COURSE FIRST TREATMENT DATE: NORMAL
RAD ONC ARIA COURSE ID: NORMAL
RAD ONC ARIA COURSE INTENT: NORMAL
RAD ONC ARIA COURSE LAST TREATMENT DATE: NORMAL
RAD ONC ARIA COURSE SESSION NUMBER: 25
RAD ONC ARIA COURSE START DATE: NORMAL
RAD ONC ARIA COURSE TREATMENT ELAPSED DAYS: 32
RAD ONC ARIA PLAN FRACTIONS TREATED TO DATE: 25
RAD ONC ARIA PLAN ID: NORMAL
RAD ONC ARIA PLAN PRESCRIBED DOSE PER FRACTION: 2 GY
RAD ONC ARIA PLAN PRIMARY REFERENCE POINT: NORMAL
RAD ONC ARIA PLAN TOTAL FRACTIONS PRESCRIBED: 32
RAD ONC ARIA PLAN TOTAL PRESCRIBED DOSE: 6400 CGY
RAD ONC ARIA REFERENCE POINT DOSAGE GIVEN TO DATE: 50 GY
RAD ONC ARIA REFERENCE POINT DOSAGE GIVEN TO DATE: 52.13 GY
RAD ONC ARIA REFERENCE POINT ID: NORMAL
RAD ONC ARIA REFERENCE POINT ID: NORMAL
RAD ONC ARIA REFERENCE POINT SESSION DOSAGE GIVEN: 2 GY
RAD ONC ARIA REFERENCE POINT SESSION DOSAGE GIVEN: 2.09 GY

## 2025-08-11 ENCOUNTER — HOSPITAL ENCOUNTER (OUTPATIENT)
Facility: HOSPITAL | Age: 75
Discharge: HOME | End: 2025-08-14
Attending: RADIOLOGY

## 2025-08-11 LAB
RAD ONC ARIA COURSE FIRST TREATMENT DATE: NORMAL
RAD ONC ARIA COURSE ID: NORMAL
RAD ONC ARIA COURSE INTENT: NORMAL
RAD ONC ARIA COURSE LAST TREATMENT DATE: NORMAL
RAD ONC ARIA COURSE SESSION NUMBER: 26
RAD ONC ARIA COURSE START DATE: NORMAL
RAD ONC ARIA COURSE TREATMENT ELAPSED DAYS: 35
RAD ONC ARIA PLAN FRACTIONS TREATED TO DATE: 26
RAD ONC ARIA PLAN ID: NORMAL
RAD ONC ARIA PLAN PRESCRIBED DOSE PER FRACTION: 2 GY
RAD ONC ARIA PLAN PRIMARY REFERENCE POINT: NORMAL
RAD ONC ARIA PLAN TOTAL FRACTIONS PRESCRIBED: 32
RAD ONC ARIA PLAN TOTAL PRESCRIBED DOSE: 6400 CGY
RAD ONC ARIA REFERENCE POINT DOSAGE GIVEN TO DATE: 52 GY
RAD ONC ARIA REFERENCE POINT DOSAGE GIVEN TO DATE: 54.21 GY
RAD ONC ARIA REFERENCE POINT ID: NORMAL
RAD ONC ARIA REFERENCE POINT ID: NORMAL
RAD ONC ARIA REFERENCE POINT SESSION DOSAGE GIVEN: 2 GY
RAD ONC ARIA REFERENCE POINT SESSION DOSAGE GIVEN: 2.09 GY

## 2025-08-12 ENCOUNTER — HOSPITAL ENCOUNTER (OUTPATIENT)
Facility: HOSPITAL | Age: 75
Discharge: HOME | End: 2025-08-15
Attending: RADIOLOGY

## 2025-08-12 VITALS — WEIGHT: 148 LBS | BODY MASS INDEX: 23.89 KG/M2

## 2025-08-12 LAB
RAD ONC ARIA COURSE FIRST TREATMENT DATE: NORMAL
RAD ONC ARIA COURSE ID: NORMAL
RAD ONC ARIA COURSE INTENT: NORMAL
RAD ONC ARIA COURSE LAST TREATMENT DATE: NORMAL
RAD ONC ARIA COURSE SESSION NUMBER: 27
RAD ONC ARIA COURSE START DATE: NORMAL
RAD ONC ARIA COURSE TREATMENT ELAPSED DAYS: 36
RAD ONC ARIA PLAN FRACTIONS TREATED TO DATE: 27
RAD ONC ARIA PLAN ID: NORMAL
RAD ONC ARIA PLAN PRESCRIBED DOSE PER FRACTION: 2 GY
RAD ONC ARIA PLAN PRIMARY REFERENCE POINT: NORMAL
RAD ONC ARIA PLAN TOTAL FRACTIONS PRESCRIBED: 32
RAD ONC ARIA PLAN TOTAL PRESCRIBED DOSE: 6400 CGY
RAD ONC ARIA REFERENCE POINT DOSAGE GIVEN TO DATE: 54 GY
RAD ONC ARIA REFERENCE POINT DOSAGE GIVEN TO DATE: 56.3 GY
RAD ONC ARIA REFERENCE POINT ID: NORMAL
RAD ONC ARIA REFERENCE POINT ID: NORMAL
RAD ONC ARIA REFERENCE POINT SESSION DOSAGE GIVEN: 2 GY
RAD ONC ARIA REFERENCE POINT SESSION DOSAGE GIVEN: 2.09 GY

## 2025-08-12 ASSESSMENT — PAIN SCALES - GENERAL: PAINLEVEL_OUTOF10: 0

## 2025-08-13 ENCOUNTER — HOSPITAL ENCOUNTER (OUTPATIENT)
Facility: HOSPITAL | Age: 75
Discharge: HOME | End: 2025-08-16
Attending: RADIOLOGY

## 2025-08-13 LAB
RAD ONC ARIA COURSE FIRST TREATMENT DATE: NORMAL
RAD ONC ARIA COURSE ID: NORMAL
RAD ONC ARIA COURSE INTENT: NORMAL
RAD ONC ARIA COURSE LAST TREATMENT DATE: NORMAL
RAD ONC ARIA COURSE SESSION NUMBER: 28
RAD ONC ARIA COURSE START DATE: NORMAL
RAD ONC ARIA COURSE TREATMENT ELAPSED DAYS: 37
RAD ONC ARIA PLAN FRACTIONS TREATED TO DATE: 28
RAD ONC ARIA PLAN ID: NORMAL
RAD ONC ARIA PLAN PRESCRIBED DOSE PER FRACTION: 2 GY
RAD ONC ARIA PLAN PRIMARY REFERENCE POINT: NORMAL
RAD ONC ARIA PLAN TOTAL FRACTIONS PRESCRIBED: 32
RAD ONC ARIA PLAN TOTAL PRESCRIBED DOSE: 6400 CGY
RAD ONC ARIA REFERENCE POINT DOSAGE GIVEN TO DATE: 56 GY
RAD ONC ARIA REFERENCE POINT DOSAGE GIVEN TO DATE: 58.38 GY
RAD ONC ARIA REFERENCE POINT ID: NORMAL
RAD ONC ARIA REFERENCE POINT ID: NORMAL
RAD ONC ARIA REFERENCE POINT SESSION DOSAGE GIVEN: 2 GY
RAD ONC ARIA REFERENCE POINT SESSION DOSAGE GIVEN: 2.09 GY

## 2025-08-14 ENCOUNTER — HOSPITAL ENCOUNTER (OUTPATIENT)
Facility: HOSPITAL | Age: 75
Discharge: HOME | End: 2025-08-17
Attending: RADIOLOGY

## 2025-08-14 LAB
RAD ONC ARIA COURSE FIRST TREATMENT DATE: NORMAL
RAD ONC ARIA COURSE ID: NORMAL
RAD ONC ARIA COURSE INTENT: NORMAL
RAD ONC ARIA COURSE LAST TREATMENT DATE: NORMAL
RAD ONC ARIA COURSE SESSION NUMBER: 29
RAD ONC ARIA COURSE START DATE: NORMAL
RAD ONC ARIA COURSE TREATMENT ELAPSED DAYS: 38
RAD ONC ARIA PLAN FRACTIONS TREATED TO DATE: 29
RAD ONC ARIA PLAN ID: NORMAL
RAD ONC ARIA PLAN PRESCRIBED DOSE PER FRACTION: 2 GY
RAD ONC ARIA PLAN PRIMARY REFERENCE POINT: NORMAL
RAD ONC ARIA PLAN TOTAL FRACTIONS PRESCRIBED: 32
RAD ONC ARIA PLAN TOTAL PRESCRIBED DOSE: 6400 CGY
RAD ONC ARIA REFERENCE POINT DOSAGE GIVEN TO DATE: 58 GY
RAD ONC ARIA REFERENCE POINT DOSAGE GIVEN TO DATE: 60.47 GY
RAD ONC ARIA REFERENCE POINT ID: NORMAL
RAD ONC ARIA REFERENCE POINT ID: NORMAL
RAD ONC ARIA REFERENCE POINT SESSION DOSAGE GIVEN: 2 GY
RAD ONC ARIA REFERENCE POINT SESSION DOSAGE GIVEN: 2.09 GY

## 2025-08-15 ENCOUNTER — HOSPITAL ENCOUNTER (OUTPATIENT)
Facility: HOSPITAL | Age: 75
Discharge: HOME | End: 2025-08-18
Attending: RADIOLOGY

## 2025-08-15 LAB
RAD ONC ARIA COURSE FIRST TREATMENT DATE: NORMAL
RAD ONC ARIA COURSE ID: NORMAL
RAD ONC ARIA COURSE INTENT: NORMAL
RAD ONC ARIA COURSE LAST TREATMENT DATE: NORMAL
RAD ONC ARIA COURSE SESSION NUMBER: 30
RAD ONC ARIA COURSE START DATE: NORMAL
RAD ONC ARIA COURSE TREATMENT ELAPSED DAYS: 39
RAD ONC ARIA PLAN FRACTIONS TREATED TO DATE: 30
RAD ONC ARIA PLAN ID: NORMAL
RAD ONC ARIA PLAN PRESCRIBED DOSE PER FRACTION: 2 GY
RAD ONC ARIA PLAN PRIMARY REFERENCE POINT: NORMAL
RAD ONC ARIA PLAN TOTAL FRACTIONS PRESCRIBED: 32
RAD ONC ARIA PLAN TOTAL PRESCRIBED DOSE: 6400 CGY
RAD ONC ARIA REFERENCE POINT DOSAGE GIVEN TO DATE: 60 GY
RAD ONC ARIA REFERENCE POINT DOSAGE GIVEN TO DATE: 62.55 GY
RAD ONC ARIA REFERENCE POINT ID: NORMAL
RAD ONC ARIA REFERENCE POINT ID: NORMAL
RAD ONC ARIA REFERENCE POINT SESSION DOSAGE GIVEN: 2 GY
RAD ONC ARIA REFERENCE POINT SESSION DOSAGE GIVEN: 2.09 GY

## 2025-08-18 ENCOUNTER — HOSPITAL ENCOUNTER (OUTPATIENT)
Facility: HOSPITAL | Age: 75
Discharge: HOME | End: 2025-08-21
Attending: RADIOLOGY

## 2025-08-18 LAB
RAD ONC ARIA COURSE FIRST TREATMENT DATE: NORMAL
RAD ONC ARIA COURSE ID: NORMAL
RAD ONC ARIA COURSE INTENT: NORMAL
RAD ONC ARIA COURSE LAST TREATMENT DATE: NORMAL
RAD ONC ARIA COURSE SESSION NUMBER: 31
RAD ONC ARIA COURSE START DATE: NORMAL
RAD ONC ARIA COURSE TREATMENT ELAPSED DAYS: 42
RAD ONC ARIA PLAN FRACTIONS TREATED TO DATE: 31
RAD ONC ARIA PLAN ID: NORMAL
RAD ONC ARIA PLAN PRESCRIBED DOSE PER FRACTION: 2 GY
RAD ONC ARIA PLAN PRIMARY REFERENCE POINT: NORMAL
RAD ONC ARIA PLAN TOTAL FRACTIONS PRESCRIBED: 32
RAD ONC ARIA PLAN TOTAL PRESCRIBED DOSE: 6400 CGY
RAD ONC ARIA REFERENCE POINT DOSAGE GIVEN TO DATE: 62 GY
RAD ONC ARIA REFERENCE POINT DOSAGE GIVEN TO DATE: 64.64 GY
RAD ONC ARIA REFERENCE POINT ID: NORMAL
RAD ONC ARIA REFERENCE POINT ID: NORMAL
RAD ONC ARIA REFERENCE POINT SESSION DOSAGE GIVEN: 2 GY
RAD ONC ARIA REFERENCE POINT SESSION DOSAGE GIVEN: 2.09 GY

## 2025-08-19 ENCOUNTER — HOSPITAL ENCOUNTER (OUTPATIENT)
Facility: HOSPITAL | Age: 75
Discharge: HOME | End: 2025-08-22
Attending: RADIOLOGY

## 2025-08-19 LAB
RAD ONC ARIA COURSE FIRST TREATMENT DATE: NORMAL
RAD ONC ARIA COURSE ID: NORMAL
RAD ONC ARIA COURSE INTENT: NORMAL
RAD ONC ARIA COURSE LAST TREATMENT DATE: NORMAL
RAD ONC ARIA COURSE SESSION NUMBER: 32
RAD ONC ARIA COURSE START DATE: NORMAL
RAD ONC ARIA COURSE TREATMENT ELAPSED DAYS: 43
RAD ONC ARIA PLAN FRACTIONS TREATED TO DATE: 32
RAD ONC ARIA PLAN ID: NORMAL
RAD ONC ARIA PLAN PRESCRIBED DOSE PER FRACTION: 2 GY
RAD ONC ARIA PLAN PRIMARY REFERENCE POINT: NORMAL
RAD ONC ARIA PLAN TOTAL FRACTIONS PRESCRIBED: 32
RAD ONC ARIA PLAN TOTAL PRESCRIBED DOSE: 6400 CGY
RAD ONC ARIA REFERENCE POINT DOSAGE GIVEN TO DATE: 64 GY
RAD ONC ARIA REFERENCE POINT DOSAGE GIVEN TO DATE: 66.72 GY
RAD ONC ARIA REFERENCE POINT ID: NORMAL
RAD ONC ARIA REFERENCE POINT ID: NORMAL
RAD ONC ARIA REFERENCE POINT SESSION DOSAGE GIVEN: 2 GY
RAD ONC ARIA REFERENCE POINT SESSION DOSAGE GIVEN: 2.09 GY

## 2025-08-28 ENCOUNTER — OFFICE VISIT (OUTPATIENT)
Age: 75
End: 2025-08-28
Payer: MEDICARE

## 2025-08-28 VITALS
SYSTOLIC BLOOD PRESSURE: 177 MMHG | WEIGHT: 154 LBS | DIASTOLIC BLOOD PRESSURE: 80 MMHG | OXYGEN SATURATION: 98 % | TEMPERATURE: 98.2 F | BODY MASS INDEX: 24.86 KG/M2

## 2025-08-28 DIAGNOSIS — C49.6 SARCOMA OF PARASPINAL REGION (HCC): Primary | ICD-10-CM

## 2025-08-28 PROCEDURE — 1090F PRES/ABSN URINE INCON ASSESS: CPT | Performed by: INTERNAL MEDICINE

## 2025-08-28 PROCEDURE — 1036F TOBACCO NON-USER: CPT | Performed by: INTERNAL MEDICINE

## 2025-08-28 PROCEDURE — 3079F DIAST BP 80-89 MM HG: CPT | Performed by: INTERNAL MEDICINE

## 2025-08-28 PROCEDURE — 1123F ACP DISCUSS/DSCN MKR DOCD: CPT | Performed by: INTERNAL MEDICINE

## 2025-08-28 PROCEDURE — G8420 CALC BMI NORM PARAMETERS: HCPCS | Performed by: INTERNAL MEDICINE

## 2025-08-28 PROCEDURE — 3017F COLORECTAL CA SCREEN DOC REV: CPT | Performed by: INTERNAL MEDICINE

## 2025-08-28 PROCEDURE — 99215 OFFICE O/P EST HI 40 MIN: CPT | Performed by: INTERNAL MEDICINE

## 2025-08-28 PROCEDURE — G8399 PT W/DXA RESULTS DOCUMENT: HCPCS | Performed by: INTERNAL MEDICINE

## 2025-08-28 PROCEDURE — G8428 CUR MEDS NOT DOCUMENT: HCPCS | Performed by: INTERNAL MEDICINE

## 2025-08-28 PROCEDURE — 3077F SYST BP >= 140 MM HG: CPT | Performed by: INTERNAL MEDICINE

## 2025-09-05 ENCOUNTER — TELEPHONE (OUTPATIENT)
Age: 75
End: 2025-09-05

## (undated) DEVICE — MEDI-VAC NON-CONDUCTIVE SUCTION TUBING: Brand: CARDINAL HEALTH

## (undated) DEVICE — 3M™ TEGADERM™ TRANSPARENT FILM DRESSING FRAME STYLE, 1626W, 4 IN X 4-3/4 IN (10 CM X 12 CM), 50/CT 4CT/CASE: Brand: 3M™ TEGADERM™

## (undated) DEVICE — BLADE ES ELASTOMERIC COAT INSUL DURABLE BEND UPTO 90DEG

## (undated) DEVICE — LINER SUCT CANSTR 3000CC PLAS SFT PRE ASSEMB W/OUT TBNG W/

## (undated) DEVICE — GOWN ISOL IMPERV UNIV, DISP, OPEN BACK, BLUE --

## (undated) DEVICE — CATHETER SUCT TR FL TIP 14FR W/ O CTRL

## (undated) DEVICE — SKIN MARKER,REGULAR TIP WITH RULER AND LABELS: Brand: DEVON

## (undated) DEVICE — NEEDLE,HYPODERM,SAFETY,21GX1.5": Brand: MEDLINE

## (undated) DEVICE — TROCAR TIP STAINLESS STEEL GUIDEWIRE 20": Brand: INVICTUS

## (undated) DEVICE — SYRINGE MED 25GA 3ML L5/8IN SUBQ PLAS W/ DETACH NDL SFTY

## (undated) DEVICE — SOLUTION IRRIG 1000ML H2O STRL BLT

## (undated) DEVICE — YANKAUER,SMOOTH HANDLE,HIGH CAPACITY: Brand: MEDLINE INDUSTRIES, INC.

## (undated) DEVICE — TROCAR TIP, NITINOL GUIDEWIRE, 320MM: Brand: LIF-PTP

## (undated) DEVICE — CHEST/BREAST DRAPE: Brand: CONVERTORS

## (undated) DEVICE — REM POLYHESIVE ADULT PATIENT RETURN ELECTRODE: Brand: VALLEYLAB

## (undated) DEVICE — PTP ACCESS PUSHER CAP: Brand: LIF-PTP

## (undated) DEVICE — GLOVE ORANGE PI 8   MSG9080

## (undated) DEVICE — DRAPE C-ARMOUR C-ARM KIT --

## (undated) DEVICE — SAFEOP STIMULATING BALL-TIP PROBE, STERILE: Brand: SAFEOP

## (undated) DEVICE — SYSTEM SKIN CLSR 22CM DERMBND PRINEO

## (undated) DEVICE — SUT VCRL + 2-0 27IN CT2 UD -- 36/BX

## (undated) DEVICE — SYR 20ML LL STRL LF --

## (undated) DEVICE — 10FR FRAZIER SUCTION HANDLE: Brand: CARDINAL HEALTH

## (undated) DEVICE — APPLIER CLP L9.38IN M LIG TI DISP STR RNG HNDL LIGACLP

## (undated) DEVICE — SYR 50ML SLIP TIP NSAF LF STRL --

## (undated) DEVICE — BLANKET WRM AD W50XL85.8IN PACU FULL BODY FORC AIR

## (undated) DEVICE — MARKER,SKIN,WI/RULER AND LABELS: Brand: MEDLINE

## (undated) DEVICE — PREP SKN CHLRAPRP APL 26ML STR --

## (undated) DEVICE — Device

## (undated) DEVICE — SYSTEM SKIN CLSR 60CM 2-OCTYL CYNOACRLT W/ MESH DISPNS

## (undated) DEVICE — 3.0MM PRECISION NEURO (MATCH HEAD)

## (undated) DEVICE — SAFEOP STIMULATING CLIP, STERILE: Brand: SAFEOP

## (undated) DEVICE — KIT CLN UP BON SECOURS MARYV

## (undated) DEVICE — CANNULA NSL AD TBNG L14FT STD PVC O2 CRV CONN NONFLARED NSL

## (undated) DEVICE — ENDOSCOPY PUMP TUBING/ CAP SET: Brand: ERBE

## (undated) DEVICE — INTENDED FOR TISSUE SEPARATION, AND OTHER PROCEDURES THAT REQUIRE A SHARP SURGICAL BLADE TO PUNCTURE OR CUT.: Brand: BARD-PARKER SAFETY BLADES SIZE 20, STERILE

## (undated) DEVICE — PTP INTRADISCAL SHIM, WIDE: Brand: LIF-PTP

## (undated) DEVICE — SUTURE STRATAFIX SYMMETRIC PDS + SZ 1 L18IN ABSRB VLT L48MM SXPP1A400

## (undated) DEVICE — SYMMETRY® FORCEPS, ROCHESTER OCHSNER, STRAIGHT, 1X2 TEETH, 9 1/2 IN: Brand: SYMMETRY SURGICAL®

## (undated) DEVICE — GLOVE ORTHO 8   MSG9480

## (undated) DEVICE — ARCUS GUIDED ACCESS SYSTEM - BEVEL TIP, STERILE: Brand: ARCUS

## (undated) DEVICE — PACK PROCEDURE SURG LAMINECTOMY SPINE CUST

## (undated) DEVICE — SURGIFOAM SPNG SZ 100

## (undated) DEVICE — APPLICATOR MEDICATED 10.5 CC SOLUTION CLR STRL CHLORAPREP

## (undated) DEVICE — JACKSON TABLE POSITIONER KIT: Brand: MEDLINE INDUSTRIES, INC.

## (undated) DEVICE — FLUFF AND POLYMER UNDERPAD,EXTRA HEAVY: Brand: WINGS

## (undated) DEVICE — INTENDED FOR TISSUE SEPARATION, AND OTHER PROCEDURES THAT REQUIRE A SHARP SURGICAL BLADE TO PUNCTURE OR CUT.: Brand: BARD-PARKER SAFETY BLADES SIZE 10, STERILE

## (undated) DEVICE — COLLAR CERV L H3.25X23IN M DENS FOAM COT STOCK CVR LO

## (undated) DEVICE — APPLIER CLP L L13IN TI MULT RNG HNDL 20 CLP STR LIGACLP

## (undated) DEVICE — BOTTLE  SPRAY  HYDROGEN PEROXIDE  EMPTY

## (undated) DEVICE — PIN SAFETY 2

## (undated) DEVICE — SPONGE SURG DISSECTOR 3/8 IN RND PNUT COTTON WHT STRL DISP

## (undated) DEVICE — STOCKING COMPR L L16-18IN LNG 19MMHG ANK 9-10IN CALF

## (undated) DEVICE — GARMENT,MEDLINE,DVT,INT,CALF,MED, GEN2: Brand: MEDLINE

## (undated) DEVICE — SINGLE PORT MANIFOLD: Brand: NEPTUNE 2

## (undated) DEVICE — TOTAL TRAY, 16FR 10ML SIL FOLEY, URN: Brand: MEDLINE

## (undated) DEVICE — SSC BONE WAX: Brand: SSC BONE WAX

## (undated) DEVICE — 450 ML BOTTLE OF 0.05% CHLORHEXIDINE GLUCONATE IN 99.95% STERILE WATER FOR IRRIGATION, USP AND APPLICATOR.: Brand: IRRISEPT ANTIMICROBIAL WOUND LAVAGE

## (undated) DEVICE — SYRINGE MED 3ML NDL 22GA L1 1/2IN REG BVL SFGLDE

## (undated) DEVICE — OPTIFOAM GENTLE SA, POSTOP, 4X8: Brand: MEDLINE

## (undated) DEVICE — SOL IRRIGATION INJ NACL 0.9% 500ML BTL

## (undated) DEVICE — SAFEOP EMG & SSEP NEEDLE ELECTRODE KIT: Brand: SAFEOP

## (undated) DEVICE — SPONGE DISSECT PNUT SM 3/8IN -- 5/PK

## (undated) DEVICE — FASCIAL BLADE: Brand: INVICTUS

## (undated) DEVICE — SCREW EXT FIX L14MM FOR DISTRCTN

## (undated) DEVICE — PACKING 8004000 NEURAY 200PK 13X13MM: Brand: NEURAY ®

## (undated) DEVICE — SUT MONOCRYL PLUS UD 3-0 --

## (undated) DEVICE — SYR 10ML LUER LOK 1/5ML GRAD --

## (undated) DEVICE — DRAIN SURG W7MMXL20CM SIL FULL PERF HUBLESS FLAT RADPQ STRP

## (undated) DEVICE — WATERPROOF, BACTERIA PROOF DRESSING WITH ABSORBENT SEE THROUGH PAD: Brand: OPSITE POST-OP VISIBLE 15X10CM CTN 20

## (undated) DEVICE — BONE VAC

## (undated) DEVICE — GAUZE,SPONGE,4"X4",16PLY,STRL,LF,10/TRAY: Brand: MEDLINE

## (undated) DEVICE — SUTURE VCRL SZ 3-0 L27IN ABSRB UD L26MM SH 1/2 CIR J416H

## (undated) DEVICE — CANNULA ORIG TL CLR W FOAM CUSHIONS AND 14FT SUPL TB 3 CHN

## (undated) DEVICE — SAFEOP INSULATED DILATOR KIT, STERILE: Brand: SAFEOP